# Patient Record
Sex: MALE | Race: WHITE | Employment: OTHER | URBAN - METROPOLITAN AREA
[De-identification: names, ages, dates, MRNs, and addresses within clinical notes are randomized per-mention and may not be internally consistent; named-entity substitution may affect disease eponyms.]

---

## 2017-04-19 ENCOUNTER — HOSPITAL ENCOUNTER (INPATIENT)
Age: 27
LOS: 8 days | Discharge: HOME | DRG: 750 | End: 2017-04-28
Attending: EMERGENCY MEDICINE | Admitting: PSYCHIATRY & NEUROLOGY
Payer: MEDICAID

## 2017-04-19 DIAGNOSIS — F23 ACUTE PSYCHOSIS (HCC): Primary | ICD-10-CM

## 2017-04-19 LAB
ALBUMIN SERPL-MCNC: 4.9 G/DL (ref 3.9–4.9)
ALP BLD-CCNC: 73 U/L (ref 35–104)
ALT SERPL-CCNC: 19 U/L (ref 0–41)
ANION GAP SERPL CALCULATED.3IONS-SCNC: 12 MEQ/L (ref 7–13)
AST SERPL-CCNC: 22 U/L (ref 0–40)
BASOPHILS ABSOLUTE: 0.1 K/UL (ref 0–0.2)
BASOPHILS RELATIVE PERCENT: 0.6 %
BILIRUB SERPL-MCNC: 0.4 MG/DL (ref 0–1.2)
BUN BLDV-MCNC: 21 MG/DL (ref 6–20)
CALCIUM SERPL-MCNC: 9.6 MG/DL (ref 8.6–10.2)
CHLORIDE BLD-SCNC: 98 MEQ/L (ref 98–107)
CO2: 27 MEQ/L (ref 22–29)
CREAT SERPL-MCNC: 0.95 MG/DL (ref 0.7–1.2)
EOSINOPHILS ABSOLUTE: 0.1 K/UL (ref 0–0.7)
EOSINOPHILS RELATIVE PERCENT: 1 %
ETHANOL PERCENT: NORMAL G/DL
ETHANOL: <10 MG/DL (ref 0–0.08)
GFR AFRICAN AMERICAN: >60
GFR NON-AFRICAN AMERICAN: >60
GLOBULIN: 2.3 G/DL (ref 2.3–3.5)
GLUCOSE BLD-MCNC: 151 MG/DL (ref 74–109)
HCT VFR BLD CALC: 45.2 % (ref 42–52)
HEMOGLOBIN: 15.3 G/DL (ref 14–18)
LYMPHOCYTES ABSOLUTE: 3.1 K/UL (ref 1–4.8)
LYMPHOCYTES RELATIVE PERCENT: 27.9 %
MCH RBC QN AUTO: 32.3 PG (ref 27–31.3)
MCHC RBC AUTO-ENTMCNC: 33.9 % (ref 33–37)
MCV RBC AUTO: 95.2 FL (ref 80–100)
MONOCYTES ABSOLUTE: 1 K/UL (ref 0.2–0.8)
MONOCYTES RELATIVE PERCENT: 8.8 %
NEUTROPHILS ABSOLUTE: 6.8 K/UL (ref 1.4–6.5)
NEUTROPHILS RELATIVE PERCENT: 61.7 %
PDW BLD-RTO: 12.1 % (ref 11.5–14.5)
PLATELET # BLD: 184 K/UL (ref 130–400)
POTASSIUM SERPL-SCNC: 3.7 MEQ/L (ref 3.5–5.1)
RBC # BLD: 4.74 M/UL (ref 4.7–6.1)
SODIUM BLD-SCNC: 137 MEQ/L (ref 132–144)
TOTAL CK: 169 U/L (ref 0–190)
TOTAL PROTEIN: 7.2 G/DL (ref 6.4–8.1)
TSH SERPL DL<=0.05 MIU/L-ACNC: 0.88 UIU/ML (ref 0.27–4.2)
VALPROIC ACID LEVEL: 9.4 UG/ML (ref 50–100)
WBC # BLD: 11 K/UL (ref 4.8–10.8)

## 2017-04-19 PROCEDURE — 6370000000 HC RX 637 (ALT 250 FOR IP): Performed by: PERSONAL EMERGENCY RESPONSE ATTENDANT

## 2017-04-19 PROCEDURE — 85025 COMPLETE CBC W/AUTO DIFF WBC: CPT

## 2017-04-19 PROCEDURE — 80053 COMPREHEN METABOLIC PANEL: CPT

## 2017-04-19 PROCEDURE — 81003 URINALYSIS AUTO W/O SCOPE: CPT

## 2017-04-19 PROCEDURE — 80307 DRUG TEST PRSMV CHEM ANLYZR: CPT

## 2017-04-19 PROCEDURE — 82550 ASSAY OF CK (CPK): CPT

## 2017-04-19 PROCEDURE — G0480 DRUG TEST DEF 1-7 CLASSES: HCPCS

## 2017-04-19 PROCEDURE — 99285 EMERGENCY DEPT VISIT HI MDM: CPT

## 2017-04-19 PROCEDURE — 36415 COLL VENOUS BLD VENIPUNCTURE: CPT

## 2017-04-19 PROCEDURE — 96372 THER/PROPH/DIAG INJ SC/IM: CPT

## 2017-04-19 PROCEDURE — 80164 ASSAY DIPROPYLACETIC ACD TOT: CPT

## 2017-04-19 PROCEDURE — 6360000002 HC RX W HCPCS: Performed by: EMERGENCY MEDICINE

## 2017-04-19 PROCEDURE — 84443 ASSAY THYROID STIM HORMONE: CPT

## 2017-04-19 RX ORDER — LORAZEPAM 1 MG/1
2 TABLET ORAL ONCE
Status: COMPLETED | OUTPATIENT
Start: 2017-04-19 | End: 2017-04-19

## 2017-04-19 RX ORDER — HALOPERIDOL 5 MG/ML
4 INJECTION INTRAMUSCULAR ONCE
Status: COMPLETED | OUTPATIENT
Start: 2017-04-19 | End: 2017-04-19

## 2017-04-19 RX ORDER — NICOTINE 21 MG/24HR
1 PATCH, TRANSDERMAL 24 HOURS TRANSDERMAL ONCE
Status: DISCONTINUED | OUTPATIENT
Start: 2017-04-19 | End: 2017-04-20

## 2017-04-19 RX ADMIN — HALOPERIDOL LACTATE 4 MG: 5 INJECTION, SOLUTION INTRAMUSCULAR at 20:38

## 2017-04-19 RX ADMIN — LORAZEPAM 2 MG: 1 TABLET ORAL at 20:30

## 2017-04-20 PROBLEM — M54.9 CHRONIC BACK PAIN: Status: ACTIVE | Noted: 2017-04-20

## 2017-04-20 PROBLEM — G89.29 CHRONIC BACK PAIN: Status: ACTIVE | Noted: 2017-04-20

## 2017-04-20 LAB
AMPHETAMINE SCREEN, URINE: ABNORMAL
BARBITURATE SCREEN URINE: ABNORMAL
BENZODIAZEPINE SCREEN, URINE: ABNORMAL
BILIRUBIN URINE: NEGATIVE
BLOOD, URINE: NEGATIVE
CANNABINOID SCREEN URINE: POSITIVE
CLARITY: CLEAR
COCAINE METABOLITE SCREEN URINE: ABNORMAL
COLOR: YELLOW
GLUCOSE URINE: NEGATIVE MG/DL
KETONES, URINE: NEGATIVE MG/DL
LEUKOCYTE ESTERASE, URINE: NEGATIVE
Lab: ABNORMAL
NITRITE, URINE: NEGATIVE
OPIATE SCREEN URINE: ABNORMAL
PH UA: 6 (ref 5–9)
PHENCYCLIDINE SCREEN URINE: ABNORMAL
PROTEIN UA: NEGATIVE MG/DL
SPECIFIC GRAVITY UA: 1.02 (ref 1–1.03)
URINE REFLEX TO CULTURE: NORMAL
UROBILINOGEN, URINE: 0.2 E.U./DL

## 2017-04-20 PROCEDURE — 99223 1ST HOSP IP/OBS HIGH 75: CPT | Performed by: PSYCHIATRY & NEUROLOGY

## 2017-04-20 PROCEDURE — 6370000000 HC RX 637 (ALT 250 FOR IP): Performed by: PHYSICIAN ASSISTANT

## 2017-04-20 PROCEDURE — 1240000000 HC EMOTIONAL WELLNESS R&B

## 2017-04-20 PROCEDURE — 6360000002 HC RX W HCPCS

## 2017-04-20 PROCEDURE — 6370000000 HC RX 637 (ALT 250 FOR IP): Performed by: PSYCHIATRY & NEUROLOGY

## 2017-04-20 PROCEDURE — 6360000002 HC RX W HCPCS: Performed by: PHYSICIAN ASSISTANT

## 2017-04-20 RX ORDER — DIVALPROEX SODIUM 500 MG/1
500 TABLET, EXTENDED RELEASE ORAL 2 TIMES DAILY
Status: DISCONTINUED | OUTPATIENT
Start: 2017-04-20 | End: 2017-04-23

## 2017-04-20 RX ORDER — HALOPERIDOL 5 MG/ML
5 INJECTION INTRAMUSCULAR ONCE
Status: COMPLETED | OUTPATIENT
Start: 2017-04-20 | End: 2017-04-20

## 2017-04-20 RX ORDER — HYDROXYZINE PAMOATE 50 MG/1
50 CAPSULE ORAL EVERY 6 HOURS PRN
Status: DISCONTINUED | OUTPATIENT
Start: 2017-04-20 | End: 2017-04-28 | Stop reason: HOSPADM

## 2017-04-20 RX ORDER — ACETAMINOPHEN 325 MG/1
650 TABLET ORAL EVERY 4 HOURS PRN
Status: DISCONTINUED | OUTPATIENT
Start: 2017-04-20 | End: 2017-04-28 | Stop reason: HOSPADM

## 2017-04-20 RX ORDER — LORAZEPAM 2 MG/ML
INJECTION INTRAMUSCULAR
Status: COMPLETED
Start: 2017-04-20 | End: 2017-04-20

## 2017-04-20 RX ORDER — LORAZEPAM 2 MG/ML
2 INJECTION INTRAMUSCULAR ONCE
Status: COMPLETED | OUTPATIENT
Start: 2017-04-20 | End: 2017-04-20

## 2017-04-20 RX ORDER — LIDOCAINE 50 MG/G
1 PATCH TOPICAL DAILY
Status: DISCONTINUED | OUTPATIENT
Start: 2017-04-20 | End: 2017-04-25

## 2017-04-20 RX ORDER — PALIPERIDONE 6 MG/1
6 TABLET, EXTENDED RELEASE ORAL DAILY
Status: DISCONTINUED | OUTPATIENT
Start: 2017-04-20 | End: 2017-04-23

## 2017-04-20 RX ORDER — MAGNESIUM HYDROXIDE/ALUMINUM HYDROXICE/SIMETHICONE 120; 1200; 1200 MG/30ML; MG/30ML; MG/30ML
30 SUSPENSION ORAL PRN
Status: DISCONTINUED | OUTPATIENT
Start: 2017-04-20 | End: 2017-04-28 | Stop reason: HOSPADM

## 2017-04-20 RX ORDER — HALOPERIDOL 5 MG/ML
INJECTION INTRAMUSCULAR
Status: DISPENSED
Start: 2017-04-20 | End: 2017-04-20

## 2017-04-20 RX ORDER — GABAPENTIN 100 MG/1
100 CAPSULE ORAL 3 TIMES DAILY
Status: DISCONTINUED | OUTPATIENT
Start: 2017-04-20 | End: 2017-04-25

## 2017-04-20 RX ORDER — BENZTROPINE MESYLATE 1 MG/ML
2 INJECTION INTRAMUSCULAR; INTRAVENOUS 2 TIMES DAILY PRN
Status: DISCONTINUED | OUTPATIENT
Start: 2017-04-20 | End: 2017-04-28 | Stop reason: HOSPADM

## 2017-04-20 RX ORDER — TRAZODONE HYDROCHLORIDE 50 MG/1
50 TABLET ORAL NIGHTLY PRN
Status: DISCONTINUED | OUTPATIENT
Start: 2017-04-20 | End: 2017-04-28 | Stop reason: HOSPADM

## 2017-04-20 RX ORDER — NICOTINE 21 MG/24HR
1 PATCH, TRANSDERMAL 24 HOURS TRANSDERMAL ONCE
Status: DISCONTINUED | OUTPATIENT
Start: 2017-04-20 | End: 2017-04-20

## 2017-04-20 RX ORDER — HALOPERIDOL 5 MG/ML
5 INJECTION INTRAMUSCULAR EVERY 4 HOURS PRN
Status: DISCONTINUED | OUTPATIENT
Start: 2017-04-20 | End: 2017-04-21

## 2017-04-20 RX ORDER — NICOTINE 21 MG/24HR
1 PATCH, TRANSDERMAL 24 HOURS TRANSDERMAL DAILY
Status: DISCONTINUED | OUTPATIENT
Start: 2017-04-20 | End: 2017-04-21

## 2017-04-20 RX ADMIN — ACETAMINOPHEN 650 MG: 325 TABLET ORAL at 13:33

## 2017-04-20 RX ADMIN — DIVALPROEX SODIUM 500 MG: 500 TABLET, EXTENDED RELEASE ORAL at 13:32

## 2017-04-20 RX ADMIN — LORAZEPAM 2 MG: 2 INJECTION INTRAMUSCULAR at 06:39

## 2017-04-20 RX ADMIN — DIVALPROEX SODIUM 500 MG: 500 TABLET, EXTENDED RELEASE ORAL at 20:48

## 2017-04-20 RX ADMIN — GABAPENTIN 100 MG: 100 CAPSULE ORAL at 13:32

## 2017-04-20 RX ADMIN — HALOPERIDOL LACTATE 5 MG: 5 INJECTION, SOLUTION INTRAMUSCULAR at 06:39

## 2017-04-20 RX ADMIN — GABAPENTIN 100 MG: 100 CAPSULE ORAL at 20:48

## 2017-04-20 RX ADMIN — PALIPERIDONE 6 MG: 6 TABLET, EXTENDED RELEASE ORAL at 13:32

## 2017-04-20 ASSESSMENT — SLEEP AND FATIGUE QUESTIONNAIRES
SLEEP PATTERN: DIFFICULTY FALLING ASLEEP;DISTURBED/INTERRUPTED SLEEP;INSOMNIA
DIFFICULTY ARISING: NO
DO YOU USE A SLEEP AID: YES
AVERAGE NUMBER OF SLEEP HOURS: 4
DIFFICULTY STAYING ASLEEP: YES
DIFFICULTY FALLING ASLEEP: YES
RESTFUL SLEEP: NO
DO YOU HAVE DIFFICULTY SLEEPING: YES

## 2017-04-20 ASSESSMENT — ENCOUNTER SYMPTOMS: BACK PAIN: 1

## 2017-04-20 ASSESSMENT — PATIENT HEALTH QUESTIONNAIRE - PHQ9: SUM OF ALL RESPONSES TO PHQ QUESTIONS 1-9: 25

## 2017-04-20 ASSESSMENT — PAIN SCALES - GENERAL: PAINLEVEL_OUTOF10: 4

## 2017-04-21 PROCEDURE — 6370000000 HC RX 637 (ALT 250 FOR IP): Performed by: PHYSICIAN ASSISTANT

## 2017-04-21 PROCEDURE — 6370000000 HC RX 637 (ALT 250 FOR IP): Performed by: PSYCHIATRY & NEUROLOGY

## 2017-04-21 PROCEDURE — 99232 SBSQ HOSP IP/OBS MODERATE 35: CPT | Performed by: PSYCHIATRY & NEUROLOGY

## 2017-04-21 PROCEDURE — 1240000000 HC EMOTIONAL WELLNESS R&B

## 2017-04-21 RX ORDER — ZIPRASIDONE MESYLATE 20 MG/ML
20 INJECTION, POWDER, LYOPHILIZED, FOR SOLUTION INTRAMUSCULAR EVERY 12 HOURS PRN
Status: DISCONTINUED | OUTPATIENT
Start: 2017-04-21 | End: 2017-04-23

## 2017-04-21 RX ORDER — LORAZEPAM 1 MG/1
1 TABLET ORAL EVERY 6 HOURS PRN
Status: DISCONTINUED | OUTPATIENT
Start: 2017-04-21 | End: 2017-04-26

## 2017-04-21 RX ORDER — HALOPERIDOL 5 MG/ML
5 INJECTION INTRAMUSCULAR EVERY 4 HOURS PRN
Status: DISCONTINUED | OUTPATIENT
Start: 2017-04-21 | End: 2017-04-28 | Stop reason: HOSPADM

## 2017-04-21 RX ORDER — HALOPERIDOL 5 MG
5 TABLET ORAL EVERY 4 HOURS PRN
Status: DISCONTINUED | OUTPATIENT
Start: 2017-04-21 | End: 2017-04-28 | Stop reason: HOSPADM

## 2017-04-21 RX ORDER — LORAZEPAM 2 MG/ML
1 INJECTION INTRAMUSCULAR EVERY 6 HOURS PRN
Status: DISCONTINUED | OUTPATIENT
Start: 2017-04-21 | End: 2017-04-26

## 2017-04-21 RX ADMIN — GABAPENTIN 100 MG: 100 CAPSULE ORAL at 21:14

## 2017-04-21 RX ADMIN — MAGNESIUM HYDROXIDE 30 ML: 400 SUSPENSION ORAL at 01:53

## 2017-04-21 RX ADMIN — GABAPENTIN 100 MG: 100 CAPSULE ORAL at 08:16

## 2017-04-21 RX ADMIN — PALIPERIDONE 6 MG: 6 TABLET, EXTENDED RELEASE ORAL at 08:20

## 2017-04-21 RX ADMIN — HALOPERIDOL 5 MG: 5 TABLET ORAL at 01:53

## 2017-04-21 RX ADMIN — TRAZODONE HYDROCHLORIDE 50 MG: 50 TABLET ORAL at 21:14

## 2017-04-21 RX ADMIN — HYDROXYZINE PAMOATE 50 MG: 50 CAPSULE ORAL at 14:31

## 2017-04-21 RX ADMIN — HYDROXYZINE PAMOATE 50 MG: 50 CAPSULE ORAL at 19:31

## 2017-04-21 RX ADMIN — NICOTINE POLACRILEX 2 MG: 2 GUM, CHEWING BUCCAL at 19:27

## 2017-04-21 RX ADMIN — HALOPERIDOL 5 MG: 5 TABLET ORAL at 14:31

## 2017-04-21 RX ADMIN — DIVALPROEX SODIUM 500 MG: 500 TABLET, EXTENDED RELEASE ORAL at 21:14

## 2017-04-21 RX ADMIN — GABAPENTIN 100 MG: 100 CAPSULE ORAL at 13:11

## 2017-04-21 RX ADMIN — NICOTINE POLACRILEX 2 MG: 2 GUM, CHEWING BUCCAL at 13:11

## 2017-04-21 RX ADMIN — HYDROXYZINE PAMOATE 50 MG: 50 CAPSULE ORAL at 01:53

## 2017-04-21 RX ADMIN — DIVALPROEX SODIUM 500 MG: 500 TABLET, EXTENDED RELEASE ORAL at 08:15

## 2017-04-21 RX ADMIN — HALOPERIDOL 5 MG: 5 TABLET ORAL at 08:15

## 2017-04-21 RX ADMIN — LORAZEPAM 1 MG: 1 TABLET ORAL at 15:00

## 2017-04-22 PROCEDURE — 6370000000 HC RX 637 (ALT 250 FOR IP): Performed by: PSYCHIATRY & NEUROLOGY

## 2017-04-22 PROCEDURE — 1240000000 HC EMOTIONAL WELLNESS R&B

## 2017-04-22 RX ORDER — DIVALPROEX SODIUM 500 MG/1
500 TABLET, DELAYED RELEASE ORAL ONCE
Status: DISCONTINUED | OUTPATIENT
Start: 2017-04-22 | End: 2017-04-22

## 2017-04-22 RX ORDER — DIVALPROEX SODIUM 500 MG/1
1000 TABLET, DELAYED RELEASE ORAL ONCE
Status: COMPLETED | OUTPATIENT
Start: 2017-04-22 | End: 2017-04-22

## 2017-04-22 RX ADMIN — TRAZODONE HYDROCHLORIDE 50 MG: 50 TABLET ORAL at 20:39

## 2017-04-22 RX ADMIN — DIVALPROEX SODIUM 500 MG: 500 TABLET, EXTENDED RELEASE ORAL at 20:39

## 2017-04-22 RX ADMIN — HALOPERIDOL 5 MG: 5 TABLET ORAL at 12:45

## 2017-04-22 RX ADMIN — NICOTINE POLACRILEX 2 MG: 2 GUM, CHEWING BUCCAL at 12:36

## 2017-04-22 RX ADMIN — DIVALPROEX SODIUM 1000 MG: 500 TABLET, DELAYED RELEASE ORAL at 13:47

## 2017-04-22 RX ADMIN — LORAZEPAM 1 MG: 1 TABLET ORAL at 12:34

## 2017-04-22 RX ADMIN — GABAPENTIN 100 MG: 100 CAPSULE ORAL at 20:39

## 2017-04-22 RX ADMIN — NICOTINE POLACRILEX 2 MG: 2 GUM, CHEWING BUCCAL at 06:38

## 2017-04-22 RX ADMIN — PALIPERIDONE 6 MG: 6 TABLET, EXTENDED RELEASE ORAL at 09:02

## 2017-04-22 RX ADMIN — LORAZEPAM 1 MG: 1 TABLET ORAL at 20:58

## 2017-04-22 RX ADMIN — NICOTINE POLACRILEX 2 MG: 2 GUM, CHEWING BUCCAL at 19:57

## 2017-04-22 RX ADMIN — HYDROXYZINE PAMOATE 50 MG: 50 CAPSULE ORAL at 12:45

## 2017-04-22 RX ADMIN — DIVALPROEX SODIUM 500 MG: 500 TABLET, EXTENDED RELEASE ORAL at 09:02

## 2017-04-22 RX ADMIN — HALOPERIDOL 5 MG: 5 TABLET ORAL at 05:44

## 2017-04-22 RX ADMIN — HYDROXYZINE PAMOATE 50 MG: 50 CAPSULE ORAL at 05:44

## 2017-04-22 RX ADMIN — GABAPENTIN 100 MG: 100 CAPSULE ORAL at 09:02

## 2017-04-22 RX ADMIN — LORAZEPAM 1 MG: 1 TABLET ORAL at 06:38

## 2017-04-23 PROCEDURE — 1240000000 HC EMOTIONAL WELLNESS R&B

## 2017-04-23 PROCEDURE — 6370000000 HC RX 637 (ALT 250 FOR IP): Performed by: PSYCHIATRY & NEUROLOGY

## 2017-04-23 PROCEDURE — 36415 COLL VENOUS BLD VENIPUNCTURE: CPT

## 2017-04-23 PROCEDURE — 80164 ASSAY DIPROPYLACETIC ACD TOT: CPT

## 2017-04-23 RX ORDER — DIVALPROEX SODIUM 500 MG/1
500 TABLET, EXTENDED RELEASE ORAL 3 TIMES DAILY
Status: DISCONTINUED | OUTPATIENT
Start: 2017-04-23 | End: 2017-04-26

## 2017-04-23 RX ORDER — PALIPERIDONE 9 MG/1
9 TABLET, EXTENDED RELEASE ORAL DAILY
Status: DISCONTINUED | OUTPATIENT
Start: 2017-04-24 | End: 2017-04-28 | Stop reason: HOSPADM

## 2017-04-23 RX ADMIN — TRAZODONE HYDROCHLORIDE 50 MG: 50 TABLET ORAL at 20:57

## 2017-04-23 RX ADMIN — DIVALPROEX SODIUM 500 MG: 500 TABLET, EXTENDED RELEASE ORAL at 20:52

## 2017-04-23 RX ADMIN — PALIPERIDONE 6 MG: 6 TABLET, EXTENDED RELEASE ORAL at 08:10

## 2017-04-23 RX ADMIN — HALOPERIDOL 5 MG: 5 TABLET ORAL at 11:12

## 2017-04-23 RX ADMIN — HYDROXYZINE PAMOATE 50 MG: 50 CAPSULE ORAL at 14:10

## 2017-04-23 RX ADMIN — LORAZEPAM 1 MG: 1 TABLET ORAL at 11:12

## 2017-04-23 RX ADMIN — DIVALPROEX SODIUM 500 MG: 500 TABLET, EXTENDED RELEASE ORAL at 08:10

## 2017-04-23 RX ADMIN — GABAPENTIN 100 MG: 100 CAPSULE ORAL at 08:10

## 2017-04-23 RX ADMIN — GABAPENTIN 100 MG: 100 CAPSULE ORAL at 20:52

## 2017-04-23 RX ADMIN — DIVALPROEX SODIUM 500 MG: 500 TABLET, EXTENDED RELEASE ORAL at 14:01

## 2017-04-23 RX ADMIN — LORAZEPAM 1 MG: 1 TABLET ORAL at 17:58

## 2017-04-23 RX ADMIN — NICOTINE POLACRILEX 2 MG: 2 GUM, CHEWING BUCCAL at 06:46

## 2017-04-23 RX ADMIN — GABAPENTIN 100 MG: 100 CAPSULE ORAL at 14:01

## 2017-04-24 PROCEDURE — 6370000000 HC RX 637 (ALT 250 FOR IP): Performed by: PSYCHIATRY & NEUROLOGY

## 2017-04-24 PROCEDURE — 99232 SBSQ HOSP IP/OBS MODERATE 35: CPT | Performed by: PSYCHIATRY & NEUROLOGY

## 2017-04-24 PROCEDURE — 6370000000 HC RX 637 (ALT 250 FOR IP): Performed by: PHYSICIAN ASSISTANT

## 2017-04-24 PROCEDURE — 1240000000 HC EMOTIONAL WELLNESS R&B

## 2017-04-24 RX ADMIN — LORAZEPAM 1 MG: 1 TABLET ORAL at 12:49

## 2017-04-24 RX ADMIN — DIVALPROEX SODIUM 500 MG: 500 TABLET, EXTENDED RELEASE ORAL at 09:26

## 2017-04-24 RX ADMIN — GABAPENTIN 100 MG: 100 CAPSULE ORAL at 20:42

## 2017-04-24 RX ADMIN — HYDROXYZINE PAMOATE 50 MG: 50 CAPSULE ORAL at 15:59

## 2017-04-24 RX ADMIN — GABAPENTIN 100 MG: 100 CAPSULE ORAL at 09:25

## 2017-04-24 RX ADMIN — HYDROXYZINE PAMOATE 50 MG: 50 CAPSULE ORAL at 10:11

## 2017-04-24 RX ADMIN — LORAZEPAM 1 MG: 1 TABLET ORAL at 04:57

## 2017-04-24 RX ADMIN — PALIPERIDONE 9 MG: 9 TABLET, EXTENDED RELEASE ORAL at 09:26

## 2017-04-24 RX ADMIN — NICOTINE POLACRILEX 2 MG: 2 GUM, CHEWING BUCCAL at 09:41

## 2017-04-24 RX ADMIN — HALOPERIDOL 5 MG: 5 TABLET ORAL at 16:41

## 2017-04-24 RX ADMIN — TRAZODONE HYDROCHLORIDE 50 MG: 50 TABLET ORAL at 20:42

## 2017-04-24 RX ADMIN — DIVALPROEX SODIUM 500 MG: 500 TABLET, EXTENDED RELEASE ORAL at 13:47

## 2017-04-24 RX ADMIN — GABAPENTIN 100 MG: 100 CAPSULE ORAL at 13:47

## 2017-04-24 RX ADMIN — DIVALPROEX SODIUM 500 MG: 500 TABLET, EXTENDED RELEASE ORAL at 20:42

## 2017-04-25 ENCOUNTER — APPOINTMENT (OUTPATIENT)
Dept: GENERAL RADIOLOGY | Age: 27
DRG: 750 | End: 2017-04-25
Payer: MEDICAID

## 2017-04-25 PROBLEM — M25.561 KNEE PAIN, BILATERAL: Status: ACTIVE | Noted: 2017-04-25

## 2017-04-25 PROBLEM — M25.562 KNEE PAIN, BILATERAL: Status: ACTIVE | Noted: 2017-04-25

## 2017-04-25 LAB
VALPROIC ACID % FREE: 11 % (ref 5–18)
VALPROIC ACID TOTAL: 75 UG/ML (ref 50–125)
VALPROIC ACID, FREE: 8 UG/ML (ref 7–23)

## 2017-04-25 PROCEDURE — 1240000000 HC EMOTIONAL WELLNESS R&B

## 2017-04-25 PROCEDURE — 99232 SBSQ HOSP IP/OBS MODERATE 35: CPT | Performed by: PSYCHIATRY & NEUROLOGY

## 2017-04-25 PROCEDURE — 72110 X-RAY EXAM L-2 SPINE 4/>VWS: CPT

## 2017-04-25 PROCEDURE — 73562 X-RAY EXAM OF KNEE 3: CPT

## 2017-04-25 PROCEDURE — 6370000000 HC RX 637 (ALT 250 FOR IP): Performed by: PSYCHIATRY & NEUROLOGY

## 2017-04-25 PROCEDURE — 6370000000 HC RX 637 (ALT 250 FOR IP): Performed by: ANESTHESIOLOGY

## 2017-04-25 RX ORDER — METHOCARBAMOL 500 MG/1
500 TABLET, FILM COATED ORAL 3 TIMES DAILY PRN
Status: DISCONTINUED | OUTPATIENT
Start: 2017-04-25 | End: 2017-04-28 | Stop reason: HOSPADM

## 2017-04-25 RX ORDER — MELOXICAM 7.5 MG/1
7.5 TABLET ORAL 2 TIMES DAILY
Status: DISCONTINUED | OUTPATIENT
Start: 2017-04-25 | End: 2017-04-28 | Stop reason: HOSPADM

## 2017-04-25 RX ORDER — LIDOCAINE 50 MG/G
3 PATCH TOPICAL DAILY
Status: DISCONTINUED | OUTPATIENT
Start: 2017-04-26 | End: 2017-04-28 | Stop reason: HOSPADM

## 2017-04-25 RX ORDER — GABAPENTIN 300 MG/1
300 CAPSULE ORAL NIGHTLY
Status: DISCONTINUED | OUTPATIENT
Start: 2017-04-26 | End: 2017-04-26

## 2017-04-25 RX ADMIN — GABAPENTIN 100 MG: 100 CAPSULE ORAL at 08:45

## 2017-04-25 RX ADMIN — GABAPENTIN 100 MG: 100 CAPSULE ORAL at 20:15

## 2017-04-25 RX ADMIN — DIVALPROEX SODIUM 500 MG: 500 TABLET, EXTENDED RELEASE ORAL at 21:35

## 2017-04-25 RX ADMIN — LORAZEPAM 1 MG: 1 TABLET ORAL at 18:51

## 2017-04-25 RX ADMIN — HYDROXYZINE PAMOATE 50 MG: 50 CAPSULE ORAL at 06:02

## 2017-04-25 RX ADMIN — MELOXICAM 7.5 MG: 7.5 TABLET ORAL at 21:34

## 2017-04-25 RX ADMIN — NICOTINE POLACRILEX 2 MG: 2 GUM, CHEWING BUCCAL at 15:02

## 2017-04-25 RX ADMIN — HALOPERIDOL 5 MG: 5 TABLET ORAL at 15:24

## 2017-04-25 RX ADMIN — NICOTINE POLACRILEX 2 MG: 2 GUM, CHEWING BUCCAL at 20:13

## 2017-04-25 RX ADMIN — HALOPERIDOL 5 MG: 5 TABLET ORAL at 20:34

## 2017-04-25 RX ADMIN — HYDROXYZINE PAMOATE 50 MG: 50 CAPSULE ORAL at 15:24

## 2017-04-25 RX ADMIN — TRAZODONE HYDROCHLORIDE 50 MG: 50 TABLET ORAL at 21:34

## 2017-04-25 RX ADMIN — PALIPERIDONE 9 MG: 9 TABLET, EXTENDED RELEASE ORAL at 08:45

## 2017-04-25 RX ADMIN — DIVALPROEX SODIUM 500 MG: 500 TABLET, EXTENDED RELEASE ORAL at 08:45

## 2017-04-25 RX ADMIN — GABAPENTIN 100 MG: 100 CAPSULE ORAL at 14:01

## 2017-04-25 RX ADMIN — HALOPERIDOL 5 MG: 5 TABLET ORAL at 06:04

## 2017-04-25 RX ADMIN — METHOCARBAMOL 500 MG: 500 TABLET ORAL at 21:35

## 2017-04-25 RX ADMIN — HYDROXYZINE PAMOATE 50 MG: 50 CAPSULE ORAL at 21:35

## 2017-04-25 RX ADMIN — DIVALPROEX SODIUM 500 MG: 500 TABLET, EXTENDED RELEASE ORAL at 14:01

## 2017-04-25 RX ADMIN — LORAZEPAM 1 MG: 1 TABLET ORAL at 10:24

## 2017-04-26 LAB — VALPROIC ACID LEVEL: 62.1 UG/ML (ref 50–100)

## 2017-04-26 PROCEDURE — 1240000000 HC EMOTIONAL WELLNESS R&B

## 2017-04-26 PROCEDURE — 6370000000 HC RX 637 (ALT 250 FOR IP): Performed by: PSYCHIATRY & NEUROLOGY

## 2017-04-26 PROCEDURE — 6370000000 HC RX 637 (ALT 250 FOR IP): Performed by: ANESTHESIOLOGY

## 2017-04-26 PROCEDURE — 80164 ASSAY DIPROPYLACETIC ACD TOT: CPT

## 2017-04-26 PROCEDURE — 36415 COLL VENOUS BLD VENIPUNCTURE: CPT

## 2017-04-26 PROCEDURE — 99233 SBSQ HOSP IP/OBS HIGH 50: CPT | Performed by: PSYCHIATRY & NEUROLOGY

## 2017-04-26 RX ORDER — DIVALPROEX SODIUM 500 MG/1
500 TABLET, DELAYED RELEASE ORAL ONCE
Status: COMPLETED | OUTPATIENT
Start: 2017-04-26 | End: 2017-04-26

## 2017-04-26 RX ORDER — GABAPENTIN 300 MG/1
300 CAPSULE ORAL 3 TIMES DAILY
Status: DISCONTINUED | OUTPATIENT
Start: 2017-04-26 | End: 2017-04-28 | Stop reason: HOSPADM

## 2017-04-26 RX ORDER — DIVALPROEX SODIUM 500 MG/1
1000 TABLET, DELAYED RELEASE ORAL EVERY 12 HOURS SCHEDULED
Status: DISCONTINUED | OUTPATIENT
Start: 2017-04-26 | End: 2017-04-28 | Stop reason: HOSPADM

## 2017-04-26 RX ORDER — BENZTROPINE MESYLATE 1 MG/1
1 TABLET ORAL 2 TIMES DAILY
Status: DISCONTINUED | OUTPATIENT
Start: 2017-04-26 | End: 2017-04-28 | Stop reason: HOSPADM

## 2017-04-26 RX ADMIN — PALIPERIDONE 9 MG: 9 TABLET, EXTENDED RELEASE ORAL at 08:52

## 2017-04-26 RX ADMIN — HYDROXYZINE PAMOATE 50 MG: 50 CAPSULE ORAL at 08:52

## 2017-04-26 RX ADMIN — DIVALPROEX SODIUM 500 MG: 500 TABLET, DELAYED RELEASE ORAL at 12:13

## 2017-04-26 RX ADMIN — NICOTINE POLACRILEX 2 MG: 2 GUM, CHEWING BUCCAL at 17:12

## 2017-04-26 RX ADMIN — HALOPERIDOL 5 MG: 5 TABLET ORAL at 08:52

## 2017-04-26 RX ADMIN — GABAPENTIN 300 MG: 300 CAPSULE ORAL at 14:09

## 2017-04-26 RX ADMIN — MELOXICAM 7.5 MG: 7.5 TABLET ORAL at 08:52

## 2017-04-26 RX ADMIN — HALOPERIDOL 5 MG: 5 TABLET ORAL at 18:10

## 2017-04-26 RX ADMIN — GABAPENTIN 300 MG: 300 CAPSULE ORAL at 20:56

## 2017-04-26 RX ADMIN — MELOXICAM 7.5 MG: 7.5 TABLET ORAL at 20:57

## 2017-04-26 RX ADMIN — BENZTROPINE MESYLATE 1 MG: 1 TABLET ORAL at 12:12

## 2017-04-26 RX ADMIN — DIVALPROEX SODIUM 500 MG: 500 TABLET, EXTENDED RELEASE ORAL at 08:52

## 2017-04-26 RX ADMIN — HYDROXYZINE PAMOATE 50 MG: 50 CAPSULE ORAL at 18:10

## 2017-04-26 RX ADMIN — NICOTINE POLACRILEX 2 MG: 2 GUM, CHEWING BUCCAL at 19:02

## 2017-04-26 RX ADMIN — BENZTROPINE MESYLATE 1 MG: 1 TABLET ORAL at 20:56

## 2017-04-26 RX ADMIN — DIVALPROEX SODIUM 1000 MG: 500 TABLET, DELAYED RELEASE ORAL at 20:56

## 2017-04-26 RX ADMIN — NICOTINE POLACRILEX 2 MG: 2 GUM, CHEWING BUCCAL at 21:31

## 2017-04-26 ASSESSMENT — PAIN SCALES - GENERAL
PAINLEVEL_OUTOF10: 6
PAINLEVEL_OUTOF10: 5

## 2017-04-27 PROCEDURE — 90833 PSYTX W PT W E/M 30 MIN: CPT | Performed by: PSYCHIATRY & NEUROLOGY

## 2017-04-27 PROCEDURE — 6370000000 HC RX 637 (ALT 250 FOR IP): Performed by: PSYCHIATRY & NEUROLOGY

## 2017-04-27 PROCEDURE — 99232 SBSQ HOSP IP/OBS MODERATE 35: CPT | Performed by: PSYCHIATRY & NEUROLOGY

## 2017-04-27 PROCEDURE — 1240000000 HC EMOTIONAL WELLNESS R&B

## 2017-04-27 PROCEDURE — 6370000000 HC RX 637 (ALT 250 FOR IP): Performed by: ANESTHESIOLOGY

## 2017-04-27 RX ADMIN — HALOPERIDOL 5 MG: 5 TABLET ORAL at 21:26

## 2017-04-27 RX ADMIN — BENZTROPINE MESYLATE 1 MG: 1 TABLET ORAL at 08:53

## 2017-04-27 RX ADMIN — NICOTINE POLACRILEX 2 MG: 2 GUM, CHEWING BUCCAL at 13:38

## 2017-04-27 RX ADMIN — GABAPENTIN 300 MG: 300 CAPSULE ORAL at 20:31

## 2017-04-27 RX ADMIN — HYDROXYZINE PAMOATE 50 MG: 50 CAPSULE ORAL at 21:26

## 2017-04-27 RX ADMIN — MELOXICAM 7.5 MG: 7.5 TABLET ORAL at 20:31

## 2017-04-27 RX ADMIN — TRAZODONE HYDROCHLORIDE 50 MG: 50 TABLET ORAL at 21:59

## 2017-04-27 RX ADMIN — GABAPENTIN 300 MG: 300 CAPSULE ORAL at 13:35

## 2017-04-27 RX ADMIN — PALIPERIDONE 9 MG: 9 TABLET, EXTENDED RELEASE ORAL at 08:53

## 2017-04-27 RX ADMIN — DIVALPROEX SODIUM 1000 MG: 500 TABLET, DELAYED RELEASE ORAL at 20:31

## 2017-04-27 RX ADMIN — GABAPENTIN 300 MG: 300 CAPSULE ORAL at 08:53

## 2017-04-27 RX ADMIN — NICOTINE POLACRILEX 2 MG: 2 GUM, CHEWING BUCCAL at 17:11

## 2017-04-27 RX ADMIN — MELOXICAM 7.5 MG: 7.5 TABLET ORAL at 08:53

## 2017-04-27 RX ADMIN — DIVALPROEX SODIUM 1000 MG: 500 TABLET, DELAYED RELEASE ORAL at 08:53

## 2017-04-27 RX ADMIN — BENZTROPINE MESYLATE 1 MG: 1 TABLET ORAL at 20:31

## 2017-04-27 ASSESSMENT — PAIN SCALES - GENERAL
PAINLEVEL_OUTOF10: 0
PAINLEVEL_OUTOF10: 0

## 2017-04-28 VITALS
HEIGHT: 72 IN | BODY MASS INDEX: 18.42 KG/M2 | RESPIRATION RATE: 16 BRPM | TEMPERATURE: 97 F | DIASTOLIC BLOOD PRESSURE: 63 MMHG | WEIGHT: 136 LBS | SYSTOLIC BLOOD PRESSURE: 108 MMHG | HEART RATE: 130 BPM | OXYGEN SATURATION: 97 %

## 2017-04-28 PROCEDURE — 99239 HOSP IP/OBS DSCHRG MGMT >30: CPT | Performed by: PSYCHIATRY & NEUROLOGY

## 2017-04-28 PROCEDURE — 6370000000 HC RX 637 (ALT 250 FOR IP): Performed by: PSYCHIATRY & NEUROLOGY

## 2017-04-28 PROCEDURE — 6370000000 HC RX 637 (ALT 250 FOR IP): Performed by: ANESTHESIOLOGY

## 2017-04-28 RX ORDER — DIVALPROEX SODIUM 500 MG/1
1000 TABLET, DELAYED RELEASE ORAL EVERY 12 HOURS SCHEDULED
Qty: 60 TABLET | Refills: 2 | Status: ON HOLD | OUTPATIENT
Start: 2017-04-28 | End: 2017-06-19 | Stop reason: HOSPADM

## 2017-04-28 RX ORDER — PALIPERIDONE 9 MG/1
9 TABLET, EXTENDED RELEASE ORAL DAILY
Qty: 30 TABLET | Refills: 0 | Status: ON HOLD | OUTPATIENT
Start: 2017-04-28 | End: 2017-06-19 | Stop reason: HOSPADM

## 2017-04-28 RX ORDER — GABAPENTIN 300 MG/1
300 CAPSULE ORAL 3 TIMES DAILY
Qty: 45 CAPSULE | Refills: 2 | Status: ON HOLD | OUTPATIENT
Start: 2017-04-28 | End: 2017-06-19 | Stop reason: HOSPADM

## 2017-04-28 RX ORDER — TRAZODONE HYDROCHLORIDE 50 MG/1
50 TABLET ORAL NIGHTLY PRN
Qty: 15 TABLET | Refills: 2 | Status: ON HOLD | OUTPATIENT
Start: 2017-04-28 | End: 2017-06-19 | Stop reason: HOSPADM

## 2017-04-28 RX ORDER — HYDROXYZINE PAMOATE 50 MG/1
50 CAPSULE ORAL 3 TIMES DAILY PRN
Qty: 30 CAPSULE | Refills: 2 | Status: SHIPPED | OUTPATIENT
Start: 2017-04-28 | End: 2017-05-12

## 2017-04-28 RX ORDER — BENZTROPINE MESYLATE 1 MG/1
1 TABLET ORAL 2 TIMES DAILY
Qty: 30 TABLET | Refills: 1 | Status: ON HOLD | OUTPATIENT
Start: 2017-04-28 | End: 2017-06-19 | Stop reason: HOSPADM

## 2017-04-28 RX ADMIN — BENZTROPINE MESYLATE 1 MG: 1 TABLET ORAL at 08:47

## 2017-04-28 RX ADMIN — MELOXICAM 7.5 MG: 7.5 TABLET ORAL at 08:47

## 2017-04-28 RX ADMIN — GABAPENTIN 300 MG: 300 CAPSULE ORAL at 08:47

## 2017-04-28 RX ADMIN — DIVALPROEX SODIUM 1000 MG: 500 TABLET, DELAYED RELEASE ORAL at 08:47

## 2017-04-28 RX ADMIN — PALIPERIDONE 9 MG: 9 TABLET, EXTENDED RELEASE ORAL at 08:47

## 2017-05-01 ENCOUNTER — HOSPITAL ENCOUNTER (EMERGENCY)
Age: 27
Discharge: HOME | End: 2017-05-01
Attending: EMERGENCY MEDICINE
Payer: MEDICAID

## 2017-05-01 ENCOUNTER — TELEPHONE (OUTPATIENT)
Dept: INTERNAL MEDICINE | Age: 27
End: 2017-05-01

## 2017-05-01 VITALS
SYSTOLIC BLOOD PRESSURE: 139 MMHG | OXYGEN SATURATION: 98 % | HEART RATE: 88 BPM | DIASTOLIC BLOOD PRESSURE: 91 MMHG | RESPIRATION RATE: 16 BRPM | TEMPERATURE: 98.3 F

## 2017-05-01 DIAGNOSIS — F41.1 ANXIETY STATE: Primary | ICD-10-CM

## 2017-05-01 PROCEDURE — 99282 EMERGENCY DEPT VISIT SF MDM: CPT

## 2017-05-01 PROCEDURE — 6370000000 HC RX 637 (ALT 250 FOR IP): Performed by: EMERGENCY MEDICINE

## 2017-05-01 RX ORDER — LORAZEPAM 1 MG/1
TABLET ORAL
Qty: 6 TABLET | Refills: 0 | Status: SHIPPED | OUTPATIENT
Start: 2017-05-01 | End: 2017-05-29

## 2017-05-01 RX ORDER — LORAZEPAM 1 MG/1
1 TABLET ORAL ONCE
Status: DISCONTINUED | OUTPATIENT
Start: 2017-05-01 | End: 2017-05-02 | Stop reason: HOSPADM

## 2017-05-01 RX ORDER — LORAZEPAM 1 MG/1
1 TABLET ORAL ONCE
Status: COMPLETED | OUTPATIENT
Start: 2017-05-01 | End: 2017-05-01

## 2017-05-01 RX ADMIN — LORAZEPAM 1 MG: 1 TABLET ORAL at 22:43

## 2017-05-01 ASSESSMENT — ENCOUNTER SYMPTOMS
TROUBLE SWALLOWING: 0
STRIDOR: 0
WHEEZING: 0
FACIAL SWELLING: 0
DIARRHEA: 0
BLOOD IN STOOL: 0
VOICE CHANGE: 0
RECTAL PAIN: 0
CHOKING: 0
EYE PAIN: 0
EYE REDNESS: 0
CHEST TIGHTNESS: 0
APNEA: 0
NAUSEA: 0
SORE THROAT: 0
PHOTOPHOBIA: 0
SHORTNESS OF BREATH: 0
ANAL BLEEDING: 0
ABDOMINAL DISTENTION: 0
EYE ITCHING: 0
CONSTIPATION: 0
RHINORRHEA: 0
COUGH: 0
EYE DISCHARGE: 0
BACK PAIN: 0
ABDOMINAL PAIN: 0
COLOR CHANGE: 0
SINUS PRESSURE: 0
VOMITING: 0

## 2017-05-01 ASSESSMENT — PAIN DESCRIPTION - PAIN TYPE: TYPE: ACUTE PAIN

## 2017-05-01 ASSESSMENT — PAIN DESCRIPTION - LOCATION: LOCATION: GENERALIZED

## 2017-05-01 ASSESSMENT — PAIN DESCRIPTION - DESCRIPTORS: DESCRIPTORS: ACHING

## 2017-05-01 ASSESSMENT — PAIN DESCRIPTION - FREQUENCY: FREQUENCY: CONTINUOUS

## 2017-05-02 ENCOUNTER — TELEPHONE (OUTPATIENT)
Dept: INTERNAL MEDICINE | Age: 27
End: 2017-05-02

## 2017-05-05 ENCOUNTER — OFFICE VISIT (OUTPATIENT)
Dept: FAMILY MEDICINE CLINIC | Age: 27
End: 2017-05-05

## 2017-05-05 VITALS
SYSTOLIC BLOOD PRESSURE: 124 MMHG | TEMPERATURE: 97.8 F | BODY MASS INDEX: 23.77 KG/M2 | HEART RATE: 102 BPM | DIASTOLIC BLOOD PRESSURE: 80 MMHG | OXYGEN SATURATION: 98 % | RESPIRATION RATE: 20 BRPM | HEIGHT: 74 IN | WEIGHT: 185.2 LBS

## 2017-05-05 DIAGNOSIS — F25.0 SCHIZOAFFECTIVE DISORDER, BIPOLAR TYPE (HCC): Primary | ICD-10-CM

## 2017-05-05 PROCEDURE — 99213 OFFICE O/P EST LOW 20 MIN: CPT | Performed by: NURSE PRACTITIONER

## 2017-05-05 ASSESSMENT — ENCOUNTER SYMPTOMS
WHEEZING: 0
SHORTNESS OF BREATH: 0
COUGH: 0

## 2017-05-29 ENCOUNTER — APPOINTMENT (OUTPATIENT)
Dept: GENERAL RADIOLOGY | Age: 27
End: 2017-05-29
Payer: MEDICAID

## 2017-05-29 ENCOUNTER — HOSPITAL ENCOUNTER (EMERGENCY)
Age: 27
Discharge: HOME | End: 2017-05-29
Attending: EMERGENCY MEDICINE
Payer: MEDICAID

## 2017-05-29 ENCOUNTER — APPOINTMENT (OUTPATIENT)
Dept: CT IMAGING | Age: 27
End: 2017-05-29
Payer: MEDICAID

## 2017-05-29 VITALS
HEIGHT: 72 IN | HEART RATE: 83 BPM | BODY MASS INDEX: 22.35 KG/M2 | RESPIRATION RATE: 18 BRPM | DIASTOLIC BLOOD PRESSURE: 61 MMHG | OXYGEN SATURATION: 97 % | TEMPERATURE: 98.6 F | SYSTOLIC BLOOD PRESSURE: 125 MMHG | WEIGHT: 165 LBS

## 2017-05-29 DIAGNOSIS — F19.90 ILLICIT DRUG USE: ICD-10-CM

## 2017-05-29 DIAGNOSIS — S20.212A CONTUSION OF CHEST WALL, LEFT, INITIAL ENCOUNTER: ICD-10-CM

## 2017-05-29 DIAGNOSIS — R56.9 SEIZURE (HCC): Primary | ICD-10-CM

## 2017-05-29 LAB
AMPHETAMINE SCREEN, URINE: ABNORMAL
ANION GAP SERPL CALCULATED.3IONS-SCNC: 38 MEQ/L (ref 7–13)
BACTERIA: NORMAL /HPF
BARBITURATE SCREEN URINE: ABNORMAL
BASOPHILS ABSOLUTE: 0.1 K/UL (ref 0–0.2)
BASOPHILS RELATIVE PERCENT: 0.5 %
BENZODIAZEPINE SCREEN, URINE: ABNORMAL
BILIRUBIN URINE: NEGATIVE
BLOOD, URINE: ABNORMAL
BUN BLDV-MCNC: 17 MG/DL (ref 6–20)
CALCIUM SERPL-MCNC: 10.7 MG/DL (ref 8.6–10.2)
CANNABINOID SCREEN URINE: POSITIVE
CHLORIDE BLD-SCNC: 92 MEQ/L (ref 98–107)
CLARITY: CLEAR
CO2: 15 MEQ/L (ref 22–29)
COCAINE METABOLITE SCREEN URINE: ABNORMAL
COLOR: YELLOW
CREAT SERPL-MCNC: 1.21 MG/DL (ref 0.7–1.2)
EOSINOPHILS ABSOLUTE: 0.1 K/UL (ref 0–0.7)
EOSINOPHILS RELATIVE PERCENT: 0.7 %
EPITHELIAL CELLS, UA: NORMAL /HPF
GFR AFRICAN AMERICAN: >60
GFR NON-AFRICAN AMERICAN: >60
GLUCOSE BLD-MCNC: 140 MG/DL (ref 74–109)
GLUCOSE URINE: NEGATIVE MG/DL
HCT VFR BLD CALC: 48.7 % (ref 42–52)
HEMOGLOBIN: 16.6 G/DL (ref 14–18)
KETONES, URINE: NEGATIVE MG/DL
LACTIC ACID: 3.6 MMOL/L (ref 0.5–2.2)
LEUKOCYTE ESTERASE, URINE: NEGATIVE
LYMPHOCYTES ABSOLUTE: 2.9 K/UL (ref 1–4.8)
LYMPHOCYTES RELATIVE PERCENT: 24.2 %
Lab: ABNORMAL
MAGNESIUM: 2.7 MG/DL (ref 1.7–2.3)
MCH RBC QN AUTO: 32.9 PG (ref 27–31.3)
MCHC RBC AUTO-ENTMCNC: 34.1 % (ref 33–37)
MCV RBC AUTO: 96.4 FL (ref 80–100)
MONOCYTES ABSOLUTE: 0.8 K/UL (ref 0.2–0.8)
MONOCYTES RELATIVE PERCENT: 6.9 %
NEUTROPHILS ABSOLUTE: 8.2 K/UL (ref 1.4–6.5)
NEUTROPHILS RELATIVE PERCENT: 67.7 %
NITRITE, URINE: NEGATIVE
OPIATE SCREEN URINE: ABNORMAL
PDW BLD-RTO: 12.8 % (ref 11.5–14.5)
PH UA: 6 (ref 5–9)
PHENCYCLIDINE SCREEN URINE: ABNORMAL
PLATELET # BLD: 214 K/UL (ref 130–400)
POTASSIUM SERPL-SCNC: 3.6 MEQ/L (ref 3.5–5.1)
PROTEIN UA: 30 MG/DL
RBC # BLD: 5.05 M/UL (ref 4.7–6.1)
RBC UA: NORMAL /HPF (ref 0–2)
SODIUM BLD-SCNC: 145 MEQ/L (ref 132–144)
SPECIFIC GRAVITY UA: 1.02 (ref 1–1.03)
TRICYCLIC, URINE: POSITIVE
URINE REFLEX TO CULTURE: YES
UROBILINOGEN, URINE: 0.2 E.U./DL
WBC # BLD: 12.2 K/UL (ref 4.8–10.8)
WBC UA: NORMAL /HPF (ref 0–5)

## 2017-05-29 PROCEDURE — 85025 COMPLETE CBC W/AUTO DIFF WBC: CPT

## 2017-05-29 PROCEDURE — 83605 ASSAY OF LACTIC ACID: CPT

## 2017-05-29 PROCEDURE — 87086 URINE CULTURE/COLONY COUNT: CPT

## 2017-05-29 PROCEDURE — 93005 ELECTROCARDIOGRAM TRACING: CPT

## 2017-05-29 PROCEDURE — 99285 EMERGENCY DEPT VISIT HI MDM: CPT

## 2017-05-29 PROCEDURE — 83735 ASSAY OF MAGNESIUM: CPT

## 2017-05-29 PROCEDURE — 80048 BASIC METABOLIC PNL TOTAL CA: CPT

## 2017-05-29 PROCEDURE — 70450 CT HEAD/BRAIN W/O DYE: CPT

## 2017-05-29 PROCEDURE — 2580000003 HC RX 258: Performed by: EMERGENCY MEDICINE

## 2017-05-29 PROCEDURE — 80307 DRUG TEST PRSMV CHEM ANLYZR: CPT

## 2017-05-29 PROCEDURE — 71020 XR CHEST STANDARD TWO VW: CPT

## 2017-05-29 PROCEDURE — 81001 URINALYSIS AUTO W/SCOPE: CPT

## 2017-05-29 RX ORDER — HYDROXYZINE PAMOATE 50 MG/1
50 CAPSULE ORAL 3 TIMES DAILY PRN
Status: ON HOLD | COMMUNITY
End: 2017-06-19 | Stop reason: HOSPADM

## 2017-05-29 RX ORDER — 0.9 % SODIUM CHLORIDE 0.9 %
1000 INTRAVENOUS SOLUTION INTRAVENOUS ONCE
Status: COMPLETED | OUTPATIENT
Start: 2017-05-29 | End: 2017-05-29

## 2017-05-29 RX ADMIN — SODIUM CHLORIDE 1000 ML: 9 INJECTION, SOLUTION INTRAVENOUS at 19:40

## 2017-05-29 ASSESSMENT — ENCOUNTER SYMPTOMS
RHINORRHEA: 0
SHORTNESS OF BREATH: 0
COUGH: 0
ABDOMINAL DISTENTION: 0
ABDOMINAL PAIN: 0
COLOR CHANGE: 0
BACK PAIN: 0
EYE PAIN: 0
SINUS PRESSURE: 0
CONSTIPATION: 0
NAUSEA: 0
VOMITING: 0
PHOTOPHOBIA: 0
APNEA: 0
DIARRHEA: 0
WHEEZING: 0
SORE THROAT: 0

## 2017-05-29 ASSESSMENT — PAIN DESCRIPTION - PROGRESSION: CLINICAL_PROGRESSION: NOT CHANGED

## 2017-05-29 ASSESSMENT — PAIN DESCRIPTION - DESCRIPTORS: DESCRIPTORS: HEADACHE

## 2017-05-29 ASSESSMENT — PAIN DESCRIPTION - ONSET: ONSET: SUDDEN

## 2017-05-29 ASSESSMENT — PAIN DESCRIPTION - LOCATION: LOCATION: HEAD

## 2017-05-29 ASSESSMENT — PAIN DESCRIPTION - ORIENTATION: ORIENTATION: LEFT;RIGHT

## 2017-06-01 LAB — URINE CULTURE, ROUTINE: NORMAL

## 2017-06-08 LAB
EKG ATRIAL RATE: 83 BPM
EKG P AXIS: 83 DEGREES
EKG P-R INTERVAL: 154 MS
EKG Q-T INTERVAL: 370 MS
EKG QRS DURATION: 90 MS
EKG QTC CALCULATION (BAZETT): 434 MS
EKG R AXIS: 88 DEGREES
EKG T AXIS: 44 DEGREES
EKG VENTRICULAR RATE: 83 BPM

## 2017-06-12 ENCOUNTER — APPOINTMENT (OUTPATIENT)
Dept: GENERAL RADIOLOGY | Age: 27
End: 2017-06-12
Payer: MEDICAID

## 2017-06-12 ENCOUNTER — HOSPITAL ENCOUNTER (EMERGENCY)
Age: 27
Discharge: HOME | End: 2017-06-12
Attending: EMERGENCY MEDICINE
Payer: MEDICAID

## 2017-06-12 VITALS
OXYGEN SATURATION: 98 % | DIASTOLIC BLOOD PRESSURE: 70 MMHG | BODY MASS INDEX: 24.38 KG/M2 | TEMPERATURE: 98.7 F | HEART RATE: 94 BPM | RESPIRATION RATE: 16 BRPM | HEIGHT: 72 IN | SYSTOLIC BLOOD PRESSURE: 123 MMHG | WEIGHT: 180 LBS

## 2017-06-12 DIAGNOSIS — S63.502A SPRAIN OF WRIST, LEFT, INITIAL ENCOUNTER: Primary | ICD-10-CM

## 2017-06-12 PROCEDURE — 99283 EMERGENCY DEPT VISIT LOW MDM: CPT

## 2017-06-12 PROCEDURE — 73110 X-RAY EXAM OF WRIST: CPT

## 2017-06-12 PROCEDURE — 6370000000 HC RX 637 (ALT 250 FOR IP): Performed by: EMERGENCY MEDICINE

## 2017-06-12 PROCEDURE — 73130 X-RAY EXAM OF HAND: CPT

## 2017-06-12 RX ORDER — OXYCODONE HYDROCHLORIDE AND ACETAMINOPHEN 5; 325 MG/1; MG/1
1 TABLET ORAL ONCE
Status: COMPLETED | OUTPATIENT
Start: 2017-06-12 | End: 2017-06-12

## 2017-06-12 RX ORDER — NAPROXEN 500 MG/1
500 TABLET ORAL 2 TIMES DAILY WITH MEALS
Qty: 30 TABLET | Refills: 0 | Status: ON HOLD | OUTPATIENT
Start: 2017-06-12 | End: 2017-06-19 | Stop reason: HOSPADM

## 2017-06-12 RX ADMIN — OXYCODONE HYDROCHLORIDE AND ACETAMINOPHEN 1 TABLET: 5; 325 TABLET ORAL at 21:58

## 2017-06-12 ASSESSMENT — ENCOUNTER SYMPTOMS
EYE PAIN: 0
STRIDOR: 0
EYE DISCHARGE: 0
CONSTIPATION: 0
APNEA: 0
WHEEZING: 0
SORE THROAT: 0
VOICE CHANGE: 0
PHOTOPHOBIA: 0
EYE ITCHING: 0
FACIAL SWELLING: 0
BLOOD IN STOOL: 0
ABDOMINAL PAIN: 0
SINUS PRESSURE: 0
COLOR CHANGE: 0
BACK PAIN: 0
CHOKING: 0
SHORTNESS OF BREATH: 0
ANAL BLEEDING: 0
TROUBLE SWALLOWING: 0
RECTAL PAIN: 0
EYE REDNESS: 0
CHEST TIGHTNESS: 0
DIARRHEA: 0
RHINORRHEA: 0
VOMITING: 0
ABDOMINAL DISTENTION: 0
COUGH: 0
NAUSEA: 0

## 2017-06-12 ASSESSMENT — PAIN DESCRIPTION - PROGRESSION
CLINICAL_PROGRESSION: NOT CHANGED
CLINICAL_PROGRESSION: NOT CHANGED

## 2017-06-12 ASSESSMENT — PAIN DESCRIPTION - PAIN TYPE
TYPE: ACUTE PAIN
TYPE: ACUTE PAIN

## 2017-06-12 ASSESSMENT — PAIN SCALES - GENERAL
PAINLEVEL_OUTOF10: 6
PAINLEVEL_OUTOF10: 3

## 2017-06-12 ASSESSMENT — PAIN DESCRIPTION - FREQUENCY
FREQUENCY: CONTINUOUS
FREQUENCY: CONTINUOUS

## 2017-06-12 ASSESSMENT — PAIN DESCRIPTION - LOCATION
LOCATION: WRIST
LOCATION: WRIST

## 2017-06-12 ASSESSMENT — PAIN DESCRIPTION - DESCRIPTORS
DESCRIPTORS: ACHING
DESCRIPTORS: ACHING

## 2017-06-12 ASSESSMENT — PAIN DESCRIPTION - ONSET
ONSET: SUDDEN
ONSET: SUDDEN

## 2017-06-12 ASSESSMENT — PAIN DESCRIPTION - ORIENTATION
ORIENTATION: LEFT
ORIENTATION: LEFT

## 2017-06-13 ENCOUNTER — HOSPITAL ENCOUNTER (INPATIENT)
Age: 27
LOS: 6 days | Discharge: HOME | DRG: 750 | End: 2017-06-19
Attending: EMERGENCY MEDICINE | Admitting: PSYCHIATRY & NEUROLOGY
Payer: MEDICAID

## 2017-06-13 DIAGNOSIS — F25.0 SCHIZOAFFECTIVE DISORDER, BIPOLAR TYPE (HCC): Primary | ICD-10-CM

## 2017-06-13 LAB
ACETAMINOPHEN LEVEL: <15 UG/ML (ref 10–30)
ALBUMIN SERPL-MCNC: 5.2 G/DL (ref 3.9–4.9)
ALP BLD-CCNC: 105 U/L (ref 35–104)
ALT SERPL-CCNC: 20 U/L (ref 0–41)
AMPHETAMINE SCREEN, URINE: POSITIVE
ANION GAP SERPL CALCULATED.3IONS-SCNC: 17 MEQ/L (ref 7–13)
AST SERPL-CCNC: 28 U/L (ref 0–40)
BARBITURATE SCREEN URINE: ABNORMAL
BASOPHILS ABSOLUTE: 0 K/UL (ref 0–0.2)
BASOPHILS RELATIVE PERCENT: 0.3 %
BENZODIAZEPINE SCREEN, URINE: ABNORMAL
BILIRUB SERPL-MCNC: 0.7 MG/DL (ref 0–1.2)
BILIRUBIN URINE: NEGATIVE
BLOOD, URINE: NEGATIVE
BUN BLDV-MCNC: 24 MG/DL (ref 6–20)
CALCIUM SERPL-MCNC: 10 MG/DL (ref 8.6–10.2)
CANNABINOID SCREEN URINE: POSITIVE
CHLORIDE BLD-SCNC: 99 MEQ/L (ref 98–107)
CK MB: 7.5 NG/ML (ref 0–6.7)
CLARITY: CLEAR
CO2: 23 MEQ/L (ref 22–29)
COCAINE METABOLITE SCREEN URINE: ABNORMAL
COLOR: YELLOW
CREAT SERPL-MCNC: 0.77 MG/DL (ref 0.7–1.2)
CREATINE KINASE-MB INDEX: 1.5 % (ref 0–3.5)
EOSINOPHILS ABSOLUTE: 0.1 K/UL (ref 0–0.7)
EOSINOPHILS RELATIVE PERCENT: 0.4 %
ETHANOL PERCENT: NORMAL G/DL
ETHANOL: <10 MG/DL (ref 0–0.08)
GFR AFRICAN AMERICAN: >60
GFR NON-AFRICAN AMERICAN: >60
GLOBULIN: 2.6 G/DL (ref 2.3–3.5)
GLUCOSE BLD-MCNC: 107 MG/DL (ref 74–109)
GLUCOSE URINE: NEGATIVE MG/DL
HCT VFR BLD CALC: 46.6 % (ref 42–52)
HEMOGLOBIN: 15.9 G/DL (ref 14–18)
KETONES, URINE: NEGATIVE MG/DL
LEUKOCYTE ESTERASE, URINE: NEGATIVE
LYMPHOCYTES ABSOLUTE: 1.6 K/UL (ref 1–4.8)
LYMPHOCYTES RELATIVE PERCENT: 12.9 %
Lab: ABNORMAL
MCH RBC QN AUTO: 31.6 PG (ref 27–31.3)
MCHC RBC AUTO-ENTMCNC: 34 % (ref 33–37)
MCV RBC AUTO: 92.9 FL (ref 80–100)
MONOCYTES ABSOLUTE: 1.5 K/UL (ref 0.2–0.8)
MONOCYTES RELATIVE PERCENT: 12.7 %
NEUTROPHILS ABSOLUTE: 8.8 K/UL (ref 1.4–6.5)
NEUTROPHILS RELATIVE PERCENT: 73.7 %
NITRITE, URINE: NEGATIVE
OPIATE SCREEN URINE: ABNORMAL
PDW BLD-RTO: 12.8 % (ref 11.5–14.5)
PH UA: 6 (ref 5–9)
PHENCYCLIDINE SCREEN URINE: ABNORMAL
PLATELET # BLD: 196 K/UL (ref 130–400)
POTASSIUM SERPL-SCNC: 3.6 MEQ/L (ref 3.5–5.1)
PROTEIN UA: NEGATIVE MG/DL
RBC # BLD: 5.01 M/UL (ref 4.7–6.1)
SALICYLATE, SERUM: <0.3 MG/DL (ref 15–30)
SODIUM BLD-SCNC: 139 MEQ/L (ref 132–144)
SPECIFIC GRAVITY UA: 1.02 (ref 1–1.03)
TOTAL CK: 498 U/L (ref 0–190)
TOTAL PROTEIN: 7.8 G/DL (ref 6.4–8.1)
TSH SERPL DL<=0.05 MIU/L-ACNC: 1.15 UIU/ML (ref 0.27–4.2)
UROBILINOGEN, URINE: 0.2 E.U./DL
WBC # BLD: 12 K/UL (ref 4.8–10.8)

## 2017-06-13 PROCEDURE — 80053 COMPREHEN METABOLIC PANEL: CPT

## 2017-06-13 PROCEDURE — 80307 DRUG TEST PRSMV CHEM ANLYZR: CPT

## 2017-06-13 PROCEDURE — 84443 ASSAY THYROID STIM HORMONE: CPT

## 2017-06-13 PROCEDURE — G0480 DRUG TEST DEF 1-7 CLASSES: HCPCS

## 2017-06-13 PROCEDURE — 6370000000 HC RX 637 (ALT 250 FOR IP): Performed by: EMERGENCY MEDICINE

## 2017-06-13 PROCEDURE — 82550 ASSAY OF CK (CPK): CPT

## 2017-06-13 PROCEDURE — 1240000000 HC EMOTIONAL WELLNESS R&B

## 2017-06-13 PROCEDURE — 36415 COLL VENOUS BLD VENIPUNCTURE: CPT

## 2017-06-13 PROCEDURE — 81003 URINALYSIS AUTO W/O SCOPE: CPT

## 2017-06-13 PROCEDURE — 6370000000 HC RX 637 (ALT 250 FOR IP): Performed by: PSYCHIATRY & NEUROLOGY

## 2017-06-13 PROCEDURE — 82553 CREATINE MB FRACTION: CPT

## 2017-06-13 PROCEDURE — 99285 EMERGENCY DEPT VISIT HI MDM: CPT

## 2017-06-13 PROCEDURE — 6370000000 HC RX 637 (ALT 250 FOR IP): Performed by: PHYSICIAN ASSISTANT

## 2017-06-13 PROCEDURE — 85025 COMPLETE CBC W/AUTO DIFF WBC: CPT

## 2017-06-13 RX ORDER — PALIPERIDONE 9 MG/1
9 TABLET, EXTENDED RELEASE ORAL DAILY
Status: DISCONTINUED | OUTPATIENT
Start: 2017-06-13 | End: 2017-06-19

## 2017-06-13 RX ORDER — LORAZEPAM 1 MG/1
2 TABLET ORAL ONCE
Status: COMPLETED | OUTPATIENT
Start: 2017-06-13 | End: 2017-06-13

## 2017-06-13 RX ORDER — TRAZODONE HYDROCHLORIDE 50 MG/1
50 TABLET ORAL NIGHTLY PRN
Status: DISCONTINUED | OUTPATIENT
Start: 2017-06-13 | End: 2017-06-19 | Stop reason: HOSPADM

## 2017-06-13 RX ORDER — BENZTROPINE MESYLATE 1 MG/1
1 TABLET ORAL 2 TIMES DAILY
Status: DISCONTINUED | OUTPATIENT
Start: 2017-06-13 | End: 2017-06-19 | Stop reason: HOSPADM

## 2017-06-13 RX ORDER — MAGNESIUM HYDROXIDE/ALUMINUM HYDROXICE/SIMETHICONE 120; 1200; 1200 MG/30ML; MG/30ML; MG/30ML
30 SUSPENSION ORAL PRN
Status: DISCONTINUED | OUTPATIENT
Start: 2017-06-13 | End: 2017-06-19 | Stop reason: HOSPADM

## 2017-06-13 RX ORDER — HALOPERIDOL 5 MG/ML
5 INJECTION INTRAMUSCULAR EVERY 4 HOURS PRN
Status: DISCONTINUED | OUTPATIENT
Start: 2017-06-13 | End: 2017-06-14

## 2017-06-13 RX ORDER — DIVALPROEX SODIUM 500 MG/1
1000 TABLET, DELAYED RELEASE ORAL EVERY 12 HOURS SCHEDULED
Status: DISCONTINUED | OUTPATIENT
Start: 2017-06-13 | End: 2017-06-19 | Stop reason: HOSPADM

## 2017-06-13 RX ORDER — ACETAMINOPHEN 325 MG/1
650 TABLET ORAL EVERY 4 HOURS PRN
Status: DISCONTINUED | OUTPATIENT
Start: 2017-06-13 | End: 2017-06-19 | Stop reason: HOSPADM

## 2017-06-13 RX ORDER — OLANZAPINE 5 MG/1
10 TABLET ORAL ONCE
Status: COMPLETED | OUTPATIENT
Start: 2017-06-13 | End: 2017-06-13

## 2017-06-13 RX ORDER — HYDROXYZINE PAMOATE 50 MG/1
50 CAPSULE ORAL EVERY 6 HOURS PRN
Status: DISCONTINUED | OUTPATIENT
Start: 2017-06-13 | End: 2017-06-19 | Stop reason: HOSPADM

## 2017-06-13 RX ORDER — ACETAMINOPHEN 500 MG
1000 TABLET ORAL ONCE
Status: COMPLETED | OUTPATIENT
Start: 2017-06-13 | End: 2017-06-13

## 2017-06-13 RX ORDER — BENZTROPINE MESYLATE 1 MG/ML
2 INJECTION INTRAMUSCULAR; INTRAVENOUS 2 TIMES DAILY PRN
Status: DISCONTINUED | OUTPATIENT
Start: 2017-06-13 | End: 2017-06-19 | Stop reason: HOSPADM

## 2017-06-13 RX ADMIN — ACETAMINOPHEN 1000 MG: 500 TABLET ORAL at 15:19

## 2017-06-13 RX ADMIN — TRAZODONE HYDROCHLORIDE 50 MG: 50 TABLET ORAL at 21:03

## 2017-06-13 RX ADMIN — ACETAMINOPHEN 650 MG: 325 TABLET ORAL at 21:35

## 2017-06-13 RX ADMIN — NICOTINE POLACRILEX 4 MG: 4 GUM, CHEWING ORAL at 21:04

## 2017-06-13 RX ADMIN — PALIPERIDONE 9 MG: 9 TABLET, EXTENDED RELEASE ORAL at 20:12

## 2017-06-13 RX ADMIN — HYDROXYZINE PAMOATE 50 MG: 50 CAPSULE ORAL at 20:12

## 2017-06-13 RX ADMIN — NICOTINE POLACRILEX 2 MG: 2 GUM, CHEWING BUCCAL at 13:55

## 2017-06-13 RX ADMIN — BENZTROPINE MESYLATE 1 MG: 1 TABLET ORAL at 20:12

## 2017-06-13 RX ADMIN — OLANZAPINE 10 MG: 5 TABLET, FILM COATED ORAL at 12:56

## 2017-06-13 RX ADMIN — DIVALPROEX SODIUM 1000 MG: 500 TABLET, DELAYED RELEASE ORAL at 20:12

## 2017-06-13 RX ADMIN — LORAZEPAM 2 MG: 1 TABLET ORAL at 13:55

## 2017-06-13 ASSESSMENT — ENCOUNTER SYMPTOMS
SHORTNESS OF BREATH: 0
NAUSEA: 0
CHEST TIGHTNESS: 0
EYE PAIN: 0
VOMITING: 0
ABDOMINAL PAIN: 0
SORE THROAT: 0

## 2017-06-13 ASSESSMENT — PAIN SCALES - GENERAL
PAINLEVEL_OUTOF10: 6
PAINLEVEL_OUTOF10: 5

## 2017-06-13 ASSESSMENT — SLEEP AND FATIGUE QUESTIONNAIRES
DO YOU USE A SLEEP AID: NO
DIFFICULTY FALLING ASLEEP: YES
DIFFICULTY ARISING: NO
DIFFICULTY STAYING ASLEEP: YES
SLEEP PATTERN: DIFFICULTY FALLING ASLEEP
AVERAGE NUMBER OF SLEEP HOURS: 3
DO YOU HAVE DIFFICULTY SLEEPING: YES
RESTFUL SLEEP: YES

## 2017-06-13 ASSESSMENT — PATIENT HEALTH QUESTIONNAIRE - PHQ9: SUM OF ALL RESPONSES TO PHQ QUESTIONS 1-9: 24

## 2017-06-14 PROBLEM — F19.10 POLYDRUG ABUSE (HCC): Status: ACTIVE | Noted: 2017-06-14

## 2017-06-14 PROBLEM — M25.539 WRIST PAIN: Status: ACTIVE | Noted: 2017-06-14

## 2017-06-14 PROCEDURE — 2500000003 HC RX 250 WO HCPCS: Performed by: PSYCHIATRY & NEUROLOGY

## 2017-06-14 PROCEDURE — 1240000000 HC EMOTIONAL WELLNESS R&B

## 2017-06-14 PROCEDURE — 6370000000 HC RX 637 (ALT 250 FOR IP): Performed by: PSYCHIATRY & NEUROLOGY

## 2017-06-14 PROCEDURE — 99223 1ST HOSP IP/OBS HIGH 75: CPT | Performed by: PSYCHIATRY & NEUROLOGY

## 2017-06-14 RX ORDER — HALOPERIDOL 5 MG/ML
5 INJECTION INTRAMUSCULAR EVERY 6 HOURS PRN
Status: DISCONTINUED | OUTPATIENT
Start: 2017-06-14 | End: 2017-06-14

## 2017-06-14 RX ORDER — OLANZAPINE 10 MG/1
10 INJECTION, POWDER, LYOPHILIZED, FOR SOLUTION INTRAMUSCULAR 2 TIMES DAILY PRN
Status: DISCONTINUED | OUTPATIENT
Start: 2017-06-14 | End: 2017-06-17

## 2017-06-14 RX ORDER — NICOTINE 21 MG/24HR
1 PATCH, TRANSDERMAL 24 HOURS TRANSDERMAL DAILY
Status: DISCONTINUED | OUTPATIENT
Start: 2017-06-14 | End: 2017-06-19 | Stop reason: HOSPADM

## 2017-06-14 RX ORDER — HALOPERIDOL 5 MG
5 TABLET ORAL EVERY 6 HOURS PRN
Status: DISCONTINUED | OUTPATIENT
Start: 2017-06-14 | End: 2017-06-14

## 2017-06-14 RX ORDER — OLANZAPINE 10 MG/1
10 TABLET ORAL 2 TIMES DAILY PRN
Status: DISCONTINUED | OUTPATIENT
Start: 2017-06-14 | End: 2017-06-17

## 2017-06-14 RX ADMIN — HYDROXYZINE PAMOATE 50 MG: 50 CAPSULE ORAL at 09:34

## 2017-06-14 RX ADMIN — PALIPERIDONE 9 MG: 9 TABLET, EXTENDED RELEASE ORAL at 09:33

## 2017-06-14 RX ADMIN — OLANZAPINE 10 MG: 10 TABLET, FILM COATED ORAL at 21:05

## 2017-06-14 RX ADMIN — OLANZAPINE 10 MG: 10 INJECTION, POWDER, LYOPHILIZED, FOR SOLUTION INTRAMUSCULAR at 12:19

## 2017-06-14 RX ADMIN — BENZTROPINE MESYLATE 1 MG: 1 TABLET ORAL at 21:10

## 2017-06-14 RX ADMIN — NICOTINE POLACRILEX 4 MG: 4 GUM, CHEWING ORAL at 09:37

## 2017-06-14 RX ADMIN — BENZTROPINE MESYLATE 1 MG: 1 TABLET ORAL at 09:35

## 2017-06-14 RX ADMIN — NICOTINE POLACRILEX 4 MG: 4 GUM, CHEWING ORAL at 11:33

## 2017-06-14 RX ADMIN — DIVALPROEX SODIUM 1000 MG: 500 TABLET, DELAYED RELEASE ORAL at 09:57

## 2017-06-14 RX ADMIN — DIVALPROEX SODIUM 1000 MG: 500 TABLET, DELAYED RELEASE ORAL at 21:10

## 2017-06-14 RX ADMIN — TRAZODONE HYDROCHLORIDE 50 MG: 50 TABLET ORAL at 21:10

## 2017-06-14 RX ADMIN — HYDROXYZINE PAMOATE 50 MG: 50 CAPSULE ORAL at 18:33

## 2017-06-14 ASSESSMENT — LIFESTYLE VARIABLES: HISTORY_ALCOHOL_USE: YES

## 2017-06-15 PROCEDURE — 1240000000 HC EMOTIONAL WELLNESS R&B

## 2017-06-15 PROCEDURE — 6370000000 HC RX 637 (ALT 250 FOR IP): Performed by: PSYCHIATRY & NEUROLOGY

## 2017-06-15 PROCEDURE — 99232 SBSQ HOSP IP/OBS MODERATE 35: CPT | Performed by: PSYCHIATRY & NEUROLOGY

## 2017-06-15 RX ADMIN — DIVALPROEX SODIUM 1000 MG: 500 TABLET, DELAYED RELEASE ORAL at 10:33

## 2017-06-15 RX ADMIN — HYDROXYZINE PAMOATE 50 MG: 50 CAPSULE ORAL at 21:10

## 2017-06-15 RX ADMIN — TRAZODONE HYDROCHLORIDE 50 MG: 50 TABLET ORAL at 21:00

## 2017-06-15 RX ADMIN — PALIPERIDONE 9 MG: 9 TABLET, EXTENDED RELEASE ORAL at 10:33

## 2017-06-15 RX ADMIN — DIVALPROEX SODIUM 1000 MG: 500 TABLET, DELAYED RELEASE ORAL at 21:00

## 2017-06-15 RX ADMIN — BENZTROPINE MESYLATE 1 MG: 1 TABLET ORAL at 10:33

## 2017-06-15 RX ADMIN — HYDROXYZINE PAMOATE 50 MG: 50 CAPSULE ORAL at 12:20

## 2017-06-15 RX ADMIN — BENZTROPINE MESYLATE 1 MG: 1 TABLET ORAL at 21:00

## 2017-06-16 PROCEDURE — 6370000000 HC RX 637 (ALT 250 FOR IP): Performed by: PSYCHIATRY & NEUROLOGY

## 2017-06-16 PROCEDURE — 1240000000 HC EMOTIONAL WELLNESS R&B

## 2017-06-16 PROCEDURE — 99232 SBSQ HOSP IP/OBS MODERATE 35: CPT | Performed by: PSYCHIATRY & NEUROLOGY

## 2017-06-16 RX ADMIN — BENZTROPINE MESYLATE 1 MG: 1 TABLET ORAL at 20:49

## 2017-06-16 RX ADMIN — DIVALPROEX SODIUM 1000 MG: 500 TABLET, DELAYED RELEASE ORAL at 20:49

## 2017-06-16 RX ADMIN — HYDROXYZINE PAMOATE 50 MG: 50 CAPSULE ORAL at 17:03

## 2017-06-16 RX ADMIN — BENZTROPINE MESYLATE 1 MG: 1 TABLET ORAL at 10:30

## 2017-06-16 RX ADMIN — TRAZODONE HYDROCHLORIDE 50 MG: 50 TABLET ORAL at 20:49

## 2017-06-16 RX ADMIN — OLANZAPINE 10 MG: 10 TABLET, FILM COATED ORAL at 18:09

## 2017-06-16 RX ADMIN — DIVALPROEX SODIUM 1000 MG: 500 TABLET, DELAYED RELEASE ORAL at 10:31

## 2017-06-16 RX ADMIN — PALIPERIDONE 9 MG: 9 TABLET, EXTENDED RELEASE ORAL at 10:31

## 2017-06-17 PROCEDURE — 6370000000 HC RX 637 (ALT 250 FOR IP): Performed by: PSYCHIATRY & NEUROLOGY

## 2017-06-17 PROCEDURE — 1240000000 HC EMOTIONAL WELLNESS R&B

## 2017-06-17 RX ORDER — OLANZAPINE 10 MG/1
10 TABLET ORAL EVERY 8 HOURS PRN
Status: DISCONTINUED | OUTPATIENT
Start: 2017-06-17 | End: 2017-06-19 | Stop reason: HOSPADM

## 2017-06-17 RX ORDER — OLANZAPINE 10 MG/1
10 INJECTION, POWDER, LYOPHILIZED, FOR SOLUTION INTRAMUSCULAR 2 TIMES DAILY PRN
Status: DISCONTINUED | OUTPATIENT
Start: 2017-06-17 | End: 2017-06-19 | Stop reason: HOSPADM

## 2017-06-17 RX ADMIN — HYDROXYZINE PAMOATE 50 MG: 50 CAPSULE ORAL at 00:10

## 2017-06-17 RX ADMIN — DIVALPROEX SODIUM 1000 MG: 500 TABLET, DELAYED RELEASE ORAL at 20:54

## 2017-06-17 RX ADMIN — HYDROXYZINE PAMOATE 50 MG: 50 CAPSULE ORAL at 15:40

## 2017-06-17 RX ADMIN — BENZTROPINE MESYLATE 1 MG: 1 TABLET ORAL at 20:54

## 2017-06-17 RX ADMIN — OLANZAPINE 10 MG: 10 TABLET, FILM COATED ORAL at 19:29

## 2017-06-17 RX ADMIN — PALIPERIDONE 9 MG: 9 TABLET, EXTENDED RELEASE ORAL at 09:19

## 2017-06-17 RX ADMIN — DIVALPROEX SODIUM 1000 MG: 500 TABLET, DELAYED RELEASE ORAL at 09:19

## 2017-06-17 RX ADMIN — OLANZAPINE 10 MG: 10 TABLET, FILM COATED ORAL at 11:10

## 2017-06-17 RX ADMIN — TRAZODONE HYDROCHLORIDE 50 MG: 50 TABLET ORAL at 20:54

## 2017-06-17 RX ADMIN — BENZTROPINE MESYLATE 1 MG: 1 TABLET ORAL at 09:19

## 2017-06-18 PROCEDURE — 6370000000 HC RX 637 (ALT 250 FOR IP): Performed by: PSYCHIATRY & NEUROLOGY

## 2017-06-18 PROCEDURE — 1240000000 HC EMOTIONAL WELLNESS R&B

## 2017-06-18 RX ADMIN — PALIPERIDONE 9 MG: 9 TABLET, EXTENDED RELEASE ORAL at 08:52

## 2017-06-18 RX ADMIN — OLANZAPINE 10 MG: 10 TABLET, FILM COATED ORAL at 08:52

## 2017-06-18 RX ADMIN — DIVALPROEX SODIUM 1000 MG: 500 TABLET, DELAYED RELEASE ORAL at 20:17

## 2017-06-18 RX ADMIN — TRAZODONE HYDROCHLORIDE 50 MG: 50 TABLET ORAL at 20:17

## 2017-06-18 RX ADMIN — DIVALPROEX SODIUM 1000 MG: 500 TABLET, DELAYED RELEASE ORAL at 08:52

## 2017-06-18 RX ADMIN — BENZTROPINE MESYLATE 1 MG: 1 TABLET ORAL at 08:52

## 2017-06-18 RX ADMIN — OLANZAPINE 10 MG: 10 TABLET, FILM COATED ORAL at 20:17

## 2017-06-18 RX ADMIN — BENZTROPINE MESYLATE 1 MG: 1 TABLET ORAL at 20:17

## 2017-06-19 VITALS
DIASTOLIC BLOOD PRESSURE: 72 MMHG | BODY MASS INDEX: 24.38 KG/M2 | WEIGHT: 180 LBS | TEMPERATURE: 97 F | HEART RATE: 125 BPM | OXYGEN SATURATION: 98 % | RESPIRATION RATE: 18 BRPM | SYSTOLIC BLOOD PRESSURE: 114 MMHG | HEIGHT: 72 IN

## 2017-06-19 LAB — VALPROIC ACID LEVEL: 109.5 UG/ML (ref 50–100)

## 2017-06-19 PROCEDURE — 6370000000 HC RX 637 (ALT 250 FOR IP): Performed by: PSYCHIATRY & NEUROLOGY

## 2017-06-19 PROCEDURE — 36415 COLL VENOUS BLD VENIPUNCTURE: CPT

## 2017-06-19 PROCEDURE — 99238 HOSP IP/OBS DSCHRG MGMT 30/<: CPT | Performed by: PSYCHIATRY & NEUROLOGY

## 2017-06-19 PROCEDURE — 80164 ASSAY DIPROPYLACETIC ACD TOT: CPT

## 2017-06-19 RX ORDER — BENZTROPINE MESYLATE 1 MG/1
1 TABLET ORAL 2 TIMES DAILY
Qty: 30 TABLET | Refills: 1 | Status: SHIPPED | OUTPATIENT
Start: 2017-06-19 | End: 2018-01-11

## 2017-06-19 RX ORDER — DIVALPROEX SODIUM 500 MG/1
1000 TABLET, DELAYED RELEASE ORAL EVERY 12 HOURS SCHEDULED
Qty: 90 TABLET | Refills: 3 | Status: SHIPPED | OUTPATIENT
Start: 2017-06-19 | End: 2018-06-13

## 2017-06-19 RX ORDER — PALIPERIDONE 6 MG/1
6 TABLET, EXTENDED RELEASE ORAL DAILY
Qty: 15 TABLET | Refills: 0 | Status: SHIPPED | OUTPATIENT
Start: 2017-06-20 | End: 2018-01-11

## 2017-06-19 RX ORDER — PALIPERIDONE 6 MG/1
6 TABLET, EXTENDED RELEASE ORAL DAILY
Status: DISCONTINUED | OUTPATIENT
Start: 2017-06-20 | End: 2017-06-19 | Stop reason: HOSPADM

## 2017-06-19 RX ORDER — TRAZODONE HYDROCHLORIDE 50 MG/1
50 TABLET ORAL NIGHTLY PRN
Qty: 15 TABLET | Refills: 1 | Status: SHIPPED | OUTPATIENT
Start: 2017-06-19 | End: 2018-01-11

## 2017-06-19 RX ADMIN — BENZTROPINE MESYLATE 1 MG: 1 TABLET ORAL at 08:15

## 2017-06-19 RX ADMIN — DIVALPROEX SODIUM 1000 MG: 500 TABLET, DELAYED RELEASE ORAL at 08:15

## 2017-06-19 RX ADMIN — PALIPERIDONE 9 MG: 9 TABLET, EXTENDED RELEASE ORAL at 08:14

## 2017-06-20 ENCOUNTER — TELEPHONE (OUTPATIENT)
Dept: FAMILY MEDICINE CLINIC | Age: 27
End: 2017-06-20

## 2017-06-21 ENCOUNTER — TELEPHONE (OUTPATIENT)
Dept: FAMILY MEDICINE CLINIC | Age: 27
End: 2017-06-21

## 2017-07-05 ENCOUNTER — TELEPHONE (OUTPATIENT)
Dept: FAMILY MEDICINE CLINIC | Age: 27
End: 2017-07-05

## 2017-07-06 ENCOUNTER — CARE COORDINATION (OUTPATIENT)
Dept: FAMILY MEDICINE CLINIC | Age: 27
End: 2017-07-06

## 2017-07-14 ENCOUNTER — CARE COORDINATION (OUTPATIENT)
Dept: FAMILY MEDICINE CLINIC | Age: 27
End: 2017-07-14

## 2017-07-14 ENCOUNTER — TELEPHONE (OUTPATIENT)
Dept: FAMILY MEDICINE CLINIC | Age: 27
End: 2017-07-14

## 2018-01-10 ENCOUNTER — HOSPITAL ENCOUNTER (EMERGENCY)
Age: 28
Discharge: OTHER INSTITUTION | End: 2018-01-11
Attending: EMERGENCY MEDICINE
Payer: COMMERCIAL

## 2018-01-10 VITALS
HEIGHT: 72 IN | BODY MASS INDEX: 23.7 KG/M2 | DIASTOLIC BLOOD PRESSURE: 86 MMHG | HEART RATE: 80 BPM | SYSTOLIC BLOOD PRESSURE: 150 MMHG | WEIGHT: 175 LBS | RESPIRATION RATE: 20 BRPM | OXYGEN SATURATION: 97 %

## 2018-01-10 DIAGNOSIS — F39 MOOD DISORDER (HCC): ICD-10-CM

## 2018-01-10 DIAGNOSIS — R45.851 SUICIDAL IDEATION: Primary | ICD-10-CM

## 2018-01-10 DIAGNOSIS — F25.9 SCHIZO AFFECTIVE SCHIZOPHRENIA (HCC): ICD-10-CM

## 2018-01-10 LAB
ALBUMIN SERPL-MCNC: 4.7 G/DL (ref 3.9–4.9)
ALP BLD-CCNC: 99 U/L (ref 35–104)
ALT SERPL-CCNC: 34 U/L (ref 0–41)
AMPHETAMINE SCREEN, URINE: ABNORMAL
ANION GAP SERPL CALCULATED.3IONS-SCNC: 15 MEQ/L (ref 7–13)
AST SERPL-CCNC: 23 U/L (ref 0–40)
BARBITURATE SCREEN URINE: ABNORMAL
BASOPHILS ABSOLUTE: 0.1 K/UL (ref 0–0.2)
BASOPHILS RELATIVE PERCENT: 0.9 %
BENZODIAZEPINE SCREEN, URINE: ABNORMAL
BILIRUB SERPL-MCNC: 0.4 MG/DL (ref 0–1.2)
BUN BLDV-MCNC: 22 MG/DL (ref 6–20)
CALCIUM SERPL-MCNC: 9.3 MG/DL (ref 8.6–10.2)
CANNABINOID SCREEN URINE: POSITIVE
CHLORIDE BLD-SCNC: 101 MEQ/L (ref 98–107)
CO2: 26 MEQ/L (ref 22–29)
COCAINE METABOLITE SCREEN URINE: ABNORMAL
CREAT SERPL-MCNC: 0.94 MG/DL (ref 0.7–1.2)
EOSINOPHILS ABSOLUTE: 0.4 K/UL (ref 0–0.7)
EOSINOPHILS RELATIVE PERCENT: 4.4 %
ETHANOL PERCENT: NORMAL G/DL
ETHANOL: <10 MG/DL (ref 0–0.08)
GFR AFRICAN AMERICAN: >60
GFR NON-AFRICAN AMERICAN: >60
GLOBULIN: 2.6 G/DL (ref 2.3–3.5)
GLUCOSE BLD-MCNC: 111 MG/DL (ref 74–109)
HCT VFR BLD CALC: 45.9 % (ref 42–52)
HEMOGLOBIN: 15.8 G/DL (ref 14–18)
LYMPHOCYTES ABSOLUTE: 2.6 K/UL (ref 1–4.8)
LYMPHOCYTES RELATIVE PERCENT: 28.8 %
Lab: ABNORMAL
MCH RBC QN AUTO: 31.4 PG (ref 27–31.3)
MCHC RBC AUTO-ENTMCNC: 34.4 % (ref 33–37)
MCV RBC AUTO: 91.3 FL (ref 80–100)
MONOCYTES ABSOLUTE: 0.9 K/UL (ref 0.2–0.8)
MONOCYTES RELATIVE PERCENT: 9.8 %
NEUTROPHILS ABSOLUTE: 5.1 K/UL (ref 1.4–6.5)
NEUTROPHILS RELATIVE PERCENT: 56.1 %
OPIATE SCREEN URINE: ABNORMAL
PDW BLD-RTO: 12.9 % (ref 11.5–14.5)
PHENCYCLIDINE SCREEN URINE: ABNORMAL
PLATELET # BLD: 276 K/UL (ref 130–400)
POTASSIUM SERPL-SCNC: 3.7 MEQ/L (ref 3.5–5.1)
RBC # BLD: 5.03 M/UL (ref 4.7–6.1)
SODIUM BLD-SCNC: 142 MEQ/L (ref 132–144)
TOTAL PROTEIN: 7.3 G/DL (ref 6.4–8.1)
TRICYCLIC, URINE: ABNORMAL
WBC # BLD: 9 K/UL (ref 4.8–10.8)

## 2018-01-10 PROCEDURE — 85025 COMPLETE CBC W/AUTO DIFF WBC: CPT

## 2018-01-10 PROCEDURE — 99285 EMERGENCY DEPT VISIT HI MDM: CPT

## 2018-01-10 PROCEDURE — 80053 COMPREHEN METABOLIC PANEL: CPT

## 2018-01-10 PROCEDURE — 80307 DRUG TEST PRSMV CHEM ANLYZR: CPT

## 2018-01-10 PROCEDURE — G0480 DRUG TEST DEF 1-7 CLASSES: HCPCS

## 2018-01-10 PROCEDURE — 84443 ASSAY THYROID STIM HORMONE: CPT

## 2018-01-10 PROCEDURE — 80164 ASSAY DIPROPYLACETIC ACD TOT: CPT

## 2018-01-10 ASSESSMENT — ENCOUNTER SYMPTOMS
EYE DISCHARGE: 0
DIARRHEA: 0
BACK PAIN: 0
STRIDOR: 0
SHORTNESS OF BREATH: 0
FACIAL SWELLING: 0
VOICE CHANGE: 0
CHOKING: 0
SORE THROAT: 0
BLOOD IN STOOL: 0
WHEEZING: 0
ABDOMINAL PAIN: 0
SINUS PRESSURE: 0
VOMITING: 0
TROUBLE SWALLOWING: 0
CHEST TIGHTNESS: 0
EYE PAIN: 0
CONSTIPATION: 0
COUGH: 0
EYE REDNESS: 0

## 2018-01-11 ENCOUNTER — HOSPITAL ENCOUNTER (INPATIENT)
Age: 28
LOS: 1 days | Discharge: HOME | DRG: 776 | End: 2018-01-12
Attending: PSYCHIATRY & NEUROLOGY | Admitting: PSYCHIATRY & NEUROLOGY
Payer: COMMERCIAL

## 2018-01-11 DIAGNOSIS — F19.10 POLYSUBSTANCE ABUSE (HCC): ICD-10-CM

## 2018-01-11 DIAGNOSIS — F19.94 SUBSTANCE INDUCED MOOD DISORDER (HCC): ICD-10-CM

## 2018-01-11 DIAGNOSIS — F25.0 SCHIZOAFFECTIVE DISORDER, BIPOLAR TYPE (HCC): Primary | ICD-10-CM

## 2018-01-11 PROBLEM — F19.951 DRUG-INDUCED HALLUCINOSIS (HCC): Status: ACTIVE | Noted: 2018-01-11

## 2018-01-11 LAB
BILIRUBIN URINE: NEGATIVE
BLOOD, URINE: NEGATIVE
CK MB: 2.2 NG/ML (ref 0–6.7)
CLARITY: CLEAR
COLOR: YELLOW
CREATINE KINASE-MB INDEX: 0.9 % (ref 0–3.5)
EKG ATRIAL RATE: 77 BPM
EKG P AXIS: 73 DEGREES
EKG P-R INTERVAL: 142 MS
EKG Q-T INTERVAL: 398 MS
EKG QRS DURATION: 88 MS
EKG QTC CALCULATION (BAZETT): 450 MS
EKG R AXIS: 92 DEGREES
EKG T AXIS: 64 DEGREES
EKG VENTRICULAR RATE: 77 BPM
GLUCOSE URINE: NEGATIVE MG/DL
KETONES, URINE: NEGATIVE MG/DL
LEUKOCYTE ESTERASE, URINE: NEGATIVE
NITRITE, URINE: NEGATIVE
PH UA: 7.5 (ref 5–9)
PROTEIN UA: NEGATIVE MG/DL
SPECIFIC GRAVITY UA: 1.02 (ref 1–1.03)
TOTAL CK: 246 U/L (ref 0–190)
TSH SERPL DL<=0.05 MIU/L-ACNC: 1.37 UIU/ML (ref 0.27–4.2)
UROBILINOGEN, URINE: 0.2 E.U./DL
VALPROIC ACID LEVEL: <2.8 UG/ML (ref 50–100)

## 2018-01-11 PROCEDURE — 82550 ASSAY OF CK (CPK): CPT

## 2018-01-11 PROCEDURE — 36415 COLL VENOUS BLD VENIPUNCTURE: CPT

## 2018-01-11 PROCEDURE — 82553 CREATINE MB FRACTION: CPT

## 2018-01-11 PROCEDURE — 1240000000 HC EMOTIONAL WELLNESS R&B

## 2018-01-11 PROCEDURE — 99223 1ST HOSP IP/OBS HIGH 75: CPT | Performed by: PSYCHIATRY & NEUROLOGY

## 2018-01-11 PROCEDURE — 99285 EMERGENCY DEPT VISIT HI MDM: CPT

## 2018-01-11 PROCEDURE — 93010 ELECTROCARDIOGRAM REPORT: CPT | Performed by: INTERNAL MEDICINE

## 2018-01-11 PROCEDURE — 6370000000 HC RX 637 (ALT 250 FOR IP): Performed by: PSYCHIATRY & NEUROLOGY

## 2018-01-11 PROCEDURE — 81003 URINALYSIS AUTO W/O SCOPE: CPT

## 2018-01-11 PROCEDURE — 93005 ELECTROCARDIOGRAM TRACING: CPT

## 2018-01-11 RX ORDER — NICOTINE 21 MG/24HR
1 PATCH, TRANSDERMAL 24 HOURS TRANSDERMAL DAILY
Status: DISCONTINUED | OUTPATIENT
Start: 2018-01-11 | End: 2018-01-12 | Stop reason: HOSPADM

## 2018-01-11 RX ORDER — DIVALPROEX SODIUM 500 MG/1
1000 TABLET, DELAYED RELEASE ORAL EVERY 12 HOURS SCHEDULED
Status: DISCONTINUED | OUTPATIENT
Start: 2018-01-11 | End: 2018-01-12 | Stop reason: HOSPADM

## 2018-01-11 RX ORDER — TRAZODONE HYDROCHLORIDE 50 MG/1
50 TABLET ORAL NIGHTLY PRN
Status: DISCONTINUED | OUTPATIENT
Start: 2018-01-11 | End: 2018-01-12 | Stop reason: HOSPADM

## 2018-01-11 RX ORDER — HALOPERIDOL 5 MG/ML
5 INJECTION INTRAMUSCULAR EVERY 4 HOURS PRN
Status: DISCONTINUED | OUTPATIENT
Start: 2018-01-11 | End: 2018-01-12 | Stop reason: HOSPADM

## 2018-01-11 RX ORDER — HYDROXYZINE PAMOATE 50 MG/1
50 CAPSULE ORAL 3 TIMES DAILY PRN
Status: DISCONTINUED | OUTPATIENT
Start: 2018-01-11 | End: 2018-01-12 | Stop reason: HOSPADM

## 2018-01-11 RX ORDER — ACETAMINOPHEN 325 MG/1
650 TABLET ORAL EVERY 4 HOURS PRN
Status: DISCONTINUED | OUTPATIENT
Start: 2018-01-11 | End: 2018-01-12 | Stop reason: HOSPADM

## 2018-01-11 RX ADMIN — DIVALPROEX SODIUM 1000 MG: 500 TABLET, DELAYED RELEASE ORAL at 09:31

## 2018-01-11 RX ADMIN — DIVALPROEX SODIUM 1000 MG: 500 TABLET, DELAYED RELEASE ORAL at 20:55

## 2018-01-11 RX ADMIN — HYDROXYZINE PAMOATE 50 MG: 50 CAPSULE ORAL at 17:13

## 2018-01-11 ASSESSMENT — ENCOUNTER SYMPTOMS
COLOR CHANGE: 0
DIARRHEA: 0
BLOOD IN STOOL: 0
RHINORRHEA: 0
ABDOMINAL PAIN: 0
SHORTNESS OF BREATH: 0
COUGH: 0
NAUSEA: 0
VOMITING: 0

## 2018-01-11 ASSESSMENT — SLEEP AND FATIGUE QUESTIONNAIRES
AVERAGE NUMBER OF SLEEP HOURS: 3
DIFFICULTY STAYING ASLEEP: YES
DO YOU HAVE DIFFICULTY SLEEPING: YES
DIFFICULTY ARISING: NO
SLEEP PATTERN: DISTURBED/INTERRUPTED SLEEP;RESTLESSNESS
DO YOU USE A SLEEP AID: NO
DIFFICULTY FALLING ASLEEP: NO
RESTFUL SLEEP: NO

## 2018-01-11 ASSESSMENT — PATIENT HEALTH QUESTIONNAIRE - PHQ9: SUM OF ALL RESPONSES TO PHQ QUESTIONS 1-9: 19

## 2018-01-11 NOTE — ED TRIAGE NOTES
Patient states that he needs help getting off of drugs. Smoked spiced tonight and has drug addiction problems. He wants help getting off of the drugs it is making him and his family \"crazy\". He denies wanting to harm himself or others. He states he really wants to find an inpatient setting. He states he was at [facility] three years ago and the program helped him.
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
## 2018-01-11 NOTE — ED NOTES
Patient resting in bed at this time. Respirations even, unlabored and within normal limits. No signs or symptoms of distress or discomfort. Continue to monitor the patient.      Nnamdi Thomason RN  01/11/18 1209

## 2018-01-11 NOTE — ED NOTES
Nursing Adult Assessment    General Appearance  [x] Facial Expressions, extremities, & body posture are relaxed. [] Exceptions:    Cognitive  [x] Alert, make eye contact when prompted. [x] Oriented to person, place, & situation. [] Exceptions:    Respiratory  [x] Unlabored breathing   [x] Speaks in clear and complete sentences   [x] Chest expansion is symmetrical with breaths   [x] Breath sounds are clear bilaterally. [] Exceptions:    Cardiovascular  [x] Regular apical heart sounds   [x] Peripheral pulses are palpable   [x] Capillary refill < 3 seconds in all extremities. [] Exceptions:    Abdomen  [x] Non-tender   [x] Non-distended   [x] Bowel sounds present. [] Exceptions:    Skin  [x] Color appropriate for ethnicity   [x] No rash or discoloration present at the area(s) of complaint   [x] Warm and dry to touch. [] Exceptions:    Extremities  [x] Non-tender   [x] Normal range of motion   [x] Normal sensation   [x] Normal Appearance, No Edema.   [] Exceptions:      Konrad Franco RN  01/10/18 4919

## 2018-01-11 NOTE — ED PROVIDER NOTES
[facility] ED  eMERGENCY dEPARTMENT eNCOUnter      Pt Name: Jillian Patel  MRN: [id number]  [place] 1990  Date of evaluation: 1/10/2018  Provider: Rasheeda Oliva MD    CHIEF COMPLAINT       Chief Complaint   Patient presents with    Addiction Problem     Patient smokes spice and wants to get off of it    Suicidal         HISTORY OF PRESENT ILLNESS   (Location/Symptom, Timing/Onset, Context/Setting, Quality, Duration, Modifying Factors, Severity)  Note limiting factors. Jillian Patel is a 32 y.o. male who presents to the emergency department Patient is here with his mom , as the patient he wanted to get it of his habits going spice spent $20 per day as a little bit of marijuana patient smoker stopped taking his seizure medications history of seizure affective disorder denies any homicidal or suicidal ideation eating and drinking fine as the patient is unemployed at this time and still home 24 hours, As per grandmother who is here with the patient patient wanted to hurt himself and he looking for money to buy drugs, and she is worried that he might hurt himself, initially patient denies for any suicidal ideation or any plan, denies any homicidal ideation  HPI    Nursing Notes were reviewed. REVIEW OF SYSTEMS    (2-9 systems for level 4, 10 or more for level 5)     Review of Systems   Constitutional: Negative. Negative for activity change and fever. HENT: Negative for congestion, drooling, facial swelling, mouth sores, nosebleeds, sinus pressure, sore throat, trouble swallowing and voice change. Eyes: Negative for pain, discharge, redness and visual disturbance. Respiratory: Negative for cough, choking, chest tightness, shortness of breath, wheezing and stridor. Cardiovascular: Negative for chest pain, palpitations and leg swelling. Gastrointestinal: Negative for abdominal pain, blood in stool, constipation, diarrhea and vomiting.    Endocrine: Negative for cold intolerance, allergies indicates no known allergies. FAMILY HISTORY       Family History   Problem Relation Age of Onset    Family history unknown: Yes          SOCIAL HISTORY       Social History     Social History    Marital status: Single     Spouse name: N/A    Number of children: N/A    Years of education: N/A     Social History Main Topics    Smoking status: Current Every Day Smoker     Packs/day: 1.00     Years: 1.00     Types: Cigarettes     Last attempt to quit: 11/28/2011    Smokeless tobacco: Never Used    Alcohol use Yes      Comment:  drinks a 12 pack daily for two weeks previously was occasional    Drug use: Yes     Types: Other-see comments      Comment: Spice and Marijuana per pt, crack, mushrooms, acid    Sexual activity: No     Other Topics Concern    None     Social History Narrative    Lives w family           SCREENINGS             PHYSICAL EXAM    (up to 7 for level 4, 8 or more for level 5)     ED Triage Vitals   BP Temp Temp src Pulse Resp SpO2 Height Weight   -- -- -- -- -- -- -- --       Physical Exam   Constitutional: He is oriented to person, place, and time. He appears well-developed and well-nourished. Active alert cooperative patient) that missing on the questions appropriately mother is at the bedside   HENT:   Head: Normocephalic and atraumatic. Mouth/Throat: No oropharyngeal exudate. Eyes: Conjunctivae are normal. Right eye exhibits no discharge. Left eye exhibits no discharge. No scleral icterus. Neck: Neck supple. No JVD present. No tracheal deviation present. No thyromegaly present. Cardiovascular: Normal rate, regular rhythm and normal heart sounds. Exam reveals no gallop and no friction rub. No murmur heard. Pulmonary/Chest: Breath sounds normal. No respiratory distress. He has no wheezes. He has no rales. He exhibits no tenderness. Abdominal: Soft. Bowel sounds are normal. He exhibits no distension and no mass. There is no tenderness. There is no rebound. lb (79.4 kg)    Height: 6' (1.829 m)            MDM  Number of Diagnoses or Management Options  Diagnosis management comments: Patient with psych history he recently was in the [facility] release from [facility] was in rehab Taking all his medication no doctor to follow-up doing drugs as per patient he does  spent $20every day and he is desperate for the drugs he claimed to do spice, she was referred to disorders as her grandmother is desperate for money and he wanted to buy a gun he wanted to shoot himself he cannot get the drugs, patient is medically cleared I discussed with the psych unit at the [facility] patient is accepted ,DR Gudelia Rebolledo  On call / working in ed        Amount and/or Complexity of Data Reviewed  Clinical lab tests: ordered and reviewed        CRITICAL CARE TIME   Total Critical Care time was  minutes, excluding separately reportable procedures. There was a high probability of clinically significant/life threatening deterioration in the patient's condition which required my urgent intervention. LTS:  None    PROCEDURES:  Unless otherwise noted below, none     Procedures    FINAL IMPRESSION      1. Suicidal ideation    2. Mood disorder ([facility] [address] [facility]    3. Schizo affective schizophrenia [facility])          DISPOSITION/PLAN   DISPOSITION Decision To Transfer 01/10/2018 11:32:43 PM      PATIENT REFERRED TO:  No follow-up provider specified.     DISCHARGE MEDICATIONS:  New Prescriptions    No medications on file          (Please note that portions of this note were completed with a voice recognition program.  Efforts were made to edit the dictations but occasionally words are mis-transcribed.)    Lata Alves MD (electronically signed)  Attending Emergency Physician       Lata Alves MD  01/10/18 7985

## 2018-01-11 NOTE — ED PROVIDER NOTES
well-nourished. HENT:   Head: Normocephalic and atraumatic. Mouth/Throat: Oropharynx is clear and moist.   Eyes: Conjunctivae and EOM are normal. Pupils are equal, round, and reactive to light. Neck: Normal range of motion. Neck supple. No tracheal deviation present. Cardiovascular: Normal heart sounds and intact distal pulses. Pulmonary/Chest: Effort normal and breath sounds normal. No stridor. No respiratory distress. Abdominal: Soft. Bowel sounds are normal. He exhibits no distension and no mass. There is no tenderness. There is no rebound and no guarding. Musculoskeletal: Normal range of motion. Neurological: He is alert and oriented to person, place, and time. He has normal reflexes. Skin: Skin is warm and dry. No rash noted. Psychiatric: He has a normal mood and affect. His behavior is normal. Judgment and thought content normal.       DIAGNOSTIC RESULTS     EKG: All EKG's are interpreted by the Emergency Department Physician who either signs or Co-signs this chart in the absence of a cardiologist.    EKG shows normal sinus rhythm, heart rate 77, normal intervals, normal axis, no ST segment changes    RADIOLOGY:   Non-plain film images such as CT, Ultrasound and MRI are read by the radiologist. Plain radiographic images are visualized and preliminarily interpreted by the emergency physician with the below findings:    Interpretation per the Radiologist below, if available at the time of this note:    No orders to display           LABS:  Labs Reviewed   CK-MB INDEX - Abnormal; Notable for the following:        Result Value    Total  (*)     All other components within normal limits   URINALYSIS       All other labs were within normal range or not returned as of this dictation.     EMERGENCY DEPARTMENT COURSE and DIFFERENTIAL DIAGNOSIS/MDM:   Vitals:    Vitals:    01/11/18 0056 01/11/18 0423   BP: 119/80 136/88   Pulse: 86 86   Resp: 20    Temp: 98.3 °F (36.8 °C) 98.1 °F (36.7 °C)
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
## 2018-01-11 NOTE — ED NOTES
[facility] was called and they will send someone out to evaluate the patient when the urine tox screen comes back     Siria Tom  01/10/18 3279

## 2018-01-11 NOTE — ED NOTES
This nurse spoke with Dr. Nathalia Johnson who gave orders to admit patient to [address] [place] [address] with dx of schizoaffective disorder bipolar type and substance induced mood disorder.       [address], RN  01/11/18 9799

## 2018-01-12 VITALS
DIASTOLIC BLOOD PRESSURE: 71 MMHG | RESPIRATION RATE: 18 BRPM | TEMPERATURE: 98.4 F | WEIGHT: 175 LBS | HEIGHT: 72 IN | BODY MASS INDEX: 23.7 KG/M2 | OXYGEN SATURATION: 97 % | HEART RATE: 73 BPM | SYSTOLIC BLOOD PRESSURE: 145 MMHG

## 2018-01-12 LAB
VALPROIC ACID % FREE: ABNORMAL % (ref 5–18)
VALPROIC ACID TOTAL: <7 UG/ML (ref 50–125)
VALPROIC ACID, FREE: <7 UG/ML (ref 7–23)

## 2018-01-12 PROCEDURE — 6370000000 HC RX 637 (ALT 250 FOR IP): Performed by: PSYCHIATRY & NEUROLOGY

## 2018-01-12 PROCEDURE — 99239 HOSP IP/OBS DSCHRG MGMT >30: CPT | Performed by: PSYCHIATRY & NEUROLOGY

## 2018-01-12 RX ORDER — HALOPERIDOL 5 MG
5 TABLET ORAL EVERY 6 HOURS PRN
Status: DISCONTINUED | OUTPATIENT
Start: 2018-01-12 | End: 2018-01-12 | Stop reason: HOSPADM

## 2018-01-12 RX ADMIN — HYDROXYZINE PAMOATE 50 MG: 50 CAPSULE ORAL at 15:40

## 2018-01-12 RX ADMIN — HALOPERIDOL 5 MG: 5 TABLET ORAL at 15:40

## 2018-01-12 RX ADMIN — DIVALPROEX SODIUM 1000 MG: 500 TABLET, DELAYED RELEASE ORAL at 10:25

## 2018-01-12 RX ADMIN — DIVALPROEX SODIUM 1000 MG: 500 TABLET, DELAYED RELEASE ORAL at 20:25

## 2018-01-12 ASSESSMENT — LIFESTYLE VARIABLES: HISTORY_ALCOHOL_USE: NO

## 2018-01-12 NOTE — CARE COORDINATION
Pt did not attend group despite staff encouragement.    Electronically signed by Monica Hastings on 1/12/2018 at 12:20 PM

## 2018-01-12 NOTE — PLAN OF CARE
Problem: Altered Mood, Depressive Behavior  Goal: LTG-Able to verbalize and/or display a decrease in depressive symptoms  Outcome: Ongoing    Goal: STG-Knowledge of positive coping patterns  Outcome: Ongoing      Problem: Altered Mood, Psychotic Behavior  Goal: LTG-Able to verbalize decrease in frequency and intensity of hallucinations  Outcome: Ongoing    Goal: STG-Medication therapy compliance  Outcome: Ongoing      Problem: Substance Abuse  Goal: STG-Knowledge of community resources  Outcome: Ongoing
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
## 2018-01-12 NOTE — CARE COORDINATION
FAMILY COLLATERAL NOTE    Family/Support Name: Bishop Goff  Contact #:[phone]  Relationship to Pt[de-identified] grandma        Family/Support contact aware of hospitalization: yes  Presenting Symptoms/Current Concerns: patient seems to be agitated as well as has been abusing spice and marijuana prior to admittance. Top 3 Life Stressors: patient has not positive support system/ patient abuses marijuana on a daily basis/patient has no income and has been stealing money to support habit of marijuana. Background History Relevant to Current Hospitalization: Grandma reports that patient was upsetting her and begging her for money and was also not taking his medicine. Grandma reports that patient stated that if he had a gun he would have shot himself. Grandma reports that patient is on his own if he gets out today. Grandma reports that he is not welcome at her home but that he has been staying at a friend home and grandma is aware of this. Grandma reports that she is unsure of if friend is a good support. Grandma reports that patient was not taking his medications prior to admittance and was unsure if patient sees his substance abuse as an issue. Family Mental Health/Substance Use History:   Grandma reports that she is aware of his substance use in terms of marijuana and that he uses that daily. Grandma reports that it has gotten out of control and that he was stealing money  To get his marijuana. Grandma reports that patients father has some issues of drugs and alcohol as well as some unknown mental illness as well as he biological fathers family. Support Network's Goal for Hospitalization:     Discharge Plan: grandma stated that patient is not welcome at her home but would have to go back to current residency.        Support Network Supportive of Discharge Plan: yes      Support can confirm Safety of Location and Security of Weapons: grandma reports that she is unaware of any weapons in the home where patient resides. Support agreeable to Safeguard and Monitor Medications (including Prescription and OTC): grandmother does not live with patient there is no one to monitor patients meds. Identified Barriers to Compliance with Discharge Plan: patient was not compliant with medications. Recommendations for Support Network: call [facility] or [facility] after discharge.          Claudell Spire, MSW, LSW

## 2018-01-13 NOTE — DISCHARGE SUMMARY
Patient discharged by Dr. Gilford Mesi. Reviewed appointment times, medication time and purpose and safety plan. Pt returning to home where he was staying, friend Shonda Calvin, [address], [place]. Pt leaving by cab after all belongings returned. Pt denied suicidal thoughts. He was extremely happy to be discharged. He was able to state a safety plan to call 911 in case of emergency.  All belongings returned and patient leaving unit at

## 2018-01-16 ENCOUNTER — HOSPITAL ENCOUNTER (EMERGENCY)
Age: 28
Discharge: OTHER INSTITUTION | End: 2018-01-16
Attending: EMERGENCY MEDICINE
Payer: COMMERCIAL

## 2018-01-16 VITALS
DIASTOLIC BLOOD PRESSURE: 75 MMHG | OXYGEN SATURATION: 99 % | HEART RATE: 98 BPM | WEIGHT: 175 LBS | HEIGHT: 72 IN | TEMPERATURE: 98.4 F | RESPIRATION RATE: 18 BRPM | BODY MASS INDEX: 23.7 KG/M2 | SYSTOLIC BLOOD PRESSURE: 148 MMHG

## 2018-01-16 DIAGNOSIS — R45.851 SUICIDAL IDEATION: Primary | ICD-10-CM

## 2018-01-16 LAB
ALBUMIN SERPL-MCNC: 4.6 G/DL (ref 3.9–4.9)
ALP BLD-CCNC: 102 U/L (ref 35–104)
ALT SERPL-CCNC: 18 U/L (ref 0–41)
AMPHETAMINE SCREEN, URINE: ABNORMAL
ANION GAP SERPL CALCULATED.3IONS-SCNC: 15 MEQ/L (ref 7–13)
AST SERPL-CCNC: 19 U/L (ref 0–40)
BARBITURATE SCREEN URINE: ABNORMAL
BENZODIAZEPINE SCREEN, URINE: ABNORMAL
BILIRUB SERPL-MCNC: 0.7 MG/DL (ref 0–1.2)
BUN BLDV-MCNC: 19 MG/DL (ref 6–20)
CALCIUM SERPL-MCNC: 9.1 MG/DL (ref 8.6–10.2)
CANNABINOID SCREEN URINE: POSITIVE
CHLORIDE BLD-SCNC: 104 MEQ/L (ref 98–107)
CO2: 25 MEQ/L (ref 22–29)
COCAINE METABOLITE SCREEN URINE: ABNORMAL
CREAT SERPL-MCNC: 0.99 MG/DL (ref 0.7–1.2)
EKG ATRIAL RATE: 101 BPM
EKG P AXIS: 75 DEGREES
EKG P-R INTERVAL: 140 MS
EKG Q-T INTERVAL: 348 MS
EKG QRS DURATION: 86 MS
EKG QTC CALCULATION (BAZETT): 451 MS
EKG R AXIS: 74 DEGREES
EKG T AXIS: 41 DEGREES
EKG VENTRICULAR RATE: 101 BPM
ETHANOL PERCENT: NORMAL G/DL
ETHANOL: <10 MG/DL (ref 0–0.08)
GFR AFRICAN AMERICAN: >60
GFR NON-AFRICAN AMERICAN: >60
GLOBULIN: 2.5 G/DL (ref 2.3–3.5)
GLUCOSE BLD-MCNC: 109 MG/DL (ref 74–109)
HCT VFR BLD CALC: 44.5 % (ref 42–52)
HEMOGLOBIN: 15.2 G/DL (ref 14–18)
Lab: ABNORMAL
MCH RBC QN AUTO: 31.6 PG (ref 27–31.3)
MCHC RBC AUTO-ENTMCNC: 34.2 % (ref 33–37)
MCV RBC AUTO: 92.4 FL (ref 80–100)
OPIATE SCREEN URINE: ABNORMAL
PDW BLD-RTO: 12.9 % (ref 11.5–14.5)
PHENCYCLIDINE SCREEN URINE: ABNORMAL
PLATELET # BLD: 229 K/UL (ref 130–400)
POTASSIUM SERPL-SCNC: 3.6 MEQ/L (ref 3.5–5.1)
RBC # BLD: 4.81 M/UL (ref 4.7–6.1)
SODIUM BLD-SCNC: 144 MEQ/L (ref 132–144)
TOTAL PROTEIN: 7.1 G/DL (ref 6.4–8.1)
TRICYCLIC, URINE: ABNORMAL
WBC # BLD: 9.2 K/UL (ref 4.8–10.8)

## 2018-01-16 PROCEDURE — 93005 ELECTROCARDIOGRAM TRACING: CPT

## 2018-01-16 PROCEDURE — 99285 EMERGENCY DEPT VISIT HI MDM: CPT

## 2018-01-16 PROCEDURE — G0480 DRUG TEST DEF 1-7 CLASSES: HCPCS

## 2018-01-16 PROCEDURE — 80053 COMPREHEN METABOLIC PANEL: CPT

## 2018-01-16 PROCEDURE — 80307 DRUG TEST PRSMV CHEM ANLYZR: CPT

## 2018-01-16 PROCEDURE — 85027 COMPLETE CBC AUTOMATED: CPT

## 2018-01-16 ASSESSMENT — ENCOUNTER SYMPTOMS
DIARRHEA: 0
NAUSEA: 0
WHEEZING: 0
RESPIRATORY NEGATIVE: 1
SINUS PAIN: 0
BLOOD IN STOOL: 0
SORE THROAT: 0
EYES NEGATIVE: 1
EYE DISCHARGE: 0
GASTROINTESTINAL NEGATIVE: 1
ABDOMINAL DISTENTION: 0
BACK PAIN: 0
ABDOMINAL PAIN: 0
SHORTNESS OF BREATH: 0
EYE PAIN: 0
VOMITING: 0
COUGH: 0
CHEST TIGHTNESS: 0

## 2018-01-17 NOTE — ED NOTES
Spoke with Wilfrido Taylor at [facility], paperwork faxed     Neno Trevino RN  01/16/18 9861

## 2018-01-17 NOTE — ED PROVIDER NOTES
syncope, weakness and headaches. Hematological: Negative. Negative for adenopathy. Does not bruise/bleed easily. Psychiatric/Behavioral: Positive for agitation, behavioral problems, hallucinations and suicidal ideas. Negative for confusion and self-injury. The patient is nervous/anxious and is hyperactive. All other systems reviewed and are negative. Except as noted above the remainder of the review of systems was reviewed and negative. PAST MEDICAL HISTORY     Past Medical History:   Diagnosis Date    Anxiety 1/4/2012    Bipolar affective disorder ([facility] 11/25/2015    Chronic back pain 4/20/2017    Drug abuse and dependence ([address] 1/4/2012    Fracture of navicular bone of foot, closed 09/03/2012    lt    Fracture of temporal bone ([facility] 2015    History of pulmonary embolism     Schizophrenia ([facility]     Seizures ([address]     Talus fracture 9/2012    lt    TBI (traumatic brain injury) ([facility] 2015    Wound of left leg 9/14/2012         SURGICAL HISTORY       Past Surgical History:   Procedure Laterality Date    WRIST SURGERY  2009    pin in wrist left         CURRENT MEDICATIONS       Previous Medications    DIVALPROEX (DEPAKOTE) 500 MG DR TABLET    Take 2 tablets by mouth every 12 hours       ALLERGIES     Shellfish-derived products and Shrimp flavor    FAMILY HISTORY       Family History   Problem Relation Age of Onset    Family history unknown: Yes          SOCIAL HISTORY       Social History     Social History    Marital status: Single     Spouse name: N/A    Number of children: 0    Years of education: 13     Social History Main Topics    Smoking status: Current Every Day Smoker     Packs/day: 1.00     Years: 1.00     Types: Cigarettes     Last attempt to quit: 11/28/2011    Smokeless tobacco: Current User     Types: Chew    Alcohol use No      Comment: pt denies    Drug use: Yes     Types:  Other-see comments, Cocaine, Marijuana, Methamphetamines, Opiates       Comment: Spice and Marijuana per pt, crack, mushrooms, acid    Sexual activity: Not Currently     Partners: Female     Other Topics Concern    None     Social History Narrative    Lives w family           SCREENINGS    [place] Coma Scale  Eye Opening: Spontaneous  Best Verbal Response: Oriented  Best Motor Response: Obeys commands  [place] Coma Scale Score: 15        PHYSICAL EXAM    (up to 7 for level 4, 8 or more for level 5)     ED Triage Vitals [01/16/18 1905]   BP Temp Temp Source Pulse Resp SpO2 Height Weight   (!) 165/90 97.6 °F (36.4 °C) Oral 111 20 97 % 6' (1.829 m) 175 lb (79.4 kg)       Physical Exam   Constitutional: He is oriented to person, place, and time. He appears well-developed and well-nourished. HENT:   Head: Normocephalic and atraumatic. Eyes: Conjunctivae and EOM are normal. Pupils are equal, round, and reactive to light. Neck: Normal range of motion. Neck supple. No JVD present. No tracheal deviation present. Cardiovascular: Normal rate, regular rhythm, normal heart sounds and intact distal pulses. Exam reveals no friction rub. No murmur heard. Pulmonary/Chest: Effort normal and breath sounds normal. No stridor. No respiratory distress. Abdominal: Soft. Bowel sounds are normal.   Musculoskeletal: Normal range of motion. He exhibits no edema, tenderness or deformity. Lymphadenopathy:     He has no cervical adenopathy. Neurological: He is alert and oriented to person, place, and time. Skin: Skin is warm and dry. No rash noted. No erythema. No pallor. Nursing note and vitals reviewed. Mood is depressed affect is flat, Patient is hallucinating thought content not normal and patient's showing poor judgment    DIAGNOSTIC RESULTS     EKG: All EKG's are interpreted by the Emergency Department Physician who either signs or Co-signs this chart in the absence of a cardiologist.    Normal sinus rhythm with a ventricular rate of 101. A 140 QRS duration 86 and QT corrected 451.   There is no ST-T wave change    RADIOLOGY:   Non-plain film images such as CT, Ultrasound and MRI are read by the radiologist. Plain radiographic images are visualized and preliminarily interpreted by the emergency physician with the below findings:    None    Interpretation per the Radiologist below, if available at the time of this note:    No orders to display         ED BEDSIDE ULTRASOUND:   Performed by ED Physician - none    LABS:  Labs Reviewed   URINE DRUG SCREEN, COMPREHENSIVE - Abnormal; Notable for the following:        Result Value    Cannabinoid Scrn, Ur POSITIVE (*)     All other components within normal limits   ETHANOL       All other labs were within normal range or not returned as of this dictation. EMERGENCY DEPARTMENT COURSE and DIFFERENTIAL DIAGNOSIS/MDM:   Vitals:    Vitals:    01/16/18 1905   BP: (!) 165/90   Pulse: 111   Resp: 20   Temp: 97.6 °F (36.4 °C)   TempSrc: Oral   SpO2: 97%   Weight: 175 lb (79.4 kg)   Height: 6' (1.829 m)       Patient has depression with suicidal ideation has hallucinations visual and is threatening that he'll hurt himself. He is on These urinating and defecating and jars in his house he is running outside in the cold in the rain he was just released he is improved in no way since he's been released from his previous suicidal ideation. The patient has only gotten worse and requires admission and the longer term facility were trying [address] time was 0 minutes, excluding separately reportable procedures. There was a high probability of clinically significant/life threatening deterioration in the patient's condition which required my urgent intervention. CONSULTS:  None    PROCEDURES:  Unless otherwise noted below, none     Procedures    FINAL IMPRESSION      1.  Suicidal ideation          DISPOSITION/PLAN   DISPOSITION Decision To Transfer 01/16/2018 08:22:06 PM      PATIENT REFERRED TO:  No follow-up provider

## 2018-02-02 ENCOUNTER — APPOINTMENT (OUTPATIENT)
Dept: GENERAL RADIOLOGY | Age: 28
End: 2018-02-02
Payer: COMMERCIAL

## 2018-02-02 ENCOUNTER — HOSPITAL ENCOUNTER (EMERGENCY)
Age: 28
Discharge: HOME | End: 2018-02-02
Attending: EMERGENCY MEDICINE
Payer: COMMERCIAL

## 2018-02-02 VITALS
RESPIRATION RATE: 20 BRPM | BODY MASS INDEX: 24.38 KG/M2 | OXYGEN SATURATION: 98 % | HEIGHT: 72 IN | WEIGHT: 180 LBS | TEMPERATURE: 97.8 F | SYSTOLIC BLOOD PRESSURE: 170 MMHG | HEART RATE: 111 BPM | DIASTOLIC BLOOD PRESSURE: 93 MMHG

## 2018-02-02 DIAGNOSIS — R45.851 SUICIDAL IDEATION: Primary | ICD-10-CM

## 2018-02-02 LAB
ALBUMIN SERPL-MCNC: 4.5 G/DL (ref 3.9–4.9)
ALP BLD-CCNC: 107 U/L (ref 35–104)
ALT SERPL-CCNC: 69 U/L (ref 0–41)
AMPHETAMINE SCREEN, URINE: ABNORMAL
ANION GAP SERPL CALCULATED.3IONS-SCNC: 15 MEQ/L (ref 7–13)
AST SERPL-CCNC: 40 U/L (ref 0–40)
BARBITURATE SCREEN URINE: ABNORMAL
BENZODIAZEPINE SCREEN, URINE: ABNORMAL
BILIRUB SERPL-MCNC: 0.3 MG/DL (ref 0–1.2)
BILIRUBIN URINE: NEGATIVE
BLOOD, URINE: NEGATIVE
BUN BLDV-MCNC: 23 MG/DL (ref 6–20)
CALCIUM SERPL-MCNC: 9.5 MG/DL (ref 8.6–10.2)
CANNABINOID SCREEN URINE: POSITIVE
CHLORIDE BLD-SCNC: 101 MEQ/L (ref 98–107)
CLARITY: CLEAR
CO2: 27 MEQ/L (ref 22–29)
COCAINE METABOLITE SCREEN URINE: ABNORMAL
COLOR: YELLOW
CREAT SERPL-MCNC: 0.85 MG/DL (ref 0.7–1.2)
EKG ATRIAL RATE: 114 BPM
EKG P AXIS: 67 DEGREES
EKG P-R INTERVAL: 148 MS
EKG Q-T INTERVAL: 338 MS
EKG QRS DURATION: 88 MS
EKG QTC CALCULATION (BAZETT): 465 MS
EKG R AXIS: 75 DEGREES
EKG T AXIS: 29 DEGREES
EKG VENTRICULAR RATE: 114 BPM
ETHANOL PERCENT: NORMAL G/DL
ETHANOL: <10 MG/DL (ref 0–0.08)
GFR AFRICAN AMERICAN: >60
GFR NON-AFRICAN AMERICAN: >60
GLOBULIN: 2.5 G/DL (ref 2.3–3.5)
GLUCOSE BLD-MCNC: 127 MG/DL (ref 74–109)
GLUCOSE URINE: NEGATIVE MG/DL
HCT VFR BLD CALC: 43.3 % (ref 42–52)
HEMOGLOBIN: 14.8 G/DL (ref 14–18)
KETONES, URINE: NEGATIVE MG/DL
LACTIC ACID: 2.1 MMOL/L (ref 0.5–2.2)
LEUKOCYTE ESTERASE, URINE: NEGATIVE
Lab: ABNORMAL
MAGNESIUM: 2.5 MG/DL (ref 1.7–2.3)
MCH RBC QN AUTO: 31.3 PG (ref 27–31.3)
MCHC RBC AUTO-ENTMCNC: 34.2 % (ref 33–37)
MCV RBC AUTO: 91.5 FL (ref 80–100)
NITRITE, URINE: NEGATIVE
OPIATE SCREEN URINE: ABNORMAL
PDW BLD-RTO: 13.1 % (ref 11.5–14.5)
PH UA: 7.5 (ref 5–9)
PHENCYCLIDINE SCREEN URINE: ABNORMAL
PLATELET # BLD: 265 K/UL (ref 130–400)
POTASSIUM SERPL-SCNC: 3.6 MEQ/L (ref 3.5–5.1)
PROTEIN UA: NEGATIVE MG/DL
RBC # BLD: 4.74 M/UL (ref 4.7–6.1)
SODIUM BLD-SCNC: 143 MEQ/L (ref 132–144)
SPECIFIC GRAVITY UA: 1.01 (ref 1–1.03)
TOTAL PROTEIN: 7 G/DL (ref 6.4–8.1)
TRICYCLIC, URINE: ABNORMAL
URINE REFLEX TO CULTURE: NORMAL
UROBILINOGEN, URINE: 0.2 E.U./DL
WBC # BLD: 9.7 K/UL (ref 4.8–10.8)

## 2018-02-02 PROCEDURE — 99285 EMERGENCY DEPT VISIT HI MDM: CPT

## 2018-02-02 PROCEDURE — 85025 COMPLETE CBC W/AUTO DIFF WBC: CPT

## 2018-02-02 PROCEDURE — 83605 ASSAY OF LACTIC ACID: CPT

## 2018-02-02 PROCEDURE — G0480 DRUG TEST DEF 1-7 CLASSES: HCPCS

## 2018-02-02 PROCEDURE — 71045 X-RAY EXAM CHEST 1 VIEW: CPT

## 2018-02-02 PROCEDURE — 93005 ELECTROCARDIOGRAM TRACING: CPT

## 2018-02-02 PROCEDURE — 36415 COLL VENOUS BLD VENIPUNCTURE: CPT

## 2018-02-02 PROCEDURE — 81003 URINALYSIS AUTO W/O SCOPE: CPT

## 2018-02-02 PROCEDURE — 80053 COMPREHEN METABOLIC PANEL: CPT

## 2018-02-02 PROCEDURE — 80307 DRUG TEST PRSMV CHEM ANLYZR: CPT

## 2018-02-02 PROCEDURE — 83735 ASSAY OF MAGNESIUM: CPT

## 2018-02-02 ASSESSMENT — ENCOUNTER SYMPTOMS
CONSTIPATION: 0
APNEA: 0
EYE PAIN: 0
COLOR CHANGE: 0
NAUSEA: 0
BACK PAIN: 0
SORE THROAT: 0
WHEEZING: 0
SHORTNESS OF BREATH: 0
RHINORRHEA: 0
COUGH: 0
DIARRHEA: 0
VOMITING: 0
ABDOMINAL DISTENTION: 0
ABDOMINAL PAIN: 0
PHOTOPHOBIA: 0
SINUS PRESSURE: 0

## 2018-02-03 NOTE — ED NOTES
Officer Mal from [employer] that he will be transporting him there once he has been medically cleared. Patient is medically cleared per verbal order from Dr. Emeka Turner. Spoke with Paul Garg at [facility] who states [facility] will evaluate him at the [facility].       Vinnie Pan RN  02/02/18 2040

## 2018-02-03 NOTE — ED NOTES
Mercedez at [facility] informed that PT will be evaluated by [facility] personal at the [facility]. Medical records sent with Officer Mal.      Judith Vasquez RN  02/02/18 2042
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
## 2018-06-13 ENCOUNTER — OFFICE VISIT (OUTPATIENT)
Dept: FAMILY MEDICINE CLINIC | Age: 28
End: 2018-06-13
Payer: COMMERCIAL

## 2018-06-13 VITALS
HEART RATE: 75 BPM | DIASTOLIC BLOOD PRESSURE: 78 MMHG | OXYGEN SATURATION: 98 % | RESPIRATION RATE: 20 BRPM | TEMPERATURE: 96.8 F | BODY MASS INDEX: 26.95 KG/M2 | WEIGHT: 199 LBS | HEIGHT: 72 IN | SYSTOLIC BLOOD PRESSURE: 126 MMHG

## 2018-06-13 DIAGNOSIS — M25.561 ACUTE PAIN OF BOTH KNEES: Primary | ICD-10-CM

## 2018-06-13 DIAGNOSIS — M25.562 ACUTE PAIN OF BOTH KNEES: Primary | ICD-10-CM

## 2018-06-13 PROCEDURE — G8427 DOCREV CUR MEDS BY ELIG CLIN: HCPCS | Performed by: NURSE PRACTITIONER

## 2018-06-13 PROCEDURE — 99213 OFFICE O/P EST LOW 20 MIN: CPT | Performed by: NURSE PRACTITIONER

## 2018-06-13 PROCEDURE — G8419 CALC BMI OUT NRM PARAM NOF/U: HCPCS | Performed by: NURSE PRACTITIONER

## 2018-06-13 PROCEDURE — 4004F PT TOBACCO SCREEN RCVD TLK: CPT | Performed by: NURSE PRACTITIONER

## 2018-06-17 ENCOUNTER — APPOINTMENT (OUTPATIENT)
Dept: CT IMAGING | Age: 28
End: 2018-06-17
Payer: COMMERCIAL

## 2018-06-17 ENCOUNTER — HOSPITAL ENCOUNTER (EMERGENCY)
Age: 28
Discharge: HOME | End: 2018-06-17
Attending: EMERGENCY MEDICINE
Payer: COMMERCIAL

## 2018-06-17 VITALS
OXYGEN SATURATION: 98 % | HEIGHT: 72 IN | TEMPERATURE: 98.4 F | DIASTOLIC BLOOD PRESSURE: 86 MMHG | BODY MASS INDEX: 25.73 KG/M2 | HEART RATE: 99 BPM | RESPIRATION RATE: 18 BRPM | SYSTOLIC BLOOD PRESSURE: 140 MMHG | WEIGHT: 190 LBS

## 2018-06-17 DIAGNOSIS — S09.90XA CLOSED HEAD INJURY, INITIAL ENCOUNTER: Primary | ICD-10-CM

## 2018-06-17 DIAGNOSIS — T07.XXXA MULTIPLE CONTUSIONS: ICD-10-CM

## 2018-06-17 DIAGNOSIS — T14.8XXA ABRASION: ICD-10-CM

## 2018-06-17 DIAGNOSIS — S01.81XA CHIN LACERATION, INITIAL ENCOUNTER: ICD-10-CM
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
## 2018-06-17 DIAGNOSIS — S02.609A JAW FRACTURE, CLOSED, INITIAL ENCOUNTER (HCC): ICD-10-CM

## 2018-06-17 PROCEDURE — 2580000003 HC RX 258: Performed by: EMERGENCY MEDICINE

## 2018-06-17 PROCEDURE — 12013 RPR F/E/E/N/L/M 2.6-5.0 CM: CPT

## 2018-06-17 PROCEDURE — 70486 CT MAXILLOFACIAL W/O DYE: CPT

## 2018-06-17 PROCEDURE — 6370000000 HC RX 637 (ALT 250 FOR IP): Performed by: EMERGENCY MEDICINE

## 2018-06-17 PROCEDURE — 2500000003 HC RX 250 WO HCPCS: Performed by: EMERGENCY MEDICINE

## 2018-06-17 PROCEDURE — 70450 CT HEAD/BRAIN W/O DYE: CPT

## 2018-06-17 PROCEDURE — 99284 EMERGENCY DEPT VISIT MOD MDM: CPT

## 2018-06-17 RX ORDER — CEPHALEXIN 500 MG/1
500 CAPSULE ORAL ONCE
Status: DISCONTINUED | OUTPATIENT
Start: 2018-06-17 | End: 2018-06-17 | Stop reason: HOSPADM

## 2018-06-17 RX ORDER — HYDROCODONE BITARTRATE AND ACETAMINOPHEN 5; 325 MG/1; MG/1
1 TABLET ORAL EVERY 6 HOURS PRN
Qty: 10 TABLET | Refills: 0 | Status: ON HOLD | OUTPATIENT
Start: 2018-06-17 | End: 2018-06-22 | Stop reason: HOSPADM

## 2018-06-17 RX ORDER — CEPHALEXIN 500 MG/1
500 CAPSULE ORAL 4 TIMES DAILY
Qty: 28 CAPSULE | Refills: 0 | Status: SHIPPED | OUTPATIENT
Start: 2018-06-17 | End: 2018-06-24

## 2018-06-17 RX ORDER — GINSENG 100 MG
CAPSULE ORAL ONCE
Status: COMPLETED | OUTPATIENT
Start: 2018-06-17 | End: 2018-06-17

## 2018-06-17 RX ORDER — IBUPROFEN 600 MG/1
600 TABLET ORAL ONCE
Status: DISCONTINUED | OUTPATIENT
Start: 2018-06-17 | End: 2018-06-17 | Stop reason: HOSPADM

## 2018-06-17 RX ORDER — LIDOCAINE HYDROCHLORIDE AND EPINEPHRINE 10; 10 MG/ML; UG/ML
20 INJECTION, SOLUTION INFILTRATION; PERINEURAL ONCE
Status: COMPLETED | OUTPATIENT
Start: 2018-06-17 | End: 2018-06-17

## 2018-06-17 RX ORDER — MAGNESIUM HYDROXIDE 1200 MG/15ML
1000 LIQUID ORAL CONTINUOUS
Status: DISCONTINUED | OUTPATIENT
Start: 2018-06-17 | End: 2018-06-17 | Stop reason: HOSPADM

## 2018-06-17 RX ORDER — IBUPROFEN 600 MG/1
600 TABLET ORAL EVERY 6 HOURS PRN
Qty: 30 TABLET | Refills: 0 | Status: ON HOLD | OUTPATIENT
Start: 2018-06-17 | End: 2018-08-21 | Stop reason: HOSPADM

## 2018-06-17 RX ADMIN — LIDOCAINE HYDROCHLORIDE,EPINEPHRINE BITARTRATE 20 ML: 10; .01 INJECTION, SOLUTION INFILTRATION; PERINEURAL at 14:16

## 2018-06-17 RX ADMIN — BACITRACIN: 500 OINTMENT TOPICAL at 14:16

## 2018-06-17 RX ADMIN — SODIUM CHLORIDE 500 ML: 900 IRRIGANT IRRIGATION at 14:16

## 2018-06-17 ASSESSMENT — PAIN SCALES - GENERAL
PAINLEVEL_OUTOF10: 5
PAINLEVEL_OUTOF10: 10

## 2018-06-17 ASSESSMENT — ENCOUNTER SYMPTOMS
SINUS PRESSURE: 0
EYE PAIN: 0
BLOOD IN STOOL: 0
WHEEZING: 0
CHOKING: 0
VOMITING: 0
COUGH: 0
EYE DISCHARGE: 0
VOICE CHANGE: 0
CHEST TIGHTNESS: 0
TROUBLE SWALLOWING: 0
CONSTIPATION: 0
FACIAL SWELLING: 1
SORE THROAT: 0
STRIDOR: 0
BACK PAIN: 0
EYE REDNESS: 0
DIARRHEA: 0
SHORTNESS OF BREATH: 0
ABDOMINAL PAIN: 0

## 2018-06-17 ASSESSMENT — PAIN DESCRIPTION - LOCATION
LOCATION: JAW
LOCATION: JAW

## 2018-06-17 ASSESSMENT — PAIN DESCRIPTION - ONSET: ONSET: SUDDEN

## 2018-06-17 ASSESSMENT — PAIN DESCRIPTION - ORIENTATION
ORIENTATION: LEFT
ORIENTATION: LEFT

## 2018-06-17 ASSESSMENT — PAIN DESCRIPTION - DESCRIPTORS
DESCRIPTORS: ACHING
DESCRIPTORS: ACHING;TENDER

## 2018-06-17 ASSESSMENT — PAIN DESCRIPTION - FREQUENCY: FREQUENCY: CONTINUOUS

## 2018-06-17 ASSESSMENT — PAIN DESCRIPTION - PROGRESSION: CLINICAL_PROGRESSION: NOT CHANGED

## 2018-06-19 ENCOUNTER — HOSPITAL ENCOUNTER (INPATIENT)
Age: 28
LOS: 3 days | Discharge: HOME | DRG: 750 | End: 2018-06-22
Attending: PSYCHIATRY & NEUROLOGY | Admitting: PSYCHIATRY & NEUROLOGY
Payer: COMMERCIAL

## 2018-06-19 DIAGNOSIS — F20.9 SCHIZOPHRENIA, UNSPECIFIED TYPE (HCC): Primary | ICD-10-CM

## 2018-06-19 LAB
ACETAMINOPHEN LEVEL: <5 UG/ML (ref 10–30)
ALBUMIN SERPL-MCNC: 4.6 G/DL (ref 3.9–4.9)
ALP BLD-CCNC: 119 U/L (ref 35–104)
ALT SERPL-CCNC: 20 U/L (ref 0–41)
AMPHETAMINE SCREEN, URINE: NORMAL
ANION GAP SERPL CALCULATED.3IONS-SCNC: 15 MEQ/L (ref 7–13)
AST SERPL-CCNC: 27 U/L (ref 0–40)
BARBITURATE SCREEN URINE: NORMAL
BASOPHILS ABSOLUTE: 0.1 K/UL (ref 0–0.2)
BASOPHILS RELATIVE PERCENT: 0.6 %
BENZODIAZEPINE SCREEN, URINE: NORMAL
BILIRUB SERPL-MCNC: 0.8 MG/DL (ref 0–1.2)
BILIRUBIN URINE: NEGATIVE
BLOOD, URINE: NEGATIVE
BUN BLDV-MCNC: 25 MG/DL (ref 6–20)
CALCIUM SERPL-MCNC: 9.4 MG/DL (ref 8.6–10.2)
CANNABINOID SCREEN URINE: NORMAL
CHLORIDE BLD-SCNC: 97 MEQ/L (ref 98–107)
CK MB: 3.1 NG/ML (ref 0–6.7)
CLARITY: CLEAR
CO2: 26 MEQ/L (ref 22–29)
COCAINE METABOLITE SCREEN URINE: NORMAL
COLOR: YELLOW
CREAT SERPL-MCNC: 0.78 MG/DL (ref 0.7–1.2)
CREATINE KINASE-MB INDEX: 0.9 % (ref 0–3.5)
EOSINOPHILS ABSOLUTE: 0.2 K/UL (ref 0–0.7)
EOSINOPHILS RELATIVE PERCENT: 2.5 %
ETHANOL PERCENT: NORMAL G/DL
ETHANOL: <10 MG/DL (ref 0–0.08)
GFR AFRICAN AMERICAN: >60
GFR NON-AFRICAN AMERICAN: >60
GLOBULIN: 2.8 G/DL (ref 2.3–3.5)
GLUCOSE BLD-MCNC: 116 MG/DL (ref 74–109)
GLUCOSE URINE: NEGATIVE MG/DL
HCT VFR BLD CALC: 44.3 % (ref 42–52)
HEMOGLOBIN: 15.3 G/DL (ref 14–18)
KETONES, URINE: NEGATIVE MG/DL
LEUKOCYTE ESTERASE, URINE: NEGATIVE
LYMPHOCYTES ABSOLUTE: 1.5 K/UL (ref 1–4.8)
LYMPHOCYTES RELATIVE PERCENT: 18.1 %
Lab: NORMAL
MCH RBC QN AUTO: 31.8 PG (ref 27–31.3)
MCHC RBC AUTO-ENTMCNC: 34.5 % (ref 33–37)
MCV RBC AUTO: 92 FL (ref 80–100)
MONOCYTES ABSOLUTE: 0.7 K/UL (ref 0.2–0.8)
MONOCYTES RELATIVE PERCENT: 8.3 %
NEUTROPHILS ABSOLUTE: 6 K/UL (ref 1.4–6.5)
NEUTROPHILS RELATIVE PERCENT: 70.5 %
NITRITE, URINE: NEGATIVE
OPIATE SCREEN URINE: NORMAL
PDW BLD-RTO: 12.2 % (ref 11.5–14.5)
PH UA: 5 (ref 5–9)
PHENCYCLIDINE SCREEN URINE: NORMAL
PLATELET # BLD: 206 K/UL (ref 130–400)
POTASSIUM SERPL-SCNC: 3.9 MEQ/L (ref 3.5–5.1)
PROTEIN UA: NEGATIVE MG/DL
RBC # BLD: 4.81 M/UL (ref 4.7–6.1)
SALICYLATE, SERUM: <0.3 MG/DL (ref 15–30)
SODIUM BLD-SCNC: 138 MEQ/L (ref 132–144)
SPECIFIC GRAVITY UA: 1.01 (ref 1–1.03)
TOTAL CK: 354 U/L (ref 0–190)
TOTAL PROTEIN: 7.4 G/DL (ref 6.4–8.1)
TSH SERPL DL<=0.05 MIU/L-ACNC: 1.47 UIU/ML (ref 0.27–4.2)
URINE REFLEX TO CULTURE: NORMAL
UROBILINOGEN, URINE: 0.2 E.U./DL
WBC # BLD: 8.5 K/UL (ref 4.8–10.8)

## 2018-06-19 PROCEDURE — G0480 DRUG TEST DEF 1-7 CLASSES: HCPCS

## 2018-06-19 PROCEDURE — 85025 COMPLETE CBC W/AUTO DIFF WBC: CPT

## 2018-06-19 PROCEDURE — 82553 CREATINE MB FRACTION: CPT

## 2018-06-19 PROCEDURE — 80307 DRUG TEST PRSMV CHEM ANLYZR: CPT

## 2018-06-19 PROCEDURE — 36415 COLL VENOUS BLD VENIPUNCTURE: CPT

## 2018-06-19 PROCEDURE — 81003 URINALYSIS AUTO W/O SCOPE: CPT

## 2018-06-19 PROCEDURE — 99285 EMERGENCY DEPT VISIT HI MDM: CPT

## 2018-06-19 PROCEDURE — 84443 ASSAY THYROID STIM HORMONE: CPT

## 2018-06-19 PROCEDURE — 82550 ASSAY OF CK (CPK): CPT

## 2018-06-19 PROCEDURE — 80053 COMPREHEN METABOLIC PANEL: CPT

## 2018-06-19 PROCEDURE — 1240000000 HC EMOTIONAL WELLNESS R&B

## 2018-06-19 RX ORDER — HALOPERIDOL 5 MG
5 TABLET ORAL EVERY 6 HOURS PRN
Status: DISCONTINUED | OUTPATIENT
Start: 2018-06-19 | End: 2018-06-22 | Stop reason: HOSPADM

## 2018-06-19 RX ORDER — TRAZODONE HYDROCHLORIDE 50 MG/1
50 TABLET ORAL NIGHTLY
Status: DISCONTINUED | OUTPATIENT
Start: 2018-06-19 | End: 2018-06-22 | Stop reason: HOSPADM

## 2018-06-19 RX ORDER — BENZTROPINE MESYLATE 1 MG/ML
2 INJECTION INTRAMUSCULAR; INTRAVENOUS 2 TIMES DAILY PRN
Status: DISCONTINUED | OUTPATIENT
Start: 2018-06-19 | End: 2018-06-22 | Stop reason: HOSPADM

## 2018-06-19 RX ORDER — MAGNESIUM HYDROXIDE/ALUMINUM HYDROXICE/SIMETHICONE 120; 1200; 1200 MG/30ML; MG/30ML; MG/30ML
30 SUSPENSION ORAL PRN
Status: DISCONTINUED | OUTPATIENT
Start: 2018-06-19 | End: 2018-06-22 | Stop reason: HOSPADM

## 2018-06-19 RX ORDER — HALOPERIDOL 5 MG/ML
5 INJECTION INTRAMUSCULAR EVERY 6 HOURS PRN
Status: DISCONTINUED | OUTPATIENT
Start: 2018-06-19 | End: 2018-06-22 | Stop reason: HOSPADM

## 2018-06-19 RX ORDER — RISPERIDONE 1 MG/1
1 TABLET, FILM COATED ORAL 2 TIMES DAILY
Status: DISCONTINUED | OUTPATIENT
Start: 2018-06-19 | End: 2018-06-20

## 2018-06-19 RX ORDER — HYDROXYZINE PAMOATE 50 MG/1
50 CAPSULE ORAL EVERY 6 HOURS PRN
Status: DISCONTINUED | OUTPATIENT
Start: 2018-06-19 | End: 2018-06-22 | Stop reason: HOSPADM

## 2018-06-19 RX ORDER — HYDROXYZINE HYDROCHLORIDE 50 MG/ML
50 INJECTION, SOLUTION INTRAMUSCULAR EVERY 6 HOURS PRN
Status: DISCONTINUED | OUTPATIENT
Start: 2018-06-19 | End: 2018-06-22 | Stop reason: HOSPADM

## 2018-06-19 RX ORDER — ACETAMINOPHEN 325 MG/1
650 TABLET ORAL EVERY 4 HOURS PRN
Status: DISCONTINUED | OUTPATIENT
Start: 2018-06-19 | End: 2018-06-22 | Stop reason: HOSPADM

## 2018-06-19 ASSESSMENT — PAIN DESCRIPTION - PROGRESSION: CLINICAL_PROGRESSION: NOT CHANGED

## 2018-06-19 ASSESSMENT — PAIN DESCRIPTION - DESCRIPTORS: DESCRIPTORS: ACHING

## 2018-06-19 ASSESSMENT — ENCOUNTER SYMPTOMS
ALLERGIC/IMMUNOLOGIC NEGATIVE: 1
COLOR CHANGE: 0
APNEA: 0
EYE PAIN: 0
ABDOMINAL PAIN: 0
TROUBLE SWALLOWING: 0
SHORTNESS OF BREATH: 0

## 2018-06-19 ASSESSMENT — PAIN DESCRIPTION - FREQUENCY: FREQUENCY: INTERMITTENT

## 2018-06-19 ASSESSMENT — PAIN DESCRIPTION - PAIN TYPE: TYPE: ACUTE PAIN

## 2018-06-19 ASSESSMENT — PAIN DESCRIPTION - LOCATION: LOCATION: JAW

## 2018-06-20 PROCEDURE — 6370000000 HC RX 637 (ALT 250 FOR IP): Performed by: NURSE PRACTITIONER

## 2018-06-20 PROCEDURE — 1240000000 HC EMOTIONAL WELLNESS R&B

## 2018-06-20 PROCEDURE — 99222 1ST HOSP IP/OBS MODERATE 55: CPT | Performed by: PSYCHIATRY & NEUROLOGY

## 2018-06-20 RX ORDER — CEPHALEXIN 500 MG/1
500 CAPSULE ORAL 4 TIMES DAILY
Status: DISCONTINUED | OUTPATIENT
Start: 2018-06-20 | End: 2018-06-22 | Stop reason: HOSPADM

## 2018-06-20 RX ORDER — PALIPERIDONE 3 MG/1
3 TABLET, EXTENDED RELEASE ORAL NIGHTLY
Status: DISCONTINUED | OUTPATIENT
Start: 2018-06-20 | End: 2018-06-22 | Stop reason: HOSPADM

## 2018-06-20 RX ORDER — IBUPROFEN 600 MG/1
600 TABLET ORAL EVERY 6 HOURS PRN
Status: DISCONTINUED | OUTPATIENT
Start: 2018-06-20 | End: 2018-06-22 | Stop reason: HOSPADM

## 2018-06-20 RX ADMIN — CEPHALEXIN 500 MG: 500 CAPSULE ORAL at 21:50

## 2018-06-20 RX ADMIN — CEPHALEXIN 500 MG: 500 CAPSULE ORAL at 17:10

## 2018-06-20 ASSESSMENT — SLEEP AND FATIGUE QUESTIONNAIRES
DIFFICULTY STAYING ASLEEP: YES
RESTFUL SLEEP: NO
DIFFICULTY FALLING ASLEEP: YES
DIFFICULTY ARISING: NO
DO YOU HAVE DIFFICULTY SLEEPING: YES
DO YOU USE A SLEEP AID: NO
SLEEP PATTERN: RESTLESSNESS;DISTURBED/INTERRUPTED SLEEP

## 2018-06-21 PROCEDURE — 6370000000 HC RX 637 (ALT 250 FOR IP): Performed by: NURSE PRACTITIONER

## 2018-06-21 PROCEDURE — 99232 SBSQ HOSP IP/OBS MODERATE 35: CPT | Performed by: PSYCHIATRY & NEUROLOGY

## 2018-06-21 PROCEDURE — 1240000000 HC EMOTIONAL WELLNESS R&B

## 2018-06-21 RX ADMIN — CEPHALEXIN 500 MG: 500 CAPSULE ORAL at 21:13

## 2018-06-21 RX ADMIN — CEPHALEXIN 500 MG: 500 CAPSULE ORAL at 16:34

## 2018-06-21 RX ADMIN — CEPHALEXIN 500 MG: 500 CAPSULE ORAL at 13:46

## 2018-06-22 VITALS
HEART RATE: 72 BPM | SYSTOLIC BLOOD PRESSURE: 135 MMHG | OXYGEN SATURATION: 100 % | DIASTOLIC BLOOD PRESSURE: 86 MMHG | TEMPERATURE: 97 F | BODY MASS INDEX: 25.73 KG/M2 | HEIGHT: 72 IN | WEIGHT: 190 LBS | RESPIRATION RATE: 16 BRPM

## 2018-06-22 PROCEDURE — 99239 HOSP IP/OBS DSCHRG MGMT >30: CPT | Performed by: PSYCHIATRY & NEUROLOGY

## 2018-06-22 RX ORDER — PALIPERIDONE 3 MG/1
3 TABLET, EXTENDED RELEASE ORAL NIGHTLY
Qty: 30 TABLET | Refills: 2 | Status: ON HOLD | OUTPATIENT
Start: 2018-06-22 | End: 2018-08-21 | Stop reason: HOSPADM

## 2018-07-02 ENCOUNTER — OFFICE VISIT (OUTPATIENT)
Dept: FAMILY MEDICINE CLINIC | Age: 28
End: 2018-07-02
Payer: MEDICARE

## 2018-07-02 ENCOUNTER — APPOINTMENT (OUTPATIENT)
Dept: GENERAL RADIOLOGY | Age: 28
End: 2018-07-02
Payer: MEDICARE

## 2018-07-02 ENCOUNTER — HOSPITAL ENCOUNTER (EMERGENCY)
Age: 28
Discharge: HOME | End: 2018-07-02
Attending: EMERGENCY MEDICINE
Payer: MEDICARE

## 2018-07-02 VITALS
BODY MASS INDEX: 25.73 KG/M2 | HEIGHT: 72 IN | HEART RATE: 84 BPM | DIASTOLIC BLOOD PRESSURE: 88 MMHG | SYSTOLIC BLOOD PRESSURE: 143 MMHG | WEIGHT: 190 LBS | TEMPERATURE: 98.6 F | RESPIRATION RATE: 17 BRPM | OXYGEN SATURATION: 99 %

## 2018-07-02 VITALS
WEIGHT: 191.5 LBS | OXYGEN SATURATION: 97 % | DIASTOLIC BLOOD PRESSURE: 70 MMHG | BODY MASS INDEX: 25.94 KG/M2 | RESPIRATION RATE: 14 BRPM | SYSTOLIC BLOOD PRESSURE: 112 MMHG | TEMPERATURE: 97.1 F | HEIGHT: 72 IN | HEART RATE: 80 BPM

## 2018-07-02 DIAGNOSIS — M25.531 RIGHT WRIST PAIN: ICD-10-CM

## 2018-07-02 DIAGNOSIS — S02.652D CLOSED FRACTURE OF LEFT MANDIBULAR ANGLE WITH ROUTINE HEALING, SUBSEQUENT ENCOUNTER: Primary | ICD-10-CM

## 2018-07-02 DIAGNOSIS — S63.501D RIGHT WRIST SPRAIN, SUBSEQUENT ENCOUNTER: ICD-10-CM

## 2018-07-02 DIAGNOSIS — S02.600D CLOSED FRACTURE OF BODY OF MANDIBLE WITH ROUTINE HEALING, UNSPECIFIED LATERALITY, SUBSEQUENT ENCOUNTER: Primary | ICD-10-CM

## 2018-07-02 DIAGNOSIS — M26.4 MALOCCLUSION: ICD-10-CM

## 2018-07-02 PROCEDURE — 99214 OFFICE O/P EST MOD 30 MIN: CPT | Performed by: FAMILY MEDICINE

## 2018-07-02 PROCEDURE — 4004F PT TOBACCO SCREEN RCVD TLK: CPT | Performed by: FAMILY MEDICINE

## 2018-07-02 PROCEDURE — 99283 EMERGENCY DEPT VISIT LOW MDM: CPT

## 2018-07-02 PROCEDURE — 1111F DSCHRG MED/CURRENT MED MERGE: CPT | Performed by: FAMILY MEDICINE

## 2018-07-02 PROCEDURE — G8427 DOCREV CUR MEDS BY ELIG CLIN: HCPCS | Performed by: FAMILY MEDICINE

## 2018-07-02 PROCEDURE — G8419 CALC BMI OUT NRM PARAM NOF/U: HCPCS | Performed by: FAMILY MEDICINE

## 2018-07-02 PROCEDURE — 73110 X-RAY EXAM OF WRIST: CPT

## 2018-07-02 PROCEDURE — 70110 X-RAY EXAM OF JAW 4/> VIEWS: CPT

## 2018-07-02 ASSESSMENT — ENCOUNTER SYMPTOMS
BLOOD IN STOOL: 0
COUGH: 0
NAUSEA: 0
ABDOMINAL PAIN: 0
APNEA: 0
CHEST TIGHTNESS: 0
SHORTNESS OF BREATH: 0
CONSTIPATION: 0
VOMITING: 0
DIARRHEA: 0

## 2018-07-02 ASSESSMENT — PAIN DESCRIPTION - DESCRIPTORS: DESCRIPTORS: SHARP

## 2018-07-02 ASSESSMENT — PAIN DESCRIPTION - PAIN TYPE: TYPE: ACUTE PAIN

## 2018-07-02 ASSESSMENT — PAIN DESCRIPTION - ONSET: ONSET: ON-GOING

## 2018-07-02 ASSESSMENT — PAIN DESCRIPTION - ORIENTATION: ORIENTATION: LEFT;RIGHT

## 2018-07-02 ASSESSMENT — PAIN DESCRIPTION - FREQUENCY: FREQUENCY: CONTINUOUS

## 2018-07-02 ASSESSMENT — PAIN SCALES - GENERAL: PAINLEVEL_OUTOF10: 2
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
## 2018-07-03 NOTE — ED TRIAGE NOTES
Patient arrives with jaw pain from previous fracture. Also has c/o right wrist pain. A/0 x3,a mbulatory, resps even and unlabored on room air. Call bell in reach. Patient anxious in bed.

## 2018-07-30 ENCOUNTER — HOSPITAL ENCOUNTER (EMERGENCY)
Age: 28
Discharge: ANOTHER HOSPITAL | End: 2018-07-31
Payer: MEDICARE

## 2018-07-30 DIAGNOSIS — F20.3 UNDIFFERENTIATED SCHIZOPHRENIA (HCC): Primary | ICD-10-CM

## 2018-07-30 LAB
ACETAMINOPHEN LEVEL: <5 UG/ML (ref 10–30)
ALBUMIN SERPL-MCNC: 4.6 G/DL (ref 3.9–4.9)
ALP BLD-CCNC: 106 U/L (ref 35–104)
ALT SERPL-CCNC: 33 U/L (ref 0–41)
AMPHETAMINE SCREEN, URINE: ABNORMAL
ANION GAP SERPL CALCULATED.3IONS-SCNC: 14 MEQ/L (ref 7–13)
AST SERPL-CCNC: 27 U/L (ref 0–40)
BARBITURATE SCREEN URINE: ABNORMAL
BASOPHILS ABSOLUTE: 0.1 K/UL (ref 0–0.2)
BASOPHILS RELATIVE PERCENT: 0.6 %
BENZODIAZEPINE SCREEN, URINE: ABNORMAL
BILIRUB SERPL-MCNC: 0.4 MG/DL (ref 0–1.2)
BILIRUBIN URINE: NEGATIVE
BLOOD, URINE: NEGATIVE
BUN BLDV-MCNC: 14 MG/DL (ref 6–20)
CALCIUM SERPL-MCNC: 9.3 MG/DL (ref 8.6–10.2)
CANNABINOID SCREEN URINE: POSITIVE
CHLORIDE BLD-SCNC: 101 MEQ/L (ref 98–107)
CK MB: 3.4 NG/ML (ref 0–6.7)
CLARITY: CLEAR
CO2: 28 MEQ/L (ref 22–29)
COCAINE METABOLITE SCREEN URINE: ABNORMAL
COLOR: YELLOW
CREAT SERPL-MCNC: 0.9 MG/DL (ref 0.7–1.2)
CREATINE KINASE-MB INDEX: 1 % (ref 0–3.5)
EOSINOPHILS ABSOLUTE: 0 K/UL (ref 0–0.7)
EOSINOPHILS RELATIVE PERCENT: 0.2 %
ETHANOL PERCENT: NORMAL G/DL
ETHANOL: <10 MG/DL (ref 0–0.08)
GFR AFRICAN AMERICAN: >60
GFR NON-AFRICAN AMERICAN: >60
GLOBULIN: 2.4 G/DL (ref 2.3–3.5)
GLUCOSE BLD-MCNC: 97 MG/DL (ref 74–109)
GLUCOSE URINE: NEGATIVE MG/DL
HCT VFR BLD CALC: 45 % (ref 42–52)
HEMOGLOBIN: 15.8 G/DL (ref 14–18)
KETONES, URINE: NEGATIVE MG/DL
LEUKOCYTE ESTERASE, URINE: NEGATIVE
LYMPHOCYTES ABSOLUTE: 1.7 K/UL (ref 1–4.8)
LYMPHOCYTES RELATIVE PERCENT: 16.8 %
Lab: ABNORMAL
MCH RBC QN AUTO: 32 PG (ref 27–31.3)
MCHC RBC AUTO-ENTMCNC: 35.1 % (ref 33–37)
MCV RBC AUTO: 91.2 FL (ref 80–100)
MONOCYTES ABSOLUTE: 0.7 K/UL (ref 0.2–0.8)
MONOCYTES RELATIVE PERCENT: 6.8 %
NEUTROPHILS ABSOLUTE: 7.5 K/UL (ref 1.4–6.5)
NEUTROPHILS RELATIVE PERCENT: 75.6 %
NITRITE, URINE: NEGATIVE
OPIATE SCREEN URINE: ABNORMAL
PDW BLD-RTO: 12.7 % (ref 11.5–14.5)
PH UA: 6.5 (ref 5–9)
PHENCYCLIDINE SCREEN URINE: ABNORMAL
PLATELET # BLD: 204 K/UL (ref 130–400)
POTASSIUM SERPL-SCNC: 3.7 MEQ/L (ref 3.5–5.1)
PROTEIN UA: NEGATIVE MG/DL
RBC # BLD: 4.94 M/UL (ref 4.7–6.1)
SALICYLATE, SERUM: <0.3 MG/DL (ref 15–30)
SODIUM BLD-SCNC: 143 MEQ/L (ref 132–144)
SPECIFIC GRAVITY UA: 1.01 (ref 1–1.03)
TOTAL CK: 342 U/L (ref 0–190)
TOTAL PROTEIN: 7 G/DL (ref 6.4–8.1)
TSH SERPL DL<=0.05 MIU/L-ACNC: 1.2 UIU/ML (ref 0.27–4.2)
UROBILINOGEN, URINE: 0.2 E.U./DL
WBC # BLD: 9.9 K/UL (ref 4.8–10.8)

## 2018-07-30 PROCEDURE — G0480 DRUG TEST DEF 1-7 CLASSES: HCPCS

## 2018-07-30 PROCEDURE — 2580000003 HC RX 258

## 2018-07-30 PROCEDURE — 99285 EMERGENCY DEPT VISIT HI MDM: CPT

## 2018-07-30 PROCEDURE — 82553 CREATINE MB FRACTION: CPT

## 2018-07-30 PROCEDURE — 6360000002 HC RX W HCPCS

## 2018-07-30 PROCEDURE — 80053 COMPREHEN METABOLIC PANEL: CPT

## 2018-07-30 PROCEDURE — 82550 ASSAY OF CK (CPK): CPT

## 2018-07-30 PROCEDURE — 84443 ASSAY THYROID STIM HORMONE: CPT

## 2018-07-30 PROCEDURE — 80307 DRUG TEST PRSMV CHEM ANLYZR: CPT

## 2018-07-30 PROCEDURE — 85025 COMPLETE CBC W/AUTO DIFF WBC: CPT

## 2018-07-30 PROCEDURE — 6370000000 HC RX 637 (ALT 250 FOR IP): Performed by: PHYSICIAN ASSISTANT

## 2018-07-30 PROCEDURE — 96372 THER/PROPH/DIAG INJ SC/IM: CPT

## 2018-07-30 PROCEDURE — 6360000002 HC RX W HCPCS: Performed by: PHYSICIAN ASSISTANT

## 2018-07-30 PROCEDURE — 36415 COLL VENOUS BLD VENIPUNCTURE: CPT

## 2018-07-30 PROCEDURE — 81003 URINALYSIS AUTO W/O SCOPE: CPT

## 2018-07-30 RX ORDER — NICOTINE 21 MG/24HR
1 PATCH, TRANSDERMAL 24 HOURS TRANSDERMAL ONCE
Status: DISCONTINUED | OUTPATIENT
Start: 2018-07-30 | End: 2018-07-31 | Stop reason: HOSPADM

## 2018-07-30 RX ORDER — LORAZEPAM 1 MG/1
2 TABLET ORAL ONCE
Status: DISCONTINUED | OUTPATIENT
Start: 2018-07-30 | End: 2018-07-30

## 2018-07-30 RX ORDER — ARIPIPRAZOLE 20 MG/1
20 TABLET ORAL DAILY
Status: ON HOLD | COMMUNITY
End: 2018-08-21 | Stop reason: HOSPADM

## 2018-07-30 RX ORDER — ZIPRASIDONE MESYLATE 20 MG/ML
20 INJECTION, POWDER, LYOPHILIZED, FOR SOLUTION INTRAMUSCULAR ONCE
Status: COMPLETED | OUTPATIENT
Start: 2018-07-31 | End: 2018-07-31

## 2018-07-30 RX ORDER — LORAZEPAM 2 MG/ML
INJECTION INTRAMUSCULAR
Status: COMPLETED
Start: 2018-07-30 | End: 2018-07-30

## 2018-07-30 RX ORDER — LORAZEPAM 2 MG/ML
2 INJECTION INTRAMUSCULAR ONCE
Status: COMPLETED | OUTPATIENT
Start: 2018-07-30 | End: 2018-07-30

## 2018-07-30 RX ORDER — LORAZEPAM 1 MG/1
2 TABLET ORAL ONCE
Status: COMPLETED | OUTPATIENT
Start: 2018-07-30 | End: 2018-07-30

## 2018-07-30 RX ORDER — ZIPRASIDONE MESYLATE 20 MG/ML
20 INJECTION, POWDER, LYOPHILIZED, FOR SOLUTION INTRAMUSCULAR ONCE
Status: COMPLETED | OUTPATIENT
Start: 2018-07-30 | End: 2018-07-30

## 2018-07-30 RX ORDER — LORAZEPAM 2 MG/ML
2 INJECTION INTRAMUSCULAR ONCE
Status: COMPLETED | OUTPATIENT
Start: 2018-07-31 | End: 2018-07-31

## 2018-07-30 RX ORDER — OXCARBAZEPINE 300 MG/1
300 TABLET, FILM COATED ORAL 2 TIMES DAILY
Status: ON HOLD | COMMUNITY
End: 2018-08-21 | Stop reason: HOSPADM

## 2018-07-30 RX ADMIN — LORAZEPAM 2 MG: 2 INJECTION INTRAMUSCULAR at 11:01

## 2018-07-30 RX ADMIN — LORAZEPAM 2 MG: 2 INJECTION INTRAMUSCULAR; INTRAVENOUS at 11:01

## 2018-07-30 RX ADMIN — LORAZEPAM 2 MG: 1 TABLET ORAL at 18:11

## 2018-07-30 RX ADMIN — ZIPRASIDONE MESYLATE 20 MG: 20 INJECTION, POWDER, LYOPHILIZED, FOR SOLUTION INTRAMUSCULAR at 11:33

## 2018-07-30 RX ADMIN — WATER 10 ML: 1 INJECTION INTRAMUSCULAR; INTRAVENOUS; SUBCUTANEOUS at 11:33

## 2018-07-30 ASSESSMENT — ENCOUNTER SYMPTOMS
EYE DISCHARGE: 0
SORE THROAT: 0
ABDOMINAL PAIN: 0
ABDOMINAL DISTENTION: 0
CONSTIPATION: 0
RHINORRHEA: 0
VOMITING: 0
NAUSEA: 0
DIARRHEA: 0
SHORTNESS OF BREATH: 0
COLOR CHANGE: 0
WHEEZING: 0
STRIDOR: 0
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
## 2018-07-30 NOTE — ED NOTES
Sandhya Carter CNP attempted to assess for disposition however pt is to groggy to participate. se will reattempt. Theresa Heads  Radha Florian  07/30/18 0893

## 2018-07-30 NOTE — ED NOTES
Restring in [employer]. More calm . Lab notified of need for blood draw. Ken James Appl  07/30/18 1226

## 2018-07-30 NOTE — ED PROVIDER NOTES
Negative for difficulty urinating, dysuria and flank pain. Musculoskeletal: Negative for arthralgias, gait problem, joint swelling, neck pain and neck stiffness. Skin: Negative for color change, pallor, rash and wound. Neurological: Negative for dizziness, syncope, numbness and headaches. Psychiatric/Behavioral: Positive for agitation and suicidal ideas. Negative for confusion, hallucinations and self-injury. The patient is not hyperactive. Except as noted above the remainder of the review of systems was reviewed and negative.        PAST MEDICAL HISTORY     Past Medical History:   Diagnosis Date    Anxiety 1/4/2012    Bipolar affective disorder ([address] 11/25/2015    Chronic back pain 4/20/2017    Drug abuse and dependence ([facility] 1/4/2012    Fracture of left side of mandibular body ([facility] [address] 06/17/2018    ACUTE LEFT CONDYLAR NECK FRACTURE    Fracture of navicular bone of foot, closed 09/03/2012    lt    Fracture of temporal bone ([facility] [address] 2015    History of pulmonary embolism     Schizophrenia ([facility] [address] [facility]     Seizures ([facility]     Talus fracture 9/2012    lt    TBI (traumatic brain injury) ([facility] [address] 2015    Wound of left leg 9/14/2012         SURGICAL HISTORY       Past Surgical History:   Procedure Laterality Date    WRIST SURGERY  2009    pin in wrist left         CURRENT MEDICATIONS       Previous Medications    ARIPIPRAZOLE (ABILIFY) 20 MG TABLET    Take 20 mg by mouth daily    IBUPROFEN (ADVIL;MOTRIN) 600 MG TABLET    Take 1 tablet by mouth every 6 hours as needed for Pain    OXCARBAZEPINE (TRILEPTAL) 300 MG TABLET    Take 300 mg by mouth 2 times daily    PALIPERIDONE (INVEGA) 3 MG EXTENDED RELEASE TABLET    Take 1 tablet by mouth nightly       ALLERGIES     Shellfish-derived products and Shrimp flavor    FAMILY HISTORY       Family History   Problem Relation Age of Onset    Family history unknown: Yes          SOCIAL HISTORY       Social History     Social History    Marital status: Single     Spouse name: N/A    Number of children: 0    Years of education: 15     Social History Main Topics    Smoking status: Former Smoker     Packs/day: 0.00     Years: 1.00     Quit date: 11/28/2011    Smokeless tobacco: Current User     Types: Chew    Alcohol use No      Comment: pt denies    Drug use: Yes     Types: Marijuana      Comment: pt currently denies    Sexual activity: Not Currently     Partners: Female     Other Topics Concern    Not on file     Social History Narrative    Lives w family           SCREENINGS             PHYSICAL EXAM    (up to 7 for level 4, 8 or more for level 5)     ED Triage Vitals   BP Temp Temp Source Pulse Resp SpO2 Height Weight   07/30/18 1029 07/30/18 1029 07/30/18 1029 07/30/18 1029 07/30/18 1029 07/30/18 1029 07/30/18 1034 07/30/18 1034   138/82 98.6 °F (37 °C) Oral 93 20 100 % 6' (1.829 m) 180 lb (81.6 kg)       Physical Exam   Constitutional: He is oriented to person, place, and time. He appears well-developed and well-nourished. HENT:   Head: Normocephalic. Eyes: Pupils are equal, round, and reactive to light. Neck: Normal range of motion. Neck supple. No JVD present. No tracheal deviation present. Cardiovascular: Normal rate. Pulmonary/Chest: Effort normal. No respiratory distress. He has no wheezes. He has no rales. He exhibits no tenderness. Abdominal: Soft. Bowel sounds are normal. He exhibits no distension and no mass. There is no tenderness. There is no guarding. Musculoskeletal: He exhibits no edema or deformity. Neurological: He is alert and oriented to person, place, and time. Coordination normal.   Skin: Skin is warm and dry. Psychiatric:   FIFI edge is angry and agitated refusing to answer any questions he states he does not know why he is here, he states that he can manage his old care and self medicate for his issues.        DIAGNOSTIC RESULTS     EKG: All EKG's are interpreted by the Emergency Department Physician who either signs or Co-signs this management comments: Medically Clear          CONSULTS:  None    PROCEDURES:  Unless otherwise noted below, none     Procedures    FINAL IMPRESSION      1. 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     DISCHARGE MEDICATIONS:  New Prescriptions    No medications on file          (Please note that portions of this note were completed with a voice recognition program.  Efforts were made to edit the dictations but occasionally words are mis-transcribed.)    Angelita Wilson MD (electronically signed)  Attending Emergency Physician         Angelita Wilson MD  07/31/18 9411

## 2018-07-30 NOTE — ED NOTES
[facility] has pt's packet, currently being reviewed. Intake states they have beds.      Agustin Morales RN  07/30/18 2058
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
## 2018-07-31 VITALS
DIASTOLIC BLOOD PRESSURE: 71 MMHG | BODY MASS INDEX: 24.38 KG/M2 | HEIGHT: 72 IN | HEART RATE: 82 BPM | TEMPERATURE: 98.6 F | RESPIRATION RATE: 16 BRPM | SYSTOLIC BLOOD PRESSURE: 131 MMHG | WEIGHT: 180 LBS | OXYGEN SATURATION: 97 %

## 2018-07-31 PROCEDURE — 6360000002 HC RX W HCPCS: Performed by: EMERGENCY MEDICINE

## 2018-07-31 PROCEDURE — 96372 THER/PROPH/DIAG INJ SC/IM: CPT

## 2018-07-31 RX ADMIN — LORAZEPAM 2 MG: 2 INJECTION INTRAMUSCULAR; INTRAVENOUS at 00:22

## 2018-07-31 RX ADMIN — ZIPRASIDONE MESYLATE 20 MG: 20 INJECTION, POWDER, LYOPHILIZED, FOR SOLUTION INTRAMUSCULAR at 00:23

## 2018-07-31 NOTE — ED NOTES
Per Vincenzo Tello RN at [facility], pt has been accepted by Dr Gaston Lemus, for [facility].      Amee Baker RN  07/31/18 3801

## 2018-07-31 NOTE — ED NOTES
Nurse report given to supervisor, Tamiko Harmon, as instructed by access center. She is unsure if pt can be accepted tonight and will have intake return call.      Rayray Leal RN  07/31/18 9637

## 2018-07-31 NOTE — ED NOTES
Faxed to [facility] [place]. AT [place] and pt ED synopsis     Helga Weaver RN  07/30/18 2038       Helga Weaver RN  07/30/18 2039

## 2018-07-31 NOTE — ED NOTES
Pt has positive medication effect. He had asked for medication stating \"I don't think I can stay in control here--I'm too cooped up and antsy\". He is polite and less hyperactive.      Alli Travis RN  07/31/18 3051

## 2018-08-14 ENCOUNTER — HOSPITAL ENCOUNTER (INPATIENT)
Age: 28
LOS: 2 days | Discharge: PSYCHIATRIC OR SUBSTANCE USE FACILITY | DRG: 558 | End: 2018-08-17
Attending: INTERNAL MEDICINE | Admitting: INTERNAL MEDICINE
Payer: MEDICARE

## 2018-08-14 DIAGNOSIS — M62.82 NON-TRAUMATIC RHABDOMYOLYSIS: Primary | ICD-10-CM

## 2018-08-14 DIAGNOSIS — F20.9 SCHIZOPHRENIA, UNSPECIFIED TYPE (HCC): ICD-10-CM

## 2018-08-14 PROCEDURE — 84443 ASSAY THYROID STIM HORMONE: CPT

## 2018-08-14 PROCEDURE — 82550 ASSAY OF CK (CPK): CPT

## 2018-08-14 PROCEDURE — G0480 DRUG TEST DEF 1-7 CLASSES: HCPCS

## 2018-08-14 PROCEDURE — 36415 COLL VENOUS BLD VENIPUNCTURE: CPT

## 2018-08-14 PROCEDURE — 85025 COMPLETE CBC W/AUTO DIFF WBC: CPT

## 2018-08-14 PROCEDURE — 82553 CREATINE MB FRACTION: CPT

## 2018-08-14 PROCEDURE — 99285 EMERGENCY DEPT VISIT HI MDM: CPT

## 2018-08-14 PROCEDURE — 80053 COMPREHEN METABOLIC PANEL: CPT

## 2018-08-14 RX ORDER — LORAZEPAM 2 MG/ML
1 INJECTION INTRAMUSCULAR ONCE
Status: COMPLETED | OUTPATIENT
Start: 2018-08-14 | End: 2018-08-15

## 2018-08-14 RX ORDER — DIPHENHYDRAMINE HYDROCHLORIDE 50 MG/ML
50 INJECTION INTRAMUSCULAR; INTRAVENOUS ONCE
Status: COMPLETED | OUTPATIENT
Start: 2018-08-14 | End: 2018-08-15

## 2018-08-14 RX ORDER — ZIPRASIDONE MESYLATE 20 MG/ML
20 INJECTION, POWDER, LYOPHILIZED, FOR SOLUTION INTRAMUSCULAR ONCE
Status: COMPLETED | OUTPATIENT
Start: 2018-08-14 | End: 2018-08-15

## 2018-08-15 PROBLEM — M62.82 RHABDOMYOLYSIS: Status: ACTIVE | Noted: 2018-08-15

## 2018-08-15 LAB
ACETAMINOPHEN LEVEL: <5 UG/ML (ref 10–30)
ALBUMIN SERPL-MCNC: 4.3 G/DL (ref 3.9–4.9)
ALP BLD-CCNC: 89 U/L (ref 35–104)
ALT SERPL-CCNC: 27 U/L (ref 0–41)
AMPHETAMINE SCREEN, URINE: ABNORMAL
ANION GAP SERPL CALCULATED.3IONS-SCNC: 13 MEQ/L (ref 7–13)
AST SERPL-CCNC: 31 U/L (ref 0–40)
BARBITURATE SCREEN URINE: ABNORMAL
BASOPHILS ABSOLUTE: 0.1 K/UL (ref 0–0.2)
BASOPHILS RELATIVE PERCENT: 0.9 %
BENZODIAZEPINE SCREEN, URINE: ABNORMAL
BILIRUB SERPL-MCNC: 0.7 MG/DL (ref 0–1.2)
BILIRUBIN URINE: NEGATIVE
BLOOD, URINE: NEGATIVE
BUN BLDV-MCNC: 14 MG/DL (ref 6–20)
CALCIUM SERPL-MCNC: 8.8 MG/DL (ref 8.6–10.2)
CANNABINOID SCREEN URINE: POSITIVE
CHLORIDE BLD-SCNC: 103 MEQ/L (ref 98–107)
CK MB: 10.8 NG/ML (ref 0–6.7)
CK MB: 12.6 NG/ML (ref 0–6.7)
CK MB: 5.7 NG/ML (ref 0–6.7)
CK MB: 9.3 NG/ML (ref 0–6.7)
CLARITY: CLEAR
CO2: 25 MEQ/L (ref 22–29)
COCAINE METABOLITE SCREEN URINE: ABNORMAL
COLOR: YELLOW
CREAT SERPL-MCNC: 0.77 MG/DL (ref 0.7–1.2)
CREATINE KINASE-MB INDEX: 0.8 % (ref 0–3.5)
CREATINE KINASE-MB INDEX: 0.9 % (ref 0–3.5)
CREATINE KINASE-MB INDEX: 0.9 % (ref 0–3.5)
CREATINE KINASE-MB INDEX: 1 % (ref 0–3.5)
EKG ATRIAL RATE: 75 BPM
EKG P AXIS: 67 DEGREES
EKG P-R INTERVAL: 148 MS
EKG Q-T INTERVAL: 396 MS
EKG QRS DURATION: 92 MS
EKG QTC CALCULATION (BAZETT): 442 MS
EKG R AXIS: 83 DEGREES
EKG T AXIS: 33 DEGREES
EKG VENTRICULAR RATE: 75 BPM
EOSINOPHILS ABSOLUTE: 0.1 K/UL (ref 0–0.7)
EOSINOPHILS RELATIVE PERCENT: 1.2 %
ETHANOL PERCENT: NORMAL G/DL
ETHANOL: <10 MG/DL (ref 0–0.08)
GFR AFRICAN AMERICAN: >60
GFR NON-AFRICAN AMERICAN: >60
GLOBULIN: 2.4 G/DL (ref 2.3–3.5)
GLUCOSE BLD-MCNC: 104 MG/DL (ref 74–109)
GLUCOSE URINE: NEGATIVE MG/DL
HCT VFR BLD CALC: 44.1 % (ref 42–52)
HEMOGLOBIN: 15.6 G/DL (ref 14–18)
KETONES, URINE: NEGATIVE MG/DL
LEUKOCYTE ESTERASE, URINE: NEGATIVE
LYMPHOCYTES ABSOLUTE: 2.6 K/UL (ref 1–4.8)
LYMPHOCYTES RELATIVE PERCENT: 28.3 %
Lab: ABNORMAL
MCH RBC QN AUTO: 32.5 PG (ref 27–31.3)
MCHC RBC AUTO-ENTMCNC: 35.3 % (ref 33–37)
MCV RBC AUTO: 92.1 FL (ref 80–100)
MONOCYTES ABSOLUTE: 1.1 K/UL (ref 0.2–0.8)
MONOCYTES RELATIVE PERCENT: 11.7 %
NEUTROPHILS ABSOLUTE: 5.3 K/UL (ref 1.4–6.5)
NEUTROPHILS RELATIVE PERCENT: 57.9 %
NITRITE, URINE: NEGATIVE
OPIATE SCREEN URINE: ABNORMAL
PDW BLD-RTO: 12.7 % (ref 11.5–14.5)
PH UA: 5 (ref 5–9)
PHENCYCLIDINE SCREEN URINE: ABNORMAL
PLATELET # BLD: 187 K/UL (ref 130–400)
POTASSIUM SERPL-SCNC: 3.6 MEQ/L (ref 3.5–5.1)
PROTEIN UA: NEGATIVE MG/DL
RBC # BLD: 4.79 M/UL (ref 4.7–6.1)
SALICYLATE, SERUM: <0.3 MG/DL (ref 15–30)
SODIUM BLD-SCNC: 141 MEQ/L (ref 132–144)
SPECIFIC GRAVITY UA: 1.01 (ref 1–1.03)
TOTAL CK: 1164 U/L (ref 0–190)
TOTAL CK: 1492 U/L (ref 0–190)
TOTAL CK: 669 U/L (ref 0–190)
TOTAL CK: 965 U/L (ref 0–190)
TOTAL PROTEIN: 6.7 G/DL (ref 6.4–8.1)
TRICYCLIC, URINE: ABNORMAL
TSH SERPL DL<=0.05 MIU/L-ACNC: 2.24 UIU/ML (ref 0.27–4.2)
URINE REFLEX TO CULTURE: NORMAL
UROBILINOGEN, URINE: 0.2 E.U./DL
WBC # BLD: 9.2 K/UL (ref 4.8–10.8)

## 2018-08-15 PROCEDURE — 2580000003 HC RX 258: Performed by: PHYSICIAN ASSISTANT

## 2018-08-15 PROCEDURE — 6370000000 HC RX 637 (ALT 250 FOR IP): Performed by: PHYSICIAN ASSISTANT

## 2018-08-15 PROCEDURE — 6360000002 HC RX W HCPCS: Performed by: NURSE PRACTITIONER

## 2018-08-15 PROCEDURE — 6370000000 HC RX 637 (ALT 250 FOR IP): Performed by: NURSE PRACTITIONER

## 2018-08-15 PROCEDURE — 82550 ASSAY OF CK (CPK): CPT

## 2018-08-15 PROCEDURE — 93005 ELECTROCARDIOGRAM TRACING: CPT

## 2018-08-15 PROCEDURE — 96372 THER/PROPH/DIAG INJ SC/IM: CPT

## 2018-08-15 PROCEDURE — 6360000002 HC RX W HCPCS

## 2018-08-15 PROCEDURE — 80306 DRUG TEST PRSMV INSTRMNT: CPT

## 2018-08-15 PROCEDURE — 2580000003 HC RX 258: Performed by: NURSE PRACTITIONER

## 2018-08-15 PROCEDURE — 96375 TX/PRO/DX INJ NEW DRUG ADDON: CPT

## 2018-08-15 PROCEDURE — 96374 THER/PROPH/DIAG INJ IV PUSH: CPT

## 2018-08-15 PROCEDURE — 1210000000 HC MED SURG R&B

## 2018-08-15 PROCEDURE — 81003 URINALYSIS AUTO W/O SCOPE: CPT

## 2018-08-15 PROCEDURE — 82553 CREATINE MB FRACTION: CPT

## 2018-08-15 PROCEDURE — 2580000003 HC RX 258

## 2018-08-15 PROCEDURE — 2580000003 HC RX 258: Performed by: EMERGENCY MEDICINE

## 2018-08-15 PROCEDURE — 36415 COLL VENOUS BLD VENIPUNCTURE: CPT

## 2018-08-15 RX ORDER — HALOPERIDOL 5 MG/ML
10 INJECTION INTRAMUSCULAR ONCE
Status: COMPLETED | OUTPATIENT
Start: 2018-08-15 | End: 2018-08-15

## 2018-08-15 RX ORDER — 0.9 % SODIUM CHLORIDE 0.9 %
2000 INTRAVENOUS SOLUTION INTRAVENOUS ONCE
Status: COMPLETED | OUTPATIENT
Start: 2018-08-15 | End: 2018-08-15

## 2018-08-15 RX ORDER — POTASSIUM CHLORIDE 7.45 MG/ML
10 INJECTION INTRAVENOUS PRN
Status: DISCONTINUED | OUTPATIENT
Start: 2018-08-15 | End: 2018-08-17 | Stop reason: HOSPADM

## 2018-08-15 RX ORDER — NICOTINE 21 MG/24HR
1 PATCH, TRANSDERMAL 24 HOURS TRANSDERMAL DAILY
Status: DISCONTINUED | OUTPATIENT
Start: 2018-08-15 | End: 2018-08-17 | Stop reason: HOSPADM

## 2018-08-15 RX ORDER — POTASSIUM CHLORIDE 20 MEQ/1
40 TABLET, EXTENDED RELEASE ORAL PRN
Status: DISCONTINUED | OUTPATIENT
Start: 2018-08-15 | End: 2018-08-17 | Stop reason: HOSPADM

## 2018-08-15 RX ORDER — 0.9 % SODIUM CHLORIDE 0.9 %
2000 INTRAVENOUS SOLUTION INTRAVENOUS ONCE
Status: DISCONTINUED | OUTPATIENT
Start: 2018-08-15 | End: 2018-08-15

## 2018-08-15 RX ORDER — SODIUM CHLORIDE 9 MG/ML
INJECTION, SOLUTION INTRAVENOUS
Status: DISCONTINUED
Start: 2018-08-15 | End: 2018-08-16

## 2018-08-15 RX ORDER — MAGNESIUM SULFATE 1 G/100ML
1 INJECTION INTRAVENOUS PRN
Status: DISCONTINUED | OUTPATIENT
Start: 2018-08-15 | End: 2018-08-17 | Stop reason: HOSPADM

## 2018-08-15 RX ORDER — DIPHENHYDRAMINE HYDROCHLORIDE 50 MG/ML
50 INJECTION INTRAMUSCULAR; INTRAVENOUS ONCE
Status: COMPLETED | OUTPATIENT
Start: 2018-08-15 | End: 2018-08-15

## 2018-08-15 RX ORDER — HALOPERIDOL 5 MG/ML
INJECTION INTRAMUSCULAR
Status: COMPLETED
Start: 2018-08-15 | End: 2018-08-15

## 2018-08-15 RX ORDER — DIPHENHYDRAMINE HYDROCHLORIDE 50 MG/ML
INJECTION INTRAMUSCULAR; INTRAVENOUS
Status: COMPLETED
Start: 2018-08-15 | End: 2018-08-15

## 2018-08-15 RX ORDER — SODIUM CHLORIDE 0.9 % (FLUSH) 0.9 %
10 SYRINGE (ML) INJECTION EVERY 12 HOURS SCHEDULED
Status: DISCONTINUED | OUTPATIENT
Start: 2018-08-15 | End: 2018-08-17 | Stop reason: HOSPADM

## 2018-08-15 RX ORDER — ONDANSETRON 2 MG/ML
4 INJECTION INTRAMUSCULAR; INTRAVENOUS EVERY 6 HOURS PRN
Status: DISCONTINUED | OUTPATIENT
Start: 2018-08-15 | End: 2018-08-17 | Stop reason: HOSPADM

## 2018-08-15 RX ORDER — NICOTINE 21 MG/24HR
1 PATCH, TRANSDERMAL 24 HOURS TRANSDERMAL ONCE
Status: DISCONTINUED | OUTPATIENT
Start: 2018-08-15 | End: 2018-08-15

## 2018-08-15 RX ORDER — SODIUM CHLORIDE 0.9 % (FLUSH) 0.9 %
10 SYRINGE (ML) INJECTION PRN
Status: DISCONTINUED | OUTPATIENT
Start: 2018-08-15 | End: 2018-08-17 | Stop reason: HOSPADM

## 2018-08-15 RX ORDER — POTASSIUM CHLORIDE 20MEQ/15ML
40 LIQUID (ML) ORAL PRN
Status: DISCONTINUED | OUTPATIENT
Start: 2018-08-15 | End: 2018-08-17 | Stop reason: HOSPADM

## 2018-08-15 RX ORDER — SODIUM CHLORIDE 9 MG/ML
INJECTION, SOLUTION INTRAVENOUS CONTINUOUS
Status: DISCONTINUED | OUTPATIENT
Start: 2018-08-15 | End: 2018-08-17 | Stop reason: HOSPADM

## 2018-08-15 RX ADMIN — SODIUM CHLORIDE 2000 ML: 9 INJECTION, SOLUTION INTRAVENOUS at 14:10

## 2018-08-15 RX ADMIN — ENOXAPARIN SODIUM 40 MG: 40 INJECTION SUBCUTANEOUS at 20:21

## 2018-08-15 RX ADMIN — DIPHENHYDRAMINE HYDROCHLORIDE 50 MG: 50 INJECTION, SOLUTION INTRAMUSCULAR; INTRAVENOUS at 00:09

## 2018-08-15 RX ADMIN — SODIUM CHLORIDE: 9 INJECTION, SOLUTION INTRAVENOUS at 20:22

## 2018-08-15 RX ADMIN — NICOTINE POLACRILEX 2 MG: 2 GUM, CHEWING BUCCAL at 18:26

## 2018-08-15 RX ADMIN — Medication 10 ML: at 20:27

## 2018-08-15 RX ADMIN — NICOTINE POLACRILEX 4 MG: 2 GUM, CHEWING BUCCAL at 10:42

## 2018-08-15 RX ADMIN — WATER 1.2 ML: 1 INJECTION INTRAMUSCULAR; INTRAVENOUS; SUBCUTANEOUS at 00:09

## 2018-08-15 RX ADMIN — DIPHENHYDRAMINE HYDROCHLORIDE 50 MG: 50 INJECTION, SOLUTION INTRAMUSCULAR; INTRAVENOUS at 07:07

## 2018-08-15 RX ADMIN — SODIUM CHLORIDE 2000 ML: 9 INJECTION, SOLUTION INTRAVENOUS at 10:49

## 2018-08-15 RX ADMIN — DIPHENHYDRAMINE HYDROCHLORIDE 50 MG: 50 INJECTION INTRAMUSCULAR; INTRAVENOUS at 07:07

## 2018-08-15 RX ADMIN — LORAZEPAM 1 MG: 2 INJECTION INTRAMUSCULAR; INTRAVENOUS at 00:09

## 2018-08-15 RX ADMIN — ZIPRASIDONE MESYLATE 20 MG: 20 INJECTION, POWDER, LYOPHILIZED, FOR SOLUTION INTRAMUSCULAR at 00:08

## 2018-08-15 RX ADMIN — HALOPERIDOL LACTATE 10 MG: 5 INJECTION, SOLUTION INTRAMUSCULAR at 07:09

## 2018-08-15 RX ADMIN — HALOPERIDOL 10 MG: 5 INJECTION INTRAMUSCULAR at 07:09

## 2018-08-15 NOTE — ED NOTES
[facility] notified of need for placement secondary to ED [facility] at [facility].      Andrés Russell RN  08/15/18 1459

## 2018-08-15 NOTE — ED NOTES
Lab in for ck redraw, pt cooperative at this time, took pt his lunch osvaldo Whitehead RN  08/15/18 5160

## 2018-08-15 NOTE — ED NOTES
0[facility] nurse updated that fluids are complete and phleb was called for CK level. Patient is currently in bed, eating turkey wrap and drinking fluids.       Mg Canada RN  08/15/18 6394

## 2018-08-15 NOTE — ED NOTES
Pt continues to laugh uncontrollable for no reason talking to self. Pt unable to answer any questions appropriately. Pt responds with incoherent answers.      Joan Flood RN  08/14/18 8251

## 2018-08-15 NOTE — ED NOTES
Report given to me from [facility]). Security and [facility] assisted patient into room 10. Pt calm and cooperative and given remote at this time. Watching tv.       Ori Cochran RN  08/15/18 4975

## 2018-08-15 NOTE — ED NOTES
Resting in bed with eyes closed and  occ. position changes per self.      Phoebe Machado LPN  60/11/32 5656

## 2018-08-15 NOTE — ED NOTES
Patient's grandmother is at [address], [address] [facility]  08/15/18 8843

## 2018-08-15 NOTE — H&P
Admission medications    Medication Sig Start Date End Date Taking? Authorizing Provider   ARIPiprazole (ABILIFY) 20 MG tablet Take 20 mg by mouth daily    Historical Provider, MD   OXcarbazepine (TRILEPTAL) 300 MG tablet Take 300 mg by mouth 2 times daily    Historical Provider, MD   paliperidone (INVEGA) 3 MG extended release tablet Take 1 tablet by mouth nightly 6/22/18   Madison Jacob MD   ibuprofen (ADVIL;MOTRIN) 600 MG tablet Take 1 tablet by mouth every 6 hours as needed for Pain 6/17/18   Guille Tesfaye MD       Allergies:  Shellfish-derived products and Shrimp flavor    Social History:      The patient currently lives alone    TOBACCO:   reports that he quit smoking about 6 years ago. He smoked 0.00 packs per day for 1.00 year. His smokeless tobacco use includes Chew. ETOH:   reports that he does not drink alcohol. Family History:       Reviewed in detail and negative for DM, CAD, Cancer, CVA. Family History   Problem Relation Age of Onset    Family history unknown: Yes       REVIEW OF SYSTEMS:   Pertinent positives as noted in the HPI. All other systems reviewed and negative. PHYSICAL EXAM:    /71   Pulse 70   Temp 97.3 °F (36.3 °C) (Oral)   Resp 18   SpO2 96%     General appearance:  No apparent distress, appears stated age and cooperative. HEENT:  Normal cephalic, atraumatic without obvious deformity. Pupils equal, round, and reactive to light. Extra ocular muscles intact. Conjunctivae/corneas clear. Neck: Supple, with full range of motion. No jugular venous distention. Trachea midline. Respiratory:  Normal respiratory effort. Clear to auscultation, bilaterally   Cardiovascular:  Regular rate and rhythm with normal S1/S2   Abdomen: Soft, non-tender, non-distended with normal bowel sounds. Musculoskeletal:  No clubbing, cyanosis or edema bilaterally. Full range of motion without deformity. Skin: Skin color, texture, turgor normal.  No rashes or lesions.   Neurologic:

## 2018-08-15 NOTE — ED NOTES
Patient plan of care was reviewed with [facility] nurses. Staff is aware that once NS bolus is completed, another CK level will be drawn. Patient is currently resting in bed with no s/s of distress. [facility] nurses state they will give report to [address].       Elizabeth Ford RN  08/15/18 7514

## 2018-08-15 NOTE — ED PROVIDER NOTES
[facility] ED  eMERGENCY dEPARTMENT eNCOUnter      Pt Name: Gela Cabezas  MRN: [id number]  Pilogfbishnu 1990  Date of evaluation: 8/14/2018  Provider: Darleen Kawasaki, [address]       Chief Complaint   Patient presents with    Mental Health Problem       HISTORY OF PRESENT ILLNESS   (Location/Symptom, Timing/Onset, Context/Setting, Quality, Duration, Modifying Factors, Severity) Note limiting factors. HPI     Conception Courser is a 80-year-old male patient per chart review with a past medical history of anxiety, meningitis, pulmonary embolism, subdural hematoma, schizoaffective disorder bipolar type, but induce hallucinations, schizophrenia. Patient presents to the behavioral health emergency room department psychotic state. He will not answer any questions and is just staring at me at this time. The patient became very agitated requiring medication at this time he will have to be further evaluated after the Geodon, Ativan and Benadryl have worn off    Nursing Notes were reviewed. REVIEW OF SYSTEMS    (2+ for level 4; 10+ for level 5)     Review of Systems   Unable to perform ROS: Psychiatric disorder       Except as noted above the remainder of the review of systems was reviewed and negative.      PAST MEDICAL HISTORY     Past Medical History:   Diagnosis Date    Anxiety 1/4/2012    Bipolar affective disorder ([address] 11/25/2015    Chronic back pain 4/20/2017    Drug abuse and dependence ([address] 1/4/2012    Fracture of left side of mandibular body ([address] 06/17/2018    ACUTE LEFT CONDYLAR NECK FRACTURE    Fracture of navicular bone of foot, closed 09/03/2012    lt    Fracture of temporal bone ([address] 2015    History of pulmonary embolism     Schizophrenia ([address]     Seizures ([address]     Talus fracture 9/2012    lt    TBI (traumatic brain injury) ([address] 2015    Wound of left leg 9/14/2012       SURGICAL HISTORY       Past Surgical History:   Procedure Laterality Date    WRIST SURGERY psychosis. Will need to be evaluated for medical clearance after the medication has worn off and the patient is cooperative at that time. Pt is medically cleared. CRITICAL CARE TIME     Total Critical Care time (not applicable if blank)      Total minutes, excluding separately reportable procedures. There was a high probability of clinically significant/life threatening deterioration in the patient's condition which required my urgent intervention. This includes discussing the case with consultants, reviewing laboratory studies and images independently, arranging disposition, and speaking with patient/family    CONSULTS:  None    PROCEDURES:  Unless otherwise noted below, none     Procedures    FINAL IMPRESSION    No diagnosis found. DISPOSITION/PLAN   DISPOSITION        PATIENT REFERRED TO:  No follow-up provider specified. DISCHARGE MEDICATIONS:  New Prescriptions    No medications on file          (Please note that portions of this note were completed with a voice recognition program.  Efforts were made to edit the dictations but occasionally words and phrases are mis-transcribed. )    VELMA Quijano  (electronically signed)                 VELMA Bryant  08/15/18 4065

## 2018-08-15 NOTE — ED NOTES
Provisional Diagnosis:    Schizophrenia    Psychosocial and Contextual Factors:    Recently discharged from [facility].     C-SSRS Summary:     Patient: C-SSRS Suicide Screening  1) Within the past month, have you wished you were dead or wished you could go to sleep and not wake up? : NO  2) Within the past month, have you actually had any thoughts of killing yourself? : NO  6)  Have you ever done anything, started to do anything, or prepared to do anything to end your life?: NO    Family: No family interaction    Agency: Assessed and pink slipped by [facility] Assessment  Suicidal and Self-Injurious Behavior : Actual suicide attempt - Lifetime  Treatment History: Noncompliant with treatment  Clinical Status (Recent): Highly impulsive behavior, Substance abuse or dependence, Agitation or severe anxiety  Protective Factors (Recent):  (Patient unable to answer question)  Other Risk Factors:  (chronic mental illness with psychosis)     Abuse Assessment  Physical Abuse: Unable to assess  Verbal Abuse: Unable to assess  Emotional Abuse: Unable to assess   Financial Abuse: Unable to assess   Sexual Abuse: Unable to assess   Elder abuse: No    Clinical Summary:    Patient presents as a 32year old male with significant past psychiatric history of schizophrenia and non-compliance who was assessed and pink slipped by [facility]. Patient presents to the ED disorganized, internally stimulated, laughing inappropriately, and alternately hostile and threatening staff. Patient denied suicidal ideation and requested to be discharged, then proceeded to begin speaking in word salad and was incoherent. Patient displayed increased psychomotor agitation, pacing, and attempting to leave the unit. Patient required emergency medication for the protection of self and others. Level of Care Disposition:      Per Dr Marta Suarez, refer to [address] as [facility] is at capacity.      Les Hanna CINDA  08/15/18 0144

## 2018-08-15 NOTE — ED NOTES
Tentatively accepted to [facility], information requested by Ramon lAlen at their [employer]. Faxed as requsted and [address] notified     Son Donohue.  [facility]  08/15/18 9032

## 2018-08-15 NOTE — ED NOTES
Spoke with Thom CARREON Patient regarding elevated CK and need to show it is trneding now to faciltate transfer. Pt can be moved to medical for IV fluid administrations temporarily as soon as be avaialble. Notified [facility] of delay. [facility]  08/15/18 0997

## 2018-08-15 NOTE — ED PROVIDER NOTES
Addendum:    Behavior health nursing noted that patient Ck is climbing after 4 liters of fluid and we cannot transfer patient at this point and we should admit medically at this time. Will advise medical backup  Physician of this and begin admission process. Discussed with Dr Jarret Lujan regarding rising ck levels and she accepts patient.     Dx rhabdomyolysis  Dx schizophrenia,nos       Jazlyn Lawrence PA-C  08/15/18 5072

## 2018-08-16 LAB
ANION GAP SERPL CALCULATED.3IONS-SCNC: 11 MEQ/L (ref 7–13)
BUN BLDV-MCNC: 13 MG/DL (ref 6–20)
CALCIUM SERPL-MCNC: 8.8 MG/DL (ref 8.6–10.2)
CHLORIDE BLD-SCNC: 103 MEQ/L (ref 98–107)
CK MB: 11.6 NG/ML (ref 0–6.7)
CK MB: 13.6 NG/ML (ref 0–6.7)
CO2: 28 MEQ/L (ref 22–29)
CREAT SERPL-MCNC: 0.61 MG/DL (ref 0.7–1.2)
CREATINE KINASE-MB INDEX: 0.6 % (ref 0–3.5)
CREATINE KINASE-MB INDEX: 0.6 % (ref 0–3.5)
GFR AFRICAN AMERICAN: >60
GFR NON-AFRICAN AMERICAN: >60
GLUCOSE BLD-MCNC: 96 MG/DL (ref 74–109)
HCT VFR BLD CALC: 43.5 % (ref 42–52)
HEMOGLOBIN: 14.8 G/DL (ref 14–18)
MCH RBC QN AUTO: 31.5 PG (ref 27–31.3)
MCHC RBC AUTO-ENTMCNC: 34.2 % (ref 33–37)
MCV RBC AUTO: 92.2 FL (ref 80–100)
PDW BLD-RTO: 12.7 % (ref 11.5–14.5)
PLATELET # BLD: 173 K/UL (ref 130–400)
POTASSIUM REFLEX MAGNESIUM: 4 MEQ/L (ref 3.5–5.1)
RBC # BLD: 4.71 M/UL (ref 4.7–6.1)
SODIUM BLD-SCNC: 142 MEQ/L (ref 132–144)
TOTAL CK: 1843 U/L (ref 0–190)
TOTAL CK: 2273 U/L (ref 0–190)
WBC # BLD: 7.1 K/UL (ref 4.8–10.8)

## 2018-08-16 PROCEDURE — 2580000003 HC RX 258: Performed by: NURSE PRACTITIONER

## 2018-08-16 PROCEDURE — 6370000000 HC RX 637 (ALT 250 FOR IP): Performed by: INTERNAL MEDICINE

## 2018-08-16 PROCEDURE — 99222 1ST HOSP IP/OBS MODERATE 55: CPT | Performed by: CLINICAL NURSE SPECIALIST

## 2018-08-16 PROCEDURE — 85027 COMPLETE CBC AUTOMATED: CPT

## 2018-08-16 PROCEDURE — 6360000002 HC RX W HCPCS: Performed by: INTERNAL MEDICINE

## 2018-08-16 PROCEDURE — 82553 CREATINE MB FRACTION: CPT

## 2018-08-16 PROCEDURE — 36415 COLL VENOUS BLD VENIPUNCTURE: CPT

## 2018-08-16 PROCEDURE — 6360000002 HC RX W HCPCS: Performed by: NURSE PRACTITIONER

## 2018-08-16 PROCEDURE — 1210000000 HC MED SURG R&B

## 2018-08-16 PROCEDURE — 6370000000 HC RX 637 (ALT 250 FOR IP): Performed by: CLINICAL NURSE SPECIALIST

## 2018-08-16 PROCEDURE — 80048 BASIC METABOLIC PNL TOTAL CA: CPT

## 2018-08-16 PROCEDURE — 6370000000 HC RX 637 (ALT 250 FOR IP): Performed by: NURSE PRACTITIONER

## 2018-08-16 PROCEDURE — 82550 ASSAY OF CK (CPK): CPT

## 2018-08-16 PROCEDURE — 2580000003 HC RX 258: Performed by: INTERNAL MEDICINE

## 2018-08-16 RX ORDER — DIPHENHYDRAMINE HCL 25 MG
50 TABLET ORAL EVERY 6 HOURS PRN
Status: DISCONTINUED | OUTPATIENT
Start: 2018-08-16 | End: 2018-08-17 | Stop reason: HOSPADM

## 2018-08-16 RX ORDER — LANOLIN ALCOHOL/MO/W.PET/CERES
3 CREAM (GRAM) TOPICAL NIGHTLY PRN
Status: DISCONTINUED | OUTPATIENT
Start: 2018-08-16 | End: 2018-08-17 | Stop reason: HOSPADM

## 2018-08-16 RX ORDER — ACETAMINOPHEN 325 MG/1
650 TABLET ORAL EVERY 8 HOURS SCHEDULED
Status: DISCONTINUED | OUTPATIENT
Start: 2018-08-16 | End: 2018-08-17 | Stop reason: HOSPADM

## 2018-08-16 RX ORDER — HALOPERIDOL 5 MG/ML
5 INJECTION INTRAMUSCULAR EVERY 6 HOURS PRN
Status: COMPLETED | OUTPATIENT
Start: 2018-08-16 | End: 2018-08-16

## 2018-08-16 RX ORDER — 0.9 % SODIUM CHLORIDE 0.9 %
2000 INTRAVENOUS SOLUTION INTRAVENOUS ONCE
Status: COMPLETED | OUTPATIENT
Start: 2018-08-16 | End: 2018-08-16

## 2018-08-16 RX ORDER — LORAZEPAM 1 MG/1
1 TABLET ORAL EVERY 4 HOURS PRN
Status: DISCONTINUED | OUTPATIENT
Start: 2018-08-16 | End: 2018-08-17 | Stop reason: HOSPADM

## 2018-08-16 RX ORDER — LANOLIN ALCOHOL/MO/W.PET/CERES
3 CREAM (GRAM) TOPICAL NIGHTLY PRN
Status: DISCONTINUED | OUTPATIENT
Start: 2018-08-16 | End: 2018-08-16

## 2018-08-16 RX ORDER — TRAZODONE HYDROCHLORIDE 100 MG/1
100 TABLET ORAL NIGHTLY PRN
Status: DISCONTINUED | OUTPATIENT
Start: 2018-08-16 | End: 2018-08-17 | Stop reason: HOSPADM

## 2018-08-16 RX ADMIN — HALOPERIDOL LACTATE 5 MG: 5 INJECTION INTRAMUSCULAR at 03:03

## 2018-08-16 RX ADMIN — ACETAMINOPHEN 650 MG: 325 TABLET ORAL at 22:05

## 2018-08-16 RX ADMIN — ENOXAPARIN SODIUM 40 MG: 40 INJECTION SUBCUTANEOUS at 21:14

## 2018-08-16 RX ADMIN — DIPHENHYDRAMINE HCL 50 MG: 25 TABLET ORAL at 21:14

## 2018-08-16 RX ADMIN — ACETAMINOPHEN 650 MG: 325 TABLET ORAL at 15:04

## 2018-08-16 RX ADMIN — LORAZEPAM 1 MG: 1 TABLET ORAL at 15:04

## 2018-08-16 RX ADMIN — SODIUM CHLORIDE: 9 INJECTION, SOLUTION INTRAVENOUS at 03:04

## 2018-08-16 RX ADMIN — SODIUM CHLORIDE: 9 INJECTION, SOLUTION INTRAVENOUS at 10:47

## 2018-08-16 RX ADMIN — SODIUM CHLORIDE 2000 ML: 9 INJECTION, SOLUTION INTRAVENOUS at 17:37

## 2018-08-16 RX ADMIN — TRAZODONE HYDROCHLORIDE 100 MG: 100 TABLET ORAL at 22:05

## 2018-08-16 RX ADMIN — MELATONIN TAB 3 MG 3 MG: 3 TAB at 03:04

## 2018-08-16 RX ADMIN — DIPHENHYDRAMINE HCL 50 MG: 25 TABLET ORAL at 15:40

## 2018-08-16 RX ADMIN — LORAZEPAM 1 MG: 1 TABLET ORAL at 20:10

## 2018-08-16 RX ADMIN — SODIUM CHLORIDE: 9 INJECTION, SOLUTION INTRAVENOUS at 22:37

## 2018-08-16 ASSESSMENT — PAIN SCALES - GENERAL
PAINLEVEL_OUTOF10: 3
PAINLEVEL_OUTOF10: 7
PAINLEVEL_OUTOF10: 5

## 2018-08-16 NOTE — PROGRESS NOTES
Nutrition Assessment    Type and Reason for Visit: Positive Nutrition Screen      Nutrition Assessment:  · Subjective Assessment: Nursing screen identified pt has having one TF or TPN which is incorrect. Also noted chewing/swallowing problems which is incorrect. Pt does report hx of fx jaw ( 6/18) but is currently tolerating Regualr diet. Awaiting tx to I unit        Patient assessed for nutrition risk. Deemed to be at no risk at this time.   Rescreen 5/36/59 per policy    Electronically signed by Alessandro Lowery RD, LD on 8/16/18 at 1:17 PM

## 2018-08-16 NOTE — PROGRESS NOTES
Difficult to obtain assessment information form patient, he has trouble focusing on the questions and changes subject frequently. He has no complaints of pain or nausea. Has steady gait, walks in room frequently. Dano Parra is the 1 on 1 in room, Pt. Is pink slipped, paper is in the chart.  Electronically signed by Maribel Brito RN on 8/15/2018 at 10:33 PM

## 2018-08-16 NOTE — PROGRESS NOTES
Patient has become increasingly more agitated the last hour. He is wanting to go home and is complaining that he can't sleep here. Andres Granados he ordered melatonin 3mg for tonight and haldol 5mg IM for agitation. Patient allowed me to give him the injection into his left deltoid, tolerated well.  Electronically signed by Mic Wall RN on 8/16/2018 at 3:11 AM

## 2018-08-17 ENCOUNTER — HOSPITAL ENCOUNTER (INPATIENT)
Age: 28
LOS: 4 days | Discharge: HOME | DRG: 885 | End: 2018-08-21
Attending: PSYCHIATRY & NEUROLOGY | Admitting: PSYCHIATRY & NEUROLOGY
Payer: MEDICARE

## 2018-08-17 VITALS
HEART RATE: 96 BPM | RESPIRATION RATE: 18 BRPM | DIASTOLIC BLOOD PRESSURE: 83 MMHG | SYSTOLIC BLOOD PRESSURE: 155 MMHG | OXYGEN SATURATION: 97 % | BODY MASS INDEX: 27.38 KG/M2 | WEIGHT: 202.16 LBS | HEIGHT: 72 IN | TEMPERATURE: 98.8 F

## 2018-08-17 LAB
ANION GAP SERPL CALCULATED.3IONS-SCNC: 13 MEQ/L (ref 7–13)
BUN BLDV-MCNC: 14 MG/DL (ref 6–20)
CALCIUM SERPL-MCNC: 8.3 MG/DL (ref 8.6–10.2)
CHLORIDE BLD-SCNC: 102 MEQ/L (ref 98–107)
CK MB: 4.6 NG/ML (ref 0–6.7)
CO2: 27 MEQ/L (ref 22–29)
CREAT SERPL-MCNC: 0.7 MG/DL (ref 0.7–1.2)
CREATINE KINASE-MB INDEX: 0.5 % (ref 0–3.5)
GFR AFRICAN AMERICAN: >60
GFR NON-AFRICAN AMERICAN: >60
GLUCOSE BLD-MCNC: 91 MG/DL (ref 74–109)
POTASSIUM REFLEX MAGNESIUM: 3.9 MEQ/L (ref 3.5–5.1)
SODIUM BLD-SCNC: 142 MEQ/L (ref 132–144)
TOTAL CK: 943 U/L (ref 0–190)

## 2018-08-17 PROCEDURE — 2580000003 HC RX 258: Performed by: NURSE PRACTITIONER

## 2018-08-17 PROCEDURE — 6370000000 HC RX 637 (ALT 250 FOR IP): Performed by: INTERNAL MEDICINE

## 2018-08-17 PROCEDURE — 82553 CREATINE MB FRACTION: CPT

## 2018-08-17 PROCEDURE — 82550 ASSAY OF CK (CPK): CPT

## 2018-08-17 PROCEDURE — 1240000000 HC EMOTIONAL WELLNESS R&B

## 2018-08-17 PROCEDURE — 36415 COLL VENOUS BLD VENIPUNCTURE: CPT

## 2018-08-17 PROCEDURE — 80048 BASIC METABOLIC PNL TOTAL CA: CPT

## 2018-08-17 PROCEDURE — 6370000000 HC RX 637 (ALT 250 FOR IP): Performed by: NURSE PRACTITIONER

## 2018-08-17 PROCEDURE — 6370000000 HC RX 637 (ALT 250 FOR IP): Performed by: PSYCHIATRY & NEUROLOGY

## 2018-08-17 PROCEDURE — 6370000000 HC RX 637 (ALT 250 FOR IP): Performed by: CLINICAL NURSE SPECIALIST

## 2018-08-17 RX ORDER — HALOPERIDOL 5 MG/ML
5 INJECTION INTRAMUSCULAR EVERY 6 HOURS PRN
Status: CANCELLED | OUTPATIENT
Start: 2018-08-17

## 2018-08-17 RX ORDER — HYDROXYZINE PAMOATE 50 MG/1
50 CAPSULE ORAL EVERY 6 HOURS PRN
Status: CANCELLED | OUTPATIENT
Start: 2018-08-17

## 2018-08-17 RX ORDER — HALOPERIDOL 5 MG/ML
5 INJECTION INTRAMUSCULAR EVERY 6 HOURS PRN
Status: DISCONTINUED | OUTPATIENT
Start: 2018-08-17 | End: 2018-08-21 | Stop reason: HOSPADM

## 2018-08-17 RX ORDER — MAGNESIUM HYDROXIDE/ALUMINUM HYDROXICE/SIMETHICONE 120; 1200; 1200 MG/30ML; MG/30ML; MG/30ML
30 SUSPENSION ORAL PRN
Status: DISCONTINUED | OUTPATIENT
Start: 2018-08-17 | End: 2018-08-21 | Stop reason: HOSPADM

## 2018-08-17 RX ORDER — LANOLIN ALCOHOL/MO/W.PET/CERES
3 CREAM (GRAM) TOPICAL NIGHTLY PRN
Status: DISCONTINUED | OUTPATIENT
Start: 2018-08-17 | End: 2018-08-21 | Stop reason: HOSPADM

## 2018-08-17 RX ORDER — BENZTROPINE MESYLATE 1 MG/ML
2 INJECTION INTRAMUSCULAR; INTRAVENOUS 2 TIMES DAILY PRN
Status: DISCONTINUED | OUTPATIENT
Start: 2018-08-17 | End: 2018-08-21 | Stop reason: HOSPADM

## 2018-08-17 RX ORDER — HALOPERIDOL 5 MG
5 TABLET ORAL EVERY 6 HOURS PRN
Status: DISCONTINUED | OUTPATIENT
Start: 2018-08-17 | End: 2018-08-21 | Stop reason: HOSPADM

## 2018-08-17 RX ORDER — TRAZODONE HYDROCHLORIDE 100 MG/1
100 TABLET ORAL NIGHTLY PRN
Status: DISCONTINUED | OUTPATIENT
Start: 2018-08-17 | End: 2018-08-21 | Stop reason: HOSPADM

## 2018-08-17 RX ORDER — SODIUM CHLORIDE 0.9 % (FLUSH) 0.9 %
10 SYRINGE (ML) INJECTION EVERY 12 HOURS SCHEDULED
Status: CANCELLED | OUTPATIENT
Start: 2018-08-17

## 2018-08-17 RX ORDER — NICOTINE 21 MG/24HR
1 PATCH, TRANSDERMAL 24 HOURS TRANSDERMAL DAILY
Status: DISCONTINUED | OUTPATIENT
Start: 2018-08-18 | End: 2018-08-19 | Stop reason: ALTCHOICE

## 2018-08-17 RX ORDER — SODIUM CHLORIDE 0.9 % (FLUSH) 0.9 %
10 SYRINGE (ML) INJECTION EVERY 12 HOURS SCHEDULED
Status: DISCONTINUED | OUTPATIENT
Start: 2018-08-17 | End: 2018-08-17

## 2018-08-17 RX ORDER — ACETAMINOPHEN 325 MG/1
650 TABLET ORAL EVERY 8 HOURS SCHEDULED
Status: CANCELLED | OUTPATIENT
Start: 2018-08-17

## 2018-08-17 RX ORDER — MAGNESIUM HYDROXIDE/ALUMINUM HYDROXICE/SIMETHICONE 120; 1200; 1200 MG/30ML; MG/30ML; MG/30ML
30 SUSPENSION ORAL PRN
Status: CANCELLED | OUTPATIENT
Start: 2018-08-17

## 2018-08-17 RX ORDER — SODIUM CHLORIDE 0.9 % (FLUSH) 0.9 %
10 SYRINGE (ML) INJECTION PRN
Status: CANCELLED | OUTPATIENT
Start: 2018-08-17

## 2018-08-17 RX ORDER — HYDROXYZINE HYDROCHLORIDE 50 MG/ML
50 INJECTION, SOLUTION INTRAMUSCULAR EVERY 6 HOURS PRN
Status: DISCONTINUED | OUTPATIENT
Start: 2018-08-17 | End: 2018-08-21 | Stop reason: HOSPADM

## 2018-08-17 RX ORDER — ACETAMINOPHEN 325 MG/1
650 TABLET ORAL EVERY 4 HOURS PRN
Status: CANCELLED | OUTPATIENT
Start: 2018-08-17

## 2018-08-17 RX ORDER — ACETAMINOPHEN 325 MG/1
650 TABLET ORAL EVERY 4 HOURS PRN
Status: DISCONTINUED | OUTPATIENT
Start: 2018-08-17 | End: 2018-08-19

## 2018-08-17 RX ORDER — TRAZODONE HYDROCHLORIDE 100 MG/1
100 TABLET ORAL NIGHTLY PRN
Status: CANCELLED | OUTPATIENT
Start: 2018-08-17

## 2018-08-17 RX ORDER — DIPHENHYDRAMINE HCL 25 MG
50 TABLET ORAL EVERY 6 HOURS PRN
Status: DISCONTINUED | OUTPATIENT
Start: 2018-08-17 | End: 2018-08-21 | Stop reason: HOSPADM

## 2018-08-17 RX ORDER — LANOLIN ALCOHOL/MO/W.PET/CERES
3 CREAM (GRAM) TOPICAL NIGHTLY PRN
Status: CANCELLED | OUTPATIENT
Start: 2018-08-17

## 2018-08-17 RX ORDER — NICOTINE 21 MG/24HR
1 PATCH, TRANSDERMAL 24 HOURS TRANSDERMAL DAILY
Status: CANCELLED | OUTPATIENT
Start: 2018-08-18

## 2018-08-17 RX ORDER — LORAZEPAM 1 MG/1
1 TABLET ORAL EVERY 4 HOURS PRN
Status: DISCONTINUED | OUTPATIENT
Start: 2018-08-17 | End: 2018-08-21 | Stop reason: HOSPADM

## 2018-08-17 RX ORDER — LORAZEPAM 1 MG/1
1 TABLET ORAL EVERY 4 HOURS PRN
Status: CANCELLED | OUTPATIENT
Start: 2018-08-17

## 2018-08-17 RX ORDER — BENZTROPINE MESYLATE 1 MG/ML
2 INJECTION INTRAMUSCULAR; INTRAVENOUS 2 TIMES DAILY PRN
Status: CANCELLED | OUTPATIENT
Start: 2018-08-17

## 2018-08-17 RX ORDER — HYDROXYZINE HYDROCHLORIDE 50 MG/ML
50 INJECTION, SOLUTION INTRAMUSCULAR EVERY 6 HOURS PRN
Status: CANCELLED | OUTPATIENT
Start: 2018-08-17

## 2018-08-17 RX ORDER — DIPHENHYDRAMINE HCL 25 MG
50 TABLET ORAL EVERY 6 HOURS PRN
Status: CANCELLED | OUTPATIENT
Start: 2018-08-17

## 2018-08-17 RX ORDER — ACETAMINOPHEN 325 MG/1
650 TABLET ORAL EVERY 8 HOURS SCHEDULED
Status: DISCONTINUED | OUTPATIENT
Start: 2018-08-17 | End: 2018-08-21

## 2018-08-17 RX ORDER — SODIUM CHLORIDE 0.9 % (FLUSH) 0.9 %
10 SYRINGE (ML) INJECTION PRN
Status: DISCONTINUED | OUTPATIENT
Start: 2018-08-17 | End: 2018-08-18

## 2018-08-17 RX ORDER — HYDROXYZINE PAMOATE 50 MG/1
50 CAPSULE ORAL EVERY 6 HOURS PRN
Status: DISCONTINUED | OUTPATIENT
Start: 2018-08-17 | End: 2018-08-21 | Stop reason: HOSPADM

## 2018-08-17 RX ADMIN — LORAZEPAM 1 MG: 1 TABLET ORAL at 19:09

## 2018-08-17 RX ADMIN — TRAZODONE HYDROCHLORIDE 100 MG: 100 TABLET ORAL at 20:47

## 2018-08-17 RX ADMIN — HALOPERIDOL 5 MG: 5 TABLET ORAL at 18:19

## 2018-08-17 RX ADMIN — DIPHENHYDRAMINE HCL 50 MG: 25 TABLET ORAL at 18:19

## 2018-08-17 RX ADMIN — LORAZEPAM 1 MG: 1 TABLET ORAL at 06:05

## 2018-08-17 RX ADMIN — DIPHENHYDRAMINE HCL 50 MG: 25 TABLET ORAL at 12:14

## 2018-08-17 RX ADMIN — MELATONIN TAB 3 MG 3 MG: 3 TAB at 20:47

## 2018-08-17 RX ADMIN — ACETAMINOPHEN 650 MG: 325 TABLET ORAL at 20:47

## 2018-08-17 RX ADMIN — LORAZEPAM 1 MG: 1 TABLET ORAL at 11:04

## 2018-08-17 RX ADMIN — ACETAMINOPHEN 650 MG: 325 TABLET ORAL at 05:46

## 2018-08-17 RX ADMIN — LORAZEPAM 1 MG: 1 TABLET ORAL at 15:12

## 2018-08-17 RX ADMIN — DIPHENHYDRAMINE HCL 50 MG: 25 TABLET ORAL at 06:05

## 2018-08-17 RX ADMIN — ACETAMINOPHEN 650 MG: 325 TABLET ORAL at 14:34

## 2018-08-17 RX ADMIN — HALOPERIDOL 5 MG: 1 TABLET ORAL at 10:21

## 2018-08-17 RX ADMIN — SODIUM CHLORIDE: 9 INJECTION, SOLUTION INTRAVENOUS at 05:47

## 2018-08-17 ASSESSMENT — PAIN SCALES - GENERAL
PAINLEVEL_OUTOF10: 3
PAINLEVEL_OUTOF10: 4
PAINLEVEL_OUTOF10: 8
PAINLEVEL_OUTOF10: 2

## 2018-08-17 ASSESSMENT — PAIN DESCRIPTION - PAIN TYPE: TYPE: CHRONIC PAIN

## 2018-08-17 ASSESSMENT — PAIN DESCRIPTION - DESCRIPTORS: DESCRIPTORS: SORE

## 2018-08-17 ASSESSMENT — PAIN DESCRIPTION - LOCATION: LOCATION: KNEE;FOOT

## 2018-08-17 ASSESSMENT — PAIN DESCRIPTION - ORIENTATION: ORIENTATION: RIGHT;LEFT

## 2018-08-17 NOTE — PROGRESS NOTES
Pt has been sleeping all morning. He became agitated on night shift. CK levels this morning 943. Planning to discharge to [address] [place] [address] today when bed is available.  Electronically signed by Ross Aguirre RN on 8/17/2018 at 10:15 AM

## 2018-08-17 NOTE — PROGRESS NOTES
Security called up to the floor. Pt being very inapropriate with staff members swearing at them demmanding to be released from his pink slip to psych.  multipul attempts were made to calm him down. At one point he ripped out his IV. 1:1 sitter continues at this time.

## 2018-08-17 NOTE — DISCHARGE SUMMARY
Hospital Medicine Discharge Summary    Antoinette Chavez  :  1990  MRN:  [id number]    Admit date:  2018  Discharge date:  2018    Admitting Physician:  Yarelis Turpin MD  Primary Care Physician:  No primary care provider on file. Discharge Diagnoses:    Principal Problem:    Rhabdomyolysis  Active Problems:    Schizoaffective disorder, bipolar type ([address]  Resolved Problems:    * No resolved hospital problems. *    Chief Complaint   Patient presents with   [address] Course:   Antoinette Chavez is a 32 y.o. male that was admitted and treated at [facility] for the following medical issues:     Principal Problem:    Rhabdomyolysis  Active Problems:    Schizoaffective disorder, bipolar type ([facility]  Resolved Problems:    * No resolved hospital problems. *    Patient presented with a psychotic state and was found to have non traumatic rhabdomyolysis. Patients CK improved and the patient was medically clear for discharge to the psych unit for further management of his psychiatric illnesses. Pt was discharge in a stable condition. Exam on discharge:   /70   Pulse 73   Temp 97.3 °F (36.3 °C) (Oral)   Resp 18   Ht 6' (1.829 m)   Wt 202 lb 2.6 oz (91.7 kg)   SpO2 98%   BMI 27.42 kg/m²   General appearance: No apparent distress, appears stated age and cooperative. HEENT: Pupils equal, round, and reactive to light. Conjunctivae/corneas clear. Neck: Supple, with full range of motion. No jugular venous distention. Trachea midline. Respiratory:  Normal respiratory effort. Clear to auscultation, bilaterally without Rales/Wheezes/Rhonchi. Cardiovascular: Regular rate and rhythm with normal S1/S2 without murmurs, rubs or gallops. Abdomen: Soft, non-tender, non-distended with normal bowel sounds. Musculoskeletal: No clubbing, cyanosis or edema bilaterally.     Neuro: Non Focal.   Capillary Refill: Brisk,< 3 seconds   Peripheral Pulses: +2 palpable, equal bilaterally     Patient was seen by the following consultants while admitted to [facility]:   Consults:  [address] Messer    Significant Diagnostic Studies:    Component      Latest Ref Rng & Units 8/29/2018 8/28/2018 8/17/2018 8/15/2018           7:09 AM  1:30 PM  6:18 AM  8:20 AM   Total CK      0 - 190 U/L 435 (H) 423 (H) 943 (H) 965 (H)       Discharge Medications:       [facility]   Home Medication Instructions FJS:[id number]    Printed on:08/17/18 0920   Medication Information                      ARIPiprazole (ABILIFY) 20 MG tablet  Take 20 mg by mouth daily             ibuprofen (ADVIL;MOTRIN) 600 MG tablet  Take 1 tablet by mouth every 6 hours as needed for Pain             OXcarbazepine (TRILEPTAL) 300 MG tablet  Take 300 mg by mouth 2 times daily             paliperidone (INVEGA) 3 MG extended release tablet  Take 1 tablet by mouth nightly                 Disposition:   If discharged to Home, Any [facility] needs that were indicated and/or required as been addressed and set up by Social Work. Condition at discharge: Pt was medically stable at the time of discharge. Activity: activity as tolerated    Total time taken for discharging this patient: 40 minutes. Greater than 70% of time was spent focused exclusively on this patient. Time was taken to review chart, discuss plans with consultants, reconciling medications, discussing plan answering questions with patient.      [facility] Room  8/17/2018, 9:20 AM  ----------------------------------------------------------------------------------------------------------------------    Jessica Goldberg

## 2018-08-17 NOTE — PROGRESS NOTES
Hospitalist Progress Note    Assessment and Plan:   1. Rhabdomyolysis: Monitor CK levels, IVF, monitor renal function and potassium levels. Will give IVF bolus today  2. Schizoaffective disorder with psychosis: to 3w once CK less than 1000  3. Functional Status: Fall precautions. 4. Bowel Regimen: stool softners PRN ordered   5. Diet: DIET GENERAL;  6. Advance Directive: Full Code   7. DVT prophylaxis: Lovenox   8. Discharge planning: SW on board. Will discharge once medically stable and cleared by all consultants. 9. High Risk Readmission Screening Tool Score Noted. Additionally, the following hospital problems were addressed:  Principal Problem:    Rhabdomyolysis  Active Problems:    Schizoaffective disorder, bipolar type ([address]  Resolved Problems:    * No resolved hospital problems. *      ** Total time spent reviewing medical records, evaluating patient, speaking with RN's and consultants where I was focused exclusively on this patient: 35 minutes. Subjective:   Admit Date: 8/14/2018  PCP: No primary care provider on file. No acute events overnight. Telemetry reviewed. No new complaints. Pt denies chest pain, SOB, N/V, fevers or chills. Objective:     Vitals:    08/16/18 1909 08/16/18 2308 08/17/18 0259 08/17/18 0710   BP: (!) 145/92 (!) 117/58 123/66 127/70   Pulse: 82 64 67 73   Resp: 18 16 16 18   Temp: 97.3 °F (36.3 °C) 97.5 °F (36.4 °C) 97.8 °F (36.6 °C) 97.3 °F (36.3 °C)   TempSrc: Oral Oral Axillary Oral   SpO2: 100% 99% 98% 98%   Weight:       Height:         General appearance: Mild acute distress sleeping  Lungs: CTA no exp wheezes, No rales , No rhonchi. No retractions; No use of accessory muscles  Heart:  S1, S2 normal, RRR, no MRG appreciated  Abdomen: (+) BS, soft, non-tender; non distended no guarding or rigidity. Extremities:  no cyanosis, no edema bilat lower exts, no calf tenderness bilaterally.        Medications:      sodium chloride 125 mL/hr at 08/17/18 0547     

## 2018-08-17 NOTE — PROGRESS NOTES
Patient up and ambulated full [place] in hallway. Patient continues to ask about when he is going to be discharged home  and when he is going to go to [address] [place] [place] [address]. Patient became slightly agitated but calm after going for a walk.

## 2018-08-18 PROCEDURE — 1240000000 HC EMOTIONAL WELLNESS R&B

## 2018-08-18 PROCEDURE — 6370000000 HC RX 637 (ALT 250 FOR IP): Performed by: PSYCHIATRY & NEUROLOGY

## 2018-08-18 PROCEDURE — 6370000000 HC RX 637 (ALT 250 FOR IP): Performed by: INTERNAL MEDICINE

## 2018-08-18 RX ORDER — DIVALPROEX SODIUM 250 MG/1
500 TABLET, DELAYED RELEASE ORAL DAILY
Status: ON HOLD | COMMUNITY
End: 2018-08-21 | Stop reason: HOSPADM

## 2018-08-18 RX ORDER — OLANZAPINE 5 MG/1
5 TABLET ORAL 2 TIMES DAILY
Status: DISCONTINUED | OUTPATIENT
Start: 2018-08-18 | End: 2018-08-21

## 2018-08-18 RX ORDER — DIVALPROEX SODIUM 500 MG/1
500 TABLET, EXTENDED RELEASE ORAL 2 TIMES DAILY WITH MEALS
Status: DISCONTINUED | OUTPATIENT
Start: 2018-08-18 | End: 2018-08-21 | Stop reason: HOSPADM

## 2018-08-18 RX ORDER — GABAPENTIN 300 MG/1
300 CAPSULE ORAL 2 TIMES DAILY
Status: ON HOLD | COMMUNITY
End: 2018-08-21 | Stop reason: HOSPADM

## 2018-08-18 RX ADMIN — HALOPERIDOL 5 MG: 5 TABLET ORAL at 05:18

## 2018-08-18 RX ADMIN — ACETAMINOPHEN 650 MG: 325 TABLET ORAL at 05:18

## 2018-08-18 RX ADMIN — OLANZAPINE 5 MG: 5 TABLET, FILM COATED ORAL at 19:57

## 2018-08-18 RX ADMIN — OLANZAPINE 5 MG: 5 TABLET, FILM COATED ORAL at 10:40

## 2018-08-18 RX ADMIN — LORAZEPAM 1 MG: 1 TABLET ORAL at 05:18

## 2018-08-18 RX ADMIN — LORAZEPAM 1 MG: 1 TABLET ORAL at 19:42

## 2018-08-18 RX ADMIN — HYDROXYZINE PAMOATE 50 MG: 50 CAPSULE ORAL at 15:43

## 2018-08-18 RX ADMIN — TRAZODONE HYDROCHLORIDE 100 MG: 100 TABLET ORAL at 19:57

## 2018-08-18 RX ADMIN — ACETAMINOPHEN 650 MG: 325 TABLET ORAL at 19:59

## 2018-08-18 RX ADMIN — DIPHENHYDRAMINE HCL 50 MG: 25 TABLET ORAL at 10:13

## 2018-08-18 ASSESSMENT — SLEEP AND FATIGUE QUESTIONNAIRES
DO YOU HAVE DIFFICULTY SLEEPING: YES
DIFFICULTY FALLING ASLEEP: YES
AVERAGE NUMBER OF SLEEP HOURS: 3
DIFFICULTY ARISING: NO
DIFFICULTY STAYING ASLEEP: YES
DO YOU USE A SLEEP AID: NO
SLEEP PATTERN: DIFFICULTY FALLING ASLEEP;INSOMNIA
RESTFUL SLEEP: NO

## 2018-08-18 ASSESSMENT — PAIN SCALES - GENERAL
PAINLEVEL_OUTOF10: 3
PAINLEVEL_OUTOF10: 5

## 2018-08-18 NOTE — PLAN OF CARE
Problem: Altered Mood, Psychotic Behavior:  Goal: Able to demonstrate trust by eating, participating in treatment and following staff's direction  Able to demonstrate trust by eating, participating in treatment and following staff's direction   Outcome: Ongoing    Goal: Able to verbalize decrease in frequency and intensity of hallucinations  Able to verbalize decrease in frequency and intensity of hallucinations   Outcome: Ongoing    Goal: Able to verbalize reality based thinking  Able to verbalize reality based thinking   Outcome: Ongoing    Goal: Absence of self-harm  Absence of self-harm   Outcome: Met This Shift    Goal: Ability to achieve adequate nutritional intake will improve  Ability to achieve adequate nutritional intake will improve   Outcome: Ongoing    Goal: Ability to interact with others will improve  Ability to interact with others will improve   Outcome: Ongoing    Goal: Compliance with prescribed medication regimen will improve  Compliance with prescribed medication regimen will improve   Outcome: Ongoing    Goal: Patient specific goal  Patient specific goal   Outcome: Ongoing

## 2018-08-19 PROCEDURE — 6370000000 HC RX 637 (ALT 250 FOR IP): Performed by: PSYCHIATRY & NEUROLOGY

## 2018-08-19 PROCEDURE — 1240000000 HC EMOTIONAL WELLNESS R&B

## 2018-08-19 PROCEDURE — 6370000000 HC RX 637 (ALT 250 FOR IP): Performed by: INTERNAL MEDICINE

## 2018-08-19 RX ADMIN — TRAZODONE HYDROCHLORIDE 100 MG: 100 TABLET ORAL at 20:04

## 2018-08-19 RX ADMIN — OLANZAPINE 5 MG: 5 TABLET, FILM COATED ORAL at 20:04

## 2018-08-19 RX ADMIN — ACETAMINOPHEN 650 MG: 325 TABLET ORAL at 20:04

## 2018-08-19 RX ADMIN — LORAZEPAM 1 MG: 1 TABLET ORAL at 09:58

## 2018-08-19 RX ADMIN — OLANZAPINE 5 MG: 5 TABLET, FILM COATED ORAL at 09:01

## 2018-08-19 RX ADMIN — HYDROXYZINE PAMOATE 50 MG: 50 CAPSULE ORAL at 09:01

## 2018-08-19 RX ADMIN — ACETAMINOPHEN 650 MG: 325 TABLET ORAL at 16:28

## 2018-08-19 RX ADMIN — DIPHENHYDRAMINE HCL 50 MG: 25 TABLET ORAL at 12:27

## 2018-08-19 RX ADMIN — DIVALPROEX SODIUM 500 MG: 500 TABLET, EXTENDED RELEASE ORAL at 16:29

## 2018-08-19 RX ADMIN — DIVALPROEX SODIUM 500 MG: 500 TABLET, EXTENDED RELEASE ORAL at 09:01

## 2018-08-19 RX ADMIN — NICOTINE POLACRILEX 4 MG: 4 GUM, CHEWING BUCCAL at 12:10

## 2018-08-19 RX ADMIN — HALOPERIDOL 5 MG: 5 TABLET ORAL at 12:27

## 2018-08-19 RX ADMIN — NICOTINE POLACRILEX 4 MG: 4 GUM, CHEWING BUCCAL at 16:31

## 2018-08-19 RX ADMIN — NICOTINE POLACRILEX 4 MG: 4 GUM, CHEWING BUCCAL at 19:48

## 2018-08-19 RX ADMIN — NICOTINE POLACRILEX 4 MG: 4 GUM, CHEWING BUCCAL at 18:25

## 2018-08-19 RX ADMIN — NICOTINE POLACRILEX 4 MG: 4 GUM, CHEWING BUCCAL at 09:40

## 2018-08-19 RX ADMIN — LORAZEPAM 1 MG: 1 TABLET ORAL at 16:29

## 2018-08-19 RX ADMIN — LORAZEPAM 1 MG: 1 TABLET ORAL at 20:30

## 2018-08-19 ASSESSMENT — PAIN SCALES - GENERAL
PAINLEVEL_OUTOF10: 5
PAINLEVEL_OUTOF10: 0
PAINLEVEL_OUTOF10: 5

## 2018-08-19 ASSESSMENT — LIFESTYLE VARIABLES: HISTORY_ALCOHOL_USE: NO

## 2018-08-19 NOTE — H&P
symptoms     Substance Abuse History:  ETOH: denies  Marijuana: CBD use on and off  Opiates: no  Other Drugs:  no     Past Psychiatric History:  Prior Diagnosis: Schizophrenia, Substance disorders:  Polysubstance abuse  Psychiatrist: [facility]  Therapist:no  Hospitalization: yes  Hx of Suicidal Attempts: no  Hx of violence:  no  ECT: no  Previous discontinued Psychiatric Med Trials: depakote     Past Medical History:    Past Medical History             Diagnosis Date    Anxiety 1/4/2012    Bipolar affective disorder ([facility] 11/25/2015    Chronic back pain 4/20/2017    Drug abuse and dependence ([facility] 1/4/2012    Fracture of left side of mandibular body ([facility] 06/17/2018     ACUTE LEFT CONDYLAR NECK FRACTURE    Fracture of navicular bone of foot, closed 09/03/2012     lt    Fracture of temporal bone ([facility] 2015    History of pulmonary embolism      Schizophrenia ([facility]      Seizures ([facility]      Talus fracture 9/2012     lt    TBI (traumatic brain injury) ([facility] 2015    Wound of left leg 9/14/2012            Past Surgical History:    Past Surgical History             Procedure Laterality Date    WRIST SURGERY   2009     pin in wrist left            Allergies:   Shellfish-derived products and Shrimp flavor     Family History  Family History         Family History   Problem Relation Age of Onset    Family history unknown:  Yes            Recent Labs      08/16/18   0641  08/14/18   2245  07/30/18   1232   WBC  7.1  9.2  9.9   HGB  14.8  15.6  15.8   HCT  43.5  44.1  45.0   MCV  92.2  92.1  91.2   PLT  173  187  204     Lab Results   Component Value Date     08/17/2018    K 3.9 08/17/2018     08/17/2018    CO2 27 08/17/2018    BUN 14 08/17/2018    CREATININE 0.70 08/17/2018    GLUCOSE 91 08/17/2018    CALCIUM 8.3 (L) 08/17/2018    PROT 6.7 08/14/2018    LABALBU 4.3 08/14/2018    BILITOT 0.7 08/14/2018    ALKPHOS 89 08/14/2018    AST 31 08/14/2018    ALT 27 08/14/2018    LABGLOM >60.0 08/17/2018 GFRAA >60.0 08/17/2018    AGRATIO 2.0 07/18/2018    GLOB 2.4 08/14/2018       Lab Results   Component Value Date    TSH 2.240 08/14/2018          Social History:     Born and Raised: [place]  Describes Childhood:   supportive  Education: some [employer], seeking work  Relationships: single  Children: no children  Current Support: grandparents    Legal Hx: on probation  Access to weapons?:  No     REVIEW OF SYSTEMS:     ROS:  [x] All negative/unchanged except if checked. Explain positive(checked items) below:  [] Constitutional  [] Eyes  [] Ear/Nose/Mouth/Throat  [] Respiratory  [] CV  [] GI  []   [] Musculoskeletal  [] Skin/Breast  [] Neurological  [] Endocrine  [] Heme/Lymph  [] Allergic/Immunologic     Explanation:         PHYSICAL EXAM:  Vitals:     Vitals:    08/18/18 1901   BP: 139/84   Pulse: 86   Resp: 16   Temp: 98 °F (36.7 °C)   TempSrc: Oral   SpO2: 100%   Neurologic Exam:   Muscle Strength & Tone: full ROM, normal  Gait: normal gait   Involuntary Movements: No     Mental Status Examination:    Level of consciousness:  within normal limits   Appearance:  ill-appearing  Behavior/Motor:  psychomotor agitation  Attitude toward examiner:  argumentative  Speech:  hyperverbal   Mood: irritable and labile  Affect: elated, inappropriate  Thought processes:  tangential   Thought content:  Suicidal Ideation:  denies   Delusions:  paranoid  Frequently threatens staff  Perceptual Disturbance:  denies any perceptual disturbance  Cognition:  oriented to person, place, and time   Concentration poor  Memory intact  Mini Mental Status 30/30  Insight poor   Judgement poor   Fund of Knowledge adequate          Discharge focused and minimizing. Dx: schizophrenia continue offering medciation. Per grandma, she is concerned about him and does not think he can come back to her    Dx: schizophrenia paranoid  Start olanzapine 5 mg po bid  . Nicola Weber    . Nicola Weber

## 2018-08-19 NOTE — PLAN OF CARE
Problem: Altered Mood, Psychotic Behavior:  Goal: Able to demonstrate trust by eating, participating in treatment and following staff's direction  Able to demonstrate trust by eating, participating in treatment and following staff's direction   Outcome: Met This Shift    Goal: Able to verbalize decrease in frequency and intensity of hallucinations  Able to verbalize decrease in frequency and intensity of hallucinations   Outcome: Ongoing    Goal: Able to verbalize reality based thinking  Able to verbalize reality based thinking   Outcome: Ongoing    Goal: Absence of self-harm  Absence of self-harm   Outcome: Met This Shift    Goal: Ability to achieve adequate nutritional intake will improve  Ability to achieve adequate nutritional intake will improve   Outcome: Met This Shift    Goal: Ability to interact with others will improve  Ability to interact with others will improve   Outcome: Met This Shift    Goal: Compliance with prescribed medication regimen will improve  Compliance with prescribed medication regimen will improve   Outcome: Ongoing

## 2018-08-20 PROCEDURE — 6370000000 HC RX 637 (ALT 250 FOR IP): Performed by: INTERNAL MEDICINE

## 2018-08-20 PROCEDURE — 90833 PSYTX W PT W E/M 30 MIN: CPT | Performed by: PSYCHIATRY & NEUROLOGY

## 2018-08-20 PROCEDURE — 1240000000 HC EMOTIONAL WELLNESS R&B

## 2018-08-20 PROCEDURE — 6370000000 HC RX 637 (ALT 250 FOR IP): Performed by: PSYCHIATRY & NEUROLOGY

## 2018-08-20 PROCEDURE — 99232 SBSQ HOSP IP/OBS MODERATE 35: CPT | Performed by: PSYCHIATRY & NEUROLOGY

## 2018-08-20 RX ADMIN — LORAZEPAM 1 MG: 1 TABLET ORAL at 21:41

## 2018-08-20 RX ADMIN — ACETAMINOPHEN 650 MG: 325 TABLET ORAL at 13:54

## 2018-08-20 RX ADMIN — HALOPERIDOL 5 MG: 5 TABLET ORAL at 10:44

## 2018-08-20 RX ADMIN — DIVALPROEX SODIUM 500 MG: 500 TABLET, EXTENDED RELEASE ORAL at 16:10

## 2018-08-20 RX ADMIN — ACETAMINOPHEN 650 MG: 325 TABLET ORAL at 20:05

## 2018-08-20 RX ADMIN — DIPHENHYDRAMINE HCL 50 MG: 25 TABLET ORAL at 20:01

## 2018-08-20 RX ADMIN — DIVALPROEX SODIUM 500 MG: 500 TABLET, EXTENDED RELEASE ORAL at 09:11

## 2018-08-20 RX ADMIN — NICOTINE POLACRILEX 4 MG: 4 GUM, CHEWING BUCCAL at 06:56

## 2018-08-20 RX ADMIN — DIPHENHYDRAMINE HCL 50 MG: 25 TABLET ORAL at 10:55

## 2018-08-20 RX ADMIN — NICOTINE POLACRILEX 4 MG: 4 GUM, CHEWING BUCCAL at 09:11

## 2018-08-20 RX ADMIN — HALOPERIDOL 5 MG: 5 TABLET ORAL at 20:02

## 2018-08-20 RX ADMIN — TRAZODONE HYDROCHLORIDE 100 MG: 100 TABLET ORAL at 20:02

## 2018-08-20 RX ADMIN — NICOTINE POLACRILEX 4 MG: 4 GUM, CHEWING BUCCAL at 10:19

## 2018-08-20 RX ADMIN — LORAZEPAM 1 MG: 1 TABLET ORAL at 16:10

## 2018-08-20 RX ADMIN — OLANZAPINE 5 MG: 5 TABLET, FILM COATED ORAL at 09:11

## 2018-08-20 RX ADMIN — NICOTINE POLACRILEX 4 MG: 4 GUM, CHEWING BUCCAL at 20:01

## 2018-08-20 RX ADMIN — LORAZEPAM 1 MG: 1 TABLET ORAL at 09:39

## 2018-08-20 RX ADMIN — OLANZAPINE 5 MG: 5 TABLET, FILM COATED ORAL at 20:02

## 2018-08-20 ASSESSMENT — PAIN SCALES - GENERAL: PAINLEVEL_OUTOF10: 5

## 2018-08-21 VITALS
HEART RATE: 84 BPM | SYSTOLIC BLOOD PRESSURE: 127 MMHG | OXYGEN SATURATION: 97 % | TEMPERATURE: 97 F | RESPIRATION RATE: 20 BRPM | DIASTOLIC BLOOD PRESSURE: 81 MMHG

## 2018-08-21 PROCEDURE — 6370000000 HC RX 637 (ALT 250 FOR IP): Performed by: INTERNAL MEDICINE

## 2018-08-21 PROCEDURE — 99239 HOSP IP/OBS DSCHRG MGMT >30: CPT | Performed by: PSYCHIATRY & NEUROLOGY

## 2018-08-21 PROCEDURE — 6370000000 HC RX 637 (ALT 250 FOR IP): Performed by: PSYCHIATRY & NEUROLOGY

## 2018-08-21 RX ORDER — ARIPIPRAZOLE 10 MG/1
10 TABLET ORAL DAILY
Qty: 30 TABLET | Refills: 0 | Status: SHIPPED | OUTPATIENT
Start: 2018-08-21 | End: 2018-10-02

## 2018-08-21 RX ORDER — DIVALPROEX SODIUM 500 MG/1
500 TABLET, EXTENDED RELEASE ORAL 2 TIMES DAILY WITH MEALS
Qty: 30 TABLET | Refills: 2 | Status: SHIPPED | OUTPATIENT
Start: 2018-08-21 | End: 2018-10-02

## 2018-08-21 RX ORDER — ARIPIPRAZOLE 10 MG/1
10 TABLET ORAL DAILY
Status: DISCONTINUED | OUTPATIENT
Start: 2018-08-21 | End: 2018-08-21 | Stop reason: HOSPADM

## 2018-08-21 RX ADMIN — NICOTINE POLACRILEX 4 MG: 4 GUM, CHEWING BUCCAL at 09:42

## 2018-08-21 RX ADMIN — DIVALPROEX SODIUM 500 MG: 500 TABLET, EXTENDED RELEASE ORAL at 08:39

## 2018-08-21 RX ADMIN — ARIPIPRAZOLE 10 MG: 10 TABLET ORAL at 09:42

## 2018-08-21 RX ADMIN — NICOTINE POLACRILEX 4 MG: 4 GUM, CHEWING BUCCAL at 11:09

## 2018-08-21 RX ADMIN — OLANZAPINE 5 MG: 5 TABLET, FILM COATED ORAL at 08:39

## 2018-08-21 RX ADMIN — HYDROXYZINE PAMOATE 50 MG: 50 CAPSULE ORAL at 08:40

## 2018-08-21 RX ADMIN — ACETAMINOPHEN 650 MG: 325 TABLET ORAL at 06:05

## 2018-08-21 RX ADMIN — NICOTINE POLACRILEX 4 MG: 4 GUM, CHEWING BUCCAL at 08:39

## 2018-08-21 ASSESSMENT — PAIN SCALES - GENERAL: PAINLEVEL_OUTOF10: 5

## 2018-08-21 NOTE — CARE COORDINATION
BHI Biopsychosocial Assessment    Current Level of Psychosocial Functioning     Independent x   Dependent    Minimal Assist     Comments:  Patient stated he lives alone and receive government benefit e.g. social security, medicaid, and food stamps. Psychosocial High Risk Factors (check all that apply)    Unable to obtain meds   Chronic illness/pain    Substance abuse   Lack of Family Support   Financial stress   Isolation   Inadequate Community Resources  Suicide attempt(s)  Not taking medications   Victim of crime   Developmental Delay  Unable to manage personal needs    Age 72 or older   Homeless  No transportation   Readmission within 30 days  Unemployment   Traumatic Event    Comments: Patient did not indicate any high risk factors associated with this admission. Psychiatric Advanced Directives: None Reported. Family to Involve in Treatment: Patient provided a family member's contact information to complete collateral.    Sexual Orientation:  Patient is in neither a hetero or homosexual relationship. Patient Strengths: None Identified. Patient Barriers: None Identified. Opiate Education Provided:  N/A    CMHC/mental health history: Patient stated he has a counselor at the [facility]. Plan of Care   medication management, group/individual therapies, family meetings, psycho -education, treatment team meetings to assist with stabilization    Initial Discharge Plan:  Patient will return to his home and follow the recommendations of the treatment team.      Clinical Summary:    Patient is a 32year old male who was admitted to the [facility] due to mental health decompensation/psychosis. Patient was agitated during the interview. He was demanding to go to the [facility] so he could smoke. Patient answered some questions appropriately while others he responded superficially or not at all. Patient presented as paranoid and confused. Not sure how to proceed.  At the end of the interview, patient was
Pt approached team station and demands to know why they lied on the pink sheet. Pt states he should not be here. Pt states he needs ativan or he \"won't be able to control getting mad. \"    Pt offered PRN Haldol and Benadryl and pt agreed to PO administration.
Pt. Calm and cooperative this afternoon. Napped through lunch d/t Zyprexa, woke up at 3:07 and ate lunch. Pt. Remains preoccupied with discharge, but is maintaining control. Good insight and said, \"I need help slowing down and focusing on things. \"  Denies depression and anxiety. Easily distracted. Was jogging around the unit in the morning. Poor sleep, but napped.
Received a call from caller identifying herself as Cande Martinez a good friend of the pt. Caller states that she knows that we cannot give any information but she wanted us to know a few things. Caller states that the pt is heavily involved in drug use, will use within hours of getting discharged from any hospital and states the pt often gets admitted for 10 days or so at various hospitals. Caller states that pt is known to abuse gabapentin whenever he can get it. Caller states pt gave her his password to his facebook account and the caller discovered that the pt has contacted 20 or more [facility] women trying at first to \"sext\" with them and then sending inappropriate photos of himself to these women and then they \"block\" him. Caller states the pt told her he knows what to say when he wants out of a hospital.    Diego Shepherd states she is a friend, but is getting very concerned. States pt seems to have gotten worse since getting an apt and she feels the pt is not in a good way, seeming out of control.
Support agreeable to Safeguard and Monitor Medications (including Prescription and OTC):   Patient will not allow grandmother to help him with medication management. Identified Barriers to Compliance with Discharge Plan:   Patient refuses to allow grandmother to support him while he is in the community. Recommendations for Support Network:    encouraged grandmother to ask patient for his HIPPA Code.        CHRIS Flynn

## 2018-08-21 NOTE — DISCHARGE SUMMARY
hours. Lab Results   Component Value Date    LABAMPH Neg 08/15/2018    BARBSCNU Neg 08/15/2018    LABBENZ Neg 08/15/2018    LABBENZ NotDTCD 03/10/2013    OPIATESCREENURINE Neg 08/15/2018    PHENCYCLIDINESCREENURINE Neg 08/15/2018    ETOH <10 08/14/2018     Lab Results   Component Value Date    TSH 2.240 08/14/2018     No results found for: LITHIUM  Lab Results   Component Value Date    VALPROATE <10 (L) 07/18/2018       RISK ASSESSMENT AT DISCHARGE: Low risk for suicide and homicide. Treatment Plan:  Reviewed current Medications with the patient. Education provided on the complaince with treatment. Risks, benefits, side effects, drug-to-drug interactions and alternatives to treatment were discussed. Encourage patient to attend outpatient follow up appointment and therapy. Patient was advised to call the outpatient provider, visit the nearest ED or call 911 if symptoms are not manageable.      Patient's family member was contacted prior to the discharge.         Medication List      START taking these medications    ARIPiprazole lauroxil 662 MG/2.4ML Prsy injection  Commonly known as:  ARISTADA  Inject 3.2 mLs into the muscle once for 1 dose  Start taking on:  10/2/2018     divalproex 500 MG extended release tablet  Commonly known as:  DEPAKOTE ER  Take 1 tablet by mouth 2 times daily (with meals)  Replaces:  divalproex 250 MG DR tablet        CHANGE how you take these medications    ARIPiprazole 10 MG tablet  Commonly known as:  ABILIFY  Take 1 tablet by mouth daily  What changed:  · medication strength  · how much to take        STOP taking these medications    divalproex 250 MG DR tablet  Commonly known as:  DEPAKOTE  Replaced by:  divalproex 500 MG extended release tablet     gabapentin 300 MG capsule  Commonly known as:  NEURONTIN     ibuprofen 600 MG tablet  Commonly known as:  ADVIL;MOTRIN     lurasidone 40 MG Tabs tablet  Commonly known as:  LATUDA     OXcarbazepine 300 MG tablet  Commonly known as:   TRILEPTAL     paliperidone 3 MG extended release tablet  Commonly known as:  INVEGA           Where to Get Your Medications      These medications were sent to [address] [place] [address], [facility] Syd Schwartz  [address]    Phone:  [phone]   · ARIPiprazole 10 MG tablet  · divalproex 500 MG extended release tablet     You can get these medications from any pharmacy    Bring a paper prescription for each of these medications  · ARIPiprazole lauroxil 662 MG/2.4ML Prsy injection           TIME SPEND - 35 MINUTES TO COMPLETE THE EVALUATION, DISCHARGE SUMMARY, MEDICATION RECONCILIATION AND FOLLOW UP CARE     Ori Clay  8/21/2018  9:23 AM

## 2018-08-21 NOTE — PROGRESS NOTES
Affect and mood stable  Denied suicidal/homicidal ideation  Reviewed and verbalized understanding of all instructions  Belongings returned to patient  D/c with grandmother for transport home
Group Therapy Note    Date: 8/20/18  Start Time: 7970  End Time: 9272    Number of Participants:8    Type of Group: Psychoeducation    Patient's Goal:  To participate in mood management group. Notes: Patient declined to attend psychoeducation group at [phone] despite encouragement by staff.      Discipline Responsible: /Counselor    Inez Severin, LISW
Group Therapy Note    Date: 8/21/2018  Start Time: 1000  End Time:  1100  Number of Participants: 13    Type of Group: Psychoeducation    Wellness Binder Information  Module Name:    Session Number:      Patient's Goal:  \"To go home\"    Notes:  Patient was calmer and more focus on his task in group. Status After Intervention:  Improved    Participation Level:  Active Listener    Participation Quality: Appropriate      Speech:  quieter      Thought Process/Content: Linear      Affective Functioning: Congruent      Mood: calmer      Level of consciousness:  Alert      Response to Learning: Progressing to goal      Endings: None Reported    Modes of Intervention: Education, Socialization and Activity      Discipline Responsible: Psychoeducational Specialist      Signature:  Kaylene Marie
Medicated per lpn with trazadone 997UA po sleep at 2002,ativan 1mg po 2141 for pacing,anxiety
Patient attended 21:00 Wrap-Up Group and participated well with others.
Patient visible on unit. Irritable. Elevated at times. Preoccupied with discharge. Patient states he does not feel he needs to be here. Patient denies SI, HI, and Hallucinations. Pt accepted PRN ativan 1 mg PO at 2030 for complaints of anxiety. Ativan effective.  Electronically signed by Esperanza Oseguera RN on 8/20/18 at 1:23 AM
Pt is irritable, c/o feeling agitated, remains fixated on discharge, states he is wasting his time being here, medicated with 1 mg po Ativan, 5 mg po Haldol.  Electronically signed by Esther Gaspar RN on 8/18/18 at 5:21 AM
Pt refused recheck of vitals at 15:33pm.
Pt. attended the 0900 community meeting.  Electronically signed by Sumeet Garduno on 8/19/2018 at 9:39 AM
Pt. presents pleasant. Laughs and makes jokes inappropriately at times. Familiar with unit program and staff from past admissions. Denies the need to be here. Reports poor sleep. Has legal issues that include fines and being on probation. Poor concentration and is easily distracted. Drinks ETOH and does smoke marijuana but is vague with details of frequency and amount. Denies AH/VH and denies si/hi. Reports going to [facility] \"just to talk to someone and ended up here. \"  Stressors include  1.ex gf Orvel Rinks  *outdoor activities, riding bikes, fishing  Electronically signed by Josh Quevedo on 8/18/2018 at 6:41 AM
Unable to complete admission assessment/consents at this time. Pt is asleep after being medicated for increased irritability, agitations, and demands to leave.  Electronically signed by Ramu Swain RN on 8/18/18 at 4:04 AM
at 08/17/18 2047    traZODone (DESYREL) tablet 100 mg, 100 mg, Oral, Nightly PRN, Carlie Nelson MD, 100 mg at 08/19/18 2004      Examination:  /81   Pulse 97   Temp 98 °F (36.7 °C) (Oral)   Resp 20   SpO2 99%   Gait - steady  Medication side effects(SE): no    Mental Status Examination:    Level of consciousness:  within normal limits   Appearance:  poor grooming and fair hygiene  Behavior/Motor:  Less psychomotor agitation  Attitude toward examiner:  cooperative  Speech:  slow   Mood: dysthymic  Affect:  mood incongruent  Thought processes:  slow   Thought content:  Delusions:  paranoid  Perceptual Disturbance:  denies any perceptual disturbance  Cognition:  oriented to person, place, and time   Concentration poor  Insight poor   Judgement poor     ASSESSMENT:   Patient symptoms are:  [] Well controlled  [x] Improving  [] Worsening  [] No change      Diagnosis:   Active Problems:    [facility]  Resolved Problems:    * No resolved hospital problems. *      LABS:    No results for input(s): WBC, HGB, PLT in the last 72 hours. No results for input(s): NA, K, CL, CO2, BUN, CREATININE, GLUCOSE in the last 72 hours. No results for input(s): BILITOT, ALKPHOS, AST, ALT in the last 72 hours. Lab Results   Component Value Date    LABAMPH Neg 08/15/2018    BARBSCNU Neg 08/15/2018    LABBENZ Neg 08/15/2018    LABBENZ NotDTCD 03/10/2013    OPIATESCREENURINE Neg 08/15/2018    PHENCYCLIDINESCREENURINE Neg 08/15/2018    ETOH <10 08/14/2018     Lab Results   Component Value Date    TSH 2.240 08/14/2018     No results found for: LITHIUM  Lab Results   Component Value Date    VALPROATE <10 (L) 07/18/2018         Treatment Plan:  Reviewed current Medications with the patient. Medication as ordered  Risks, benefits, side effects, drug-to-drug interactions and alternatives to treatment were discussed.   Collateral information: pending  CD evaluation  Encourage patient to attend group and other milieu

## 2018-08-28 ENCOUNTER — HOSPITAL ENCOUNTER (EMERGENCY)
Age: 28
Discharge: PSYCHIATRIC OR SUBSTANCE USE FACILITY | End: 2018-08-29
Attending: EMERGENCY MEDICINE
Payer: MEDICARE

## 2018-08-28 DIAGNOSIS — F20.9 SCHIZOPHRENIA, UNSPECIFIED TYPE (HCC): ICD-10-CM

## 2018-08-28 DIAGNOSIS — F25.0 SCHIZOAFFECTIVE DISORDER, BIPOLAR TYPE (HCC): Primary | ICD-10-CM

## 2018-08-28 LAB
ACETAMINOPHEN LEVEL: <5 UG/ML (ref 10–30)
ALBUMIN SERPL-MCNC: 5.2 G/DL (ref 3.9–4.9)
ALP BLD-CCNC: 129 U/L (ref 35–104)
ALT SERPL-CCNC: 41 U/L (ref 0–41)
AMPHETAMINE SCREEN, URINE: ABNORMAL
ANION GAP SERPL CALCULATED.3IONS-SCNC: 13 MEQ/L (ref 7–13)
AST SERPL-CCNC: 30 U/L (ref 0–40)
BARBITURATE SCREEN URINE: ABNORMAL
BASOPHILS ABSOLUTE: 0.1 K/UL (ref 0–0.2)
BASOPHILS RELATIVE PERCENT: 0.8 %
BENZODIAZEPINE SCREEN, URINE: ABNORMAL
BILIRUB SERPL-MCNC: 0.6 MG/DL (ref 0–1.2)
BILIRUBIN URINE: NEGATIVE
BLOOD, URINE: NEGATIVE
BUN BLDV-MCNC: 20 MG/DL (ref 6–20)
CALCIUM SERPL-MCNC: 10 MG/DL (ref 8.6–10.2)
CANNABINOID SCREEN URINE: POSITIVE
CHLORIDE BLD-SCNC: 100 MEQ/L (ref 98–107)
CLARITY: CLEAR
CO2: 25 MEQ/L (ref 22–29)
COCAINE METABOLITE SCREEN URINE: ABNORMAL
COLOR: YELLOW
CREAT SERPL-MCNC: 0.82 MG/DL (ref 0.7–1.2)
EOSINOPHILS ABSOLUTE: 0.2 K/UL (ref 0–0.7)
EOSINOPHILS RELATIVE PERCENT: 1.9 %
ETHANOL PERCENT: NORMAL G/DL
ETHANOL: <10 MG/DL (ref 0–0.08)
GFR AFRICAN AMERICAN: >60
GFR NON-AFRICAN AMERICAN: >60
GLOBULIN: 3 G/DL (ref 2.3–3.5)
GLUCOSE BLD-MCNC: 123 MG/DL (ref 74–109)
GLUCOSE URINE: NEGATIVE MG/DL
HCT VFR BLD CALC: 47.6 % (ref 42–52)
HEMOGLOBIN: 16.8 G/DL (ref 14–18)
KETONES, URINE: NEGATIVE MG/DL
LEUKOCYTE ESTERASE, URINE: NEGATIVE
LYMPHOCYTES ABSOLUTE: 1.3 K/UL (ref 1–4.8)
LYMPHOCYTES RELATIVE PERCENT: 12.2 %
Lab: ABNORMAL
MCH RBC QN AUTO: 32 PG (ref 27–31.3)
MCHC RBC AUTO-ENTMCNC: 35.2 % (ref 33–37)
MCV RBC AUTO: 90.9 FL (ref 80–100)
MONOCYTES ABSOLUTE: 0.7 K/UL (ref 0.2–0.8)
MONOCYTES RELATIVE PERCENT: 6.4 %
NEUTROPHILS ABSOLUTE: 8.6 K/UL (ref 1.4–6.5)
NEUTROPHILS RELATIVE PERCENT: 78.7 %
NITRITE, URINE: NEGATIVE
OPIATE SCREEN URINE: ABNORMAL
PDW BLD-RTO: 13.3 % (ref 11.5–14.5)
PH UA: 7 (ref 5–9)
PHENCYCLIDINE SCREEN URINE: ABNORMAL
PLATELET # BLD: 305 K/UL (ref 130–400)
POTASSIUM SERPL-SCNC: 4.2 MEQ/L (ref 3.5–5.1)
PROTEIN UA: NEGATIVE MG/DL
RBC # BLD: 5.24 M/UL (ref 4.7–6.1)
SALICYLATE, SERUM: <0.3 MG/DL (ref 15–30)
SODIUM BLD-SCNC: 138 MEQ/L (ref 132–144)
SPECIFIC GRAVITY UA: 1.01 (ref 1–1.03)
TOTAL CK: 423 U/L (ref 0–190)
TOTAL PROTEIN: 8.2 G/DL (ref 6.4–8.1)
TSH SERPL DL<=0.05 MIU/L-ACNC: 1.09 UIU/ML (ref 0.27–4.2)
UROBILINOGEN, URINE: 1 E.U./DL
WBC # BLD: 10.9 K/UL (ref 4.8–10.8)

## 2018-08-28 PROCEDURE — 85025 COMPLETE CBC W/AUTO DIFF WBC: CPT

## 2018-08-28 PROCEDURE — 6370000000 HC RX 637 (ALT 250 FOR IP): Performed by: EMERGENCY MEDICINE

## 2018-08-28 PROCEDURE — G0480 DRUG TEST DEF 1-7 CLASSES: HCPCS

## 2018-08-28 PROCEDURE — 6360000002 HC RX W HCPCS: Performed by: EMERGENCY MEDICINE

## 2018-08-28 PROCEDURE — 96372 THER/PROPH/DIAG INJ SC/IM: CPT

## 2018-08-28 PROCEDURE — 82550 ASSAY OF CK (CPK): CPT

## 2018-08-28 PROCEDURE — 82553 CREATINE MB FRACTION: CPT

## 2018-08-28 PROCEDURE — 80053 COMPREHEN METABOLIC PANEL: CPT

## 2018-08-28 PROCEDURE — 84443 ASSAY THYROID STIM HORMONE: CPT

## 2018-08-28 PROCEDURE — 36415 COLL VENOUS BLD VENIPUNCTURE: CPT

## 2018-08-28 PROCEDURE — 81003 URINALYSIS AUTO W/O SCOPE: CPT

## 2018-08-28 PROCEDURE — 80307 DRUG TEST PRSMV CHEM ANLYZR: CPT

## 2018-08-28 PROCEDURE — 99285 EMERGENCY DEPT VISIT HI MDM: CPT

## 2018-08-28 RX ORDER — LORAZEPAM 1 MG/1
2 TABLET ORAL ONCE
Status: COMPLETED | OUTPATIENT
Start: 2018-08-28 | End: 2018-08-28

## 2018-08-28 RX ORDER — NICOTINE 21 MG/24HR
1 PATCH, TRANSDERMAL 24 HOURS TRANSDERMAL ONCE
Status: COMPLETED | OUTPATIENT
Start: 2018-08-28 | End: 2018-08-29

## 2018-08-28 RX ORDER — LORAZEPAM 2 MG/ML
3 INJECTION INTRAMUSCULAR ONCE
Status: COMPLETED | OUTPATIENT
Start: 2018-08-28 | End: 2018-08-28

## 2018-08-28 RX ADMIN — LORAZEPAM 3 MG: 2 INJECTION INTRAMUSCULAR; INTRAVENOUS at 13:51

## 2018-08-28 RX ADMIN — LORAZEPAM 2 MG: 1 TABLET ORAL at 21:28

## 2018-08-28 NOTE — ED TRIAGE NOTES
Pt is not cooperative and refusing to allow any blood draws or urine sample at this time. Pt is very agitated at this time and does not want to do anything. chp officers changed pt on arrival to ed under supervision of opd. Pt body language showing the ability to have an increased temperament soon. Pt was medicated and pt states \"that is not going to knock me down, I just wants the medicine and I want to get out of here\" pt also advised that he is \"not staying here today\" pt speaking softly and using hand gestures  Pt states he is not allowing another needle right now and wants his dip. Advised pt he is not going to able to have his dip at this time.

## 2018-08-28 NOTE — ED NOTES
Bed: 10  Expected date: 8/28/18  Expected time: 1:08 PM  Means of arrival: [facility]  Comments:  32 M - homicidal. Not taking meds.  Pink gaurang Wright RN  08/28/18 5966

## 2018-08-28 NOTE — ED NOTES
Report received from [facility], voices no complaints.       Héctor Kidd RN  08/28/18 7887

## 2018-08-29 VITALS
BODY MASS INDEX: 27.36 KG/M2 | HEIGHT: 72 IN | WEIGHT: 202 LBS | HEART RATE: 87 BPM | RESPIRATION RATE: 18 BRPM | OXYGEN SATURATION: 99 % | DIASTOLIC BLOOD PRESSURE: 74 MMHG | TEMPERATURE: 97.3 F | SYSTOLIC BLOOD PRESSURE: 130 MMHG

## 2018-08-29 LAB
CK MB: 4.6 NG/ML (ref 0–6.7)
CK MB: 4.9 NG/ML (ref 0–6.7)
CREATINE KINASE-MB INDEX: 1.1 % (ref 0–3.5)
CREATINE KINASE-MB INDEX: 1.2 % (ref 0–3.5)
EKG ATRIAL RATE: 87 BPM
EKG P AXIS: 90 DEGREES
EKG P-R INTERVAL: 138 MS
EKG Q-T INTERVAL: 366 MS
EKG QRS DURATION: 92 MS
EKG QTC CALCULATION (BAZETT): 440 MS
EKG R AXIS: 94 DEGREES
EKG T AXIS: 50 DEGREES
EKG VENTRICULAR RATE: 87 BPM
TOTAL CK: 435 U/L (ref 0–190)

## 2018-08-29 PROCEDURE — 6360000002 HC RX W HCPCS

## 2018-08-29 PROCEDURE — 96372 THER/PROPH/DIAG INJ SC/IM: CPT

## 2018-08-29 PROCEDURE — 6360000002 HC RX W HCPCS: Performed by: PHYSICIAN ASSISTANT

## 2018-08-29 PROCEDURE — 82550 ASSAY OF CK (CPK): CPT

## 2018-08-29 PROCEDURE — 6370000000 HC RX 637 (ALT 250 FOR IP): Performed by: PHYSICIAN ASSISTANT

## 2018-08-29 PROCEDURE — 82553 CREATINE MB FRACTION: CPT

## 2018-08-29 PROCEDURE — 36415 COLL VENOUS BLD VENIPUNCTURE: CPT

## 2018-08-29 PROCEDURE — 93005 ELECTROCARDIOGRAM TRACING: CPT

## 2018-08-29 RX ORDER — HALOPERIDOL 5 MG/ML
10 INJECTION INTRAMUSCULAR ONCE
Status: COMPLETED | OUTPATIENT
Start: 2018-08-29 | End: 2018-08-29

## 2018-08-29 RX ORDER — LORAZEPAM 2 MG/ML
2 INJECTION INTRAMUSCULAR ONCE
Status: COMPLETED | OUTPATIENT
Start: 2018-08-29 | End: 2018-08-29

## 2018-08-29 RX ORDER — ZIPRASIDONE MESYLATE 20 MG/ML
INJECTION, POWDER, LYOPHILIZED, FOR SOLUTION INTRAMUSCULAR
Status: COMPLETED
Start: 2018-08-29 | End: 2018-08-29

## 2018-08-29 RX ORDER — ZIPRASIDONE MESYLATE 20 MG/ML
20 INJECTION, POWDER, LYOPHILIZED, FOR SOLUTION INTRAMUSCULAR ONCE
Status: COMPLETED | OUTPATIENT
Start: 2018-08-29 | End: 2018-08-29

## 2018-08-29 RX ADMIN — LORAZEPAM 2 MG: 2 INJECTION INTRAMUSCULAR; INTRAVENOUS at 13:12

## 2018-08-29 RX ADMIN — HALOPERIDOL LACTATE 10 MG: 5 INJECTION, SOLUTION INTRAMUSCULAR at 13:12

## 2018-08-29 RX ADMIN — ZIPRASIDONE MESYLATE 20 MG: 20 INJECTION, POWDER, LYOPHILIZED, FOR SOLUTION INTRAMUSCULAR at 05:31

## 2018-08-29 RX ADMIN — NICOTINE POLACRILEX 2 MG: 2 GUM, CHEWING BUCCAL at 12:10

## 2018-08-29 NOTE — ED NOTES
Received call from [facility] requesting repeat CK level. Order entered and lab notified.       Rakesh Watts RN  08/29/18 2350

## 2018-09-09 ENCOUNTER — HOSPITAL ENCOUNTER (EMERGENCY)
Age: 28
Discharge: PSYCHIATRIC OR SUBSTANCE USE FACILITY | End: 2018-09-10
Attending: EMERGENCY MEDICINE
Payer: MEDICARE

## 2018-09-09 DIAGNOSIS — F25.0 SCHIZOAFFECTIVE DISORDER, BIPOLAR TYPE (HCC): Primary | ICD-10-CM

## 2018-09-09 LAB
ACETAMINOPHEN LEVEL: <5 UG/ML (ref 10–30)
ALBUMIN SERPL-MCNC: 4.9 G/DL (ref 3.9–4.9)
ALP BLD-CCNC: 103 U/L (ref 35–104)
ALT SERPL-CCNC: 35 U/L (ref 0–41)
AMPHETAMINE SCREEN, URINE: ABNORMAL
ANION GAP SERPL CALCULATED.3IONS-SCNC: 14 MEQ/L (ref 7–13)
AST SERPL-CCNC: 42 U/L (ref 0–40)
BARBITURATE SCREEN URINE: ABNORMAL
BASOPHILS ABSOLUTE: 0.1 K/UL (ref 0–0.2)
BASOPHILS RELATIVE PERCENT: 0.8 %
BENZODIAZEPINE SCREEN, URINE: ABNORMAL
BILIRUB SERPL-MCNC: 0.8 MG/DL (ref 0–1.2)
BILIRUBIN URINE: NEGATIVE
BLOOD, URINE: NEGATIVE
BUN BLDV-MCNC: 19 MG/DL (ref 6–20)
CALCIUM SERPL-MCNC: 9.6 MG/DL (ref 8.6–10.2)
CANNABINOID SCREEN URINE: POSITIVE
CHLORIDE BLD-SCNC: 100 MEQ/L (ref 98–107)
CK MB: 5.5 NG/ML (ref 0–6.7)
CLARITY: CLEAR
CO2: 25 MEQ/L (ref 22–29)
COCAINE METABOLITE SCREEN URINE: ABNORMAL
COLOR: YELLOW
CREAT SERPL-MCNC: 1.03 MG/DL (ref 0.7–1.2)
CREATINE KINASE-MB INDEX: 0.8 % (ref 0–3.5)
EOSINOPHILS ABSOLUTE: 0.1 K/UL (ref 0–0.7)
EOSINOPHILS RELATIVE PERCENT: 0.9 %
ETHANOL PERCENT: NORMAL G/DL
ETHANOL: <10 MG/DL (ref 0–0.08)
GFR AFRICAN AMERICAN: >60
GFR NON-AFRICAN AMERICAN: >60
GLOBULIN: 2.6 G/DL (ref 2.3–3.5)
GLUCOSE BLD-MCNC: 109 MG/DL (ref 74–109)
GLUCOSE URINE: NEGATIVE MG/DL
HCT VFR BLD CALC: 45.4 % (ref 42–52)
HEMOGLOBIN: 15.9 G/DL (ref 14–18)
KETONES, URINE: NEGATIVE MG/DL
LEUKOCYTE ESTERASE, URINE: NEGATIVE
LYMPHOCYTES ABSOLUTE: 1.1 K/UL (ref 1–4.8)
LYMPHOCYTES RELATIVE PERCENT: 11.9 %
Lab: ABNORMAL
MCH RBC QN AUTO: 32 PG (ref 27–31.3)
MCHC RBC AUTO-ENTMCNC: 34.9 % (ref 33–37)
MCV RBC AUTO: 91.6 FL (ref 80–100)
MONOCYTES ABSOLUTE: 1.1 K/UL (ref 0.2–0.8)
MONOCYTES RELATIVE PERCENT: 12 %
NEUTROPHILS ABSOLUTE: 7.1 K/UL (ref 1.4–6.5)
NEUTROPHILS RELATIVE PERCENT: 74.4 %
NITRITE, URINE: NEGATIVE
OPIATE SCREEN URINE: ABNORMAL
PDW BLD-RTO: 12.9 % (ref 11.5–14.5)
PH UA: 6 (ref 5–9)
PHENCYCLIDINE SCREEN URINE: ABNORMAL
PLATELET # BLD: 221 K/UL (ref 130–400)
POTASSIUM SERPL-SCNC: 3.6 MEQ/L (ref 3.5–5.1)
PROTEIN UA: NEGATIVE MG/DL
RBC # BLD: 4.96 M/UL (ref 4.7–6.1)
SALICYLATE, SERUM: <0.3 MG/DL (ref 15–30)
SODIUM BLD-SCNC: 139 MEQ/L (ref 132–144)
SPECIFIC GRAVITY UA: 1.02 (ref 1–1.03)
TOTAL CK: 687 U/L (ref 0–190)
TOTAL PROTEIN: 7.5 G/DL (ref 6.4–8.1)
TSH SERPL DL<=0.05 MIU/L-ACNC: 1.4 UIU/ML (ref 0.27–4.2)
URINE REFLEX TO CULTURE: NORMAL
UROBILINOGEN, URINE: 1 E.U./DL
VALPROIC ACID LEVEL: <2.8 UG/ML (ref 50–100)
WBC # BLD: 9.5 K/UL (ref 4.8–10.8)

## 2018-09-09 PROCEDURE — 99285 EMERGENCY DEPT VISIT HI MDM: CPT

## 2018-09-09 PROCEDURE — 85025 COMPLETE CBC W/AUTO DIFF WBC: CPT

## 2018-09-09 PROCEDURE — 80307 DRUG TEST PRSMV CHEM ANLYZR: CPT

## 2018-09-09 PROCEDURE — 84443 ASSAY THYROID STIM HORMONE: CPT

## 2018-09-09 PROCEDURE — G0480 DRUG TEST DEF 1-7 CLASSES: HCPCS

## 2018-09-09 PROCEDURE — 81003 URINALYSIS AUTO W/O SCOPE: CPT

## 2018-09-09 PROCEDURE — 82553 CREATINE MB FRACTION: CPT

## 2018-09-09 PROCEDURE — 80053 COMPREHEN METABOLIC PANEL: CPT

## 2018-09-09 PROCEDURE — 80164 ASSAY DIPROPYLACETIC ACD TOT: CPT

## 2018-09-09 PROCEDURE — 36415 COLL VENOUS BLD VENIPUNCTURE: CPT

## 2018-09-09 PROCEDURE — 82550 ASSAY OF CK (CPK): CPT

## 2018-09-09 PROCEDURE — 6370000000 HC RX 637 (ALT 250 FOR IP): Performed by: FAMILY MEDICINE

## 2018-09-09 RX ORDER — LORAZEPAM 1 MG/1
2 TABLET ORAL ONCE
Status: COMPLETED | OUTPATIENT
Start: 2018-09-09 | End: 2018-09-09

## 2018-09-09 RX ADMIN — LORAZEPAM 2 MG: 1 TABLET ORAL at 11:36

## 2018-09-09 RX ADMIN — NICOTINE POLACRILEX 2 MG: 2 GUM, CHEWING BUCCAL at 11:36

## 2018-09-09 ASSESSMENT — PAIN DESCRIPTION - PAIN TYPE: TYPE: ACUTE PAIN

## 2018-09-09 ASSESSMENT — PAIN DESCRIPTION - DESCRIPTORS: DESCRIPTORS: ACHING

## 2018-09-09 ASSESSMENT — PAIN DESCRIPTION - LOCATION: LOCATION: WRIST;SHOULDER

## 2018-09-09 ASSESSMENT — ENCOUNTER SYMPTOMS
FACIAL SWELLING: 0
ABDOMINAL PAIN: 0
RHINORRHEA: 0
COLOR CHANGE: 0
EYE DISCHARGE: 0
VOMITING: 0
SHORTNESS OF BREATH: 0

## 2018-09-09 ASSESSMENT — PAIN DESCRIPTION - FREQUENCY: FREQUENCY: CONTINUOUS

## 2018-09-09 ASSESSMENT — PAIN SCALES - GENERAL: PAINLEVEL_OUTOF10: 3

## 2018-09-09 NOTE — ED NOTES
Pt given Ativan 2 mg oral and nicotine gum 2 mg given to pt, pt was acceptable in taking the medication he is slightly irritable and wanted the police called to make a report on his grandparents. Called security and security came to [facility] office where pt was allowed to vent his frustrations to security as he asked, security listened, pt was advised he could use the phone amd make a report to the police would find the number for him but pt refused to call the police on his grandmother, he talked with security and this  in the office and was able to calm himself down. Will monitor pt's anxiety irritability with the medication given. He is paranoid talking about he doesn't like what his peers are saying but bothers him. He could not express what exactly he was observing that bothered him  \"I just will stay away from them and sit down in my area, I really don't understand what they are saying but I don't like the talk. \"  \"I can stop and sit and not listen, I don't want to call the police now maybe later I really shouldn't report my grandmother right now, it is okay. \"     Mona Mcintyre, CINDA  09/09/18 1145

## 2018-09-09 NOTE — ED NOTES
Pt is resting ate poor for lunch is drinking fluids with no problems noted     Diann Dyer RN  09/09/18 0132

## 2018-09-09 NOTE — ED NOTES
Pt stated he has a protection order where he can not be around his grandmother, he can state if TPO is against him or his grandmother, per pt he stated \"I think it is on both of us not sure but      Yvrose Connelly RN  09/09/18 1000

## 2018-09-09 NOTE — ED NOTES
Pt arrive from [facility] from home, pt believes his neighbor called the police due to he was banging pans in his home. Pt is hard to understand and is not cooperating with changing his clothes, he has not acted out but laughs inappropriately. [facility] ER staff are present with pt at the bathroom door  Per EMS staff he stated he took 15 mg of Naproxen before leaving home.   He is not S/H/I feels depressed and anxious would like to see [facility]  09/09/18 3398

## 2018-09-09 NOTE — ED PROVIDER NOTES
remainder of the review of systems was reviewed and negative.        PAST MEDICAL HISTORY     Past Medical History:   Diagnosis Date    Anxiety 1/4/2012    Bipolar affective disorder ([facility] 11/25/2015    Chronic back pain 4/20/2017    Depression     Drug abuse and dependence ([facility] 1/4/2012    Fracture of left side of mandibular body ([facility] 06/17/2018    ACUTE LEFT CONDYLAR NECK FRACTURE    Fracture of navicular bone of foot, closed 09/03/2012    lt    Fracture of temporal bone ([facility] 2015    History of pulmonary embolism     Schizophrenia ([facility]     Seizures ([facility]     Talus fracture 9/2012    lt    TBI (traumatic brain injury) ([facility] 2015    Wound of left leg 9/14/2012         SURGICAL HISTORY       Past Surgical History:   Procedure Laterality Date    WRIST SURGERY  2009    pin in wrist left         CURRENT MEDICATIONS       Previous Medications    ARIPIPRAZOLE (ABILIFY) 10 MG TABLET    Take 1 tablet by mouth daily    ARIPIPRAZOLE LAUROXIL (ARISTADA) 662 MG/2.4ML PRSY INJECTION    Inject 3.2 mLs into the muscle once for 1 dose    DIVALPROEX (DEPAKOTE ER) 500 MG EXTENDED RELEASE TABLET    Take 1 tablet by mouth 2 times daily (with meals)       ALLERGIES     Shellfish-derived products and Shrimp flavor    FAMILY HISTORY       Family History   Problem Relation Age of Onset    Family history unknown: Yes          SOCIAL HISTORY       Social History     Social History    Marital status: Single     Spouse name: N/A    Number of children: 0    Years of education: 13     Social History Main Topics    Smoking status: Former Smoker     Packs/day: 0.00     Years: 1.00     Quit date: 11/28/2011    Smokeless tobacco: Current User     Types: Chew    Alcohol use No      Comment: pt denies    Drug use: Yes     Frequency: 3.0 times per week     Types: Marijuana      Comment:      Sexual activity: Not Currently     Partners: Female     Other Topics Concern    None     Social History Narrative    Lives w family the time of this note:    No orders to display         ED BEDSIDE ULTRASOUND:   Performed by ED Physician - none    LABS:  Labs Reviewed   CK - Abnormal; Notable for the following:        Result Value    Total  (*)     All other components within normal limits   CBC WITH AUTO DIFFERENTIAL - Abnormal; Notable for the following:     MCH 32.0 (*)     Neutrophils # 7.1 (*)     Monocytes # 1.1 (*)     All other components within normal limits   COMPREHENSIVE METABOLIC PANEL - Abnormal; Notable for the following: Anion Gap 14 (*)     AST 42 (*)     All other components within normal limits   URINE DRUG SCREEN - Abnormal; Notable for the following:     Cannabinoid Scrn, Ur POSITIVE (*)     All other components within normal limits   ACETAMINOPHEN LEVEL - Abnormal; Notable for the following:     Acetaminophen Level <5 (*)     All other components within normal limits   SALICYLATE LEVEL - Abnormal; Notable for the following:     Salicylate, Serum <9.8 (*)     All other components within normal limits   VALPROIC ACID LEVEL, TOTAL - Abnormal; Notable for the following:     Valproic Acid Lvl <2.8 (*)     All other components within normal limits   ETHANOL   TSH WITHOUT REFLEX   URINE RT REFLEX TO CULTURE   CKMB & RELATIVE PERCENT   VALPROIC ACID LEVEL, FREE       All other labs were within normal range or not returned as of this dictation.     EMERGENCY DEPARTMENT COURSE and DIFFERENTIAL DIAGNOSIS/MDM:   Vitals:    Vitals:    09/09/18 0900 09/10/18 0000 09/10/18 0638 09/10/18 1643   BP: (!) 162/104 103/62 124/78 125/74   Pulse: 106 91 93 66   Resp: 20 20 14 14   Temp: 98.9 °F (37.2 °C)  98.1 °F (36.7 °C)    TempSrc: Oral  Oral    SpO2: 95% 98% 97% 97%   Weight: 175 lb (79.4 kg)      Height: 6' (1.829 m)          Patient is medically cleared at 11:30 AM with further disposition pending psychiatric evaluation    MDM    CRITICAL CARE TIME   Total Critical Care time was 0 minutes, excluding separately reportable

## 2018-09-09 NOTE — ED NOTES
Dr. Carmencita Marquez was busy with medical pt's report given to  in zone 1 chart was left for the doctor to see the pt.      Delores Rosa RN  [phone]

## 2018-09-10 VITALS
RESPIRATION RATE: 14 BRPM | OXYGEN SATURATION: 97 % | WEIGHT: 175 LBS | SYSTOLIC BLOOD PRESSURE: 125 MMHG | DIASTOLIC BLOOD PRESSURE: 74 MMHG | HEIGHT: 72 IN | BODY MASS INDEX: 23.7 KG/M2 | HEART RATE: 66 BPM | TEMPERATURE: 98.1 F

## 2018-09-10 LAB
EKG ATRIAL RATE: 90 BPM
EKG P AXIS: 82 DEGREES
EKG P-R INTERVAL: 142 MS
EKG Q-T INTERVAL: 384 MS
EKG QRS DURATION: 92 MS
EKG QTC CALCULATION (BAZETT): 469 MS
EKG R AXIS: 92 DEGREES
EKG T AXIS: 59 DEGREES
EKG VENTRICULAR RATE: 90 BPM

## 2018-09-10 PROCEDURE — 96372 THER/PROPH/DIAG INJ SC/IM: CPT

## 2018-09-10 PROCEDURE — 6370000000 HC RX 637 (ALT 250 FOR IP): Performed by: EMERGENCY MEDICINE

## 2018-09-10 PROCEDURE — 6360000002 HC RX W HCPCS

## 2018-09-10 PROCEDURE — 93005 ELECTROCARDIOGRAM TRACING: CPT

## 2018-09-10 PROCEDURE — 6370000000 HC RX 637 (ALT 250 FOR IP): Performed by: NURSE PRACTITIONER

## 2018-09-10 PROCEDURE — 6370000000 HC RX 637 (ALT 250 FOR IP): Performed by: PHYSICIAN ASSISTANT

## 2018-09-10 RX ORDER — LORAZEPAM 2 MG/ML
2 INJECTION INTRAMUSCULAR ONCE
Status: COMPLETED | OUTPATIENT
Start: 2018-09-10 | End: 2018-09-10

## 2018-09-10 RX ORDER — LORAZEPAM 2 MG/ML
INJECTION INTRAMUSCULAR
Status: COMPLETED
Start: 2018-09-10 | End: 2018-09-10

## 2018-09-10 RX ORDER — ZIPRASIDONE MESYLATE 20 MG/ML
20 INJECTION, POWDER, LYOPHILIZED, FOR SOLUTION INTRAMUSCULAR ONCE
Status: COMPLETED | OUTPATIENT
Start: 2018-09-10 | End: 2018-09-10

## 2018-09-10 RX ORDER — HALOPERIDOL 10 MG/1
10 TABLET ORAL ONCE
Status: COMPLETED | OUTPATIENT
Start: 2018-09-10 | End: 2018-09-10

## 2018-09-10 RX ORDER — LORAZEPAM 1 MG/1
1 TABLET ORAL ONCE
Status: COMPLETED | OUTPATIENT
Start: 2018-09-10 | End: 2018-09-10

## 2018-09-10 RX ORDER — DIPHENHYDRAMINE HCL 25 MG
50 TABLET ORAL
Status: COMPLETED | OUTPATIENT
Start: 2018-09-10 | End: 2018-09-10

## 2018-09-10 RX ORDER — ZIPRASIDONE MESYLATE 20 MG/ML
INJECTION, POWDER, LYOPHILIZED, FOR SOLUTION INTRAMUSCULAR
Status: COMPLETED
Start: 2018-09-10 | End: 2018-09-10

## 2018-09-10 RX ADMIN — NICOTINE POLACRILEX 2 MG: 2 GUM, CHEWING BUCCAL at 19:53

## 2018-09-10 RX ADMIN — LORAZEPAM 2 MG: 2 INJECTION INTRAMUSCULAR; INTRAVENOUS at 01:11

## 2018-09-10 RX ADMIN — LORAZEPAM 1 MG: 1 TABLET ORAL at 00:44

## 2018-09-10 RX ADMIN — DIPHENHYDRAMINE HCL 50 MG: 25 TABLET ORAL at 11:43

## 2018-09-10 RX ADMIN — HALOPERIDOL 10 MG: 10 TABLET ORAL at 11:43

## 2018-09-10 RX ADMIN — ZIPRASIDONE MESYLATE 20 MG: 20 INJECTION, POWDER, LYOPHILIZED, FOR SOLUTION INTRAMUSCULAR at 01:10

## 2018-09-10 RX ADMIN — NICOTINE POLACRILEX 2 MG: 2 GUM, CHEWING BUCCAL at 11:43

## 2018-09-10 RX ADMIN — LORAZEPAM 2 MG: 2 INJECTION INTRAMUSCULAR at 01:11

## 2018-09-10 RX ADMIN — NICOTINE POLACRILEX 2 MG: 2 GUM, CHEWING BUCCAL at 01:10

## 2018-09-10 NOTE — ED NOTES
Pt demands to \"Get the fuck out of here! I have an appointment 9-12 at [place]! \". Due to mood lability with agitation, disorganized thoughts with hallucinations, Dr ferguson pink slip him.      Patito Verdin RN  09/10/18 1304

## 2018-09-10 NOTE — ED NOTES
When pt was informed that he may not leave [facility], he shouted and tried to break heavy glass door between nurse station and unit. Threatened to punch  in the face. Unable to redirect or regain control.      Lolyd Ch RN  09/10/18 1858

## 2018-09-10 NOTE — ED NOTES
Called charge nurse to talked with Anny Hidalgo and advised her off Dr. Ulysses Kub wanting [facility] for admit that [facility] has put the pt on their [facility] to assess but will not assess until all the hospitals have declined him can not send to [address] [facility] with insurance unless all places have declined  [employer] is faxing their form of places declined or no bed.   [facility] will assess when all places have declined and [address] is aware and faxing the form to [facility], [address] [place] [address]  09/10/18 2625

## 2018-09-10 NOTE — ED NOTES
Talked with Bridger Reyna at [facility] gave her a report on the pt, drug alcohol screen and why and how he arrived to the ER and Dr. Jesika Cardona wanted pt admitted to [facility]  Reviewed places where he had been declined and not accepted, per Sindy Baker will need the list/form from [place] [address] of places where he has been declined and having no beds is not a reason need all places to decline, advised I was aaware of this requirement due to he has insurance. Advised will call [facility] to have them fax the form on places declined and places that are full. Advised pt on last admit had been threatening to the doctor on [address] and has been non compliant with treatment or taking medications  Dr. Jesika Cardona stated he had also been disruptive in another place of hospitalization.   Bridger Reyna will put him on [facility] board for assessment for [address] [facility] [address], RN  09/10/18 3575

## 2018-09-10 NOTE — ED NOTES
Per    Staff, pt is  To be transferred to  [facility]. Pt resting at this time     April L Lorelei Greenwood RN  09/10/18 9480

## 2018-09-10 NOTE — ED NOTES
Pt approached Nursing office questioning if he could go home informed that his assessment needed to be  completed and physician consulted      Kevin Hung, [facility]  09/09/18 8749

## 2018-09-10 NOTE — ED NOTES
Code violet with pt still making verbal threats to harm security and to leave ER. Did agree to walk down the barrow to private room, and to accept IM meds. Report given to CINDA Grossman and to charge RN, Rishi Wan. Responsive to decreased stimuli.      Alli Travis RN  09/10/18 1487

## 2018-09-10 NOTE — ED NOTES
Patients best friend called  stating that patient has a no contact order through the [address] [place] [address] Road with his grandmother. The patient has been calling his grandmother for her to bring him things. Phone removed from room. Security informed of situation.      Myrna Fierro RN  09/10/18 2073

## 2018-09-10 NOTE — ED NOTES
Report and paper work given to [employer] whom is here to assess the pt for [address] Henrique Weber, RN  09/10/18 9849

## 2018-09-10 NOTE — ED NOTES
Pt resting in bed.  Respirations unlabored     April L [facility]  09/10/18 0098

## 2018-09-10 NOTE — ED NOTES
Pt file has been faxed to [facility] and they are aware that he requires a bed. Waiting for  Available bed. Pt resting at this time.      April MADAI Blanc RN  09/10/18 1991

## 2018-09-10 NOTE — ED NOTES
In room calm drinking soda pt medicated by Kathryn Covarrubias rn bh precautions maintained  Pt brought to room 12 from psych due to code violet and pt acting out .   Kee Plasencia RN  09/10/18 0126       Kee Plasencia RN  09/10/18 [address], RN  09/10/18 4171

## 2018-09-10 NOTE — ED NOTES
Presented pt to Dr Leonides Curry, who is familiar with pt. Instructed to seek transfer due to capacity. Pt is with access center.      Kasey Schwartz RN  09/10/18 0367

## 2018-09-10 NOTE — ED NOTES
Medical chart and pink slip faxed to [address] [facility] admission to confirm receipt.  Spoke with Anupama Hale in [address], RN  09/10/18 7688

## 2018-09-10 NOTE — ED NOTES
Daphnie  From [facility] called requesting pt info to be faxed to her pt's EKG , Labs , ED Summary  Provider /nursing notes ,# [phone]  Faxed at this time      Kevin Knapp [facility]  09/10/18 1570

## 2018-09-10 NOTE — ED NOTES
Called [facility] to talk Myrtle Yen talked with then talked with [facility] and gave a report to her to fax the form of denials and no beds to [address] [place] [address] will fax the form and [facility] is aware of need for pt to go to [facility] but due to insurance they will need to call hospitals today to get him admitted somewhere before he can go to [address], RN  09/10/18 2904

## 2018-09-10 NOTE — ED NOTES
Dr. Kristine Collins called the [facility] pt needs to go to [facility] due to threats made to the doctor while on [address] and non compliant with medications and treatment.   Will call [facility] and [address] [place] [address] to find out and let the [address] [place] [address] aware of [facility] admit per Dr. Toy Brooks, RN  09/10/18 9196

## 2018-09-14 PROCEDURE — 93010 ELECTROCARDIOGRAM REPORT: CPT | Performed by: INTERNAL MEDICINE

## 2018-10-02 ENCOUNTER — HOSPITAL ENCOUNTER (EMERGENCY)
Age: 28
Discharge: HOME | End: 2018-10-03
Payer: MEDICARE

## 2018-10-02 DIAGNOSIS — F25.9 SCHIZOAFFECTIVE DISORDER, UNSPECIFIED TYPE (HCC): Primary | ICD-10-CM

## 2018-10-02 LAB
ACETAMINOPHEN LEVEL: <5 UG/ML (ref 10–30)
ALBUMIN SERPL-MCNC: 5.4 G/DL (ref 3.9–4.9)
ALP BLD-CCNC: 109 U/L (ref 35–104)
ALT SERPL-CCNC: 35 U/L (ref 0–41)
AMPHETAMINE SCREEN, URINE: ABNORMAL
ANION GAP SERPL CALCULATED.3IONS-SCNC: 17 MEQ/L (ref 7–13)
AST SERPL-CCNC: 30 U/L (ref 0–40)
BARBITURATE SCREEN URINE: ABNORMAL
BASOPHILS ABSOLUTE: 0.1 K/UL (ref 0–0.2)
BASOPHILS RELATIVE PERCENT: 0.6 %
BENZODIAZEPINE SCREEN, URINE: ABNORMAL
BILIRUB SERPL-MCNC: 0.8 MG/DL (ref 0–1.2)
BILIRUBIN URINE: ABNORMAL
BLOOD, URINE: NEGATIVE
BUN BLDV-MCNC: 18 MG/DL (ref 6–20)
CALCIUM SERPL-MCNC: 10 MG/DL (ref 8.6–10.2)
CANNABINOID SCREEN URINE: POSITIVE
CHLORIDE BLD-SCNC: 96 MEQ/L (ref 98–107)
CK MB: 2.1 NG/ML (ref 0–6.7)
CLARITY: ABNORMAL
CO2: 28 MEQ/L (ref 22–29)
COCAINE METABOLITE SCREEN URINE: ABNORMAL
COLOR: ABNORMAL
CREAT SERPL-MCNC: 1.06 MG/DL (ref 0.7–1.2)
CREATINE KINASE-MB INDEX: 0.9 % (ref 0–3.5)
EOSINOPHILS ABSOLUTE: 0 K/UL (ref 0–0.7)
EOSINOPHILS RELATIVE PERCENT: 0.2 %
ETHANOL PERCENT: NORMAL G/DL
ETHANOL: <10 MG/DL (ref 0–0.08)
GFR AFRICAN AMERICAN: >60
GFR NON-AFRICAN AMERICAN: >60
GLOBULIN: 3 G/DL (ref 2.3–3.5)
GLUCOSE BLD-MCNC: 115 MG/DL (ref 74–109)
GLUCOSE URINE: NEGATIVE MG/DL
HCT VFR BLD CALC: 48 % (ref 42–52)
HEMOGLOBIN: 16.9 G/DL (ref 14–18)
KETONES, URINE: ABNORMAL MG/DL
LEUKOCYTE ESTERASE, URINE: ABNORMAL
LYMPHOCYTES ABSOLUTE: 1.7 K/UL (ref 1–4.8)
LYMPHOCYTES RELATIVE PERCENT: 17.2 %
Lab: ABNORMAL
MCH RBC QN AUTO: 32.1 PG (ref 27–31.3)
MCHC RBC AUTO-ENTMCNC: 35.2 % (ref 33–37)
MCV RBC AUTO: 91 FL (ref 80–100)
MONOCYTES ABSOLUTE: 0.8 K/UL (ref 0.2–0.8)
MONOCYTES RELATIVE PERCENT: 7.6 %
MUCUS: PRESENT
NEUTROPHILS ABSOLUTE: 7.5 K/UL (ref 1.4–6.5)
NEUTROPHILS RELATIVE PERCENT: 74.4 %
NITRITE, URINE: NEGATIVE
OPIATE SCREEN URINE: ABNORMAL
PDW BLD-RTO: 12.8 % (ref 11.5–14.5)
PH UA: 6 (ref 5–9)
PHENCYCLIDINE SCREEN URINE: ABNORMAL
PLATELET # BLD: 242 K/UL (ref 130–400)
POTASSIUM SERPL-SCNC: 4 MEQ/L (ref 3.5–5.1)
PROTEIN UA: ABNORMAL MG/DL
RBC # BLD: 5.27 M/UL (ref 4.7–6.1)
RBC UA: NORMAL /HPF (ref 0–2)
SALICYLATE, SERUM: <0.3 MG/DL (ref 15–30)
SODIUM BLD-SCNC: 141 MEQ/L (ref 132–144)
SPECIFIC GRAVITY UA: 1.03 (ref 1–1.03)
TOTAL CK: 240 U/L (ref 0–190)
TOTAL PROTEIN: 8.4 G/DL (ref 6.4–8.1)
TSH SERPL DL<=0.05 MIU/L-ACNC: 1.73 UIU/ML (ref 0.27–4.2)
URINE REFLEX TO CULTURE: YES
UROBILINOGEN, URINE: 1 E.U./DL
VALPROIC ACID LEVEL: <2.8 UG/ML (ref 50–100)
WBC # BLD: 10.1 K/UL (ref 4.8–10.8)
WBC UA: NORMAL /HPF (ref 0–5)

## 2018-10-02 PROCEDURE — G0480 DRUG TEST DEF 1-7 CLASSES: HCPCS

## 2018-10-02 PROCEDURE — 84443 ASSAY THYROID STIM HORMONE: CPT

## 2018-10-02 PROCEDURE — 99284 EMERGENCY DEPT VISIT MOD MDM: CPT

## 2018-10-02 PROCEDURE — 36415 COLL VENOUS BLD VENIPUNCTURE: CPT

## 2018-10-02 PROCEDURE — 81001 URINALYSIS AUTO W/SCOPE: CPT

## 2018-10-02 PROCEDURE — 85025 COMPLETE CBC W/AUTO DIFF WBC: CPT

## 2018-10-02 PROCEDURE — 80307 DRUG TEST PRSMV CHEM ANLYZR: CPT

## 2018-10-02 PROCEDURE — 82553 CREATINE MB FRACTION: CPT

## 2018-10-02 PROCEDURE — 82550 ASSAY OF CK (CPK): CPT

## 2018-10-02 PROCEDURE — 87086 URINE CULTURE/COLONY COUNT: CPT

## 2018-10-02 PROCEDURE — 80053 COMPREHEN METABOLIC PANEL: CPT

## 2018-10-02 PROCEDURE — 80164 ASSAY DIPROPYLACETIC ACD TOT: CPT

## 2018-10-02 RX ORDER — DIVALPROEX SODIUM 250 MG/1
500 TABLET, EXTENDED RELEASE ORAL 2 TIMES DAILY
COMMUNITY
End: 2018-10-30

## 2018-10-02 RX ORDER — BENZTROPINE MESYLATE 1 MG/1
1 TABLET ORAL 2 TIMES DAILY
COMMUNITY
End: 2018-10-30

## 2018-10-02 RX ORDER — NALOXONE HYDROCHLORIDE 1 MG/ML
1 INJECTION INTRAMUSCULAR; INTRAVENOUS; SUBCUTANEOUS PRN
COMMUNITY
End: 2018-10-18

## 2018-10-02 RX ORDER — CETIRIZINE HYDROCHLORIDE 10 MG/1
10 TABLET ORAL NIGHTLY
COMMUNITY
End: 2018-10-30

## 2018-10-02 RX ORDER — NICOTINE 21 MG/24HR
1 PATCH, TRANSDERMAL 24 HOURS TRANSDERMAL ONCE
Status: DISCONTINUED | OUTPATIENT
Start: 2018-10-02 | End: 2018-10-03 | Stop reason: HOSPADM

## 2018-10-02 RX ORDER — HALOPERIDOL 10 MG/1
10 TABLET ORAL 2 TIMES DAILY
COMMUNITY
End: 2018-10-30

## 2018-10-02 ASSESSMENT — ENCOUNTER SYMPTOMS
VOMITING: 0
RHINORRHEA: 0
SORE THROAT: 0
COUGH: 0
DIARRHEA: 0
COLOR CHANGE: 0
SHORTNESS OF BREATH: 0
BLOOD IN STOOL: 0
ABDOMINAL PAIN: 0
NAUSEA: 0

## 2018-10-03 VITALS
OXYGEN SATURATION: 98 % | DIASTOLIC BLOOD PRESSURE: 81 MMHG | RESPIRATION RATE: 20 BRPM | HEART RATE: 89 BPM | SYSTOLIC BLOOD PRESSURE: 134 MMHG | TEMPERATURE: 98.7 F | HEIGHT: 72 IN | WEIGHT: 200 LBS | BODY MASS INDEX: 27.09 KG/M2

## 2018-10-03 PROCEDURE — 6370000000 HC RX 637 (ALT 250 FOR IP): Performed by: PERSONAL EMERGENCY RESPONSE ATTENDANT

## 2018-10-03 RX ORDER — HALOPERIDOL 10 MG/1
10 TABLET ORAL ONCE
Status: COMPLETED | OUTPATIENT
Start: 2018-10-03 | End: 2018-10-03

## 2018-10-03 RX ORDER — BENZTROPINE MESYLATE 1 MG/1
1 TABLET ORAL ONCE
Status: COMPLETED | OUTPATIENT
Start: 2018-10-03 | End: 2018-10-03

## 2018-10-03 RX ADMIN — BENZTROPINE MESYLATE 1 MG: 1 TABLET ORAL at 02:39

## 2018-10-03 RX ADMIN — HALOPERIDOL 10 MG: 10 TABLET ORAL at 02:39

## 2018-10-03 NOTE — ED NOTES
Valproic Acid level added to labs ordered      Kevin Sterling, [facility]  10/02/18 7259
Yisel Casarez is here from [facility] for evaluation. She reports there are no beds on [facility].      Sandrita Reis RN  10/03/18 0000
was with his grandmother today  prior to calling 911/ pt med seeking requesting Xanax     Level of Care Disposition: To be determined by physician      Kevin Granados, [facility]  10/02/18 8769

## 2018-10-04 LAB — URINE CULTURE, ROUTINE: NORMAL

## 2018-10-09 ENCOUNTER — HOSPITAL ENCOUNTER (EMERGENCY)
Age: 28
Discharge: HOME | End: 2018-10-09
Payer: MEDICARE

## 2018-10-09 VITALS
OXYGEN SATURATION: 100 % | HEIGHT: 72 IN | RESPIRATION RATE: 16 BRPM | HEART RATE: 86 BPM | TEMPERATURE: 98 F | DIASTOLIC BLOOD PRESSURE: 90 MMHG | SYSTOLIC BLOOD PRESSURE: 142 MMHG | WEIGHT: 200 LBS | BODY MASS INDEX: 27.09 KG/M2

## 2018-10-09 DIAGNOSIS — F19.10 DRUG ABUSE (HCC): Primary | ICD-10-CM

## 2018-10-09 LAB
ACETAMINOPHEN LEVEL: <5 UG/ML (ref 10–30)
ALBUMIN SERPL-MCNC: 5 G/DL (ref 3.9–4.9)
ALP BLD-CCNC: 92 U/L (ref 35–104)
ALT SERPL-CCNC: 27 U/L (ref 0–41)
AMPHETAMINE SCREEN, URINE: ABNORMAL
ANION GAP SERPL CALCULATED.3IONS-SCNC: 15 MEQ/L (ref 7–13)
AST SERPL-CCNC: 25 U/L (ref 0–40)
BARBITURATE SCREEN URINE: ABNORMAL
BASOPHILS ABSOLUTE: 0.1 K/UL (ref 0–0.2)
BASOPHILS RELATIVE PERCENT: 0.8 %
BENZODIAZEPINE SCREEN, URINE: ABNORMAL
BILIRUB SERPL-MCNC: 0.6 MG/DL (ref 0–1.2)
BILIRUBIN URINE: NEGATIVE
BLOOD, URINE: NEGATIVE
BUN BLDV-MCNC: 20 MG/DL (ref 6–20)
CALCIUM SERPL-MCNC: 9.3 MG/DL (ref 8.6–10.2)
CANNABINOID SCREEN URINE: POSITIVE
CHLORIDE BLD-SCNC: 100 MEQ/L (ref 98–107)
CK MB: 2.2 NG/ML (ref 0–6.7)
CLARITY: CLEAR
CO2: 27 MEQ/L (ref 22–29)
COCAINE METABOLITE SCREEN URINE: ABNORMAL
COLOR: YELLOW
CREAT SERPL-MCNC: 1 MG/DL (ref 0.7–1.2)
CREATINE KINASE-MB INDEX: 1.1 % (ref 0–3.5)
EKG ATRIAL RATE: 113 BPM
EKG ATRIAL RATE: 90 BPM
EKG P AXIS: 106 DEGREES
EKG P AXIS: 67 DEGREES
EKG P-R INTERVAL: 146 MS
EKG P-R INTERVAL: 148 MS
EKG Q-T INTERVAL: 346 MS
EKG Q-T INTERVAL: 380 MS
EKG QRS DURATION: 94 MS
EKG QRS DURATION: 98 MS
EKG QTC CALCULATION (BAZETT): 464 MS
EKG QTC CALCULATION (BAZETT): 474 MS
EKG R AXIS: 83 DEGREES
EKG R AXIS: 96 DEGREES
EKG T AXIS: 131 DEGREES
EKG T AXIS: 36 DEGREES
EKG VENTRICULAR RATE: 113 BPM
EKG VENTRICULAR RATE: 90 BPM
EOSINOPHILS ABSOLUTE: 0.1 K/UL (ref 0–0.7)
EOSINOPHILS RELATIVE PERCENT: 1.2 %
ETHANOL PERCENT: NORMAL G/DL
ETHANOL: <10 MG/DL (ref 0–0.08)
GFR AFRICAN AMERICAN: >60
GFR NON-AFRICAN AMERICAN: >60
GLOBULIN: 2.5 G/DL (ref 2.3–3.5)
GLUCOSE BLD-MCNC: 102 MG/DL (ref 74–109)
GLUCOSE URINE: NEGATIVE MG/DL
HCT VFR BLD CALC: 45.6 % (ref 42–52)
HEMOGLOBIN: 16.1 G/DL (ref 14–18)
KETONES, URINE: NEGATIVE MG/DL
LEUKOCYTE ESTERASE, URINE: NEGATIVE
LYMPHOCYTES ABSOLUTE: 1.6 K/UL (ref 1–4.8)
LYMPHOCYTES RELATIVE PERCENT: 18.7 %
Lab: ABNORMAL
MCH RBC QN AUTO: 32.7 PG (ref 27–31.3)
MCHC RBC AUTO-ENTMCNC: 35.2 % (ref 33–37)
MCV RBC AUTO: 92.9 FL (ref 80–100)
MONOCYTES ABSOLUTE: 0.7 K/UL (ref 0.2–0.8)
MONOCYTES RELATIVE PERCENT: 7.9 %
NEUTROPHILS ABSOLUTE: 6.1 K/UL (ref 1.4–6.5)
NEUTROPHILS RELATIVE PERCENT: 71.4 %
NITRITE, URINE: NEGATIVE
OPIATE SCREEN URINE: ABNORMAL
PDW BLD-RTO: 13.3 % (ref 11.5–14.5)
PH UA: 7 (ref 5–9)
PHENCYCLIDINE SCREEN URINE: ABNORMAL
PLATELET # BLD: 239 K/UL (ref 130–400)
POTASSIUM SERPL-SCNC: 3.9 MEQ/L (ref 3.5–5.1)
PROTEIN UA: NEGATIVE MG/DL
RBC # BLD: 4.91 M/UL (ref 4.7–6.1)
SALICYLATE, SERUM: <0.3 MG/DL (ref 15–30)
SODIUM BLD-SCNC: 142 MEQ/L (ref 132–144)
SPECIFIC GRAVITY UA: 1.02 (ref 1–1.03)
TOTAL CK: 200 U/L (ref 0–190)
TOTAL PROTEIN: 7.5 G/DL (ref 6.4–8.1)
TSH SERPL DL<=0.05 MIU/L-ACNC: 1.27 UIU/ML (ref 0.27–4.2)
UROBILINOGEN, URINE: 0.2 E.U./DL
VALPROIC ACID LEVEL: 7.9 UG/ML (ref 50–100)
WBC # BLD: 8.6 K/UL (ref 4.8–10.8)

## 2018-10-09 PROCEDURE — 93005 ELECTROCARDIOGRAM TRACING: CPT

## 2018-10-09 PROCEDURE — 84443 ASSAY THYROID STIM HORMONE: CPT

## 2018-10-09 PROCEDURE — 82553 CREATINE MB FRACTION: CPT

## 2018-10-09 PROCEDURE — 80164 ASSAY DIPROPYLACETIC ACD TOT: CPT

## 2018-10-09 PROCEDURE — 81003 URINALYSIS AUTO W/O SCOPE: CPT

## 2018-10-09 PROCEDURE — 36415 COLL VENOUS BLD VENIPUNCTURE: CPT

## 2018-10-09 PROCEDURE — G0480 DRUG TEST DEF 1-7 CLASSES: HCPCS

## 2018-10-09 PROCEDURE — 93010 ELECTROCARDIOGRAM REPORT: CPT | Performed by: INTERNAL MEDICINE

## 2018-10-09 PROCEDURE — 99284 EMERGENCY DEPT VISIT MOD MDM: CPT

## 2018-10-09 PROCEDURE — 2580000003 HC RX 258: Performed by: NURSE PRACTITIONER

## 2018-10-09 PROCEDURE — 85025 COMPLETE CBC W/AUTO DIFF WBC: CPT

## 2018-10-09 PROCEDURE — 80053 COMPREHEN METABOLIC PANEL: CPT

## 2018-10-09 PROCEDURE — 82550 ASSAY OF CK (CPK): CPT

## 2018-10-09 PROCEDURE — 80307 DRUG TEST PRSMV CHEM ANLYZR: CPT

## 2018-10-09 RX ORDER — 0.9 % SODIUM CHLORIDE 0.9 %
2000 INTRAVENOUS SOLUTION INTRAVENOUS ONCE
Status: COMPLETED | OUTPATIENT
Start: 2018-10-09 | End: 2018-10-09

## 2018-10-09 RX ADMIN — SODIUM CHLORIDE 2000 ML: 9 INJECTION, SOLUTION INTRAVENOUS at 08:08

## 2018-10-09 ASSESSMENT — ENCOUNTER SYMPTOMS
BACK PAIN: 0
EYE PAIN: 0
VOMITING: 0
RHINORRHEA: 0
ABDOMINAL PAIN: 0
DIARRHEA: 0
SORE THROAT: 0
COUGH: 0
NAUSEA: 0
SHORTNESS OF BREATH: 0
PHOTOPHOBIA: 0

## 2018-10-09 NOTE — ED NOTES
Bed: 11  Expected date:   Expected time:   Means of arrival:   Comments:     Bridger Lemus RN  10/09/18 4896

## 2018-10-09 NOTE — ED NOTES
Telemetry applied. Pt very diaphoretic, agitated, talking to self.       Bridget Tapia RN  10/09/18 5744

## 2018-10-09 NOTE — ED NOTES
PT agitated, wanting to go home. Explained to pt again that he has to be monitored to noon, per poison control. Pt verbalized understanding.      Norm Done, RN  10/09/18 1015

## 2018-10-09 NOTE — ED PROVIDER NOTES
[facility] ED  eMERGENCY dEPARTMENT eNCOUnter      Pt Name: Ana Laura Scott  MRN: [id number]  [place] 1990  Date of evaluation: 10/9/2018  Provider: Arlen Scheuermann, APRN - [place] [address]       Chief Complaint   Patient presents with    Ingestion     Patient took 3 boxes of Coricidin to get high         HISTORY OF PRESENT ILLNESS   (Location/Symptom, Timing/Onset, Context/Setting, Quality, Duration, Modifying Factors, Severity)  Note limiting factors. Ana Laura Scott is a 29 y.o. male who presents to the emergency department for evaluation. Patient was brought by his grandmother after taking 3 boxes of Coricidin to get high. The grandmother was concerned because he typically takes 1-1/2 boxes that had taken 2. While in route to the hospital he found another box of Coricidin in the vehicle with her and took another box prior to arrival.  He denies this to be a suicide attempt. He does not feel depressed. He states he was trying to get high and he feels that way right now. He is denying any complaints. Location/Symptom - Coricidin overdose  Timing/Onset - unknown exact onset but within the last few hours  Context/Setting - attempting to get high  Quality - N/A  Duration - hours  Modifying Factors - none  Severity - moderate    Nursing Notes were reviewed. REVIEW OF SYSTEMS    (2-9 systems for level 4, 10 or more for level 5)     Review of Systems   Constitutional: Negative for chills, diaphoresis, fatigue and fever. HENT: Negative for congestion, rhinorrhea and sore throat. Eyes: Negative for photophobia and pain. Respiratory: Negative for cough and shortness of breath. Cardiovascular: Negative for chest pain and palpitations. Gastrointestinal: Negative for abdominal pain, diarrhea, nausea and vomiting. Genitourinary: Negative for dysuria and flank pain. Musculoskeletal: Negative for back pain. Skin: Negative for rash.    Neurological: Negative for dizziness, light-headedness and headaches. Except as noted above the remainder of the review of systems was reviewed and negative.        PAST MEDICAL HISTORY     Past Medical History:   Diagnosis Date    Anxiety 1/4/2012    Bipolar [facility] disorder [facility] 11/25/2015    Chronic back pain 4/20/2017    Depression     Drug abuse and dependence ([facility] 1/4/2012    Fracture of left side of mandibular body ([facility] 06/17/2018    ACUTE LEFT CONDYLAR NECK FRACTURE    Fracture of navicular bone of foot, closed 09/03/2012    lt    Fracture of temporal bone ([facility] 2015    History of pulmonary embolism     Schizophrenia ([facility]     Seizures ([facility]     Talus fracture 9/2012    lt    [facility]    Wound of left leg 9/14/2012     Past Surgical History:   Procedure Laterality Date    WRIST SURGERY  2009    pin in wrist left     Social History     Social History    Marital status: Single     Spouse name: N/A    Number of children: 0    Years of education: 15     Social History Main Topics    Smoking status: Current Some Day Smoker     Packs/day: 0.50     Years: 1.00     Last attempt to quit: 11/28/2011    Smokeless tobacco: Current User     Types: Chew    Alcohol use No      Comment: pt denies    Drug use: Yes     Frequency: 3.0 times per week     Types: Marijuana      Comment: used today     Sexual activity: Not Currently     Partners: Female     Other Topics Concern    Not on file     Social History Narrative    Lives w family           SCREENINGS    [place] Coma Scale  Eye Opening: Spontaneous  Best Verbal Response: Oriented  Best Motor Response: Obeys commands  [place] Coma Scale Score: 15        PHYSICAL EXAM    (up to 7 for level 4, 8 or more for level 5)     ED Triage Vitals [10/09/18 0445]   BP Temp Temp Source Pulse Resp SpO2 Height Weight   (!) 148/93 98.6 °F (37 °C) Oral 102 22 100 % 6' (1.829 m) 200 lb (90.7 kg)       Physical Exam   Constitutional: He is oriented to person, place, and time. He appears well-developed and well-nourished. He is active. No distress. HENT:   Head: Normocephalic and atraumatic. Mouth/Throat: Mucous membranes are normal.   Neck: Normal range of motion. Neck supple. Cardiovascular: Regular rhythm, normal heart sounds, intact distal pulses and normal pulses. Tachycardia present. Pulmonary/Chest: Effort normal and breath sounds normal.   Abdominal: Soft. Normal appearance and bowel sounds are normal. There is no tenderness. Neurological: He is alert and oriented to person, place, and time. He has normal strength. Skin: Skin is warm and intact. No rash noted. He is diaphoretic. Nursing note and vitals reviewed. DIAGNOSTIC RESULTS     EKG: All EKG's are interpreted by the Emergency Department Physician who either signs or Co-signs this chart in the absence of a cardiologist.    Sinus tach at 113. No acute ST segment changes  Repeat  EKG demonstrates normal sinus rhythm at 90 bpm.  No acute ST segment elevation. QTC is 464. RADIOLOGY:   Non-plain film images such as CT, Ultrasound and MRI are read by the radiologist. Plain radiographic images are visualized and preliminarily interpreted by the emergency physician with the below findings:        Interpretation per the Radiologist below, if available at the time of this note:    No orders to display         ED BEDSIDE ULTRASOUND:   Performed by ED Physician - none    LABS:  Labs Reviewed   CBC WITH AUTO DIFFERENTIAL - Abnormal; Notable for the following:        Result Value    MCH 32.7 (*)     All other components within normal limits   CK - Abnormal; Notable for the following: Total  (*)     All other components within normal limits   COMPREHENSIVE METABOLIC PANEL - Abnormal; Notable for the following:      Anion Gap 15 (*)     Alb 5.0 (*)     All other components within normal limits   URINE DRUG SCREEN - Abnormal; Notable for the following:     Cannabinoid Scrn, Ur POSITIVE

## 2018-10-11 PROCEDURE — 93010 ELECTROCARDIOGRAM REPORT: CPT | Performed by: INTERNAL MEDICINE

## 2018-10-18 ENCOUNTER — HOSPITAL ENCOUNTER (EMERGENCY)
Age: 28
Discharge: HOME | End: 2018-10-19
Payer: MEDICARE

## 2018-10-18 DIAGNOSIS — F19.10 SUBSTANCE ABUSE (HCC): Primary | ICD-10-CM

## 2018-10-18 DIAGNOSIS — R74.8 ELEVATED CK: ICD-10-CM

## 2018-10-18 LAB
ACETAMINOPHEN LEVEL: <5 UG/ML (ref 10–30)
ALBUMIN SERPL-MCNC: 4.7 G/DL (ref 3.9–4.9)
ALP BLD-CCNC: 94 U/L (ref 35–104)
ALT SERPL-CCNC: 45 U/L (ref 0–41)
ANION GAP SERPL CALCULATED.3IONS-SCNC: 12 MEQ/L (ref 7–13)
AST SERPL-CCNC: 59 U/L (ref 0–40)
BASOPHILS ABSOLUTE: 0.1 K/UL (ref 0–0.2)
BASOPHILS RELATIVE PERCENT: 0.7 %
BILIRUB SERPL-MCNC: 0.5 MG/DL (ref 0–1.2)
BUN BLDV-MCNC: 21 MG/DL (ref 6–20)
CALCIUM SERPL-MCNC: 9.5 MG/DL (ref 8.6–10.2)
CHLORIDE BLD-SCNC: 95 MEQ/L (ref 98–107)
CK MB: 11.7 NG/ML (ref 0–6.7)
CO2: 30 MEQ/L (ref 22–29)
CREAT SERPL-MCNC: 1 MG/DL (ref 0.7–1.2)
CREATINE KINASE-MB INDEX: 0.8 % (ref 0–3.5)
EOSINOPHILS ABSOLUTE: 0.2 K/UL (ref 0–0.7)
EOSINOPHILS RELATIVE PERCENT: 1.8 %
ETHANOL PERCENT: NORMAL G/DL
ETHANOL: <10 MG/DL (ref 0–0.08)
GFR AFRICAN AMERICAN: >60
GFR NON-AFRICAN AMERICAN: >60
GLOBULIN: 2.6 G/DL (ref 2.3–3.5)
GLUCOSE BLD-MCNC: 91 MG/DL (ref 74–109)
HCT VFR BLD CALC: 46.7 % (ref 42–52)
HEMOGLOBIN: 16.1 G/DL (ref 14–18)
LYMPHOCYTES ABSOLUTE: 1.9 K/UL (ref 1–4.8)
LYMPHOCYTES RELATIVE PERCENT: 22.3 %
MCH RBC QN AUTO: 32 PG (ref 27–31.3)
MCHC RBC AUTO-ENTMCNC: 34.5 % (ref 33–37)
MCV RBC AUTO: 92.7 FL (ref 80–100)
MONOCYTES ABSOLUTE: 0.6 K/UL (ref 0.2–0.8)
MONOCYTES RELATIVE PERCENT: 7 %
NEUTROPHILS ABSOLUTE: 5.9 K/UL (ref 1.4–6.5)
NEUTROPHILS RELATIVE PERCENT: 68.2 %
PDW BLD-RTO: 12.9 % (ref 11.5–14.5)
PLATELET # BLD: 245 K/UL (ref 130–400)
POTASSIUM SERPL-SCNC: 4 MEQ/L (ref 3.5–5.1)
RBC # BLD: 5.03 M/UL (ref 4.7–6.1)
SALICYLATE, SERUM: <0.3 MG/DL (ref 15–30)
SODIUM BLD-SCNC: 137 MEQ/L (ref 132–144)
TOTAL CK: 1399 U/L (ref 0–190)
TOTAL PROTEIN: 7.3 G/DL (ref 6.4–8.1)
TSH SERPL DL<=0.05 MIU/L-ACNC: 1.68 UIU/ML (ref 0.27–4.2)
VALPROIC ACID LEVEL: 16.2 UG/ML (ref 50–100)
WBC # BLD: 8.7 K/UL (ref 4.8–10.8)

## 2018-10-18 PROCEDURE — 80053 COMPREHEN METABOLIC PANEL: CPT

## 2018-10-18 PROCEDURE — 82553 CREATINE MB FRACTION: CPT

## 2018-10-18 PROCEDURE — 2580000003 HC RX 258: Performed by: PERSONAL EMERGENCY RESPONSE ATTENDANT

## 2018-10-18 PROCEDURE — 99285 EMERGENCY DEPT VISIT HI MDM: CPT

## 2018-10-18 PROCEDURE — 36415 COLL VENOUS BLD VENIPUNCTURE: CPT

## 2018-10-18 PROCEDURE — G0480 DRUG TEST DEF 1-7 CLASSES: HCPCS

## 2018-10-18 PROCEDURE — 84443 ASSAY THYROID STIM HORMONE: CPT

## 2018-10-18 PROCEDURE — 85025 COMPLETE CBC W/AUTO DIFF WBC: CPT

## 2018-10-18 PROCEDURE — 82550 ASSAY OF CK (CPK): CPT

## 2018-10-18 PROCEDURE — 80164 ASSAY DIPROPYLACETIC ACD TOT: CPT

## 2018-10-18 RX ORDER — 0.9 % SODIUM CHLORIDE 0.9 %
3000 INTRAVENOUS SOLUTION INTRAVENOUS ONCE
Status: COMPLETED | OUTPATIENT
Start: 2018-10-18 | End: 2018-10-19

## 2018-10-18 RX ADMIN — SODIUM CHLORIDE 3000 ML: 9 INJECTION, SOLUTION INTRAVENOUS at 23:13

## 2018-10-18 ASSESSMENT — ENCOUNTER SYMPTOMS
SORE THROAT: 0
VOMITING: 0
NAUSEA: 0
BLOOD IN STOOL: 0
COUGH: 0
RHINORRHEA: 0
ABDOMINAL PAIN: 0
COLOR CHANGE: 0
DIARRHEA: 0
SHORTNESS OF BREATH: 0

## 2018-10-18 ASSESSMENT — PATIENT HEALTH QUESTIONNAIRE - PHQ9: SUM OF ALL RESPONSES TO PHQ QUESTIONS 1-9: 9

## 2018-10-19 VITALS
DIASTOLIC BLOOD PRESSURE: 84 MMHG | TEMPERATURE: 98.3 F | HEIGHT: 72 IN | HEART RATE: 78 BPM | RESPIRATION RATE: 20 BRPM | WEIGHT: 185 LBS | SYSTOLIC BLOOD PRESSURE: 132 MMHG | OXYGEN SATURATION: 100 % | BODY MASS INDEX: 25.06 KG/M2

## 2018-10-19 NOTE — ED NOTES
Lab called for blood draw. Patient unable to urinate at this time. Patient given 240ml  Of water.      Jorge Oneal RN  10/18/18 5300

## 2018-10-19 NOTE — ED PROVIDER NOTES
components within normal limits   CKMB & RELATIVE PERCENT - Abnormal; Notable for the following:     CK-MB 11.7 (*)     All other components within normal limits   ETHANOL   TSH WITHOUT REFLEX   URINE DRUG SCREEN   URINE RT REFLEX TO CULTURE       All other labs were within normal range or not returned as of this dictation. EMERGENCY DEPARTMENT COURSE and DIFFERENTIAL DIAGNOSIS/MDM:   Vitals:    Vitals:    10/18/18 2133 10/18/18 2233 10/18/18 2305 10/19/18 0002   BP: (!) 146/98 132/68 (!) 148/66 132/84   Pulse: 99 74 68 71   Resp: 18 20 18 20   Temp: 97.6 °F (36.4 °C) 98 °F (36.7 °C)  98.1 °F (36.7 °C)   TempSrc:  Oral  Oral   SpO2: 99% 100% 100% 99%   Weight: 185 lb (83.9 kg)      Height: 6' (1.829 m)            MDM    Patients CK is 1,399. He was given 3L IVF. Lets get real came to speak to patient and he is to call them in the morning for treatment. He denies suicidal ideation or homicidal ideation. He states he is not supposed to be taking Depakote. Standard anticipatory guidance given to patient upon discharge. Have given them a specific time frame in which to follow-up and who to follow-up with. I have also advised them that they should return to the emergency department if they get worse, or not getting better or develop any new or concerning symptoms. Patient demonstrates understanding. CRITICAL CARE TIME   Total Critical Caretime was 0 minutes, excluding separately reportable procedures. There was a high probability of clinically significant/life threatening deterioration in the patient's condition which required my urgent intervention. Procedures    FINAL IMPRESSION      1.  Substance abuse ([address]    2. Elevated CK          DISPOSITION/PLAN   DISPOSITION        PATIENT REFERRED TO:  [facility] ALCOHOL AND DRUG [facility]BUSE  [facility] [id number] [phone]          DISCHARGE MEDICATIONS:  New Prescriptions    No medications on file          (Please notethat portions of

## 2018-10-26 ENCOUNTER — HOSPITAL ENCOUNTER (EMERGENCY)
Age: 28
Discharge: HOME | End: 2018-10-26
Payer: MEDICARE

## 2018-10-26 ENCOUNTER — HOSPITAL ENCOUNTER (EMERGENCY)
Age: 28
Discharge: OTHER INSTITUTION | End: 2018-10-26
Attending: EMERGENCY MEDICINE
Payer: MEDICARE

## 2018-10-26 VITALS
WEIGHT: 180 LBS | OXYGEN SATURATION: 99 % | BODY MASS INDEX: 24.38 KG/M2 | HEART RATE: 106 BPM | HEIGHT: 72 IN | RESPIRATION RATE: 18 BRPM | DIASTOLIC BLOOD PRESSURE: 53 MMHG | SYSTOLIC BLOOD PRESSURE: 99 MMHG | TEMPERATURE: 98.4 F

## 2018-10-26 VITALS
OXYGEN SATURATION: 99 % | WEIGHT: 185 LBS | RESPIRATION RATE: 20 BRPM | SYSTOLIC BLOOD PRESSURE: 177 MMHG | DIASTOLIC BLOOD PRESSURE: 95 MMHG | HEART RATE: 120 BPM | BODY MASS INDEX: 25.09 KG/M2

## 2018-10-26 DIAGNOSIS — F32.A DEPRESSION WITH SUICIDAL IDEATION: Primary | ICD-10-CM

## 2018-10-26 DIAGNOSIS — R45.851 DEPRESSION WITH SUICIDAL IDEATION: Primary | ICD-10-CM

## 2018-10-26 DIAGNOSIS — F25.9 SCHIZOAFFECTIVE DISORDER, UNSPECIFIED TYPE (HCC): Primary | ICD-10-CM

## 2018-10-26 LAB
ACETAMINOPHEN LEVEL: <15 UG/ML (ref 10–30)
AMPHETAMINE SCREEN, URINE: ABNORMAL
ANION GAP SERPL CALCULATED.3IONS-SCNC: 18 MEQ/L (ref 7–13)
BARBITURATE SCREEN URINE: ABNORMAL
BASOPHILS ABSOLUTE: 0 K/UL (ref 0–0.2)
BASOPHILS RELATIVE PERCENT: 0.2 %
BENZODIAZEPINE SCREEN, URINE: ABNORMAL
BILIRUBIN URINE: NORMAL
BLOOD, URINE: NEGATIVE
BUN BLDV-MCNC: 21 MG/DL (ref 6–20)
CALCIUM SERPL-MCNC: 9.6 MG/DL (ref 8.6–10.2)
CANNABINOID SCREEN URINE: POSITIVE
CHLORIDE BLD-SCNC: 97 MEQ/L (ref 98–107)
CLARITY: CLEAR
CO2: 22 MEQ/L (ref 22–29)
COCAINE METABOLITE SCREEN URINE: ABNORMAL
COLOR: YELLOW
CREAT SERPL-MCNC: 0.94 MG/DL (ref 0.7–1.2)
EKG ATRIAL RATE: 103 BPM
EKG P AXIS: 104 DEGREES
EKG P-R INTERVAL: 136 MS
EKG Q-T INTERVAL: 358 MS
EKG QRS DURATION: 92 MS
EKG QTC CALCULATION (BAZETT): 468 MS
EKG R AXIS: 97 DEGREES
EKG T AXIS: 139 DEGREES
EKG VENTRICULAR RATE: 103 BPM
EOSINOPHILS ABSOLUTE: 0.2 K/UL (ref 0–0.7)
EOSINOPHILS RELATIVE PERCENT: 1.1 %
ETHANOL PERCENT: NORMAL G/DL
ETHANOL: <10 MG/DL (ref 0–0.08)
GFR AFRICAN AMERICAN: >60
GFR NON-AFRICAN AMERICAN: >60
GLUCOSE BLD-MCNC: 142 MG/DL (ref 74–109)
GLUCOSE URINE: NEGATIVE MG/DL
HCT VFR BLD CALC: 44.3 % (ref 42–52)
HEMOGLOBIN: 15.4 G/DL (ref 14–18)
KETONES, URINE: NEGATIVE MG/DL
LEUKOCYTE ESTERASE, URINE: NEGATIVE
LYMPHOCYTES ABSOLUTE: 1.5 K/UL (ref 1–4.8)
LYMPHOCYTES RELATIVE PERCENT: 11.3 %
Lab: ABNORMAL
MAGNESIUM: 2.2 MG/DL (ref 1.7–2.3)
MCH RBC QN AUTO: 32.4 PG (ref 27–31.3)
MCHC RBC AUTO-ENTMCNC: 34.8 % (ref 33–37)
MCV RBC AUTO: 93.1 FL (ref 80–100)
MONOCYTES ABSOLUTE: 0.7 K/UL (ref 0.2–0.8)
MONOCYTES RELATIVE PERCENT: 4.8 %
NEUTROPHILS ABSOLUTE: 11.3 K/UL (ref 1.4–6.5)
NEUTROPHILS RELATIVE PERCENT: 82.6 %
NITRITE, URINE: NEGATIVE
OPIATE SCREEN URINE: ABNORMAL
PDW BLD-RTO: 12.9 % (ref 11.5–14.5)
PH UA: 7.5 (ref 5–9)
PHENCYCLIDINE SCREEN URINE: ABNORMAL
PLATELET # BLD: 170 K/UL (ref 130–400)
POTASSIUM SERPL-SCNC: 3.6 MEQ/L (ref 3.5–5.1)
PROTEIN UA: NEGATIVE MG/DL
RBC # BLD: 4.75 M/UL (ref 4.7–6.1)
SALICYLATE, SERUM: <0.3 MG/DL (ref 15–30)
SODIUM BLD-SCNC: 137 MEQ/L (ref 132–144)
SPECIFIC GRAVITY UA: 1.02 (ref 1–1.03)
TRICYCLIC, URINE: ABNORMAL
TSH SERPL DL<=0.05 MIU/L-ACNC: 0.18 UIU/ML (ref 0.27–4.2)
URINE REFLEX TO CULTURE: NORMAL
UROBILINOGEN, URINE: 1 E.U./DL
VALPROIC ACID LEVEL: <2.8 UG/ML (ref 50–100)
WBC # BLD: 13.6 K/UL (ref 4.8–10.8)

## 2018-10-26 PROCEDURE — 99284 EMERGENCY DEPT VISIT MOD MDM: CPT

## 2018-10-26 PROCEDURE — 80306 DRUG TEST PRSMV INSTRMNT: CPT

## 2018-10-26 PROCEDURE — 36415 COLL VENOUS BLD VENIPUNCTURE: CPT

## 2018-10-26 PROCEDURE — 80048 BASIC METABOLIC PNL TOTAL CA: CPT

## 2018-10-26 PROCEDURE — 81003 URINALYSIS AUTO W/O SCOPE: CPT

## 2018-10-26 PROCEDURE — 85025 COMPLETE CBC W/AUTO DIFF WBC: CPT

## 2018-10-26 PROCEDURE — 99283 EMERGENCY DEPT VISIT LOW MDM: CPT

## 2018-10-26 PROCEDURE — 93005 ELECTROCARDIOGRAM TRACING: CPT

## 2018-10-26 PROCEDURE — G0480 DRUG TEST DEF 1-7 CLASSES: HCPCS

## 2018-10-26 PROCEDURE — 80164 ASSAY DIPROPYLACETIC ACD TOT: CPT

## 2018-10-26 PROCEDURE — 83735 ASSAY OF MAGNESIUM: CPT

## 2018-10-26 PROCEDURE — 96372 THER/PROPH/DIAG INJ SC/IM: CPT

## 2018-10-26 PROCEDURE — 84443 ASSAY THYROID STIM HORMONE: CPT

## 2018-10-26 PROCEDURE — 6360000002 HC RX W HCPCS

## 2018-10-26 RX ORDER — HALOPERIDOL 5 MG/ML
5 INJECTION INTRAMUSCULAR ONCE
Status: COMPLETED | OUTPATIENT
Start: 2018-10-26 | End: 2018-10-26

## 2018-10-26 RX ORDER — HALOPERIDOL 5 MG/ML
INJECTION INTRAMUSCULAR
Status: COMPLETED
Start: 2018-10-26 | End: 2018-10-26

## 2018-10-26 RX ORDER — LORAZEPAM 2 MG/ML
2 INJECTION INTRAMUSCULAR ONCE
Status: COMPLETED | OUTPATIENT
Start: 2018-10-26 | End: 2018-10-26

## 2018-10-26 RX ORDER — LORAZEPAM 2 MG/ML
INJECTION INTRAMUSCULAR
Status: COMPLETED
Start: 2018-10-26 | End: 2018-10-26

## 2018-10-26 RX ADMIN — HALOPERIDOL LACTATE 5 MG: 5 INJECTION, SOLUTION INTRAMUSCULAR at 17:42

## 2018-10-26 RX ADMIN — LORAZEPAM 2 MG: 2 INJECTION INTRAMUSCULAR; INTRAVENOUS at 17:41

## 2018-10-26 RX ADMIN — LORAZEPAM 2 MG: 2 INJECTION INTRAMUSCULAR at 17:41

## 2018-10-26 RX ADMIN — HALOPERIDOL 5 MG: 5 INJECTION INTRAMUSCULAR at 17:42

## 2018-10-26 ASSESSMENT — ENCOUNTER SYMPTOMS
EYE PAIN: 0
SHORTNESS OF BREATH: 0
NAUSEA: 0
RHINORRHEA: 0
COLOR CHANGE: 0
VOMITING: 0
APNEA: 0
CONSTIPATION: 0
PHOTOPHOBIA: 0
ABDOMINAL PAIN: 0
SORE THROAT: 0
WHEEZING: 0
SINUS PRESSURE: 0
DIARRHEA: 0
ABDOMINAL DISTENTION: 0
COUGH: 0
BACK PAIN: 0

## 2018-10-26 NOTE — ED NOTES
Pt arrived via [facility] [facility] on a gurney from [facility] ED pt transferred to [facility] with  Assistance X3 respirations unlabored  pt remained asleep during transfer, v/s taken . Kevin Monique, [facility]  10/26/18 0802

## 2018-10-26 NOTE — ED TRIAGE NOTES
Pt transported from [facility] ED for psych eval Pt was medicated with Haldol and Ativan prior to transfer. Pt arrived sleeping and transferred into bed still asleep.  Behavior calm

## 2018-10-26 NOTE — ED NOTES
Pt complains of anxiety and unable to settle down. He rapidly goes from tearful to uncooperative. He refuses to change into a gown. [employer] called to assist with searching the patient for weapons.      Tianna Lowery RN  10/26/18 9087

## 2018-10-26 NOTE — ED NOTES
PT cooperative and agrressive intermittently. A PD at bedside. Pt allowed medication, Lab draw and EKG after significant de-escalation by PD.      Sim Alvarado RN  10/26/18 6169

## 2018-10-26 NOTE — ED PROVIDER NOTES
concentration, dysphoric mood and suicidal ideas. Negative for confusion and hallucinations. The patient is nervous/anxious. All other systems reviewed and are negative. Except as noted above the remainder of the review of systems was reviewed and negative.        PAST MEDICAL HISTORY     Past Medical History:   Diagnosis Date    Anxiety 1/4/2012    Bipolar affective disorder ([facility] 11/25/2015    Chronic back pain 4/20/2017    Depression     Drug abuse and dependence ([facility] 1/4/2012    Fracture of left side of mandibular body ([facility] 06/17/2018    ACUTE LEFT CONDYLAR NECK FRACTURE    Fracture of navicular bone of foot, closed 09/03/2012    lt    Fracture of temporal bone ([facility] 2015    History of pulmonary embolism     Schizophrenia ([facility]     Seizures ([facility]     Talus fracture 9/2012    lt    TBI (traumatic brain injury) ([facility] 2015    Wound of left leg 9/14/2012         SURGICAL HISTORY       Past Surgical History:   Procedure Laterality Date    WRIST SURGERY  2009    pin in wrist left         CURRENT MEDICATIONS       Discharge Medication List as of 10/26/2018  6:49 PM      CONTINUE these medications which have NOT CHANGED    Details   benztropine (COGENTIN) 1 MG tablet Take 1 mg by mouth 2 times dailyHistorical Med      cetirizine (ZYRTEC) 10 MG tablet Take 10 mg by mouth nightlyHistorical Med      haloperidol (HALDOL) 10 MG tablet Take 10 mg by mouth 2 times dailyHistorical Med      divalproex (DEPAKOTE ER) 250 MG extended release tablet Take 250 mg by mouth 2 times dailyHistorical Med             ALLERGIES     Shellfish-derived products and Shrimp flavor    FAMILY HISTORY       Family History   Problem Relation Age of Onset    Family history unknown: Yes          SOCIAL HISTORY       Social History     Social History    Marital status: Single     Spouse name: N/A    Number of children: 0    Years of education: 13     Social History Main Topics    Smoking status: Current Some Day Smoker tone. Coordination normal.   Skin: Skin is warm and dry. No rash noted. He is not diaphoretic. No erythema. No pallor. Psychiatric: His behavior is normal. Judgment and thought content normal.   Very tearful and anxious. Nursing note and vitals reviewed. DIAGNOSTIC RESULTS     EKG: All EKG's are interpreted by the Emergency Department Physician who either signs or Co-signs this chart in the absence of a cardiologist.    Twelve-lead EKG shows sinus tachycardia, rate 10 3 bpm, right colón axis, normal intervals, no acute ST-T wave changes.     RADIOLOGY:   Non-plain film images such as CT, Ultrasound and MRI are read by the radiologist. [place] radiographicimages are visualized and preliminarily interpreted by the emergency physician with the below findings:        Interpretation per the Radiologist below, if available at the time of this note:    No orders to display         ED BEDSIDE ULTRASOUND:   Performed by ED Physician - none    LABS:  Labs Reviewed   BASIC METABOLIC PANEL - Abnormal; Notable for the following:        Result Value    Chloride 97 (*)     Anion Gap 18 (*)     Glucose 142 (*)     BUN 21 (*)     All other components within normal limits   CBC WITH AUTO DIFFERENTIAL - Abnormal; Notable for the following:     WBC 13.6 (*)     MCH 32.4 (*)     Neutrophils # 11.3 (*)     All other components within normal limits   URINE DRUG SCREEN, COMPREHENSIVE - Abnormal; Notable for the following:     Cannabinoid Scrn, Ur POSITIVE (*)     All other components within normal limits   TSH WITHOUT REFLEX - Abnormal; Notable for the following:     TSH 0.183 (*)     All other components within normal limits   SALICYLATE LEVEL - Abnormal; Notable for the following:     Salicylate, Serum <8.9 (*)     All other components within normal limits   VALPROIC ACID LEVEL, TOTAL - Abnormal; Notable for the following:     Valproic Acid Lvl <2.8 (*)     All other components within normal limits   MAGNESIUM   URINE RT REFLEX TO

## 2018-10-27 NOTE — ED PROVIDER NOTES
Patient is medically cleared for behavioral health evaluation and treatment     Patient evaluated by behavioral health and discussion with psychiatrist, discussion with grandmother reveals that the stomal is not homicidal or suicidal.  Patient still denying homicidal or suicidal ideation. Grandmother states that she will check on him later on tonight. He will return for homicidal or suicidal ideation.   Condition is stable     Esteban Alaniz MD  10/26/18 7028       Esteban Alaniz MD  10/26/18 3709       Esteban Alaniz MD  10/26/18 Winnie Ro MD  11/02/18 9018

## 2018-10-27 NOTE — ED NOTES
Pt requesting Xanax RX . Writer informed pt that ED physician was not going to give pt a RX for xanax pt then stated if hes not going to give me any xanax then send me home Pt denies any SI/HI or a/v hallucinations at his time      Kevin HANNA  [facility], [address] [place] [address]  10/26/18 9598

## 2018-10-28 ENCOUNTER — HOSPITAL ENCOUNTER (EMERGENCY)
Age: 28
Discharge: HOME | End: 2018-10-28
Attending: EMERGENCY MEDICINE
Payer: MEDICARE

## 2018-10-28 VITALS
WEIGHT: 180 LBS | RESPIRATION RATE: 16 BRPM | HEIGHT: 72 IN | HEART RATE: 114 BPM | OXYGEN SATURATION: 98 % | TEMPERATURE: 98.8 F | BODY MASS INDEX: 24.38 KG/M2 | DIASTOLIC BLOOD PRESSURE: 110 MMHG | SYSTOLIC BLOOD PRESSURE: 165 MMHG

## 2018-10-28 DIAGNOSIS — F13.90 BENZODIAZEPINE MISUSE: ICD-10-CM

## 2018-10-28 DIAGNOSIS — F41.1 ANXIETY STATE: Primary | ICD-10-CM

## 2018-10-28 PROCEDURE — 6370000000 HC RX 637 (ALT 250 FOR IP): Performed by: EMERGENCY MEDICINE

## 2018-10-28 PROCEDURE — 99282 EMERGENCY DEPT VISIT SF MDM: CPT

## 2018-10-28 RX ORDER — HYDROXYZINE HYDROCHLORIDE 25 MG/1
25 TABLET, FILM COATED ORAL ONCE
Status: COMPLETED | OUTPATIENT
Start: 2018-10-28 | End: 2018-10-28

## 2018-10-28 RX ORDER — HYDROXYZINE HYDROCHLORIDE 25 MG/1
25 TABLET, FILM COATED ORAL EVERY 6 HOURS PRN
Qty: 20 TABLET | Refills: 0 | Status: SHIPPED | OUTPATIENT
Start: 2018-10-28 | End: 2018-10-30

## 2018-10-28 RX ADMIN — HYDROXYZINE HYDROCHLORIDE 25 MG: 25 TABLET, FILM COATED ORAL at 10:12

## 2018-10-28 ASSESSMENT — ENCOUNTER SYMPTOMS
COLOR CHANGE: 0
ABDOMINAL DISTENTION: 0
RHINORRHEA: 0
WHEEZING: 0
SHORTNESS OF BREATH: 0
ABDOMINAL PAIN: 0
VOMITING: 0
APNEA: 0
BACK PAIN: 0
DIARRHEA: 0
SINUS PRESSURE: 0
NAUSEA: 0
SORE THROAT: 0
EYE PAIN: 0
PHOTOPHOBIA: 0
COUGH: 0
CONSTIPATION: 0

## 2018-10-28 NOTE — ED PROVIDER NOTES
history unknown: Yes          SOCIAL HISTORY       Social History     Social History    Marital status: Single     Spouse name: N/A    Number of children: 0    Years of education: 13     Social History Main Topics    Smoking status: Current Some Day Smoker     Packs/day: 0.50     Years: 1.00     Last attempt to quit: 11/28/2011    Smokeless tobacco: Current User     Types: Chew    Alcohol use No      Comment: pt denies    Drug use: Yes     Frequency: 3.0 times per week     Types: Marijuana      Comment: used today,  10/28/18    Sexual activity: Not Currently     Partners: Female     Other Topics Concern    None     Social History Narrative    Lives w family           SCREENINGS             PHYSICAL EXAM    (up to 7 for level 4, 8 or more for level 5)     ED Triage Vitals [10/28/18 0948]   BP Temp Temp Source Pulse Resp SpO2 Height Weight   (!) 165/110 98.8 °F (37.1 °C) Oral 114 16 98 % 6' (1.829 m) 180 lb (81.6 kg)       Physical Exam   Constitutional: He is oriented to person, place, and time. He appears well-developed and well-nourished. No distress. HENT:   Head: Normocephalic and atraumatic. Nose: Nose normal.   Mouth/Throat: Oropharynx is clear and moist. No oropharyngeal exudate. Eyes: Pupils are equal, round, and reactive to light. Conjunctivae and EOM are normal. Right eye exhibits no discharge. Left eye exhibits no discharge. No scleral icterus. Neck: Normal range of motion. Neck supple. No JVD present. No tracheal deviation present. No thyromegaly present. Cardiovascular: Normal rate, regular rhythm, normal heart sounds and intact distal pulses. Exam reveals no gallop and no friction rub. No murmur heard. Pulmonary/Chest: Effort normal and breath sounds normal. No stridor. No respiratory distress. He has no wheezes. He has no rales. He exhibits no tenderness. Abdominal: Soft. Bowel sounds are normal. He exhibits no distension and no mass. There is no tenderness.  There is no

## 2018-10-28 NOTE — ED TRIAGE NOTES
Pt walked to [facility] [employer] police department. Pt told police that he needed help. Pt reporting to nurse that his friend  and\"I just can't deal with this\". Pt requesting xanax. Pt says he doesn't take any of his home meds because \"they do not help\"  Pt denies thoughts of hurting himself at this time. Denies thoughts of hurting others. Pt denies heroin use, reports he has used marijuana and cold medication today. Pt is impatient saying he will leave if he doesn't get xanax soon.

## 2018-10-30 ENCOUNTER — HOSPITAL ENCOUNTER (EMERGENCY)
Age: 28
Discharge: OTHER INSTITUTION | End: 2018-10-30
Attending: EMERGENCY MEDICINE
Payer: MEDICARE

## 2018-10-30 ENCOUNTER — HOSPITAL ENCOUNTER (INPATIENT)
Age: 28
LOS: 9 days | Discharge: HOME | DRG: 885 | End: 2018-11-08
Attending: EMERGENCY MEDICINE | Admitting: PSYCHIATRY & NEUROLOGY
Payer: MEDICARE

## 2018-10-30 VITALS
WEIGHT: 190 LBS | HEART RATE: 99 BPM | SYSTOLIC BLOOD PRESSURE: 129 MMHG | HEIGHT: 72 IN | DIASTOLIC BLOOD PRESSURE: 91 MMHG | TEMPERATURE: 97.4 F | RESPIRATION RATE: 20 BRPM | OXYGEN SATURATION: 100 % | BODY MASS INDEX: 25.73 KG/M2

## 2018-10-30 DIAGNOSIS — F31.81 BIPOLAR 2 DISORDER (HCC): Primary | ICD-10-CM

## 2018-10-30 DIAGNOSIS — R45.850 HOMICIDAL BEHAVIOR: ICD-10-CM

## 2018-10-30 DIAGNOSIS — R45.89 SUICIDAL BEHAVIOR WITHOUT ATTEMPTED SELF-INJURY: Primary | ICD-10-CM

## 2018-10-30 LAB
ACETAMINOPHEN LEVEL: <5 UG/ML (ref 10–30)
ALBUMIN SERPL-MCNC: 4.5 G/DL (ref 3.9–4.9)
ALP BLD-CCNC: 90 U/L (ref 35–104)
ALT SERPL-CCNC: 29 U/L (ref 0–41)
AMPHETAMINE SCREEN, URINE: ABNORMAL
ANION GAP SERPL CALCULATED.3IONS-SCNC: 13 MEQ/L (ref 7–13)
AST SERPL-CCNC: 28 U/L (ref 0–40)
BARBITURATE SCREEN URINE: ABNORMAL
BASOPHILS ABSOLUTE: 0.1 K/UL (ref 0–0.2)
BASOPHILS RELATIVE PERCENT: 0.6 %
BENZODIAZEPINE SCREEN, URINE: ABNORMAL
BILIRUB SERPL-MCNC: 0.3 MG/DL (ref 0–1.2)
BILIRUBIN URINE: NEGATIVE
BLOOD, URINE: NEGATIVE
BUN BLDV-MCNC: 12 MG/DL (ref 6–20)
CALCIUM SERPL-MCNC: 9.3 MG/DL (ref 8.6–10.2)
CANNABINOID SCREEN URINE: POSITIVE
CHLORIDE BLD-SCNC: 100 MEQ/L (ref 98–107)
CK MB: 2.7 NG/ML (ref 0–6.7)
CLARITY: CLEAR
CO2: 27 MEQ/L (ref 22–29)
COCAINE METABOLITE SCREEN URINE: ABNORMAL
COLOR: YELLOW
CREAT SERPL-MCNC: 0.68 MG/DL (ref 0.7–1.2)
CREATINE KINASE-MB INDEX: 1 % (ref 0–3.5)
EOSINOPHILS ABSOLUTE: 0.3 K/UL (ref 0–0.7)
EOSINOPHILS RELATIVE PERCENT: 2.6 %
ETHANOL PERCENT: NORMAL G/DL
ETHANOL: <10 MG/DL (ref 0–0.08)
GFR AFRICAN AMERICAN: >60
GFR NON-AFRICAN AMERICAN: >60
GLOBULIN: 2.5 G/DL (ref 2.3–3.5)
GLUCOSE BLD-MCNC: 94 MG/DL (ref 74–109)
GLUCOSE URINE: NEGATIVE MG/DL
HCT VFR BLD CALC: 45.1 % (ref 42–52)
HEMOGLOBIN: 15.7 G/DL (ref 14–18)
KETONES, URINE: NEGATIVE MG/DL
LEUKOCYTE ESTERASE, URINE: NEGATIVE
LYMPHOCYTES ABSOLUTE: 1.9 K/UL (ref 1–4.8)
LYMPHOCYTES RELATIVE PERCENT: 17.5 %
Lab: ABNORMAL
MCH RBC QN AUTO: 33 PG (ref 27–31.3)
MCHC RBC AUTO-ENTMCNC: 34.8 % (ref 33–37)
MCV RBC AUTO: 95.1 FL (ref 80–100)
MONOCYTES ABSOLUTE: 0.7 K/UL (ref 0.2–0.8)
MONOCYTES RELATIVE PERCENT: 6.6 %
NEUTROPHILS ABSOLUTE: 7.8 K/UL (ref 1.4–6.5)
NEUTROPHILS RELATIVE PERCENT: 72.7 %
NITRITE, URINE: NEGATIVE
OPIATE SCREEN URINE: ABNORMAL
PDW BLD-RTO: 13 % (ref 11.5–14.5)
PH UA: 7 (ref 5–9)
PHENCYCLIDINE SCREEN URINE: ABNORMAL
PLATELET # BLD: 210 K/UL (ref 130–400)
POTASSIUM SERPL-SCNC: 3.5 MEQ/L (ref 3.5–5.1)
PROTEIN UA: NEGATIVE MG/DL
RBC # BLD: 4.74 M/UL (ref 4.7–6.1)
SALICYLATE, SERUM: <0.3 MG/DL (ref 15–30)
SODIUM BLD-SCNC: 140 MEQ/L (ref 132–144)
SPECIFIC GRAVITY UA: 1 (ref 1–1.03)
TOTAL CK: 262 U/L (ref 0–190)
TOTAL PROTEIN: 7 G/DL (ref 6.4–8.1)
TSH SERPL DL<=0.05 MIU/L-ACNC: 1.52 UIU/ML (ref 0.27–4.2)
URINE REFLEX TO CULTURE: NORMAL
UROBILINOGEN, URINE: 0.2 E.U./DL
WBC # BLD: 10.7 K/UL (ref 4.8–10.8)

## 2018-10-30 PROCEDURE — 99285 EMERGENCY DEPT VISIT HI MDM: CPT

## 2018-10-30 PROCEDURE — G0480 DRUG TEST DEF 1-7 CLASSES: HCPCS

## 2018-10-30 PROCEDURE — 6370000000 HC RX 637 (ALT 250 FOR IP): Performed by: PSYCHIATRY & NEUROLOGY

## 2018-10-30 PROCEDURE — 6370000000 HC RX 637 (ALT 250 FOR IP): Performed by: EMERGENCY MEDICINE

## 2018-10-30 PROCEDURE — 80053 COMPREHEN METABOLIC PANEL: CPT

## 2018-10-30 PROCEDURE — 81003 URINALYSIS AUTO W/O SCOPE: CPT

## 2018-10-30 PROCEDURE — 36415 COLL VENOUS BLD VENIPUNCTURE: CPT

## 2018-10-30 PROCEDURE — 80307 DRUG TEST PRSMV CHEM ANLYZR: CPT

## 2018-10-30 PROCEDURE — 82550 ASSAY OF CK (CPK): CPT

## 2018-10-30 PROCEDURE — 82553 CREATINE MB FRACTION: CPT

## 2018-10-30 PROCEDURE — 85025 COMPLETE CBC W/AUTO DIFF WBC: CPT

## 2018-10-30 PROCEDURE — 1240000000 HC EMOTIONAL WELLNESS R&B

## 2018-10-30 PROCEDURE — 84443 ASSAY THYROID STIM HORMONE: CPT

## 2018-10-30 RX ORDER — HALOPERIDOL 5 MG
5 TABLET ORAL EVERY 4 HOURS PRN
Status: DISCONTINUED | OUTPATIENT
Start: 2018-10-30 | End: 2018-10-31

## 2018-10-30 RX ORDER — BENZTROPINE MESYLATE 2 MG/1
2 TABLET ORAL 2 TIMES DAILY PRN
Status: DISCONTINUED | OUTPATIENT
Start: 2018-10-30 | End: 2018-11-08 | Stop reason: HOSPADM

## 2018-10-30 RX ORDER — TRAZODONE HYDROCHLORIDE 50 MG/1
50 TABLET ORAL NIGHTLY PRN
Status: DISCONTINUED | OUTPATIENT
Start: 2018-10-30 | End: 2018-11-08 | Stop reason: HOSPADM

## 2018-10-30 RX ORDER — HALOPERIDOL 5 MG/ML
5 INJECTION INTRAMUSCULAR EVERY 4 HOURS PRN
Status: DISCONTINUED | OUTPATIENT
Start: 2018-10-30 | End: 2018-10-30 | Stop reason: SDUPTHER

## 2018-10-30 RX ORDER — HYDROXYZINE PAMOATE 50 MG/1
50 CAPSULE ORAL ONCE
Status: COMPLETED | OUTPATIENT
Start: 2018-10-30 | End: 2018-10-30

## 2018-10-30 RX ORDER — ACETAMINOPHEN 325 MG/1
650 TABLET ORAL EVERY 4 HOURS PRN
Status: DISCONTINUED | OUTPATIENT
Start: 2018-10-30 | End: 2018-11-08 | Stop reason: HOSPADM

## 2018-10-30 RX ORDER — HYDROXYZINE PAMOATE 50 MG/1
50 CAPSULE ORAL EVERY 6 HOURS PRN
Status: DISCONTINUED | OUTPATIENT
Start: 2018-10-30 | End: 2018-11-08 | Stop reason: HOSPADM

## 2018-10-30 RX ORDER — BENZTROPINE MESYLATE 1 MG/ML
2 INJECTION INTRAMUSCULAR; INTRAVENOUS 2 TIMES DAILY PRN
Status: DISCONTINUED | OUTPATIENT
Start: 2018-10-30 | End: 2018-10-30 | Stop reason: SDUPTHER

## 2018-10-30 RX ORDER — MAGNESIUM HYDROXIDE/ALUMINUM HYDROXICE/SIMETHICONE 120; 1200; 1200 MG/30ML; MG/30ML; MG/30ML
30 SUSPENSION ORAL PRN
Status: DISCONTINUED | OUTPATIENT
Start: 2018-10-30 | End: 2018-11-08 | Stop reason: HOSPADM

## 2018-10-30 RX ORDER — BENZTROPINE MESYLATE 1 MG/ML
2 INJECTION INTRAMUSCULAR; INTRAVENOUS 2 TIMES DAILY PRN
Status: DISCONTINUED | OUTPATIENT
Start: 2018-10-30 | End: 2018-11-08 | Stop reason: HOSPADM

## 2018-10-30 RX ORDER — ARIPIPRAZOLE 10 MG/1
10 TABLET ORAL DAILY
Status: ON HOLD | COMMUNITY
End: 2018-11-08 | Stop reason: HOSPADM

## 2018-10-30 RX ORDER — HALOPERIDOL 5 MG/ML
5 INJECTION INTRAMUSCULAR EVERY 4 HOURS PRN
Status: DISCONTINUED | OUTPATIENT
Start: 2018-10-30 | End: 2018-10-31

## 2018-10-30 RX ADMIN — HYDROXYZINE PAMOATE 50 MG: 50 CAPSULE ORAL at 19:08

## 2018-10-30 RX ADMIN — BENZTROPINE MESYLATE 2 MG: 2 TABLET ORAL at 21:18

## 2018-10-30 RX ADMIN — HALOPERIDOL 5 MG: 5 TABLET ORAL at 21:17

## 2018-10-30 RX ADMIN — TRAZODONE HYDROCHLORIDE 50 MG: 50 TABLET ORAL at 21:17

## 2018-10-30 ASSESSMENT — ENCOUNTER SYMPTOMS
PHOTOPHOBIA: 0
EYE PAIN: 0
CHEST TIGHTNESS: 0
SINUS PRESSURE: 0
VOMITING: 0
RHINORRHEA: 0
DIARRHEA: 0
CONSTIPATION: 0
NAUSEA: 0
SORE THROAT: 0
APNEA: 0
ABDOMINAL DISTENTION: 0
COUGH: 0
SHORTNESS OF BREATH: 0
COLOR CHANGE: 0
ABDOMINAL PAIN: 0
NAUSEA: 0
ABDOMINAL PAIN: 0
VOMITING: 0
BACK PAIN: 0
WHEEZING: 0
EYE PAIN: 0
SORE THROAT: 0
SHORTNESS OF BREATH: 0

## 2018-10-30 ASSESSMENT — PATIENT HEALTH QUESTIONNAIRE - PHQ9: SUM OF ALL RESPONSES TO PHQ QUESTIONS 1-9: 12

## 2018-10-30 NOTE — ED PROVIDER NOTES
urgentintervention. CONSULTS:  None    PROCEDURES:  Unless otherwise noted below, none     Procedures    FINAL IMPRESSION      1. Suicidal behavior without attempted self-injury    2. Homicidal behavior          DISPOSITION/PLAN   DISPOSITION Decision To Transfer 10/30/2018 01:21:01 PM      PATIENT REFERRED TO:  No follow-up provider specified.     DISCHARGE MEDICATIONS:  New Prescriptions    No medications on file          (Please note that portions of this note were completed with a voice recognitionprogram.  Efforts were made to edit the dictations but occasionally words are mis-transcribed.)    Darcy Donald MD (electronically signed)  Attending Emergency Physician          Darcy Donald MD  10/30/18 6140

## 2018-10-30 NOTE — ED PROVIDER NOTES
Chronic back pain 4/20/2017    Depression     Drug abuse and dependence ([facility] 1/4/2012    Fracture of left side of mandibular body ([facility] 06/17/2018    ACUTE LEFT CONDYLAR NECK FRACTURE    Fracture of navicular bone of foot, closed 09/03/2012    lt    Fracture of temporal bone ([facility] 2015    History of pulmonary embolism     Schizophrenia ([facility]     Seizures ([facility]     Talus fracture 9/2012    lt    [facility]    Wound of left leg 9/14/2012         SURGICALHISTORY       Past Surgical History:   Procedure Laterality Date    WRIST SURGERY  2009    pin in wrist left         CURRENT MEDICATIONS       Previous Medications    BENZTROPINE (COGENTIN) 1 MG TABLET    Take 1 mg by mouth 2 times daily    CETIRIZINE (ZYRTEC) 10 MG TABLET    Take 10 mg by mouth nightly    DIVALPROEX (DEPAKOTE ER) 250 MG EXTENDED RELEASE TABLET    Take 250 mg by mouth 2 times daily    HALOPERIDOL (HALDOL) 10 MG TABLET    Take 10 mg by mouth 2 times daily    HYDROXYZINE (ATARAX) 25 MG TABLET    Take 1 tablet by mouth every 6 hours as needed for Anxiety       ALLERGIES     Shellfish-derived products and Shrimp flavor    FAMILY HISTORY       Family History   Problem Relation Age of Onset    Family history unknown: Yes          SOCIAL HISTORY       Social History     Social History    Marital status: Single     Spouse name: N/A    Number of children: 0    Years of education: 13     Social History Main Topics    Smoking status: Current Some Day Smoker     Packs/day: 0.50     Years: 1.00     Last attempt to quit: 11/28/2011    Smokeless tobacco: Current User     Types: Chew    Alcohol use No      Comment: pt denies    Drug use: Yes     Frequency: 3.0 times per week     Types: Marijuana      Comment: used today,  10/28/18    Sexual activity: Not Currently     Partners: Female     Other Topics Concern    Not on file     Social History Narrative    Lives w family           SCREENINGS CREATININE 0.68 (*)     All other components within normal limits   CBC WITH AUTO DIFFERENTIAL - Abnormal; Notable for the following:     MCH 33.0 (*)     Neutrophils # 7.8 (*)     All other components within normal limits   ACETAMINOPHEN LEVEL - Abnormal; Notable for the following:     Acetaminophen Level <5 (*)     All other components within normal limits   SALICYLATE LEVEL - Abnormal; Notable for the following:     Salicylate, Serum <0.5 (*)     All other components within normal limits   CK - Abnormal; Notable for the following: Total  (*)     All other components within normal limits   ETHANOL   URINE RT REFLEX TO CULTURE   TSH WITHOUT REFLEX   URINE DRUG SCREEN   CKMB & RELATIVE PERCENT       All other labs were within normal range or not returned as of this dictation. EMERGENCY DEPARTMENT COURSE and DIFFERENTIAL DIAGNOSIS/MDM:   Vitals:    Vitals:    10/30/18 1519   BP: 135/83   Pulse: 60   Temp: 98.4 °F (36.9 °C)   TempSrc: Oral   SpO2: 98%       Medically cleared for [facility]    MDM      REASSESSMENT          CRITICAL CARE TIME   Total Critical Care time was 0 minutes, excluding separatelyreportable procedures. There was a high probability ofclinically significant/life threatening deterioration in the patient's condition which required my urgent intervention. CONSULTS:  None    PROCEDURES:  Unless otherwise noted below, none     Procedures    FINAL IMPRESSION      1. Bipolar 2 disorder ([facility]          DISPOSITION/PLAN   DISPOSITION  admit to Psychiatry      PATIENT REFERREDTO:  No follow-up provider specified.     DISCHARGEMEDICATIONS:  New Prescriptions    No medications on file          (Please note that portions of this note were completed with a voice recognition program.  Efforts were made to edit the dictations but occasionally words are mis-transcribed.)    Rodrigo Dsouza DO (electronically signed)  Attending Emergency Physician          Rodrigo Dsouza DO  10/31/18 1044

## 2018-10-30 NOTE — ED NOTES
Patient to transfer to ED [facility] behavior health ED>      Tom Williamson RN  10/30/18 5445

## 2018-10-30 NOTE — ED NOTES
Provisional Diagnosis ; Bipolar type 2    Psychosocial and Contextual Factors:     29 yr old W/M arrived via ambulance /ambulatory from [facility] ED  where pt took himself and c/o anxiety /agitation  Pt's grandmother called [facility] ED expressing concern that pt was treating harm to him self and others  Pt was observed to be \"agitated \" and uncooperative to requests and not responding to questions asked demanding Xanax . Pt was transfered to [facility] on arrival pt was  cooperative answering questions asked stated he was tired of this shit  That he does not get along with him grandmother he goes to the [facility] in [place]  for medication (Xanax) and they send him here . Pt arrived on an invalid Pink  called [facility] ED and informed ED Manager [place] and she stated that she was sending over a new \"Pink Sheet \" Pt has an appointment with the [facility] center scheduled for Nov 1 2018 @ 10:00 am           C-SSRS Summary:     Patient: C-SSRS Suicide Screening  1) Within the past month, have you wished you were dead or wished you could go to sleep and not wake up? : YES  2) Within the past month, have you actually had any thoughts of killing yourself? : YES  3) Within the past month, have you been thinking about how you might kill yourself? : YES  4) Within the past month, have you had these thoughts and had some intention of acting on them? : YES  5) Within the past month, have you started to work out or worked out the details of how to kill yourself? Do you intend to carry out this plan? : YES  6)  Have you ever done anything, started to do anything, or prepared to do anything to end your life?: YES  How long ago did you do any of these? : Over a year ago?     Family: grandmother   Agency : Open with [place]   C-SSRS Assessment  Suicidal and Self-Injurious Behavior :  (denies )  Suicidal Ideation (Most Severe in Past Month): Suicidal thoughts (no plan or intent )  Activating Events (Recent):

## 2018-10-31 PROCEDURE — 6370000000 HC RX 637 (ALT 250 FOR IP): Performed by: PSYCHIATRY & NEUROLOGY

## 2018-10-31 PROCEDURE — 99223 1ST HOSP IP/OBS HIGH 75: CPT | Performed by: PSYCHIATRY & NEUROLOGY

## 2018-10-31 PROCEDURE — 1240000000 HC EMOTIONAL WELLNESS R&B

## 2018-10-31 RX ORDER — HALOPERIDOL 5 MG
5 TABLET ORAL EVERY 4 HOURS PRN
Status: DISCONTINUED | OUTPATIENT
Start: 2018-10-31 | End: 2018-11-01

## 2018-10-31 RX ORDER — HALOPERIDOL 5 MG/ML
10 INJECTION INTRAMUSCULAR EVERY 6 HOURS PRN
Status: DISCONTINUED | OUTPATIENT
Start: 2018-10-31 | End: 2018-11-01

## 2018-10-31 RX ORDER — FLUPHENAZINE HYDROCHLORIDE 5 MG/1
5 TABLET ORAL EVERY 12 HOURS SCHEDULED
Status: DISCONTINUED | OUTPATIENT
Start: 2018-10-31 | End: 2018-11-08 | Stop reason: HOSPADM

## 2018-10-31 RX ADMIN — FLUPHENAZINE HYDROCHLORIDE 5 MG: 5 TABLET, FILM COATED ORAL at 20:31

## 2018-10-31 RX ADMIN — HYDROXYZINE PAMOATE 50 MG: 50 CAPSULE ORAL at 20:31

## 2018-10-31 RX ADMIN — HYDROXYZINE PAMOATE 50 MG: 50 CAPSULE ORAL at 10:44

## 2018-10-31 RX ADMIN — NICOTINE POLACRILEX 4 MG: 4 GUM, CHEWING BUCCAL at 20:31

## 2018-10-31 RX ADMIN — NICOTINE POLACRILEX 4 MG: 4 GUM, CHEWING BUCCAL at 19:11

## 2018-10-31 RX ADMIN — FLUPHENAZINE HYDROCHLORIDE 5 MG: 5 TABLET, FILM COATED ORAL at 10:44

## 2018-10-31 RX ADMIN — TRAZODONE HYDROCHLORIDE 50 MG: 50 TABLET ORAL at 20:31

## 2018-10-31 ASSESSMENT — SLEEP AND FATIGUE QUESTIONNAIRES
SLEEP PATTERN: DIFFICULTY FALLING ASLEEP;DISTURBED/INTERRUPTED SLEEP;RESTLESSNESS
AVERAGE NUMBER OF SLEEP HOURS: 6
DIFFICULTY STAYING ASLEEP: YES
DIFFICULTY FALLING ASLEEP: YES
DO YOU HAVE DIFFICULTY SLEEPING: YES
DO YOU USE A SLEEP AID: NO
RESTFUL SLEEP: NO
DIFFICULTY ARISING: NO

## 2018-10-31 ASSESSMENT — PATIENT HEALTH QUESTIONNAIRE - PHQ9: SUM OF ALL RESPONSES TO PHQ QUESTIONS 1-9: 13

## 2018-10-31 ASSESSMENT — LIFESTYLE VARIABLES: HISTORY_ALCOHOL_USE: NO

## 2018-10-31 NOTE — PROGRESS NOTES
Pt. declined to attend the 0900 community meeting, despite staff encouragement.  Electronically signed by Keysha Cedeno on 10/31/2018 at 9:56 AM

## 2018-10-31 NOTE — PROGRESS NOTES
Pt. refused to attend the 1000 skills group, despite staff encouragement.  Electronically signed by Kena Kerr on 10/31/2018 at 1:55 PM

## 2018-10-31 NOTE — PROGRESS NOTES
Pt. presents laying in bed, head turned away. Refused to engage in any assessment conversation. \"I just want to go home. \"  Irritated and dismissive. Familiar with 3WT unit program and activities staff from past admissions. Unwilling to state stressors or constructive leisure activities. Denies the need to be here and repeated, \"I just want to go home. \"   Pt. will be encouraged to attend all unit programs and groups as condition improves.  Electronically signed by Pat House on 10/31/2018 at 12:24 PM

## 2018-10-31 NOTE — CONSULTS
[address] MEDICINE    HISTORY AND PHYSICAL EXAM    PATIENT NAME:  Greg Cabello    MRN:  [id number]  SERVICE DATE:  10/31/2018   SERVICE TIME:  1:50 PM    Primary Care Physician: No primary care provider on file. SUBJECTIVE  CHIEF COMPLAINT:  Medical clear for inpatient psychiatry admission. Consult for medical H/P encounter. HPI:  This is a 29 y.o. male admitted to behavioral health unit with c/o anxiety and agitation and report by grandma that pt is threatening to kill himself and others. Pt pacing around in room asking when he will be discharged . I asked pt if he has a hx of seizure as listed under his medical hx  and if on any medication, pt replied \" I wish I had one now\" . He however denies any SOB, CP, N/V, fever, chills, constipation or diarrhea     PAST MEDICAL HISTORY:    Past Medical History:   Diagnosis Date    Anxiety 1/4/2012    Bipolar affective disorder ([address] 11/25/2015    Chronic back pain 4/20/2017    Depression     Drug abuse and dependence ([address] 1/4/2012    Fracture of left side of mandibular body ([address] 06/17/2018    ACUTE LEFT CONDYLAR NECK FRACTURE    Fracture of navicular bone of foot, closed 09/03/2012    lt    Fracture of temporal bone ([address] 2015    History of pulmonary embolism     Schizophrenia ([address]     Seizures ([address]     Talus fracture 9/2012    lt    TBI (traumatic brain injury) ([facility] 2015    Wound of left leg 9/14/2012     PAST SURGICAL HISTORY:    Past Surgical History:   Procedure Laterality Date    WRIST SURGERY  2009    pin in wrist left     FAMILY HISTORY:    Family History   Problem Relation Age of Onset    Family history unknown: Yes     SOCIAL HISTORY:    Social History     Social History    Marital status: Single     Spouse name: N/A    Number of children: 0    Years of education: 15     Occupational History    Not on file.      Social History Main Topics    Smoking status: Current Some Day Smoker     Packs/day: 0.50     Years: 1.00 Last attempt to quit: 11/28/2011    Smokeless tobacco: Current User     Types: Chew    Alcohol use No      Comment: pt denies    Drug use: Yes     Frequency: 3.0 times per week     Types: Marijuana      Comment: used today,  10/28/18    Sexual activity: Not Currently     Partners: Female     Other Topics Concern    Not on file     Social History Narrative    Lives w family         MEDICATIONS:    Current Facility-Administered Medications   Medication Dose Route Frequency Provider Last Rate Last Dose    fluPHENAZine HCl (PROLIXIN) tablet 5 mg  5 mg Oral 2 times per day Nini Coffman MD   5 mg at 10/31/18 1044    haloperidol lactate (HALDOL) injection 10 mg  10 mg Intramuscular Q6H PRN Nini Coffman MD        Or    haloperidol (HALDOL) tablet 5 mg  5 mg Oral Q4H PRN Nini Coffman MD        acetaminophen (TYLENOL) tablet 650 mg  650 mg Oral Q4H PRN Rafael Kwon MD        hydrOXYzine (VISTARIL) capsule 50 mg  50 mg Oral Q6H PRN Rafael Kwon MD   50 mg at 10/31/18 1044    traZODone (DESYREL) tablet 50 mg  50 mg Oral Nightly PRN Rafael Kwon MD   50 mg at 10/30/18 2117    magnesium hydroxide (MILK OF MAGNESIA) 400 MG/5ML suspension 30 mL  30 mL Oral Daily PRN Rafael Kwon MD        aluminum & magnesium hydroxide-simethicone (MAALOX) 200-200-20 MG/5ML suspension 30 mL  30 mL Oral PRN Rafael Kwon MD        benztropine (COGENTIN) tablet 2 mg  2 mg Oral BID PRN Rafael Kwon MD   2 mg at 10/30/18 2118    Or    benztropine mesylate (COGENTIN) injection 2 mg  2 mg Intramuscular BID PRN Rafael Kwon MD           ALLERGIES: Shellfish-derived products and Shrimp flavor    REVIEW OF SYSTEM:   ROS as noted in HPI, 12 point ROS reviewed and otherwise negative.     OBJECTIVE  PHYSICAL EXAM: /80   Pulse 69   Temp 98.4 °F (36.9 °C) (Oral)   Resp 18   SpO2 98%   CONSTITUTIONAL:  awake, alert, cooperative, no apparent distress, and appears stated age  EYES:  Lids and lashes

## 2018-10-31 NOTE — CARE COORDINATION
BHI Biopsychosocial Assessment    Current Level of Psychosocial Functioning     Independent   Dependent  x  Minimal Assist     Comments:  Patient refused to provide any details about his level of functioning. He stated he lives on his own with the support of his [address]. Patient stated he has no family support at this time. Psychosocial High Risk Factors (check all that apply)    Unable to obtain meds   Chronic illness/pain    Substance abuse x  Lack of Family Support x  Financial stress x  Isolation x  Inadequate Community Resources  Suicide attempt(s)  Not taking medications   Victim of crime   Developmental Delay  Unable to manage personal needs    Age 72 or older   Homeless  No transportation   Readmission within 30 days  Unemployment x  Traumatic Event    Comments: Patient has at least four high risk factors associated with this admission. Psychiatric Advanced Directives: None Reported. Family to Involve in Treatment: Patient refused to provide any collateral contact information. Sexual Orientation:  Patient refused to answer. Patient Strengths: None Identified. Patient Barriers: Patient is very oppositional and defiant. Opiate Education Provided:  Patient refused to participate in brief education/intervention. CMHC/mental health history: Patient has been on the [facility] unit several times before this admission. Plan of Care   medication management, group/individual therapies, family meetings, psycho -education, treatment team meetings to assist with stabilization    Initial Discharge Plan:  Patient did not indicate what his discharge plan was for himself. He is connected to [facility]. One would assume he would continue with [facility] as a community provider. Clinical Summary:   Patient is a 29year old male who was admitted to the [facility] due to severe anxiety and agitation. Reportedly, patient has been a frequent visitor to the ER and was finally admitted.  Patient presented as

## 2018-11-01 PROCEDURE — 6370000000 HC RX 637 (ALT 250 FOR IP): Performed by: PSYCHIATRY & NEUROLOGY

## 2018-11-01 PROCEDURE — 99232 SBSQ HOSP IP/OBS MODERATE 35: CPT | Performed by: PSYCHIATRY & NEUROLOGY

## 2018-11-01 PROCEDURE — 1240000000 HC EMOTIONAL WELLNESS R&B

## 2018-11-01 RX ORDER — HALOPERIDOL 5 MG
5 TABLET ORAL EVERY 4 HOURS PRN
Status: DISCONTINUED | OUTPATIENT
Start: 2018-11-01 | End: 2018-11-08 | Stop reason: HOSPADM

## 2018-11-01 RX ORDER — HALOPERIDOL 5 MG/ML
10 INJECTION INTRAMUSCULAR EVERY 4 HOURS PRN
Status: DISCONTINUED | OUTPATIENT
Start: 2018-11-01 | End: 2018-11-08 | Stop reason: HOSPADM

## 2018-11-01 RX ADMIN — NICOTINE POLACRILEX 4 MG: 4 GUM, CHEWING BUCCAL at 16:17

## 2018-11-01 RX ADMIN — NICOTINE POLACRILEX 4 MG: 4 GUM, CHEWING BUCCAL at 11:58

## 2018-11-01 RX ADMIN — NICOTINE POLACRILEX 4 MG: 4 GUM, CHEWING BUCCAL at 13:30

## 2018-11-01 RX ADMIN — FLUPHENAZINE HYDROCHLORIDE 5 MG: 5 TABLET, FILM COATED ORAL at 20:18

## 2018-11-01 RX ADMIN — NICOTINE POLACRILEX 4 MG: 4 GUM, CHEWING BUCCAL at 19:32

## 2018-11-01 RX ADMIN — HALOPERIDOL 5 MG: 5 TABLET ORAL at 16:39

## 2018-11-01 RX ADMIN — HYDROXYZINE PAMOATE 50 MG: 50 CAPSULE ORAL at 16:39

## 2018-11-01 RX ADMIN — HALOPERIDOL 5 MG: 5 TABLET ORAL at 21:17

## 2018-11-01 RX ADMIN — FLUPHENAZINE HYDROCHLORIDE 5 MG: 5 TABLET, FILM COATED ORAL at 08:20

## 2018-11-01 RX ADMIN — TRAZODONE HYDROCHLORIDE 50 MG: 50 TABLET ORAL at 20:18

## 2018-11-01 ASSESSMENT — SLEEP AND FATIGUE QUESTIONNAIRES
DIFFICULTY STAYING ASLEEP: YES
RESTFUL SLEEP: NO
DIFFICULTY ARISING: NO
DIFFICULTY FALLING ASLEEP: YES
AVERAGE NUMBER OF SLEEP HOURS: 4
DO YOU HAVE DIFFICULTY SLEEPING: YES
DO YOU USE A SLEEP AID: NO
SLEEP PATTERN: DIFFICULTY FALLING ASLEEP;DISTURBED/INTERRUPTED SLEEP;INSOMNIA

## 2018-11-01 ASSESSMENT — PAIN SCALES - GENERAL: PAINLEVEL_OUTOF10: 0

## 2018-11-01 ASSESSMENT — PATIENT HEALTH QUESTIONNAIRE - PHQ9: SUM OF ALL RESPONSES TO PHQ QUESTIONS 1-9: 14

## 2018-11-01 NOTE — PROGRESS NOTES
effects(SE): no    Mental Status Examination:    Level of consciousness:  within normal limits   Appearance:  poor grooming and poor hygiene  Behavior/Motor:  psychomotor agitation  Attitude toward examiner:  evasive and guarded  Speech:  slow   Mood: dysthymic  Affect:  mood incongruent  Thought processes:  slow   Thought content:  Suicidal Ideation:  passive  Delusions:  paranoid  Perceptual Disturbance:  auditory  Cognition:  oriented to person, place, and time   Concentration poor  Insight poor  Judgement poor     ASSESSMENT:   Patient symptoms are:  [] Well controlled  [] Improving  [] Worsening  [] No change      Diagnosis:   Principal Problem:    Schizoaffective disorder, [facility] ([facility]  Resolved Problems:    * No resolved hospital problems. *      LABS:    Recent Labs      10/30/18   1541   WBC  10.7   HGB  15.7   PLT  210     Recent Labs      10/30/18   1541   NA  140   K  3.5   CL  100   CO2  27   BUN  12   CREATININE  0.68*   GLUCOSE  94     Recent Labs      10/30/18   1541   BILITOT  0.3   ALKPHOS  90   AST  28   ALT  29     Lab Results   Component Value Date    LABAMPH Neg 10/30/2018    BARBSCNU Neg 10/30/2018    LABBENZ Neg 10/30/2018    LABBENZ NotDTCD 03/10/2013    OPIATESCREENURINE Neg 10/30/2018    PHENCYCLIDINESCREENURINE Neg 10/30/2018    ETOH <10 10/30/2018     Lab Results   Component Value Date    TSH 1.520 10/30/2018     No results found for: LITHIUM  Lab Results   Component Value Date    VALPROATE <2.8 (L) 10/26/2018       Treatment Plan:  Reviewed current Medications with the patient. Meds as ordered  Risks, benefits, side effects, drug-to-drug interactions and alternatives to treatment were discussed. Collateral information: pending  CD evaluation  Encourage patient to attend group and other milieu activities.   Discharge planning discussed with the patient and treatment team.    PSYCHOTHERAPY/COUNSELING:  [x] Therapeutic interview  [x] Supportive  [] CBT  [] Ongoing  [] Other    [x]

## 2018-11-01 NOTE — PROGRESS NOTES
Pt states that her cant stop his paranoid thoughts that tell him   that the government and gangs are after him . he denies he has actual auditory hallucinations but feels fearful and he is diaphoretic as a result . He was able to engage in a game with his peers which got his mind off these thoughts .haldol 5 mg po and vistaril  50 mg po were given at this time for his extreme anxiety and fears . ..

## 2018-11-01 NOTE — PROGRESS NOTES
Pt. declined to attend the 0900 community meeting, despite staff encouragement.  Electronically signed by Lex Santos on 11/1/2018 at 10:01 AM

## 2018-11-02 PROCEDURE — 6370000000 HC RX 637 (ALT 250 FOR IP): Performed by: PSYCHIATRY & NEUROLOGY

## 2018-11-02 PROCEDURE — 1240000000 HC EMOTIONAL WELLNESS R&B

## 2018-11-02 PROCEDURE — 99232 SBSQ HOSP IP/OBS MODERATE 35: CPT | Performed by: PSYCHIATRY & NEUROLOGY

## 2018-11-02 RX ORDER — DIVALPROEX SODIUM 250 MG/1
250 TABLET, DELAYED RELEASE ORAL EVERY 8 HOURS SCHEDULED
Status: DISCONTINUED | OUTPATIENT
Start: 2018-11-02 | End: 2018-11-03

## 2018-11-02 RX ADMIN — BENZTROPINE MESYLATE 2 MG: 2 TABLET ORAL at 17:47

## 2018-11-02 RX ADMIN — DIVALPROEX SODIUM 250 MG: 250 TABLET, DELAYED RELEASE ORAL at 13:33

## 2018-11-02 RX ADMIN — FLUPHENAZINE HYDROCHLORIDE 5 MG: 5 TABLET, FILM COATED ORAL at 10:54

## 2018-11-02 RX ADMIN — DIVALPROEX SODIUM 250 MG: 250 TABLET, DELAYED RELEASE ORAL at 20:58

## 2018-11-02 RX ADMIN — NICOTINE POLACRILEX 4 MG: 4 GUM, CHEWING BUCCAL at 21:12

## 2018-11-02 RX ADMIN — HYDROXYZINE PAMOATE 50 MG: 50 CAPSULE ORAL at 13:33

## 2018-11-02 RX ADMIN — NICOTINE POLACRILEX 4 MG: 4 GUM, CHEWING BUCCAL at 19:12

## 2018-11-02 RX ADMIN — FLUPHENAZINE HYDROCHLORIDE 5 MG: 5 TABLET, FILM COATED ORAL at 20:58

## 2018-11-02 RX ADMIN — NICOTINE POLACRILEX 4 MG: 4 GUM, CHEWING BUCCAL at 11:00

## 2018-11-02 RX ADMIN — NICOTINE POLACRILEX 4 MG: 4 GUM, CHEWING BUCCAL at 13:06

## 2018-11-02 RX ADMIN — HYDROXYZINE PAMOATE 50 MG: 50 CAPSULE ORAL at 20:58

## 2018-11-02 RX ADMIN — TRAZODONE HYDROCHLORIDE 50 MG: 50 TABLET ORAL at 20:58

## 2018-11-02 NOTE — PROGRESS NOTES
poor grooming and poor hygiene  Behavior/Motor:  psychomotor agitation  Attitude toward examiner:  evasive and guarded  Speech:  slow   Mood: dysthymic  Affect:  mood incongruent  Thought processes:  slow   Thought content:  Suicidal Ideation:  passive  Delusions:  paranoid  Perceptual Disturbance:  auditory  Cognition:  oriented to person, place, and time   Concentration poor  Insight poor  Judgement poor     ASSESSMENT:   Patient symptoms are:  [] Well controlled  [] Improving  [] Worsening  [] No change      Diagnosis:   Principal Problem:    Schiz[facility]ffective disorder[facility] ([facility]  Resolved Problems:    * No resolved hospital problems. *      LABS:    Recent Labs      10/30/18   1541   WBC  10.7   HGB  15.7   PLT  210     Recent Labs      10/30/18   1541   NA  140   K  3.5   CL  100   CO2  27   BUN  12   CREATININE  0.68*   GLUCOSE  94     Recent Labs      10/30/18   1541   BILITOT  0.3   ALKPHOS  90   AST  28   ALT  29     Lab Results   Component Value Date    LABAMPH Neg 10/30/2018    BARBSCNU Neg 10/30/2018    LABBENZ Neg 10/30/2018    LABBENZ NotDTCD 03/10/2013    OPIATESCREENURINE Neg 10/30/2018    PHENCYCLIDINESCREENURINE Neg 10/30/2018    ETOH <10 10/30/2018     Lab Results   Component Value Date    TSH 1.520 10/30/2018     No results found for: LITHIUM  Lab Results   Component Value Date    VALPROATE <2.8 (L) 10/26/2018       Treatment Plan:  Reviewed current Medications with the patient. Meds as ordered. Depakote started today  Risks, benefits, side effects, drug-to-drug interactions and alternatives to treatment were discussed. Collateral information: pending  CD evaluation  Encourage patient to attend group and other milieu activities.   Discharge planning discussed with the patient and treatment team.    PSYCHOTHERAPY/COUNSELING:  [x] Therapeutic interview  [x] Supportive  [] CBT  [] Ongoing  [] Other    [x] Patient continues to need, on a daily basis, active treatment furnished directly by or

## 2018-11-02 NOTE — CARE COORDINATION
Pt is anxious, pacing, approaching staff asking for something for anxiety. Pt is elevated. Pt states he wants something to control his anxiety, pt also is fixated on being discharged. Pt will verbalize some insight and then immediately state he needs to leave the hospital as the medications and the doctor are not doing anything. Pt verbalizes that he is keeping much inside his head and forcing himself to hold it together, pt wants this to be easier. Pt feels medications should address this and make \"life easier so I can go home and handle it\". Pt repeatedly states he \"can't continue to live like this,  it makes no sense\". Pt also repeatedly states if this help is not available then he wants to go home. RN assured pt that his insight is helpful. Also encouraged pt to approach staff when he is feeling himself getting out of control. Pt is accepting of PRN medications but has difficulty understanding why antipsychotics are all that help him when he feels only anxious. RN also encouraged pt to be forthcoming with provider during their daily discussions, pt relates again that he is dissatisfied with Dr Khang Amaya and does not think the doctor can help him. Pt states \" this is not the first time he has pissed me off\". Pt encouraged to continue to consider all avenues of therapy, including medication compliance as well as tx. Pt was not overly engaging at this point in the conversation, took his prn Haldol and returned to the common area. Pt is selectively social and will alternate between walking laps of the unit and sitting alone or selectively with other male pts.

## 2018-11-03 PROCEDURE — 1240000000 HC EMOTIONAL WELLNESS R&B

## 2018-11-03 PROCEDURE — 6370000000 HC RX 637 (ALT 250 FOR IP): Performed by: PSYCHIATRY & NEUROLOGY

## 2018-11-03 RX ORDER — IBUPROFEN 200 MG
400 TABLET ORAL EVERY 6 HOURS PRN
Status: DISCONTINUED | OUTPATIENT
Start: 2018-11-03 | End: 2018-11-08 | Stop reason: HOSPADM

## 2018-11-03 RX ORDER — BUSPIRONE HYDROCHLORIDE 10 MG/1
10 TABLET ORAL 3 TIMES DAILY
Status: DISCONTINUED | OUTPATIENT
Start: 2018-11-03 | End: 2018-11-08 | Stop reason: HOSPADM

## 2018-11-03 RX ORDER — DIVALPROEX SODIUM 500 MG/1
500 TABLET, DELAYED RELEASE ORAL EVERY 8 HOURS SCHEDULED
Status: DISCONTINUED | OUTPATIENT
Start: 2018-11-03 | End: 2018-11-08 | Stop reason: HOSPADM

## 2018-11-03 RX ADMIN — TRAZODONE HYDROCHLORIDE 50 MG: 50 TABLET ORAL at 21:55

## 2018-11-03 RX ADMIN — NICOTINE POLACRILEX 4 MG: 4 GUM, CHEWING BUCCAL at 12:56

## 2018-11-03 RX ADMIN — DIVALPROEX SODIUM 250 MG: 250 TABLET, DELAYED RELEASE ORAL at 13:36

## 2018-11-03 RX ADMIN — FLUPHENAZINE HYDROCHLORIDE 5 MG: 5 TABLET, FILM COATED ORAL at 08:37

## 2018-11-03 RX ADMIN — DIVALPROEX SODIUM 250 MG: 250 TABLET, DELAYED RELEASE ORAL at 06:38

## 2018-11-03 RX ADMIN — FLUPHENAZINE HYDROCHLORIDE 5 MG: 5 TABLET, FILM COATED ORAL at 21:55

## 2018-11-03 RX ADMIN — DIVALPROEX SODIUM 500 MG: 500 TABLET, DELAYED RELEASE ORAL at 21:55

## 2018-11-03 RX ADMIN — HYDROXYZINE PAMOATE 50 MG: 50 CAPSULE ORAL at 13:36

## 2018-11-03 RX ADMIN — BUSPIRONE HYDROCHLORIDE 10 MG: 10 TABLET ORAL at 21:55

## 2018-11-03 ASSESSMENT — PAIN - FUNCTIONAL ASSESSMENT: PAIN_FUNCTIONAL_ASSESSMENT: 0-10

## 2018-11-03 NOTE — PROGRESS NOTES
Pt. refused to attend the 0900 community meeting due to resting in room, despite staff encouragement. Electronically signed by Dale [address] on 11/3/2018 at 10:07 AM

## 2018-11-03 NOTE — PLAN OF CARE
Problem: Anger Management/Homicidal Ideation:  Goal: Ability to verbalize frustrations and anger appropriately will improve  Ability to verbalize frustrations and anger appropriately will improve   Outcome: Ongoing    Goal: Absence of angry outbursts  Absence of angry outbursts   Outcome: Met This Shift    Goal: Participates in care planning  Participates in care planning   Outcome: Ongoing      Problem: Altered Mood, Depressive Behavior:  Goal: Able to verbalize and/or display a decrease in depressive symptoms  Able to verbalize and/or display a decrease in depressive symptoms   Outcome: Ongoing    Goal: Absence of self-harm  Absence of self-harm   Outcome: Met This Shift      Problem: Falls - Risk of:  Goal: Will remain free from falls  Will remain free from falls   Outcome: Met This Shift    Goal: Absence of physical injury  Absence of physical injury   Outcome: Met This Shift

## 2018-11-03 NOTE — PROGRESS NOTES
Pt. refused to attend the 1000 skills group, despite staff encouragement. Electronically signed by Inder Mendenhall, [place] [facility] on 11/3/2018 at 2:52 PM

## 2018-11-04 PROCEDURE — 1240000000 HC EMOTIONAL WELLNESS R&B

## 2018-11-04 PROCEDURE — 6370000000 HC RX 637 (ALT 250 FOR IP): Performed by: PSYCHIATRY & NEUROLOGY

## 2018-11-04 RX ORDER — QUETIAPINE FUMARATE 25 MG/1
25 TABLET, FILM COATED ORAL 3 TIMES DAILY
Status: DISCONTINUED | OUTPATIENT
Start: 2018-11-04 | End: 2018-11-08 | Stop reason: HOSPADM

## 2018-11-04 RX ADMIN — DIVALPROEX SODIUM 500 MG: 500 TABLET, DELAYED RELEASE ORAL at 20:19

## 2018-11-04 RX ADMIN — DIVALPROEX SODIUM 500 MG: 500 TABLET, DELAYED RELEASE ORAL at 06:22

## 2018-11-04 RX ADMIN — DIVALPROEX SODIUM 500 MG: 500 TABLET, DELAYED RELEASE ORAL at 13:24

## 2018-11-04 RX ADMIN — FLUPHENAZINE HYDROCHLORIDE 5 MG: 5 TABLET, FILM COATED ORAL at 20:20

## 2018-11-04 RX ADMIN — QUETIAPINE FUMARATE 25 MG: 25 TABLET ORAL at 14:43

## 2018-11-04 RX ADMIN — BUSPIRONE HYDROCHLORIDE 10 MG: 10 TABLET ORAL at 08:23

## 2018-11-04 RX ADMIN — FLUPHENAZINE HYDROCHLORIDE 5 MG: 5 TABLET, FILM COATED ORAL at 08:23

## 2018-11-04 RX ADMIN — NICOTINE POLACRILEX 4 MG: 4 GUM, CHEWING BUCCAL at 13:25

## 2018-11-04 RX ADMIN — NICOTINE POLACRILEX 4 MG: 4 GUM, CHEWING BUCCAL at 17:43

## 2018-11-04 RX ADMIN — QUETIAPINE FUMARATE 25 MG: 25 TABLET ORAL at 20:19

## 2018-11-04 RX ADMIN — BUSPIRONE HYDROCHLORIDE 10 MG: 10 TABLET ORAL at 13:24

## 2018-11-04 RX ADMIN — NICOTINE POLACRILEX 4 MG: 4 GUM, CHEWING BUCCAL at 09:44

## 2018-11-04 RX ADMIN — BUSPIRONE HYDROCHLORIDE 10 MG: 10 TABLET ORAL at 20:19

## 2018-11-04 RX ADMIN — TRAZODONE HYDROCHLORIDE 50 MG: 50 TABLET ORAL at 20:20

## 2018-11-04 NOTE — PROGRESS NOTES
Pt. attended the 0900 community meeting. Electronically signed by Derrick Castellanos [address] on 11/4/2018 at 9:55 AM

## 2018-11-04 NOTE — PROGRESS NOTES
denies    Drug use: Yes     Frequency: 3.0 times per week     Types: Marijuana      Comment: used today,  10/28/18    Sexual activity: Not Currently     Partners: Female     Other Topics Concern    Not on file     Social History Narrative    Lives w family               ROS:  [x] All negative/unchanged except if checked.  Explain positive(checked items) below:  [] Constitutional  [] Eyes  [] Ear/Nose/Mouth/Throat  [] Respiratory  [] CV  [] GI  []   [] Musculoskeletal  [] Skin/Breast  [] Neurological  [] Endocrine  [] Heme/Lymph  [] Allergic/Immunologic    Explanation:     MEDICATIONS:    Current Facility-Administered Medications:     QUEtiapine (SEROQUEL) tablet 25 mg, 25 mg, Oral, TID, Katherine Kelly MD, 25 mg at 11/04/18 1443    ibuprofen (ADVIL;MOTRIN) tablet 400 mg, 400 mg, Oral, Q6H PRN, Katherine Kelly MD    divalproex (DEPAKOTE) DR tablet 500 mg, 500 mg, Oral, 3 times per day, Katherine Kelly MD, 500 mg at 11/04/18 1324    busPIRone (BUSPAR) tablet 10 mg, 10 mg, Oral, TID, Katherine Kelly MD, 10 mg at 11/04/18 1324    haloperidol (HALDOL) tablet 5 mg, 5 mg, Oral, Q4H PRN, 5 mg at 11/01/18 2117 **OR** haloperidol lactate (HALDOL) injection 10 mg, 10 mg, Intramuscular, Q4H PRN, Jacky Juarez MD    fluPHENAZine HCl (PROLIXIN) tablet 5 mg, 5 mg, Oral, 2 times per day, Jacky Juarez MD, 5 mg at 11/04/18 6436    nicotine polacrilex (NICORETTE) gum 4 mg, 4 mg, Oral, PRN, Jacky Juarez MD, 4 mg at 11/04/18 1743    acetaminophen (TYLENOL) tablet 650 mg, 650 mg, Oral, Q4H PRN, Katherine Kelly MD    hydrOXYzine (VISTARIL) capsule 50 mg, 50 mg, Oral, Q6H PRN, Katherine Kelly MD, 50 mg at 11/03/18 1336    traZODone (DESYREL) tablet 50 mg, 50 mg, Oral, Nightly PRN, Katherine Kelly MD, 50 mg at 11/03/18 7612    magnesium hydroxide (MILK OF MAGNESIA) 400 MG/5ML suspension 30 mL, 30 mL, Oral, Daily PRN, Katherine Kelly MD    aluminum & magnesium hydroxide-simethicone ([address]) 788-390-31 MG/5ML

## 2018-11-05 PROCEDURE — 6370000000 HC RX 637 (ALT 250 FOR IP): Performed by: PSYCHIATRY & NEUROLOGY

## 2018-11-05 PROCEDURE — 99232 SBSQ HOSP IP/OBS MODERATE 35: CPT | Performed by: PSYCHIATRY & NEUROLOGY

## 2018-11-05 PROCEDURE — 1240000000 HC EMOTIONAL WELLNESS R&B

## 2018-11-05 RX ADMIN — BUSPIRONE HYDROCHLORIDE 10 MG: 10 TABLET ORAL at 21:15

## 2018-11-05 RX ADMIN — TRAZODONE HYDROCHLORIDE 50 MG: 50 TABLET ORAL at 21:15

## 2018-11-05 RX ADMIN — QUETIAPINE FUMARATE 25 MG: 25 TABLET ORAL at 09:11

## 2018-11-05 RX ADMIN — NICOTINE POLACRILEX 4 MG: 4 GUM, CHEWING BUCCAL at 17:49

## 2018-11-05 RX ADMIN — BUSPIRONE HYDROCHLORIDE 10 MG: 10 TABLET ORAL at 09:11

## 2018-11-05 RX ADMIN — DIVALPROEX SODIUM 500 MG: 500 TABLET, DELAYED RELEASE ORAL at 06:13

## 2018-11-05 RX ADMIN — NICOTINE POLACRILEX 4 MG: 4 GUM, CHEWING BUCCAL at 14:32

## 2018-11-05 RX ADMIN — QUETIAPINE FUMARATE 25 MG: 25 TABLET ORAL at 21:15

## 2018-11-05 RX ADMIN — NICOTINE POLACRILEX 4 MG: 4 GUM, CHEWING BUCCAL at 09:18

## 2018-11-05 RX ADMIN — FLUPHENAZINE HYDROCHLORIDE 5 MG: 5 TABLET, FILM COATED ORAL at 09:11

## 2018-11-05 RX ADMIN — NICOTINE POLACRILEX 4 MG: 4 GUM, CHEWING BUCCAL at 20:02

## 2018-11-05 RX ADMIN — DIVALPROEX SODIUM 500 MG: 500 TABLET, DELAYED RELEASE ORAL at 14:32

## 2018-11-05 RX ADMIN — QUETIAPINE FUMARATE 25 MG: 25 TABLET ORAL at 14:32

## 2018-11-05 RX ADMIN — DIVALPROEX SODIUM 500 MG: 500 TABLET, DELAYED RELEASE ORAL at 21:14

## 2018-11-05 RX ADMIN — BUSPIRONE HYDROCHLORIDE 10 MG: 10 TABLET ORAL at 14:32

## 2018-11-05 RX ADMIN — FLUPHENAZINE HYDROCHLORIDE 5 MG: 5 TABLET, FILM COATED ORAL at 21:14

## 2018-11-05 NOTE — PROGRESS NOTES
BEHAVIORAL HEALTH FOLLOW-UP NOTE     11/5/2018     Patient was seen and examined in person, Chart reviewed   Patient's case discussed with staff/team    Chief Complaint: psychosis    Interim History:     No major change in presentation  Has been compliant with medication  Pt has been sleeping better  D/C focused  Still is paranoid and internally stimulated    Appetite:   [x] Normal/Unchanged  [] Increased  [] Decreased      Sleep:       [] Normal/Unchanged  [x] Fair       [] Poor              Energy:    [x] Normal/Unchanged  [] Increased  [] Decreased        SI [x] Present  [] Absent    HI  []Present  [] Absent     Aggression:  [x] yes  [] no    Patient is [] able  [x] unable to CONTRACT FOR SAFETY     PAST MEDICAL/PSYCHIATRIC HISTORY:   Past Medical History:   Diagnosis Date    Anxiety 1/4/2012    Bipolar affective disorder ([facility] [address] 11/25/2015    Chronic back pain 4/20/2017    Depression     Drug abuse and dependence ([facility] [address] 1/4/2012    Fracture of left side of mandibular body ([facility] [address] 06/17/2018    ACUTE LEFT CONDYLAR NECK FRACTURE    Fracture of navicular bone of foot, closed 09/03/2012    lt    Fracture of temporal bone ([facility] [address] 2015    History of pulmonary embolism     Schizophrenia ([facility] [address] [facility]     Seizures ([facility] [address]     Talus fracture 9/2012    lt    TBI (traumatic brain injury) ([facility] [address] 2015    Wound of left leg 9/14/2012       FAMILY/SOCIAL HISTORY:  Family History   Problem Relation Age of Onset    Family history unknown: Yes     Social History     Social History    Marital status: Single     Spouse name: N/A    Number of children: 0    Years of education: 15     Occupational History    Not on file.      Social History Main Topics    Smoking status: Current Some Day Smoker     Packs/day: 0.50     Years: 1.00     Last attempt to quit: 11/28/2011    Smokeless tobacco: Current User     Types: Chew    Alcohol use No      Comment: pt denies    Drug use: Yes     Frequency: 3.0 times per week     Types: Marijuana tablet 2 mg, 2 mg, Oral, BID PRN, 2 mg at 11/02/18 1747 **OR** benztropine mesylate (COGENTIN) injection 2 mg, 2 mg, Intramuscular, BID PRN, Teresa Montez MD      Examination:  /79   Pulse 77   Temp 98 °F (36.7 °C) (Oral)   Resp 18   SpO2 100%   Gait - steady  Medication side effects(SE): no    Mental Status Examination:    Level of consciousness:  within normal limits   Appearance:  poor grooming and poor hygiene  Behavior/Motor:  psychomotor agitation  Attitude toward examiner:  evasive and guarded  Speech:  slow   Mood: dysthymic  Affect:  mood incongruent  Thought processes:  slow   Thought content:  Suicidal Ideation:  passive  Delusions:  paranoid  Perceptual Disturbance:  auditory  Cognition:  oriented to person, place, and time   Concentration poor  Insight poor  Judgement poor     ASSESSMENT:   Patient symptoms are:  [] Well controlled  [] Improving  [] Worsening  [] No change      Diagnosis:   Principal Problem:    Schizoaffective disorder, bipolar type (HCC)  Resolved Problems:    * No resolved hospital problems. *      LABS:    No results for input(s): WBC, HGB, PLT in the last 72 hours. No results for input(s): NA, K, CL, CO2, BUN, CREATININE, GLUCOSE in the last 72 hours. No results for input(s): BILITOT, ALKPHOS, AST, ALT in the last 72 hours. Lab Results   Component Value Date    LABAMPH Neg 10/30/2018    BARBSCNU Neg 10/30/2018    LABBENZ Neg 10/30/2018    LABBENZ NotDTCD 03/10/2013    OPIATESCREENURINE Neg 10/30/2018    PHENCYCLIDINESCREENURINE Neg 10/30/2018    ETOH <10 10/30/2018     Lab Results   Component Value Date    TSH 1.520 10/30/2018     No results found for: LITHIUM  Lab Results   Component Value Date    VALPROATE <2.8 (L) 10/26/2018       Treatment Plan:  Reviewed current Medications with the patient. Meds as ordered. Depakote level  Risks, benefits, side effects, drug-to-drug interactions and alternatives to treatment were discussed.   Collateral information:

## 2018-11-06 PROCEDURE — 6370000000 HC RX 637 (ALT 250 FOR IP): Performed by: PSYCHIATRY & NEUROLOGY

## 2018-11-06 PROCEDURE — 1240000000 HC EMOTIONAL WELLNESS R&B

## 2018-11-06 PROCEDURE — 6360000002 HC RX W HCPCS: Performed by: PSYCHIATRY & NEUROLOGY

## 2018-11-06 PROCEDURE — 99232 SBSQ HOSP IP/OBS MODERATE 35: CPT | Performed by: PSYCHIATRY & NEUROLOGY

## 2018-11-06 RX ORDER — BENZTROPINE MESYLATE 1 MG/1
1 TABLET ORAL 2 TIMES DAILY
Status: DISCONTINUED | OUTPATIENT
Start: 2018-11-06 | End: 2018-11-08 | Stop reason: HOSPADM

## 2018-11-06 RX ORDER — FLUPHENAZINE DECANOATE 25 MG/ML
25 INJECTION, SOLUTION INTRAMUSCULAR; SUBCUTANEOUS
Status: DISCONTINUED | OUTPATIENT
Start: 2018-11-06 | End: 2018-11-08 | Stop reason: HOSPADM

## 2018-11-06 RX ADMIN — QUETIAPINE FUMARATE 25 MG: 25 TABLET ORAL at 08:03

## 2018-11-06 RX ADMIN — NICOTINE POLACRILEX 4 MG: 4 GUM, CHEWING BUCCAL at 20:46

## 2018-11-06 RX ADMIN — BENZTROPINE MESYLATE 1 MG: 1 TABLET ORAL at 13:04

## 2018-11-06 RX ADMIN — DIVALPROEX SODIUM 500 MG: 500 TABLET, DELAYED RELEASE ORAL at 13:57

## 2018-11-06 RX ADMIN — QUETIAPINE FUMARATE 25 MG: 25 TABLET ORAL at 20:43

## 2018-11-06 RX ADMIN — TRAZODONE HYDROCHLORIDE 50 MG: 50 TABLET ORAL at 20:43

## 2018-11-06 RX ADMIN — FLUPHENAZINE HYDROCHLORIDE 5 MG: 5 TABLET, FILM COATED ORAL at 08:02

## 2018-11-06 RX ADMIN — BUSPIRONE HYDROCHLORIDE 10 MG: 10 TABLET ORAL at 20:43

## 2018-11-06 RX ADMIN — FLUPHENAZINE HYDROCHLORIDE 5 MG: 5 TABLET, FILM COATED ORAL at 20:43

## 2018-11-06 RX ADMIN — BENZTROPINE MESYLATE 1 MG: 1 TABLET ORAL at 20:43

## 2018-11-06 RX ADMIN — DIVALPROEX SODIUM 500 MG: 500 TABLET, DELAYED RELEASE ORAL at 06:29

## 2018-11-06 RX ADMIN — DIVALPROEX SODIUM 500 MG: 500 TABLET, DELAYED RELEASE ORAL at 20:43

## 2018-11-06 RX ADMIN — FLUPHENAZINE DECANOATE 25 MG: 25 INJECTION, SOLUTION INTRAMUSCULAR; SUBCUTANEOUS at 13:05

## 2018-11-06 RX ADMIN — QUETIAPINE FUMARATE 25 MG: 25 TABLET ORAL at 13:57

## 2018-11-06 RX ADMIN — BUSPIRONE HYDROCHLORIDE 10 MG: 10 TABLET ORAL at 08:03

## 2018-11-06 RX ADMIN — BUSPIRONE HYDROCHLORIDE 10 MG: 10 TABLET ORAL at 13:57

## 2018-11-06 NOTE — FLOWSHEET NOTE
Pt has isolated to room all afternoon sleeping. Pt out only for meals. Pt is bright when out. Pt denies SI/HI/AVH, depression and anxiety. Pt has been calm cooperative and polite. Pt reports good sleep and appetite.

## 2018-11-06 NOTE — PROGRESS NOTES
Pt. refused to attend the 1000 skills group, despite staff encouragement. Electronically signed by Nilton Atkinson, [address] on 11/6/2018 at 1:22 PM

## 2018-11-07 LAB — VALPROIC ACID LEVEL: 64.3 UG/ML (ref 50–100)

## 2018-11-07 PROCEDURE — 6370000000 HC RX 637 (ALT 250 FOR IP): Performed by: PSYCHIATRY & NEUROLOGY

## 2018-11-07 PROCEDURE — 99232 SBSQ HOSP IP/OBS MODERATE 35: CPT | Performed by: PSYCHIATRY & NEUROLOGY

## 2018-11-07 PROCEDURE — 80164 ASSAY DIPROPYLACETIC ACD TOT: CPT

## 2018-11-07 PROCEDURE — 1240000000 HC EMOTIONAL WELLNESS R&B

## 2018-11-07 PROCEDURE — 36415 COLL VENOUS BLD VENIPUNCTURE: CPT

## 2018-11-07 RX ADMIN — DIVALPROEX SODIUM 500 MG: 500 TABLET, DELAYED RELEASE ORAL at 13:51

## 2018-11-07 RX ADMIN — BUSPIRONE HYDROCHLORIDE 10 MG: 10 TABLET ORAL at 20:23

## 2018-11-07 RX ADMIN — DIVALPROEX SODIUM 500 MG: 500 TABLET, DELAYED RELEASE ORAL at 20:23

## 2018-11-07 RX ADMIN — BUSPIRONE HYDROCHLORIDE 10 MG: 10 TABLET ORAL at 13:51

## 2018-11-07 RX ADMIN — FLUPHENAZINE HYDROCHLORIDE 5 MG: 5 TABLET, FILM COATED ORAL at 08:29

## 2018-11-07 RX ADMIN — BENZTROPINE MESYLATE 1 MG: 1 TABLET ORAL at 08:29

## 2018-11-07 RX ADMIN — BENZTROPINE MESYLATE 1 MG: 1 TABLET ORAL at 20:23

## 2018-11-07 RX ADMIN — BUSPIRONE HYDROCHLORIDE 10 MG: 10 TABLET ORAL at 08:29

## 2018-11-07 RX ADMIN — FLUPHENAZINE HYDROCHLORIDE 5 MG: 5 TABLET, FILM COATED ORAL at 20:23

## 2018-11-07 RX ADMIN — QUETIAPINE FUMARATE 25 MG: 25 TABLET ORAL at 20:24

## 2018-11-07 RX ADMIN — QUETIAPINE FUMARATE 25 MG: 25 TABLET ORAL at 13:51

## 2018-11-07 RX ADMIN — TRAZODONE HYDROCHLORIDE 50 MG: 50 TABLET ORAL at 20:24

## 2018-11-07 RX ADMIN — QUETIAPINE FUMARATE 25 MG: 25 TABLET ORAL at 08:29

## 2018-11-07 RX ADMIN — NICOTINE POLACRILEX 4 MG: 4 GUM, CHEWING BUCCAL at 18:43

## 2018-11-07 RX ADMIN — DIVALPROEX SODIUM 500 MG: 500 TABLET, DELAYED RELEASE ORAL at 08:28

## 2018-11-07 NOTE — PROGRESS NOTES
BEHAVIORAL HEALTH FOLLOW-UP NOTE     11/7/2018     Patient was seen and examined in person, Chart reviewed   Patient's case discussed with staff/team    Chief Complaint: psychosis    Interim History:     Doing better  Focused on discharge  Received prolixin shot yesterday  Denies AH or VH  No agitation  Has been seclusive in his room  Appetite:   [x] Normal/Unchanged  [] Increased  [] Decreased      Sleep:       [] Normal/Unchanged  [x] Fair       [] Poor              Energy:    [x] Normal/Unchanged  [] Increased  [] Decreased        SI [] Present  [x] Absent    HI  []Present  [x] Absent     Aggression:  [x] yes  [] no    Patient is [] able  [x] unable to CONTRACT FOR SAFETY     PAST MEDICAL/PSYCHIATRIC HISTORY:   Past Medical History:   Diagnosis Date    Anxiety 1/4/2012    Bipolar affective disorder ([facility] [address] 11/25/2015    Chronic back pain 4/20/2017    Depression     Drug abuse and dependence ([facility] [address] 1/4/2012    Fracture of left side of mandibular body ([facility] [address] 06/17/2018    ACUTE LEFT CONDYLAR NECK FRACTURE    Fracture of navicular bone of foot, closed 09/03/2012    lt    Fracture of temporal bone ([facility] [address] 2015    History of pulmonary embolism     Schizophrenia ([facility] [address]     Seizures ([facility] [address]     Talus fracture 9/2012    lt    TBI (traumatic brain injury) ([facility] [address] 2015    Wound of left leg 9/14/2012       FAMILY/SOCIAL HISTORY:  Family History   Problem Relation Age of Onset    Family history unknown: Yes     Social History     Social History    Marital status: Single     Spouse name: N/A    Number of children: 0    Years of education: 15     Occupational History    Not on file.      Social History Main Topics    Smoking status: Current Some Day Smoker     Packs/day: 0.50     Years: 1.00     Last attempt to quit: 11/28/2011    Smokeless tobacco: Current User     Types: Chew    Alcohol use No      Comment: pt denies    Drug use: Yes     Frequency: 3.0 times per week     Types: Marijuana      Comment: used today, 10/28/18    Sexual activity: Not Currently     Partners: Female     Other Topics Concern    Not on file     Social History Narrative    Lives w family               ROS:  [x] All negative/unchanged except if checked.  Explain positive(checked items) below:  [] Constitutional  [] Eyes  [] Ear/Nose/Mouth/Throat  [] Respiratory  [] CV  [] GI  []   [] Musculoskeletal  [] Skin/Breast  [] Neurological  [] Endocrine  [] Heme/Lymph  [] Allergic/Immunologic    Explanation:     MEDICATIONS:    Current Facility-Administered Medications:     benztropine (COGENTIN) tablet 1 mg, 1 mg, Oral, BID, Lyndon Dye MD, 1 mg at 11/07/18 7938    fluPHENAZine decanoate (PROLIXIN) injection 25 mg, 25 mg, Intramuscular, Q21 Days, Lyndon Dye MD, 25 mg at 11/06/18 1305    QUEtiapine (SEROQUEL) tablet 25 mg, 25 mg, Oral, TID, Rafita Black MD, 25 mg at 11/07/18 1351    ibuprofen (ADVIL;MOTRIN) tablet 400 mg, 400 mg, Oral, Q6H PRN, Rafita Black MD    divalproex (DEPAKOTE) DR tablet 500 mg, 500 mg, Oral, 3 times per day, Rafita Black MD, 500 mg at 11/07/18 1351    busPIRone (BUSPAR) tablet 10 mg, 10 mg, Oral, TID, Rafita Black MD, 10 mg at 11/07/18 1351    haloperidol (HALDOL) tablet 5 mg, 5 mg, Oral, Q4H PRN, 5 mg at 11/01/18 2117 **OR** haloperidol lactate (HALDOL) injection 10 mg, 10 mg, Intramuscular, Q4H PRN, Lyndon Dye MD    fluPHENAZine HCl (PROLIXIN) tablet 5 mg, 5 mg, Oral, 2 times per day, Lyndon Dye MD, 5 mg at 11/07/18 0829    nicotine polacrilex (NICORETTE) gum 4 mg, 4 mg, Oral, PRN, Lyndon Dye MD, 4 mg at 11/06/18 2046    acetaminophen (TYLENOL) tablet 650 mg, 650 mg, Oral, Q4H PRN, Rafita Black MD    hydrOXYzine (VISTARIL) capsule 50 mg, 50 mg, Oral, Q6H PRN, Rafita Black MD, 50 mg at 11/03/18 1336    traZODone (DESYREL) tablet 50 mg, 50 mg, Oral, Nightly PRN, Rafita Black MD, 50 mg at 11/06/18 2043    magnesium hydroxide (MILK OF MAGNESIA) 400 MG/5ML suspension

## 2018-11-08 VITALS
HEART RATE: 75 BPM | DIASTOLIC BLOOD PRESSURE: 81 MMHG | OXYGEN SATURATION: 100 % | RESPIRATION RATE: 18 BRPM | SYSTOLIC BLOOD PRESSURE: 122 MMHG | TEMPERATURE: 98 F

## 2018-11-08 LAB
CHOLESTEROL, FASTING: 124 MG/DL (ref 0–199)
HDLC SERPL-MCNC: 36 MG/DL (ref 40–59)
LDL CHOLESTEROL CALCULATED: 67 MG/DL (ref 0–129)
TRIGLYCERIDE, FASTING: 103 MG/DL (ref 0–200)

## 2018-11-08 PROCEDURE — 80061 LIPID PANEL: CPT

## 2018-11-08 PROCEDURE — 36415 COLL VENOUS BLD VENIPUNCTURE: CPT

## 2018-11-08 PROCEDURE — 6370000000 HC RX 637 (ALT 250 FOR IP): Performed by: PSYCHIATRY & NEUROLOGY

## 2018-11-08 PROCEDURE — 99239 HOSP IP/OBS DSCHRG MGMT >30: CPT | Performed by: PSYCHIATRY & NEUROLOGY

## 2018-11-08 RX ORDER — BENZTROPINE MESYLATE 1 MG/1
1 TABLET ORAL 2 TIMES DAILY
Qty: 30 TABLET | Refills: 2 | Status: SHIPPED | OUTPATIENT
Start: 2018-11-08 | End: 2019-02-01

## 2018-11-08 RX ORDER — BUSPIRONE HYDROCHLORIDE 10 MG/1
10 TABLET ORAL 3 TIMES DAILY
Qty: 45 TABLET | Refills: 2 | Status: SHIPPED | OUTPATIENT
Start: 2018-11-08 | End: 2019-02-01

## 2018-11-08 RX ORDER — QUETIAPINE FUMARATE 25 MG/1
25 TABLET, FILM COATED ORAL 3 TIMES DAILY
Qty: 45 TABLET | Refills: 2 | Status: ON HOLD | OUTPATIENT
Start: 2018-11-08 | End: 2018-12-14 | Stop reason: HOSPADM

## 2018-11-08 RX ORDER — FLUPHENAZINE DECANOATE 25 MG/ML
25 INJECTION, SOLUTION INTRAMUSCULAR; SUBCUTANEOUS
Qty: 1 ML | Refills: 2 | Status: SHIPPED | OUTPATIENT
Start: 2018-11-27 | End: 2019-02-01 | Stop reason: ALTCHOICE

## 2018-11-08 RX ORDER — DIVALPROEX SODIUM 500 MG/1
500 TABLET, DELAYED RELEASE ORAL EVERY 8 HOURS SCHEDULED
Qty: 45 TABLET | Refills: 2 | Status: ON HOLD | OUTPATIENT
Start: 2018-11-08 | End: 2018-12-14 | Stop reason: HOSPADM

## 2018-11-08 RX ORDER — FLUPHENAZINE HYDROCHLORIDE 5 MG/1
5 TABLET ORAL EVERY 12 HOURS SCHEDULED
Qty: 45 TABLET | Refills: 0 | Status: ON HOLD | OUTPATIENT
Start: 2018-11-08 | End: 2018-12-14 | Stop reason: HOSPADM

## 2018-11-08 RX ADMIN — BUSPIRONE HYDROCHLORIDE 10 MG: 10 TABLET ORAL at 13:29

## 2018-11-08 RX ADMIN — BENZTROPINE MESYLATE 1 MG: 1 TABLET ORAL at 08:14

## 2018-11-08 RX ADMIN — QUETIAPINE FUMARATE 25 MG: 25 TABLET ORAL at 08:14

## 2018-11-08 RX ADMIN — BUSPIRONE HYDROCHLORIDE 10 MG: 10 TABLET ORAL at 08:14

## 2018-11-08 RX ADMIN — DIVALPROEX SODIUM 500 MG: 500 TABLET, DELAYED RELEASE ORAL at 06:33

## 2018-11-08 RX ADMIN — QUETIAPINE FUMARATE 25 MG: 25 TABLET ORAL at 13:28

## 2018-11-08 RX ADMIN — FLUPHENAZINE HYDROCHLORIDE 5 MG: 5 TABLET, FILM COATED ORAL at 08:14

## 2018-11-08 RX ADMIN — DIVALPROEX SODIUM 500 MG: 500 TABLET, DELAYED RELEASE ORAL at 13:28

## 2018-11-08 NOTE — PROGRESS NOTES
Pt is visible on unit. In dining room for breakfast. Med compliant. Pt denies SI/HI/AVH. Denies depression and anxiety. Showers daily. Reports good sleep and eating. Eye contact is good. Pt is cooperative with assessment. He did not attend AM groups.

## 2018-11-08 NOTE — PLAN OF CARE
Problem: Anger Management/Homicidal Ideation:  Goal: Ability to verbalize frustrations and anger appropriately will improve  Ability to verbalize frustrations and anger appropriately will improve   Outcome: Ongoing    Goal: Absence of angry outbursts  Absence of angry outbursts   Outcome: Ongoing    Goal: Participates in care planning  Participates in care planning   Outcome: Ongoing      Problem: Altered Mood, Depressive Behavior:  Goal: Able to verbalize and/or display a decrease in depressive symptoms  Able to verbalize and/or display a decrease in depressive symptoms   Outcome: Ongoing    Goal: Absence of self-harm  Absence of self-harm   Outcome: Ongoing      Problem: Falls - Risk of:  Goal: Will remain free from falls  Will remain free from falls   Outcome: Ongoing    Goal: Absence of physical injury  Absence of physical injury   Outcome: Ongoing

## 2018-12-06 ENCOUNTER — HOSPITAL ENCOUNTER (INPATIENT)
Age: 28
LOS: 7 days | Discharge: HOME | DRG: 885 | End: 2018-12-14
Attending: EMERGENCY MEDICINE | Admitting: PSYCHIATRY & NEUROLOGY
Payer: MEDICARE

## 2018-12-06 DIAGNOSIS — F31.81 BIPOLAR 2 DISORDER (HCC): Primary | ICD-10-CM

## 2018-12-06 LAB
ACETAMINOPHEN LEVEL: <5 UG/ML (ref 10–30)
ALBUMIN SERPL-MCNC: 4.5 G/DL (ref 3.9–4.9)
ALP BLD-CCNC: 74 U/L (ref 35–104)
ALT SERPL-CCNC: 34 U/L (ref 0–41)
AMPHETAMINE SCREEN, URINE: ABNORMAL
ANION GAP SERPL CALCULATED.3IONS-SCNC: 10 MEQ/L (ref 7–13)
AST SERPL-CCNC: 31 U/L (ref 0–40)
BARBITURATE SCREEN URINE: ABNORMAL
BASOPHILS ABSOLUTE: 0.1 K/UL (ref 0–0.2)
BASOPHILS RELATIVE PERCENT: 0.7 %
BENZODIAZEPINE SCREEN, URINE: ABNORMAL
BILIRUB SERPL-MCNC: 0.3 MG/DL (ref 0–1.2)
BILIRUBIN URINE: NEGATIVE
BLOOD, URINE: NEGATIVE
BUN BLDV-MCNC: 27 MG/DL (ref 6–20)
CALCIUM SERPL-MCNC: 9.5 MG/DL (ref 8.6–10.2)
CANNABINOID SCREEN URINE: POSITIVE
CHLORIDE BLD-SCNC: 103 MEQ/L (ref 98–107)
CK MB: 4 NG/ML (ref 0–6.7)
CLARITY: CLEAR
CO2: 28 MEQ/L (ref 22–29)
COCAINE METABOLITE SCREEN URINE: POSITIVE
COLOR: YELLOW
CREAT SERPL-MCNC: 0.84 MG/DL (ref 0.7–1.2)
CREATINE KINASE-MB INDEX: 0.8 % (ref 0–3.5)
EOSINOPHILS ABSOLUTE: 0.1 K/UL (ref 0–0.7)
EOSINOPHILS RELATIVE PERCENT: 0.7 %
ETHANOL PERCENT: NORMAL G/DL
ETHANOL: <10 MG/DL (ref 0–0.08)
GFR AFRICAN AMERICAN: >60
GFR NON-AFRICAN AMERICAN: >60
GLOBULIN: 2.6 G/DL (ref 2.3–3.5)
GLUCOSE BLD-MCNC: 131 MG/DL (ref 74–109)
GLUCOSE URINE: NEGATIVE MG/DL
HCT VFR BLD CALC: 42.1 % (ref 42–52)
HEMOGLOBIN: 14.8 G/DL (ref 14–18)
KETONES, URINE: NEGATIVE MG/DL
LEUKOCYTE ESTERASE, URINE: NEGATIVE
LYMPHOCYTES ABSOLUTE: 1.4 K/UL (ref 1–4.8)
LYMPHOCYTES RELATIVE PERCENT: 18.6 %
Lab: ABNORMAL
MCH RBC QN AUTO: 32.8 PG (ref 27–31.3)
MCHC RBC AUTO-ENTMCNC: 35.1 % (ref 33–37)
MCV RBC AUTO: 93.6 FL (ref 80–100)
MONOCYTES ABSOLUTE: 0.5 K/UL (ref 0.2–0.8)
MONOCYTES RELATIVE PERCENT: 6.6 %
NEUTROPHILS ABSOLUTE: 5.7 K/UL (ref 1.4–6.5)
NEUTROPHILS RELATIVE PERCENT: 73.4 %
NITRITE, URINE: NEGATIVE
OPIATE SCREEN URINE: ABNORMAL
PDW BLD-RTO: 13.1 % (ref 11.5–14.5)
PH UA: 7.5 (ref 5–9)
PHENCYCLIDINE SCREEN URINE: ABNORMAL
PLATELET # BLD: 208 K/UL (ref 130–400)
POTASSIUM SERPL-SCNC: 4.2 MEQ/L (ref 3.5–5.1)
PROTEIN UA: NEGATIVE MG/DL
RBC # BLD: 4.5 M/UL (ref 4.7–6.1)
SALICYLATE, SERUM: <0.3 MG/DL (ref 15–30)
SODIUM BLD-SCNC: 141 MEQ/L (ref 132–144)
SPECIFIC GRAVITY UA: 1.02 (ref 1–1.03)
TOTAL CK: 475 U/L (ref 0–190)
TOTAL PROTEIN: 7.1 G/DL (ref 6.4–8.1)
TSH SERPL DL<=0.05 MIU/L-ACNC: 0.96 UIU/ML (ref 0.27–4.2)
URINE REFLEX TO CULTURE: NORMAL
UROBILINOGEN, URINE: 0.2 E.U./DL
WBC # BLD: 7.8 K/UL (ref 4.8–10.8)

## 2018-12-06 PROCEDURE — 80164 ASSAY DIPROPYLACETIC ACD TOT: CPT

## 2018-12-06 PROCEDURE — G0480 DRUG TEST DEF 1-7 CLASSES: HCPCS

## 2018-12-06 PROCEDURE — 99285 EMERGENCY DEPT VISIT HI MDM: CPT

## 2018-12-06 PROCEDURE — 80053 COMPREHEN METABOLIC PANEL: CPT

## 2018-12-06 PROCEDURE — 36415 COLL VENOUS BLD VENIPUNCTURE: CPT

## 2018-12-06 PROCEDURE — 82553 CREATINE MB FRACTION: CPT

## 2018-12-06 PROCEDURE — 80307 DRUG TEST PRSMV CHEM ANLYZR: CPT

## 2018-12-06 PROCEDURE — 81003 URINALYSIS AUTO W/O SCOPE: CPT

## 2018-12-06 PROCEDURE — 84443 ASSAY THYROID STIM HORMONE: CPT

## 2018-12-06 PROCEDURE — 82550 ASSAY OF CK (CPK): CPT

## 2018-12-06 PROCEDURE — 85025 COMPLETE CBC W/AUTO DIFF WBC: CPT

## 2018-12-06 RX ORDER — BUSPIRONE HYDROCHLORIDE 10 MG/1
10 TABLET ORAL ONCE
Status: COMPLETED | OUTPATIENT
Start: 2018-12-07 | End: 2018-12-07

## 2018-12-06 RX ORDER — QUETIAPINE FUMARATE 25 MG/1
25 TABLET, FILM COATED ORAL ONCE
Status: COMPLETED | OUTPATIENT
Start: 2018-12-07 | End: 2018-12-07

## 2018-12-07 LAB — VALPROIC ACID LEVEL: <2.8 UG/ML (ref 50–100)

## 2018-12-07 PROCEDURE — 6360000002 HC RX W HCPCS: Performed by: PSYCHIATRY & NEUROLOGY

## 2018-12-07 PROCEDURE — 6370000000 HC RX 637 (ALT 250 FOR IP): Performed by: EMERGENCY MEDICINE

## 2018-12-07 PROCEDURE — 6370000000 HC RX 637 (ALT 250 FOR IP): Performed by: PSYCHIATRY & NEUROLOGY

## 2018-12-07 PROCEDURE — 1240000000 HC EMOTIONAL WELLNESS R&B

## 2018-12-07 RX ORDER — BENZTROPINE MESYLATE 1 MG/1
1 TABLET ORAL 2 TIMES DAILY
Status: DISCONTINUED | OUTPATIENT
Start: 2018-12-07 | End: 2018-12-14 | Stop reason: HOSPADM

## 2018-12-07 RX ORDER — BENZTROPINE MESYLATE 1 MG/ML
2 INJECTION INTRAMUSCULAR; INTRAVENOUS 2 TIMES DAILY PRN
Status: DISCONTINUED | OUTPATIENT
Start: 2018-12-07 | End: 2018-12-14 | Stop reason: HOSPADM

## 2018-12-07 RX ORDER — TRAZODONE HYDROCHLORIDE 50 MG/1
50 TABLET ORAL NIGHTLY PRN
Status: DISCONTINUED | OUTPATIENT
Start: 2018-12-07 | End: 2018-12-14 | Stop reason: HOSPADM

## 2018-12-07 RX ORDER — BENZTROPINE MESYLATE 1 MG/1
1 TABLET ORAL ONCE
Status: COMPLETED | OUTPATIENT
Start: 2018-12-07 | End: 2018-12-07

## 2018-12-07 RX ORDER — MAGNESIUM HYDROXIDE/ALUMINUM HYDROXICE/SIMETHICONE 120; 1200; 1200 MG/30ML; MG/30ML; MG/30ML
30 SUSPENSION ORAL PRN
Status: DISCONTINUED | OUTPATIENT
Start: 2018-12-07 | End: 2018-12-14 | Stop reason: HOSPADM

## 2018-12-07 RX ORDER — FLUPHENAZINE HYDROCHLORIDE 5 MG/ML
5 SOLUTION, CONCENTRATE ORAL ONCE
Status: COMPLETED | OUTPATIENT
Start: 2018-12-07 | End: 2018-12-07

## 2018-12-07 RX ORDER — DIVALPROEX SODIUM 500 MG/1
500 TABLET, DELAYED RELEASE ORAL EVERY 8 HOURS SCHEDULED
Status: DISCONTINUED | OUTPATIENT
Start: 2018-12-07 | End: 2018-12-14 | Stop reason: HOSPADM

## 2018-12-07 RX ORDER — QUETIAPINE FUMARATE 25 MG/1
25 TABLET, FILM COATED ORAL 3 TIMES DAILY
Status: DISCONTINUED | OUTPATIENT
Start: 2018-12-07 | End: 2018-12-14 | Stop reason: HOSPADM

## 2018-12-07 RX ORDER — FLUPHENAZINE DECANOATE 25 MG/ML
25 INJECTION, SOLUTION INTRAMUSCULAR; SUBCUTANEOUS
Status: DISCONTINUED | OUTPATIENT
Start: 2018-12-07 | End: 2018-12-14 | Stop reason: HOSPADM

## 2018-12-07 RX ORDER — LORAZEPAM 2 MG/ML
2 INJECTION INTRAMUSCULAR ONCE
Status: DISCONTINUED | OUTPATIENT
Start: 2018-12-07 | End: 2018-12-14 | Stop reason: HOSPADM

## 2018-12-07 RX ORDER — BUSPIRONE HYDROCHLORIDE 5 MG/1
10 TABLET ORAL 3 TIMES DAILY
Status: DISCONTINUED | OUTPATIENT
Start: 2018-12-07 | End: 2018-12-14 | Stop reason: HOSPADM

## 2018-12-07 RX ORDER — ACETAMINOPHEN 325 MG/1
650 TABLET ORAL EVERY 4 HOURS PRN
Status: DISCONTINUED | OUTPATIENT
Start: 2018-12-07 | End: 2018-12-14 | Stop reason: HOSPADM

## 2018-12-07 RX ORDER — HYDROXYZINE HYDROCHLORIDE 50 MG/ML
50 INJECTION, SOLUTION INTRAMUSCULAR EVERY 6 HOURS PRN
Status: DISCONTINUED | OUTPATIENT
Start: 2018-12-07 | End: 2018-12-14 | Stop reason: HOSPADM

## 2018-12-07 RX ORDER — HALOPERIDOL 5 MG/ML
5 INJECTION INTRAMUSCULAR EVERY 6 HOURS PRN
Status: DISCONTINUED | OUTPATIENT
Start: 2018-12-07 | End: 2018-12-14 | Stop reason: HOSPADM

## 2018-12-07 RX ORDER — HYDROXYZINE PAMOATE 50 MG/1
50 CAPSULE ORAL EVERY 6 HOURS PRN
Status: DISCONTINUED | OUTPATIENT
Start: 2018-12-07 | End: 2018-12-14 | Stop reason: HOSPADM

## 2018-12-07 RX ORDER — DIVALPROEX SODIUM 500 MG/1
500 TABLET, EXTENDED RELEASE ORAL ONCE
Status: COMPLETED | OUTPATIENT
Start: 2018-12-07 | End: 2018-12-07

## 2018-12-07 RX ORDER — HALOPERIDOL 5 MG
5 TABLET ORAL EVERY 6 HOURS PRN
Status: DISCONTINUED | OUTPATIENT
Start: 2018-12-07 | End: 2018-12-14 | Stop reason: HOSPADM

## 2018-12-07 RX ORDER — FLUPHENAZINE HYDROCHLORIDE 5 MG/1
5 TABLET ORAL EVERY 12 HOURS SCHEDULED
Status: DISCONTINUED | OUTPATIENT
Start: 2018-12-07 | End: 2018-12-14 | Stop reason: HOSPADM

## 2018-12-07 RX ADMIN — BENZTROPINE MESYLATE 1 MG: 1 TABLET ORAL at 04:41

## 2018-12-07 RX ADMIN — FLUPHENAZINE HYDROCHLORIDE 5 MG: 5 TABLET, FILM COATED ORAL at 20:35

## 2018-12-07 RX ADMIN — TRAZODONE HYDROCHLORIDE 50 MG: 50 TABLET ORAL at 20:34

## 2018-12-07 RX ADMIN — BUSPIRONE HYDROCHLORIDE 10 MG: 10 TABLET ORAL at 00:24

## 2018-12-07 RX ADMIN — FLUPHENAZINE HYDROCHLORIDE 5 MG: 5 SOLUTION, CONCENTRATE ORAL at 04:41

## 2018-12-07 RX ADMIN — NICOTINE POLACRILEX 4 MG: 4 GUM, CHEWING BUCCAL at 20:35

## 2018-12-07 RX ADMIN — BENZTROPINE MESYLATE 1 MG: 1 TABLET ORAL at 20:34

## 2018-12-07 RX ADMIN — FLUPHENAZINE DECANOATE 25 MG: 25 INJECTION, SOLUTION INTRAMUSCULAR; SUBCUTANEOUS at 22:36

## 2018-12-07 RX ADMIN — BUSPIRONE HYDROCHLORIDE 10 MG: 5 TABLET ORAL at 20:34

## 2018-12-07 RX ADMIN — QUETIAPINE FUMARATE 25 MG: 25 TABLET ORAL at 20:34

## 2018-12-07 RX ADMIN — HYDROXYZINE PAMOATE 50 MG: 50 CAPSULE ORAL at 20:35

## 2018-12-07 RX ADMIN — DIVALPROEX SODIUM 500 MG: 500 TABLET, EXTENDED RELEASE ORAL at 04:40

## 2018-12-07 RX ADMIN — DIVALPROEX SODIUM 500 MG: 500 TABLET, DELAYED RELEASE ORAL at 20:34

## 2018-12-07 RX ADMIN — QUETIAPINE FUMARATE 25 MG: 25 TABLET ORAL at 00:24

## 2018-12-07 ASSESSMENT — SLEEP AND FATIGUE QUESTIONNAIRES
DIFFICULTY FALLING ASLEEP: NO
AVERAGE NUMBER OF SLEEP HOURS: 6
DIFFICULTY STAYING ASLEEP: NO
RESTFUL SLEEP: YES
SLEEP PATTERN: NORMAL
DO YOU USE A SLEEP AID: NO
DIFFICULTY ARISING: NO
DO YOU HAVE DIFFICULTY SLEEPING: NO

## 2018-12-07 NOTE — ED NOTES
Pt in bed, resp are even and unlabored. No s/s of distress noted. Will cont to monitor.  Electronically signed by Saurav Burkett LPN on 31/7/0246 at 13:12 AM       Jovanny Carroll LPN  27/92/22 2721

## 2018-12-07 NOTE — ED NOTES
Pt in bed with eyes closed, resp are even and unlabored. No s/s of distress. Will cont to monitor.  Electronically signed by Julia June LPN on 09/2/9369 at 1:82 AM       Ondina Whittaker LPN  25/44/38 8916

## 2018-12-07 NOTE — ED NOTES
Patient is awake, no S/S of distress noted at this time.      [facility]  99/25/41 1428

## 2018-12-07 NOTE — ED NOTES
[facility] called to advise EKG needed to send pt chart for review to [facility].      Rich Noel RN  12/07/18 1004

## 2018-12-07 NOTE — ED NOTES
Dr Stanley Gilliam has seen pt at bedside for medical screening. Discussion disposition and pt condition . Will complete assessments.      Elena Osman RN  12/07/18 0000

## 2018-12-07 NOTE — ED NOTES
Pt sitting on edge of bed, eating, Pt denies needs at this time. No s/s of distress noted. Will cont to monitor.  Electronically signed by Earline Goodpasture, LPN on 38/7/1089 at 1:77 PM       Sabra Lanes, LPN  87/68/43 8037

## 2018-12-07 NOTE — ED PROVIDER NOTES
Frequency: 3.0 times per week     Types: Marijuana      Comment: used today,  10/28/18    Sexual activity: Not Currently     Partners: Female     Other Topics Concern    None     Social History Narrative    Lives w family           SCREENINGS      @FLOW([id number])@      PHYSICAL EXAM    (up to 7 for level 4, 8 or more for level 5)     ED Triage Vitals [12/06/18 1938]   BP Temp Temp Source Pulse Resp SpO2 Height Weight   123/83 98.6 °F (37 °C) Oral 93 18 97 % 6' (1.829 m) 200 lb (90.7 kg)       Physical Exam     CONST: -Well-developed well-nourished ;                -In no acute distress. -Vitals reviewed. EYES: -EOM intact, TIFFANIE:              -Sclera normal and conjunctiva: clear bilaterally. ENT: - Normal pharynx pink and moist.    NECK: -Supple (chin-to-chest). CARD: -Rate and rhythm: Regular              -Murmurs: No  RESP: -Respiratory effort and chest excursion with respirations: Normal             -Breath sounds equal bilaterally: Clear             -Wheezes: No             -Rales: No    BACK: -Flank pain: No              -Pain on palpation: No    ABD: -Distended: No           -Bruits: No           -Bowel sounds: Normal.           -Deep palpation: Non-tender           -Organomegaly palpable: No           -Abnormal masses: No    EXT: Gross appearance and use of all four extremities: Normal     SKIN: -Good turgor warm and dry. -Apparent lesions or rashes: No    NEURO: -Patient: alert                -Oriented to: person, place and time.                 -Appearance and judgment: appropriate.                -Cranial Nerves: Normal.                -Speech: Normal          DIAGNOSTIC RESULTS     EKG: All EKG's are interpreted by the Emergency Department Physician who either signs or Co-signsthis chart in the absence of a cardiologist.        RADIOLOGY:   Non-plain filmimages such as CT, Ultrasound and MRI are read by the radiologist. Plain radiographic images are visualized and Resp: 18   Temp: 98.6 °F (37 °C)   TempSrc: Oral   SpO2: 97%   Weight: 200 lb (90.7 kg)   Height: 6' (1.829 m)           MDM    CRITICAL CARE TIME   Total Critical Care time was  minutes, excluding separately reportableprocedures. There was a high probability of clinicallysignificant/life threatening deterioration in the patient's condition which required my urgent intervention. CONSULTS:  None    PROCEDURES:  Unless otherwise noted below, none     Procedures    FINAL IMPRESSION      1. Acute adjustment disorder with anxiety    2.  Homicidal ideation          DISPOSITION/PLAN   DISPOSITION Decision To Transfer 12/07/2018 03:38:46 AM      PATIENT REFERRED TO:  Behavioral health specialist as soon as possible          [place] [address]             DISCHARGE MEDICATIONS:  New Prescriptions    No medications on file          (Please note that portions of this note were completed with a voice recognition program.  Efforts were made to edit the dictations but occasionally words are mis-transcribed.)    Nakita Quick MD (electronically signed)  Attending Emergency Physician          Nakita Quick MD  12/07/18 0001       Nakita Quick MD  12/07/18 6812

## 2018-12-07 NOTE — ED NOTES
Pt sitting in recliner watching television, no c/o voiced at this time.      Tati Figueroa LPN  69/45/91 8360

## 2018-12-08 PROCEDURE — 6370000000 HC RX 637 (ALT 250 FOR IP): Performed by: PSYCHIATRY & NEUROLOGY

## 2018-12-08 PROCEDURE — 1240000000 HC EMOTIONAL WELLNESS R&B

## 2018-12-08 RX ADMIN — DIVALPROEX SODIUM 500 MG: 500 TABLET, DELAYED RELEASE ORAL at 20:56

## 2018-12-08 RX ADMIN — TRAZODONE HYDROCHLORIDE 50 MG: 50 TABLET ORAL at 20:56

## 2018-12-08 RX ADMIN — QUETIAPINE FUMARATE 25 MG: 25 TABLET ORAL at 14:00

## 2018-12-08 RX ADMIN — DIVALPROEX SODIUM 500 MG: 500 TABLET, DELAYED RELEASE ORAL at 06:14

## 2018-12-08 RX ADMIN — BENZTROPINE MESYLATE 1 MG: 1 TABLET ORAL at 10:47

## 2018-12-08 RX ADMIN — BUSPIRONE HYDROCHLORIDE 10 MG: 5 TABLET ORAL at 14:00

## 2018-12-08 RX ADMIN — BUSPIRONE HYDROCHLORIDE 10 MG: 5 TABLET ORAL at 10:47

## 2018-12-08 RX ADMIN — FLUPHENAZINE HYDROCHLORIDE 5 MG: 5 TABLET, FILM COATED ORAL at 20:56

## 2018-12-08 RX ADMIN — BENZTROPINE MESYLATE 1 MG: 1 TABLET ORAL at 20:56

## 2018-12-08 RX ADMIN — QUETIAPINE FUMARATE 25 MG: 25 TABLET ORAL at 10:47

## 2018-12-08 RX ADMIN — BUSPIRONE HYDROCHLORIDE 10 MG: 5 TABLET ORAL at 20:59

## 2018-12-08 RX ADMIN — DIVALPROEX SODIUM 500 MG: 500 TABLET, DELAYED RELEASE ORAL at 14:00

## 2018-12-08 RX ADMIN — HYDROXYZINE PAMOATE 50 MG: 50 CAPSULE ORAL at 20:59

## 2018-12-08 RX ADMIN — FLUPHENAZINE HYDROCHLORIDE 5 MG: 5 TABLET, FILM COATED ORAL at 10:48

## 2018-12-08 RX ADMIN — QUETIAPINE FUMARATE 25 MG: 25 TABLET ORAL at 20:56

## 2018-12-08 NOTE — H&P
2015    Wound of left leg 9/14/2012       Past Surgical History:        Procedure Laterality Date    WRIST SURGERY  2009    pin in wrist left       Allergies:   Shellfish-derived products and Shrimp flavor    Family History  Family History   Problem Relation Age of Onset    Family history unknown: Yes       Social History:     Born and Raised: [place]  Describes Childhood:   supportive  Education: some College  Employment: Unemployed, seeking work  Relationships: single  Children: no children  Current Support: grandparents    Legal Hx: on probation  Access to weapons?:  No      REVIEW OF SYSTEMS:    ROS:  [x] All negative/unchanged except if checked. Explain positive(checked items) below:  [] Constitutional  [] Eyes  [] Ear/Nose/Mouth/Throat  [] Respiratory  [] CV  [] GI  []   [] Musculoskeletal  [] Skin/Breast  [] Neurological  [] Endocrine  [] Heme/Lymph  [] Allergic/Immunologic    Explanation:       PHYSICAL EXAM:  Vitals:  /76   Pulse 66   Temp 98.6 °F (37 °C) (Oral)   Resp 18   Ht 6' (1.829 m)   Wt 200 lb (90.7 kg)   SpO2 100%   BMI 27.12 kg/m²      Neurologic Exam:   Muscle Strength & Tone: full ROM  Gait: normal gait   Involuntary Movements: No    Mental Status Examination:    Level of consciousness:  Alert, awake, oriented   Appearance:  Fair grooming  Behavior/Motor: no psychomotor agitation or retardation  Attitude toward examiner:  guarded  Speech:   With normal tone, rate, volume   Mood: dysthymic, irritable  Affect:  mood congruent  Thought processes: relatively organized  Thought content:  Denies suicidal or homicidal ideations  Delusions: some paranoia  Perceptual Disturbance: denies; may be minimizing though  Cognition:  oriented to person, place, and time   Concentration poor  Memory intact  Insight poor   Judgement poor   Fund of Knowledge limited      DIAGNOSIS:    Schizoaffective disorder; Bipolar type  Cannabis use disorder  Stimulant (cocaine) use disorder     RISK

## 2018-12-08 NOTE — H&P
[address] MEDICINE    HISTORY AND PHYSICAL EXAM    PATIENT NAME:  Christina Parra    MRN:  [id number]  SERVICE DATE:  12/8/2018   SERVICE TIME:  11:52 AM    Primary Care Physician: No primary care provider on file. SUBJECTIVE  CHIEF COMPLAINT:  Medical clear for inpatient psychiatry admission. Consult for medical H/P encounter. HPI:  This is a 29 y.o. male who presents to [address] with complaint of aggression and impulse control. He currently denies any needs at this time and denies cp/sob/n/v/d/fever/chills/rash. PAST MEDICAL HISTORY:    Past Medical History:   Diagnosis Date    Anxiety 1/4/2012    Bipolar affective disorder ([facility] [address] 11/25/2015    Chronic back pain 4/20/2017    Depression     Drug abuse and dependence ([facility] [address] 1/4/2012    Fracture of left side of mandibular body ([facility] [address] 06/17/2018    ACUTE LEFT CONDYLAR NECK FRACTURE    Fracture of navicular bone of foot, closed 09/03/2012    lt    Fracture of temporal bone ([facility] [address] 2015    History of pulmonary embolism     Schizophrenia ([address]     Seizures ([address]     Talus fracture 9/2012    lt    TBI (traumatic brain injury) ([facility] [address] 2015    Wound of left leg 9/14/2012     PAST SURGICAL HISTORY:    Past Surgical History:   Procedure Laterality Date    WRIST SURGERY  2009    pin in wrist left     FAMILY HISTORY:    Family History   Problem Relation Age of Onset    Family history unknown: Yes     SOCIAL HISTORY:    Social History     Social History    Marital status: Single     Spouse name: N/A    Number of children: 0    Years of education: 15     Occupational History    Not on file.      Social History Main Topics    Smoking status: Current Some Day Smoker     Packs/day: 0.50     Years: 1.00     Last attempt to quit: 11/28/2011    Smokeless tobacco: Current User     Types: Chew    Alcohol use No      Comment: pt denies    Drug use: Yes     Frequency: 3.0 times per week     Types: Marijuana      Comment: used today,  10/28/18 examination and not medical management. The patient is to contact and follow up with their primary care physician and go over any abnormal labs, imaging, findings, medical concerns, or conditions that we have and have not addressed during this encounter.     Plan of care discussed with: patient    SIGNATURE: ALICE Rowley CNP  DATE: December 8, 2018  TIME: 11:52 AM

## 2018-12-09 PROCEDURE — 1240000000 HC EMOTIONAL WELLNESS R&B

## 2018-12-09 PROCEDURE — 6370000000 HC RX 637 (ALT 250 FOR IP): Performed by: PSYCHIATRY & NEUROLOGY

## 2018-12-09 RX ADMIN — BUSPIRONE HYDROCHLORIDE 10 MG: 5 TABLET ORAL at 14:21

## 2018-12-09 RX ADMIN — HYDROXYZINE PAMOATE 50 MG: 50 CAPSULE ORAL at 21:13

## 2018-12-09 RX ADMIN — BUSPIRONE HYDROCHLORIDE 10 MG: 5 TABLET ORAL at 21:13

## 2018-12-09 RX ADMIN — DIVALPROEX SODIUM 500 MG: 500 TABLET, DELAYED RELEASE ORAL at 21:13

## 2018-12-09 RX ADMIN — BENZTROPINE MESYLATE 1 MG: 1 TABLET ORAL at 09:01

## 2018-12-09 RX ADMIN — QUETIAPINE FUMARATE 25 MG: 25 TABLET ORAL at 21:13

## 2018-12-09 RX ADMIN — BUSPIRONE HYDROCHLORIDE 10 MG: 5 TABLET ORAL at 09:00

## 2018-12-09 RX ADMIN — QUETIAPINE FUMARATE 25 MG: 25 TABLET ORAL at 09:01

## 2018-12-09 RX ADMIN — BENZTROPINE MESYLATE 1 MG: 1 TABLET ORAL at 21:13

## 2018-12-09 RX ADMIN — QUETIAPINE FUMARATE 25 MG: 25 TABLET ORAL at 14:21

## 2018-12-09 RX ADMIN — DIVALPROEX SODIUM 500 MG: 500 TABLET, DELAYED RELEASE ORAL at 14:21

## 2018-12-09 RX ADMIN — FLUPHENAZINE HYDROCHLORIDE 5 MG: 5 TABLET, FILM COATED ORAL at 09:01

## 2018-12-09 RX ADMIN — TRAZODONE HYDROCHLORIDE 50 MG: 50 TABLET ORAL at 21:13

## 2018-12-09 RX ADMIN — DIVALPROEX SODIUM 500 MG: 500 TABLET, DELAYED RELEASE ORAL at 06:45

## 2018-12-09 RX ADMIN — FLUPHENAZINE HYDROCHLORIDE 5 MG: 5 TABLET, FILM COATED ORAL at 21:16

## 2018-12-10 PROCEDURE — 1240000000 HC EMOTIONAL WELLNESS R&B

## 2018-12-10 PROCEDURE — 6370000000 HC RX 637 (ALT 250 FOR IP): Performed by: PSYCHIATRY & NEUROLOGY

## 2018-12-10 PROCEDURE — 99232 SBSQ HOSP IP/OBS MODERATE 35: CPT | Performed by: PSYCHIATRY & NEUROLOGY

## 2018-12-10 RX ADMIN — FLUPHENAZINE HYDROCHLORIDE 5 MG: 5 TABLET, FILM COATED ORAL at 09:15

## 2018-12-10 RX ADMIN — BENZTROPINE MESYLATE 1 MG: 1 TABLET ORAL at 09:15

## 2018-12-10 RX ADMIN — NICOTINE POLACRILEX 4 MG: 4 GUM, CHEWING BUCCAL at 21:45

## 2018-12-10 RX ADMIN — BUSPIRONE HYDROCHLORIDE 10 MG: 5 TABLET ORAL at 21:46

## 2018-12-10 RX ADMIN — QUETIAPINE FUMARATE 25 MG: 25 TABLET ORAL at 13:30

## 2018-12-10 RX ADMIN — BUSPIRONE HYDROCHLORIDE 10 MG: 5 TABLET ORAL at 09:15

## 2018-12-10 RX ADMIN — FLUPHENAZINE HYDROCHLORIDE 5 MG: 5 TABLET, FILM COATED ORAL at 21:46

## 2018-12-10 RX ADMIN — QUETIAPINE FUMARATE 25 MG: 25 TABLET ORAL at 09:15

## 2018-12-10 RX ADMIN — DIVALPROEX SODIUM 500 MG: 500 TABLET, DELAYED RELEASE ORAL at 06:26

## 2018-12-10 RX ADMIN — HYDROXYZINE PAMOATE 50 MG: 50 CAPSULE ORAL at 21:46

## 2018-12-10 RX ADMIN — BENZTROPINE MESYLATE 1 MG: 1 TABLET ORAL at 21:46

## 2018-12-10 RX ADMIN — TRAZODONE HYDROCHLORIDE 50 MG: 50 TABLET ORAL at 21:46

## 2018-12-10 RX ADMIN — DIVALPROEX SODIUM 500 MG: 500 TABLET, DELAYED RELEASE ORAL at 13:30

## 2018-12-10 RX ADMIN — QUETIAPINE FUMARATE 25 MG: 25 TABLET ORAL at 21:45

## 2018-12-10 RX ADMIN — BUSPIRONE HYDROCHLORIDE 10 MG: 5 TABLET ORAL at 13:30

## 2018-12-10 RX ADMIN — DIVALPROEX SODIUM 500 MG: 500 TABLET, DELAYED RELEASE ORAL at 21:46

## 2018-12-10 RX ADMIN — NICOTINE POLACRILEX 4 MG: 4 GUM, CHEWING BUCCAL at 18:11

## 2018-12-10 NOTE — CARE COORDINATION
Patient did not attend group despite staff encouragement.   Electronically signed by Joann Pritchett, [facility] [place] on 12/10/2018 at 4:17 PM

## 2018-12-10 NOTE — PLAN OF CARE
Problem: Anger Management/Homicidal Ideation:  Goal: Able to display appropriate communication and problem solving  Able to display appropriate communication and problem solving   Outcome: Ongoing    Goal: Ability to verbalize frustrations and anger appropriately will improve  Ability to verbalize frustrations and anger appropriately will improve   Outcome: Ongoing

## 2018-12-11 PROCEDURE — 6370000000 HC RX 637 (ALT 250 FOR IP): Performed by: PSYCHIATRY & NEUROLOGY

## 2018-12-11 PROCEDURE — 1240000000 HC EMOTIONAL WELLNESS R&B

## 2018-12-11 PROCEDURE — 99232 SBSQ HOSP IP/OBS MODERATE 35: CPT | Performed by: PSYCHIATRY & NEUROLOGY

## 2018-12-11 RX ADMIN — QUETIAPINE FUMARATE 25 MG: 25 TABLET ORAL at 13:57

## 2018-12-11 RX ADMIN — NICOTINE POLACRILEX 4 MG: 4 GUM, CHEWING BUCCAL at 20:37

## 2018-12-11 RX ADMIN — HYDROXYZINE PAMOATE 50 MG: 50 CAPSULE ORAL at 21:25

## 2018-12-11 RX ADMIN — DIVALPROEX SODIUM 500 MG: 500 TABLET, DELAYED RELEASE ORAL at 13:57

## 2018-12-11 RX ADMIN — DIVALPROEX SODIUM 500 MG: 500 TABLET, DELAYED RELEASE ORAL at 21:25

## 2018-12-11 RX ADMIN — BENZTROPINE MESYLATE 1 MG: 1 TABLET ORAL at 09:18

## 2018-12-11 RX ADMIN — BUSPIRONE HYDROCHLORIDE 10 MG: 5 TABLET ORAL at 09:18

## 2018-12-11 RX ADMIN — FLUPHENAZINE HYDROCHLORIDE 5 MG: 5 TABLET, FILM COATED ORAL at 21:25

## 2018-12-11 RX ADMIN — TRAZODONE HYDROCHLORIDE 50 MG: 50 TABLET ORAL at 21:25

## 2018-12-11 RX ADMIN — BUSPIRONE HYDROCHLORIDE 10 MG: 5 TABLET ORAL at 21:25

## 2018-12-11 RX ADMIN — BENZTROPINE MESYLATE 1 MG: 1 TABLET ORAL at 21:25

## 2018-12-11 RX ADMIN — QUETIAPINE FUMARATE 25 MG: 25 TABLET ORAL at 09:18

## 2018-12-11 RX ADMIN — BUSPIRONE HYDROCHLORIDE 10 MG: 5 TABLET ORAL at 13:57

## 2018-12-11 RX ADMIN — FLUPHENAZINE HYDROCHLORIDE 5 MG: 5 TABLET, FILM COATED ORAL at 09:18

## 2018-12-11 RX ADMIN — DIVALPROEX SODIUM 500 MG: 500 TABLET, DELAYED RELEASE ORAL at 06:18

## 2018-12-11 RX ADMIN — QUETIAPINE FUMARATE 25 MG: 25 TABLET ORAL at 21:25

## 2018-12-11 ASSESSMENT — LIFESTYLE VARIABLES: HISTORY_ALCOHOL_USE: NO

## 2018-12-12 PROCEDURE — 6370000000 HC RX 637 (ALT 250 FOR IP): Performed by: PSYCHIATRY & NEUROLOGY

## 2018-12-12 PROCEDURE — 99232 SBSQ HOSP IP/OBS MODERATE 35: CPT | Performed by: PSYCHIATRY & NEUROLOGY

## 2018-12-12 PROCEDURE — 1240000000 HC EMOTIONAL WELLNESS R&B

## 2018-12-12 RX ADMIN — BENZTROPINE MESYLATE 1 MG: 1 TABLET ORAL at 20:49

## 2018-12-12 RX ADMIN — BENZTROPINE MESYLATE 1 MG: 1 TABLET ORAL at 09:02

## 2018-12-12 RX ADMIN — DIVALPROEX SODIUM 500 MG: 500 TABLET, DELAYED RELEASE ORAL at 13:49

## 2018-12-12 RX ADMIN — NICOTINE POLACRILEX 4 MG: 4 GUM, CHEWING BUCCAL at 17:53

## 2018-12-12 RX ADMIN — BUSPIRONE HYDROCHLORIDE 10 MG: 5 TABLET ORAL at 13:49

## 2018-12-12 RX ADMIN — FLUPHENAZINE HYDROCHLORIDE 5 MG: 5 TABLET, FILM COATED ORAL at 20:49

## 2018-12-12 RX ADMIN — TRAZODONE HYDROCHLORIDE 50 MG: 50 TABLET ORAL at 20:49

## 2018-12-12 RX ADMIN — QUETIAPINE FUMARATE 25 MG: 25 TABLET ORAL at 09:02

## 2018-12-12 RX ADMIN — BUSPIRONE HYDROCHLORIDE 10 MG: 5 TABLET ORAL at 20:49

## 2018-12-12 RX ADMIN — FLUPHENAZINE HYDROCHLORIDE 5 MG: 5 TABLET, FILM COATED ORAL at 09:02

## 2018-12-12 RX ADMIN — DIVALPROEX SODIUM 500 MG: 500 TABLET, DELAYED RELEASE ORAL at 06:23

## 2018-12-12 RX ADMIN — DIVALPROEX SODIUM 500 MG: 500 TABLET, DELAYED RELEASE ORAL at 20:52

## 2018-12-12 RX ADMIN — QUETIAPINE FUMARATE 25 MG: 25 TABLET ORAL at 20:49

## 2018-12-12 RX ADMIN — NICOTINE POLACRILEX 4 MG: 4 GUM, CHEWING BUCCAL at 13:49

## 2018-12-12 RX ADMIN — HYDROXYZINE PAMOATE 50 MG: 50 CAPSULE ORAL at 20:49

## 2018-12-12 RX ADMIN — BUSPIRONE HYDROCHLORIDE 10 MG: 5 TABLET ORAL at 09:02

## 2018-12-12 RX ADMIN — QUETIAPINE FUMARATE 25 MG: 25 TABLET ORAL at 13:49

## 2018-12-12 NOTE — CARE COORDINATION
Pt denies any current suicidal ideation, homicidal ideation or hallucinations. Pt presents disheveled. Pt denies any hallucinations, typically this pt will admit to hearing voices. Pt also admits to feeling better than when he came in and laughed when he reported so. Pt is sleeping or resting in bed a good deal of the time and is not overly social with peers. Pt has been mildly irritable but cooperative with staff. Pt states he is being discharged Friday and is looking forward to going home.

## 2018-12-13 PROCEDURE — 6370000000 HC RX 637 (ALT 250 FOR IP): Performed by: PSYCHIATRY & NEUROLOGY

## 2018-12-13 PROCEDURE — 99232 SBSQ HOSP IP/OBS MODERATE 35: CPT | Performed by: PSYCHIATRY & NEUROLOGY

## 2018-12-13 PROCEDURE — 1240000000 HC EMOTIONAL WELLNESS R&B

## 2018-12-13 RX ADMIN — BUSPIRONE HYDROCHLORIDE 10 MG: 5 TABLET ORAL at 20:34

## 2018-12-13 RX ADMIN — QUETIAPINE FUMARATE 25 MG: 25 TABLET ORAL at 08:54

## 2018-12-13 RX ADMIN — QUETIAPINE FUMARATE 25 MG: 25 TABLET ORAL at 13:46

## 2018-12-13 RX ADMIN — FLUPHENAZINE HYDROCHLORIDE 5 MG: 5 TABLET, FILM COATED ORAL at 08:54

## 2018-12-13 RX ADMIN — BENZTROPINE MESYLATE 1 MG: 1 TABLET ORAL at 19:47

## 2018-12-13 RX ADMIN — BUSPIRONE HYDROCHLORIDE 10 MG: 5 TABLET ORAL at 13:46

## 2018-12-13 RX ADMIN — BENZTROPINE MESYLATE 1 MG: 1 TABLET ORAL at 08:54

## 2018-12-13 RX ADMIN — DIVALPROEX SODIUM 500 MG: 500 TABLET, DELAYED RELEASE ORAL at 13:46

## 2018-12-13 RX ADMIN — DIVALPROEX SODIUM 500 MG: 500 TABLET, DELAYED RELEASE ORAL at 05:45

## 2018-12-13 RX ADMIN — FLUPHENAZINE HYDROCHLORIDE 5 MG: 5 TABLET, FILM COATED ORAL at 19:46

## 2018-12-13 RX ADMIN — NICOTINE POLACRILEX 4 MG: 4 GUM, CHEWING BUCCAL at 19:47

## 2018-12-13 RX ADMIN — DIVALPROEX SODIUM 500 MG: 500 TABLET, DELAYED RELEASE ORAL at 19:49

## 2018-12-13 RX ADMIN — HYDROXYZINE PAMOATE 50 MG: 50 CAPSULE ORAL at 19:47

## 2018-12-13 RX ADMIN — BUSPIRONE HYDROCHLORIDE 10 MG: 5 TABLET ORAL at 08:54

## 2018-12-13 RX ADMIN — TRAZODONE HYDROCHLORIDE 50 MG: 50 TABLET ORAL at 19:46

## 2018-12-13 RX ADMIN — QUETIAPINE FUMARATE 25 MG: 25 TABLET ORAL at 19:46

## 2018-12-13 NOTE — BH NOTE
Pt did not attend Wrap Up group at 2100.     Electronically signed by Oracio Stafford on 12/12/2018 at 11:06 PM

## 2018-12-14 VITALS
TEMPERATURE: 97 F | WEIGHT: 200 LBS | HEIGHT: 72 IN | OXYGEN SATURATION: 98 % | RESPIRATION RATE: 18 BRPM | DIASTOLIC BLOOD PRESSURE: 82 MMHG | HEART RATE: 77 BPM | SYSTOLIC BLOOD PRESSURE: 120 MMHG | BODY MASS INDEX: 27.09 KG/M2

## 2018-12-14 PROCEDURE — 6370000000 HC RX 637 (ALT 250 FOR IP): Performed by: PSYCHIATRY & NEUROLOGY

## 2018-12-14 PROCEDURE — 99239 HOSP IP/OBS DSCHRG MGMT >30: CPT | Performed by: PSYCHIATRY & NEUROLOGY

## 2018-12-14 RX ORDER — DIVALPROEX SODIUM 500 MG/1
500 TABLET, DELAYED RELEASE ORAL EVERY 8 HOURS SCHEDULED
Qty: 45 TABLET | Refills: 2 | Status: SHIPPED | OUTPATIENT
Start: 2018-12-14 | End: 2019-02-01

## 2018-12-14 RX ORDER — FLUPHENAZINE HYDROCHLORIDE 5 MG/1
5 TABLET ORAL EVERY 12 HOURS SCHEDULED
Qty: 30 TABLET | Refills: 2 | Status: SHIPPED | OUTPATIENT
Start: 2018-12-14 | End: 2019-02-01

## 2018-12-14 RX ORDER — QUETIAPINE FUMARATE 25 MG/1
25 TABLET, FILM COATED ORAL 3 TIMES DAILY
Qty: 45 TABLET | Refills: 2 | Status: SHIPPED | OUTPATIENT
Start: 2018-12-14 | End: 2019-02-01

## 2018-12-14 RX ADMIN — QUETIAPINE FUMARATE 25 MG: 25 TABLET ORAL at 08:29

## 2018-12-14 RX ADMIN — BENZTROPINE MESYLATE 1 MG: 1 TABLET ORAL at 08:29

## 2018-12-14 RX ADMIN — BUSPIRONE HYDROCHLORIDE 10 MG: 5 TABLET ORAL at 13:59

## 2018-12-14 RX ADMIN — DIVALPROEX SODIUM 500 MG: 500 TABLET, DELAYED RELEASE ORAL at 05:52

## 2018-12-14 RX ADMIN — QUETIAPINE FUMARATE 25 MG: 25 TABLET ORAL at 13:59

## 2018-12-14 RX ADMIN — BUSPIRONE HYDROCHLORIDE 10 MG: 5 TABLET ORAL at 08:28

## 2018-12-14 RX ADMIN — FLUPHENAZINE HYDROCHLORIDE 5 MG: 5 TABLET, FILM COATED ORAL at 08:29

## 2018-12-14 RX ADMIN — DIVALPROEX SODIUM 500 MG: 500 TABLET, DELAYED RELEASE ORAL at 13:59

## 2018-12-14 NOTE — PLAN OF CARE
Problem: Anger Management/Homicidal Ideation:  Goal: Able to display appropriate communication and problem solving  Able to display appropriate communication and problem solving   Outcome: Met This Shift    Goal: Ability to verbalize frustrations and anger appropriately will improve  Ability to verbalize frustrations and anger appropriately will improve   Outcome: Met This Shift

## 2018-12-14 NOTE — PROGRESS NOTES
[facility] FOLLOW-UP NOTE     12/13/2018     Patient was seen and examined in person, Chart reviewed   Patient's case discussed with staff/team    Chief Complaint: Psychosis    Interim History:     Has been cooperative  Less paranoid  Focus on discharge  Denies SI/HI  No AH or VH  Has been compliant with medication  Appetite:   [x] Normal/Unchanged  [] Increased  [] Decreased      Sleep:       [] Normal/Unchanged  [x] Fair       [] Poor              Energy:    [] Normal/Unchanged  [] Increased  [x] Decreased        SI [] Present  [x] Absent    HI  []Present  [x] Absent     Aggression:  [] yes  [x] no    Patient is [x] able  [] unable to CONTRACT FOR SAFETY     PAST MEDICAL/PSYCHIATRIC HISTORY:   Past Medical History:   Diagnosis Date    Anxiety 1/4/2012    Bipolar affective disorder ([facility] 11/25/2015    Chronic back pain 4/20/2017    Depression     Drug abuse and dependence ([facility] 1/4/2012    Fracture of left side of mandibular body ([facility] 06/17/2018    ACUTE LEFT CONDYLAR NECK FRACTURE    Fracture of navicular bone of foot, closed 09/03/2012    lt    Fracture of temporal bone ([facility] 2015    History of pulmonary embolism     Schizophrenia ([facility]     Seizures ([facility]     Talus fracture 9/2012    lt    TBI (traumatic brain injury) ([facility] 2015    Wound of left leg 9/14/2012       FAMILY/SOCIAL HISTORY:  Family History   Problem Relation Age of Onset    Family history unknown: Yes     Social History     Social History    Marital status: Single     Spouse name: N/A    Number of children: 0    Years of education: 15     Occupational History    Not on file.      Social History Main Topics    Smoking status: Current Some Day Smoker     Packs/day: 0.50     Years: 1.00     Last attempt to quit: 11/28/2011    Smokeless tobacco: Current User     Types: Chew    Alcohol use No      Comment: pt denies    Drug use: Yes     Frequency: 3.0 times per week     Types: Marijuana      Comment: used today,  10/28/18
Group Therapy Note    Date: 12/11/18  Start Time: 1430  End Time:  1500    Number of Participants:8    Type of Group: Psychoeducation    Patient's Goal:  To participate in mood management group. Notes: Patient declined to attend psychoeducation group at 1430 despite encouragement by staff.      Discipline Responsible: /Counselor    CHRIS Farr
Group Therapy Note    Date: 12/12/18  Start Time: 1450  End Time:  4390    Number of Participants:7    Type of Group: Psychoeducation    Patient's Goal: To participate in mood management group. Notes: Patient declined to attend psychoeducation group [id number] despite encouragement by staff.      Discipline Responsible: /Counselor    CHRIS Archuleta
Isolated to room all morning except for breakfast. Denies SI, depression or hallucinations. At first he said he didn't know why he was here but then admitted he missed his appointment with GILBERTO for his long acting injection. Pt biggest concern is missing an appointment with his  today. Calm, cooperative, eating and sleeping.
Pat refused vitals this am, did not want to do an assessment, refused meds, \"I'll take later. \"  Patient did take meds at 1045
Pt denies SI/HI or AVH. Patient isolates to room, cooperative with meds and assessment and comes out for meals.  Electronically signed by Georgi Argueta LPN on 38/81/1450 at 12:36 PM
Pt. refused to attend the 1000 skills group, despite staff encouragement.  Electronically signed by Sissy Linares on 12/8/2018 at 1:18 PM
Pt. refused to attend the 1100 skills group, despite staff encouragement. Electronically signed by Champ Mcmahon, [facility] on 12/14/2018 at 12:28 PM
Stays to self and isolates to room  Remains concerned about  and court today.  Responds appropriately to questions denies suicidal/homicidal ideation /denies hallucinations or delusions  Taking medications without diffcuklty  Would like to be discharged soon   mood stable this evening
No      Comment: pt denies    Drug use: Yes     Frequency: 3.0 times per week     Types: Marijuana      Comment: used today,  10/28/18    Sexual activity: Not Currently     Partners: Female     Other Topics Concern    Not on file     Social History Narrative    Lives w family               ROS:  [x] All negative/unchanged except if checked.  Explain positive(checked items) below:  [] Constitutional  [] Eyes  [] Ear/Nose/Mouth/Throat  [] Respiratory  [] CV  [] GI  []   [] Musculoskeletal  [] Skin/Breast  [] Neurological  [] Endocrine  [] Heme/Lymph  [] Allergic/Immunologic    Explanation:     MEDICATIONS:    Current Facility-Administered Medications:     LORazepam (ATIVAN) injection 2 mg, 2 mg, Intramuscular, Once, Leslie Trinidad MD    benztropine (COGENTIN) tablet 1 mg, 1 mg, Oral, BID, Misty Anne MD, 1 mg at 12/10/18 0915    busPIRone (BUSPAR) tablet 10 mg, 10 mg, Oral, TID, Misty Anne MD, 10 mg at 12/10/18 0915    divalproex (DEPAKOTE) DR tablet 500 mg, 500 mg, Oral, 3 times per day, Misty Anne MD, 500 mg at 12/10/18 6892    fluPHENAZine decanoate (PROLIXIN) injection 25 mg, 25 mg, Intramuscular, Q21 Days, Misty Anne MD, 25 mg at 12/07/18 2236    fluPHENAZine HCl (PROLIXIN) tablet 5 mg, 5 mg, Oral, 2 times per day, Misty Anne MD, 5 mg at 12/10/18 0915    QUEtiapine (SEROQUEL) tablet 25 mg, 25 mg, Oral, TID, Misty Anne MD, 25 mg at 12/10/18 0915    acetaminophen (TYLENOL) tablet 650 mg, 650 mg, Oral, Q4H PRN, Misty Anne MD    traZODone (DESYREL) tablet 50 mg, 50 mg, Oral, Nightly PRN, Misty Anne MD, 50 mg at 12/09/18 2113    benztropine mesylate (COGENTIN) injection 2 mg, 2 mg, Intramuscular, BID PRN, Misty Anne MD    magnesium hydroxide (MILK OF MAGNESIA) 400 MG/5ML suspension 30 mL, 30 mL, Oral, Daily PRN, Misty Anne MD    aluminum & magnesium hydroxide-simethicone (MAALOX) 200-200-20 MG/5ML suspension 30 mL, 30 mL, Oral, PRN,
mg, Intramuscular, Q6H PRN, Lyndon Dye MD    hydrOXYzine (VISTARIL) capsule 50 mg, 50 mg, Oral, Q6H PRN, 50 mg at 12/11/18 4097 **OR** hydrOXYzine (VISTARIL) injection 50 mg, 50 mg, Intramuscular, Q6H PRN, Lyndon Dye MD    nicotine polacrilex (NICORETTE) gum 4 mg, 4 mg, Oral, PRN, Rafita Black MD, 4 mg at 12/12/18 1349      Examination:  /62   Pulse 62   Temp 97 °F (36.1 °C) (Oral)   Resp 20   Ht 6' (1.829 m)   Wt 200 lb (90.7 kg)   SpO2 99%   BMI 27.12 kg/m²   Gait - steady  Medication side effects(SE): no    Mental Status Examination:    Level of consciousness:  within normal limits   Appearance:  fair grooming and fair hygiene  Behavior/Motor:  no abnormalities noted  Attitude toward examiner:  cooperative  Speech:  slow   Mood: dysthymic  Affect:  anxious  Thought processes:  slow   Thought content:  Delusions:less  paranoid  Cognition:  oriented to person, place, and time   Concentration intact  Insight fair   Judgement fair     ASSESSMENT:   Patient symptoms are:  [] Well controlled  [x] Improving  [] Worsening  [] No change      Diagnosis:   Principal Problem:    Schizoaffective disorder, bipolar type ([facility]  Resolved Problems:    * No resolved hospital problems. *      LABS:    No results for input(s): WBC, HGB, PLT in the last 72 hours. No results for input(s): NA, K, CL, CO2, BUN, CREATININE, GLUCOSE in the last 72 hours. No results for input(s): BILITOT, ALKPHOS, AST, ALT in the last 72 hours. Lab Results   Component Value Date    LABAMPH Neg 12/06/2018    BARBSCNU Neg 12/06/2018    LABBENZ Neg 12/06/2018    LABBENZ NotDTCD 03/10/2013    OPIATESCREENURINE Neg 12/06/2018    PHENCYCLIDINESCREENURINE Neg 12/06/2018    ETOH <10 12/06/2018     Lab Results   Component Value Date    TSH 0.960 12/06/2018     No results found for: LITHIUM  Lab Results   Component Value Date    VALPROATE <2.8 (L) 12/06/2018       Treatment Plan:  Reviewed current Medications with the patient.
Continue Current Medications  [x] Continue Follow-up  [x] Continue Labs      [x] Risks, benefits, side effects, drug-to-drug interactions and alternatives to treatment were discussed in my usual manner.     Reason for more than one antipsychotic:  [x] N/A  [] 3 failed monotherapy(drugs tried):  [] Cross over to a new antipsychotic  [] Taper to monotherapy from polypharmacy  [] Augmentation of Clozapine therapy due to treatment resistance to single therapy      Electronically signed by Ju Osuna MD on 12/9/2018 at 5:58 PM

## 2018-12-14 NOTE — DISCHARGE SUMMARY
he stopped taking his medications; he said he \"doesn't know\".  He otherwise said he was \"OK\", and he denied having been aggressive towards his grandmother.      The patient is not currently receiving care for the above psychiatric illness.     Medications Prior to Admission:   Prescriptions Prior to Admission: busPIRone (BUSPAR) 10 MG tablet, Take 1 tablet by mouth 3 times daily  divalproex (DEPAKOTE) 500 MG DR tablet, Take 1 tablet by mouth every 8 hours  benztropine (COGENTIN) 1 MG tablet, Take 1 tablet by mouth 2 times daily  fluPHENAZine decanoate (PROLIXIN) 25 MG/ML injection, Inject 1 mL into the muscle every 21 days  fluPHENAZine HCl (PROLIXIN) 5 MG tablet, Take 1 tablet by mouth every 12 hours  QUEtiapine (SEROQUEL) 25 MG tablet, Take 1 tablet by mouth 3 times daily     Compliance:no     Psychiatric Review of Systems       Depression: yes     Layne or Hypomania:  yes      Panic Attacks:  no     Phobias:  no     Obsessions and Compulsions:  no     PTSD : no     Hallucinations:  yes      Delusions:  yes      Substance Abuse History:  ETOH: he did not want to talk about it  Marijuana: yes daily  Opiates: no  Other Drugs: cocaine  Toxicology screen was positive for THC and cocaine     Past Psychiatric History:  Prior Diagnosis: Schizoaffective disorder, Substance disorders:  Polysubstance abuse  Psychiatrist: [facility]  Therapist: no  Hospitalizations: yes  Hx of Suicidal Attempts: no  Hx of violence:  no  ECT: no         PAST MEDICAL/PSYCHIATRIC HISTORY:   Past Medical History:   Diagnosis Date    Anxiety 1/4/2012    Bipolar affective disorder ([address] 11/25/2015    Chronic back pain 4/20/2017    Depression     Drug abuse and dependence ([address] 1/4/2012    Fracture of left side of mandibular body ([address] 06/17/2018    ACUTE LEFT CONDYLAR NECK FRACTURE    Fracture of navicular bone of foot, closed 09/03/2012    lt    Fracture of temporal bone ([address] 2015    History of pulmonary embolism     [facility] ([facility]     Seizures ([facility]     Talus fracture 9/2012    lt    TBI (traumatic brain injury) ([facility] 2015    Wound of left leg 9/14/2012       FAMILY/SOCIAL HISTORY:  Family History   Problem Relation Age of Onset    Family history unknown: Yes     Social History     Social History    Marital status: Single     Spouse name: N/A    Number of children: 0    Years of education: 15     Occupational History    Not on file.      Social History Main Topics    Smoking status: Current Some Day Smoker     Packs/day: 0.50     Years: 1.00     Last attempt to quit: 11/28/2011    Smokeless tobacco: Current User     Types: Chew    Alcohol use No      Comment: pt denies    Drug use: Yes     Frequency: 3.0 times per week     Types: Marijuana      Comment: used today,  10/28/18    Sexual activity: Not Currently     Partners: Female     Other Topics Concern    Not on file     Social History Narrative    Lives w family           MEDICATIONS:    Current Facility-Administered Medications:     LORazepam (ATIVAN) injection 2 mg, 2 mg, Intramuscular, Once, Mukesh Temple MD    benztropine (COGENTIN) tablet 1 mg, 1 mg, Oral, BID, Cheli Cardoso MD, 1 mg at 12/14/18 0829    busPIRone (BUSPAR) tablet 10 mg, 10 mg, Oral, TID, Cheli Cardoso MD, 10 mg at 12/14/18 0828    divalproex (DEPAKOTE) DR tablet 500 mg, 500 mg, Oral, 3 times per day, Cheli Cardoso MD, 500 mg at 12/14/18 0552    fluPHENAZine decanoate (PROLIXIN) injection 25 mg, 25 mg, Intramuscular, Q21 Days, Cheli Cardoso MD, 25 mg at 12/07/18 2236    fluPHENAZine HCl (PROLIXIN) tablet 5 mg, 5 mg, Oral, 2 times per day, Cheli Cardoso MD, 5 mg at 12/14/18 0829    QUEtiapine (SEROQUEL) tablet 25 mg, 25 mg, Oral, TID, Cheli Cardoso MD, 25 mg at 12/14/18 0829    acetaminophen (TYLENOL) tablet 650 mg, 650 mg, Oral, Q4H PRN, Cheli Cardoso MD    traZODone (DESYREL) tablet 50 mg, 50 mg, Oral, Nightly PRN, Cheli Cardoso MD, 50 mg at 12/13/18

## 2019-01-13 ENCOUNTER — HOSPITAL ENCOUNTER (EMERGENCY)
Age: 29
Discharge: HOME | End: 2019-01-15
Payer: MEDICARE

## 2019-01-13 ENCOUNTER — HOSPITAL ENCOUNTER (EMERGENCY)
Age: 29
Discharge: OTHER INSTITUTION | End: 2019-01-13
Attending: EMERGENCY MEDICINE
Payer: MEDICARE

## 2019-01-13 VITALS
TEMPERATURE: 98.6 F | SYSTOLIC BLOOD PRESSURE: 155 MMHG | OXYGEN SATURATION: 100 % | DIASTOLIC BLOOD PRESSURE: 97 MMHG | RESPIRATION RATE: 18 BRPM | HEIGHT: 72 IN | BODY MASS INDEX: 23.03 KG/M2 | HEART RATE: 85 BPM | WEIGHT: 170 LBS

## 2019-01-13 DIAGNOSIS — F12.10 DRUG ABUSE, MARIJUANA: ICD-10-CM

## 2019-01-13 DIAGNOSIS — F12.10 MARIJUANA ABUSE: ICD-10-CM

## 2019-01-13 DIAGNOSIS — F14.10 COCAINE ABUSE (HCC): ICD-10-CM

## 2019-01-13 DIAGNOSIS — R45.851 SUICIDAL IDEATION: Primary | ICD-10-CM

## 2019-01-13 DIAGNOSIS — F11.10 OPIATE ABUSE, EPISODIC (HCC): ICD-10-CM

## 2019-01-13 DIAGNOSIS — F20.81 SCHIZOPHRENIFORM DISORDER (HCC): ICD-10-CM

## 2019-01-13 DIAGNOSIS — F14.10 DRUG ABUSE, COCAINE TYPE (HCC): ICD-10-CM

## 2019-01-13 DIAGNOSIS — F25.0 SCHIZOAFFECTIVE DISORDER, BIPOLAR TYPE (HCC): Primary | ICD-10-CM

## 2019-01-13 LAB
ACETAMINOPHEN LEVEL: <15 UG/ML (ref 10–30)
AMPHETAMINE SCREEN, URINE: ABNORMAL
ANION GAP SERPL CALCULATED.3IONS-SCNC: 17 MEQ/L (ref 7–13)
BARBITURATE SCREEN URINE: ABNORMAL
BASOPHILS ABSOLUTE: 0.1 K/UL (ref 0–0.2)
BASOPHILS RELATIVE PERCENT: 0.6 %
BENZODIAZEPINE SCREEN, URINE: ABNORMAL
BILIRUBIN URINE: NEGATIVE
BLOOD, URINE: NEGATIVE
BUN BLDV-MCNC: 13 MG/DL (ref 6–20)
CALCIUM SERPL-MCNC: 9.9 MG/DL (ref 8.6–10.2)
CANNABINOID SCREEN URINE: POSITIVE
CHLORIDE BLD-SCNC: 100 MEQ/L (ref 98–107)
CLARITY: CLEAR
CO2: 27 MEQ/L (ref 22–29)
COCAINE METABOLITE SCREEN URINE: POSITIVE
COLOR: YELLOW
CREAT SERPL-MCNC: 1.07 MG/DL (ref 0.7–1.2)
EKG ATRIAL RATE: 69 BPM
EKG P AXIS: 67 DEGREES
EKG P-R INTERVAL: 144 MS
EKG Q-T INTERVAL: 426 MS
EKG QRS DURATION: 94 MS
EKG QTC CALCULATION (BAZETT): 456 MS
EKG R AXIS: 75 DEGREES
EKG T AXIS: 44 DEGREES
EKG VENTRICULAR RATE: 69 BPM
EOSINOPHILS ABSOLUTE: 0 K/UL (ref 0–0.7)
EOSINOPHILS RELATIVE PERCENT: 0.2 %
ETHANOL PERCENT: NORMAL G/DL
ETHANOL: <10 MG/DL (ref 0–0.08)
GFR AFRICAN AMERICAN: >60
GFR NON-AFRICAN AMERICAN: >60
GLUCOSE BLD-MCNC: 96 MG/DL (ref 74–109)
GLUCOSE URINE: NEGATIVE MG/DL
HCT VFR BLD CALC: 46.7 % (ref 42–52)
HEMOGLOBIN: 16.1 G/DL (ref 14–18)
KETONES, URINE: 15 MG/DL
LEUKOCYTE ESTERASE, URINE: NEGATIVE
LYMPHOCYTES ABSOLUTE: 1.6 K/UL (ref 1–4.8)
LYMPHOCYTES RELATIVE PERCENT: 17.8 %
Lab: ABNORMAL
MCH RBC QN AUTO: 31.8 PG (ref 27–31.3)
MCHC RBC AUTO-ENTMCNC: 34.3 % (ref 33–37)
MCV RBC AUTO: 92.7 FL (ref 80–100)
MONOCYTES ABSOLUTE: 0.6 K/UL (ref 0.2–0.8)
MONOCYTES RELATIVE PERCENT: 6.4 %
NEUTROPHILS ABSOLUTE: 6.9 K/UL (ref 1.4–6.5)
NEUTROPHILS RELATIVE PERCENT: 75 %
NITRITE, URINE: NEGATIVE
OPIATE SCREEN URINE: POSITIVE
PDW BLD-RTO: 12.9 % (ref 11.5–14.5)
PH UA: 7 (ref 5–9)
PHENCYCLIDINE SCREEN URINE: ABNORMAL
PLATELET # BLD: 213 K/UL (ref 130–400)
POTASSIUM SERPL-SCNC: 3.7 MEQ/L (ref 3.5–5.1)
PROTEIN UA: NEGATIVE MG/DL
RBC # BLD: 5.04 M/UL (ref 4.7–6.1)
SALICYLATE, SERUM: <0.3 MG/DL (ref 15–30)
SODIUM BLD-SCNC: 144 MEQ/L (ref 132–144)
SPECIFIC GRAVITY UA: 1.02 (ref 1–1.03)
TRICYCLIC, URINE: ABNORMAL
URINE REFLEX TO CULTURE: ABNORMAL
UROBILINOGEN, URINE: 1 E.U./DL
WBC # BLD: 9.2 K/UL (ref 4.8–10.8)

## 2019-01-13 PROCEDURE — 81003 URINALYSIS AUTO W/O SCOPE: CPT

## 2019-01-13 PROCEDURE — 93005 ELECTROCARDIOGRAM TRACING: CPT

## 2019-01-13 PROCEDURE — G0480 DRUG TEST DEF 1-7 CLASSES: HCPCS

## 2019-01-13 PROCEDURE — 80048 BASIC METABOLIC PNL TOTAL CA: CPT

## 2019-01-13 PROCEDURE — 99285 EMERGENCY DEPT VISIT HI MDM: CPT

## 2019-01-13 PROCEDURE — 85025 COMPLETE CBC W/AUTO DIFF WBC: CPT

## 2019-01-13 PROCEDURE — 6370000000 HC RX 637 (ALT 250 FOR IP): Performed by: PHYSICIAN ASSISTANT

## 2019-01-13 PROCEDURE — 36415 COLL VENOUS BLD VENIPUNCTURE: CPT

## 2019-01-13 PROCEDURE — 80306 DRUG TEST PRSMV INSTRMNT: CPT

## 2019-01-13 RX ORDER — 0.9 % SODIUM CHLORIDE 0.9 %
1000 INTRAVENOUS SOLUTION INTRAVENOUS ONCE
Status: DISCONTINUED | OUTPATIENT
Start: 2019-01-13 | End: 2019-01-13

## 2019-01-13 RX ADMIN — NICOTINE POLACRILEX 2 MG: 2 GUM, CHEWING BUCCAL at 18:22

## 2019-01-13 ASSESSMENT — ENCOUNTER SYMPTOMS
SHORTNESS OF BREATH: 0
COLOR CHANGE: 0
APNEA: 0
SHORTNESS OF BREATH: 0
SORE THROAT: 0
ABDOMINAL PAIN: 0
DIARRHEA: 0
TROUBLE SWALLOWING: 0
SINUS PRESSURE: 0
RHINORRHEA: 0
ABDOMINAL DISTENTION: 0
EYE PAIN: 0
CONSTIPATION: 0
PHOTOPHOBIA: 0
ABDOMINAL PAIN: 0
EYE PAIN: 0
ALLERGIC/IMMUNOLOGIC NEGATIVE: 1
WHEEZING: 0
APNEA: 0
BACK PAIN: 0
NAUSEA: 0
COLOR CHANGE: 0
COUGH: 0
VOMITING: 0

## 2019-01-14 PROCEDURE — 6370000000 HC RX 637 (ALT 250 FOR IP): Performed by: PHYSICIAN ASSISTANT

## 2019-01-14 PROCEDURE — 96372 THER/PROPH/DIAG INJ SC/IM: CPT

## 2019-01-14 RX ADMIN — NICOTINE POLACRILEX 2 MG: 2 GUM, CHEWING BUCCAL at 18:27

## 2019-01-15 VITALS
HEIGHT: 73 IN | SYSTOLIC BLOOD PRESSURE: 128 MMHG | RESPIRATION RATE: 18 BRPM | HEART RATE: 69 BPM | TEMPERATURE: 97.5 F | DIASTOLIC BLOOD PRESSURE: 88 MMHG | OXYGEN SATURATION: 100 % | BODY MASS INDEX: 22.53 KG/M2 | WEIGHT: 170 LBS

## 2019-01-15 PROCEDURE — 90791 PSYCH DIAGNOSTIC EVALUATION: CPT | Performed by: PSYCHIATRY & NEUROLOGY

## 2019-01-15 PROCEDURE — 6360000002 HC RX W HCPCS: Performed by: STUDENT IN AN ORGANIZED HEALTH CARE EDUCATION/TRAINING PROGRAM

## 2019-01-15 PROCEDURE — 6370000000 HC RX 637 (ALT 250 FOR IP): Performed by: STUDENT IN AN ORGANIZED HEALTH CARE EDUCATION/TRAINING PROGRAM

## 2019-01-15 PROCEDURE — 6370000000 HC RX 637 (ALT 250 FOR IP): Performed by: PHYSICIAN ASSISTANT

## 2019-01-15 RX ORDER — DIVALPROEX SODIUM 500 MG/1
500 TABLET, EXTENDED RELEASE ORAL ONCE
Status: COMPLETED | OUTPATIENT
Start: 2019-01-15 | End: 2019-01-15

## 2019-01-15 RX ORDER — FLUPHENAZINE DECANOATE 25 MG/ML
25 INJECTION, SOLUTION INTRAMUSCULAR; SUBCUTANEOUS ONCE
Status: COMPLETED | OUTPATIENT
Start: 2019-01-15 | End: 2019-01-15

## 2019-01-15 RX ORDER — FLUPHENAZINE DECANOATE 25 MG/ML
25 INJECTION, SOLUTION INTRAMUSCULAR; SUBCUTANEOUS ONCE
Status: DISCONTINUED | OUTPATIENT
Start: 2019-01-15 | End: 2019-01-15 | Stop reason: SDUPTHER

## 2019-01-15 RX ADMIN — FLUPHENAZINE DECANOATE 25 MG: 25 INJECTION, SOLUTION INTRAMUSCULAR; SUBCUTANEOUS at 11:22

## 2019-01-15 RX ADMIN — NICOTINE POLACRILEX 2 MG: 2 GUM, CHEWING BUCCAL at 12:06

## 2019-01-15 RX ADMIN — DIVALPROEX SODIUM 500 MG: 500 TABLET, EXTENDED RELEASE ORAL at 11:22

## 2019-01-31 ENCOUNTER — HOSPITAL ENCOUNTER (EMERGENCY)
Age: 29
Discharge: HOME | End: 2019-02-01
Payer: MEDICARE

## 2019-01-31 DIAGNOSIS — F25.0 SCHIZOAFFECTIVE DISORDER, BIPOLAR TYPE (HCC): Primary | ICD-10-CM

## 2019-01-31 DIAGNOSIS — F12.10 MARIJUANA ABUSE: ICD-10-CM

## 2019-01-31 DIAGNOSIS — F14.10 COCAINE ABUSE (HCC): ICD-10-CM

## 2019-01-31 LAB
ACETAMINOPHEN LEVEL: <5 UG/ML (ref 10–30)
ALBUMIN SERPL-MCNC: 5.1 G/DL (ref 3.9–4.9)
ALP BLD-CCNC: 80 U/L (ref 35–104)
ALT SERPL-CCNC: 24 U/L (ref 0–41)
ANION GAP SERPL CALCULATED.3IONS-SCNC: 13 MEQ/L (ref 7–13)
AST SERPL-CCNC: 22 U/L (ref 0–40)
BASOPHILS ABSOLUTE: 0.1 K/UL (ref 0–0.2)
BASOPHILS RELATIVE PERCENT: 1.3 %
BILIRUB SERPL-MCNC: 0.7 MG/DL (ref 0–1.2)
BUN BLDV-MCNC: 20 MG/DL (ref 6–20)
CALCIUM SERPL-MCNC: 9.4 MG/DL (ref 8.6–10.2)
CHLORIDE BLD-SCNC: 101 MEQ/L (ref 98–107)
CO2: 29 MEQ/L (ref 22–29)
CREAT SERPL-MCNC: 1.03 MG/DL (ref 0.7–1.2)
EKG ATRIAL RATE: 77 BPM
EKG P AXIS: 76 DEGREES
EKG P-R INTERVAL: 146 MS
EKG Q-T INTERVAL: 406 MS
EKG QRS DURATION: 98 MS
EKG QTC CALCULATION (BAZETT): 459 MS
EKG R AXIS: 86 DEGREES
EKG T AXIS: 49 DEGREES
EKG VENTRICULAR RATE: 77 BPM
EOSINOPHILS ABSOLUTE: 0 K/UL (ref 0–0.7)
EOSINOPHILS RELATIVE PERCENT: 0.4 %
ETHANOL PERCENT: NORMAL G/DL
ETHANOL: <10 MG/DL (ref 0–0.08)
GFR AFRICAN AMERICAN: >60
GFR NON-AFRICAN AMERICAN: >60
GLOBULIN: 2.5 G/DL (ref 2.3–3.5)
GLUCOSE BLD-MCNC: 76 MG/DL (ref 74–109)
HCT VFR BLD CALC: 46.1 % (ref 42–52)
HEMOGLOBIN: 16.1 G/DL (ref 14–18)
LYMPHOCYTES ABSOLUTE: 2 K/UL (ref 1–4.8)
LYMPHOCYTES RELATIVE PERCENT: 22.2 %
MCH RBC QN AUTO: 32.8 PG (ref 27–31.3)
MCHC RBC AUTO-ENTMCNC: 34.9 % (ref 33–37)
MCV RBC AUTO: 94.2 FL (ref 80–100)
MONOCYTES ABSOLUTE: 0.7 K/UL (ref 0.2–0.8)
MONOCYTES RELATIVE PERCENT: 8.1 %
NEUTROPHILS ABSOLUTE: 6.1 K/UL (ref 1.4–6.5)
NEUTROPHILS RELATIVE PERCENT: 68 %
PDW BLD-RTO: 13.2 % (ref 11.5–14.5)
PLATELET # BLD: 212 K/UL (ref 130–400)
POTASSIUM SERPL-SCNC: 3.7 MEQ/L (ref 3.5–5.1)
RBC # BLD: 4.89 M/UL (ref 4.7–6.1)
SALICYLATE, SERUM: <0.3 MG/DL (ref 15–30)
SODIUM BLD-SCNC: 143 MEQ/L (ref 132–144)
TOTAL CK: 181 U/L (ref 0–190)
TOTAL PROTEIN: 7.6 G/DL (ref 6.4–8.1)
TSH SERPL DL<=0.05 MIU/L-ACNC: 1.99 UIU/ML (ref 0.27–4.2)
WBC # BLD: 9 K/UL (ref 4.8–10.8)

## 2019-01-31 PROCEDURE — 82550 ASSAY OF CK (CPK): CPT

## 2019-01-31 PROCEDURE — G0480 DRUG TEST DEF 1-7 CLASSES: HCPCS

## 2019-01-31 PROCEDURE — 84443 ASSAY THYROID STIM HORMONE: CPT

## 2019-01-31 PROCEDURE — 80307 DRUG TEST PRSMV CHEM ANLYZR: CPT

## 2019-01-31 PROCEDURE — 81003 URINALYSIS AUTO W/O SCOPE: CPT

## 2019-01-31 PROCEDURE — 80053 COMPREHEN METABOLIC PANEL: CPT

## 2019-01-31 PROCEDURE — 80164 ASSAY DIPROPYLACETIC ACD TOT: CPT

## 2019-01-31 PROCEDURE — 99285 EMERGENCY DEPT VISIT HI MDM: CPT

## 2019-01-31 PROCEDURE — 85025 COMPLETE CBC W/AUTO DIFF WBC: CPT

## 2019-01-31 PROCEDURE — 93005 ELECTROCARDIOGRAM TRACING: CPT

## 2019-01-31 PROCEDURE — 36415 COLL VENOUS BLD VENIPUNCTURE: CPT

## 2019-01-31 ASSESSMENT — ENCOUNTER SYMPTOMS
COLOR CHANGE: 0
VOMITING: 0
COUGH: 0
NAUSEA: 0
BLOOD IN STOOL: 0
DIARRHEA: 0
ABDOMINAL PAIN: 0
SORE THROAT: 0
SHORTNESS OF BREATH: 0
RHINORRHEA: 0

## 2019-02-01 VITALS
WEIGHT: 176 LBS | TEMPERATURE: 98.4 F | OXYGEN SATURATION: 97 % | RESPIRATION RATE: 18 BRPM | SYSTOLIC BLOOD PRESSURE: 129 MMHG | HEIGHT: 72 IN | DIASTOLIC BLOOD PRESSURE: 75 MMHG | BODY MASS INDEX: 23.84 KG/M2 | HEART RATE: 69 BPM

## 2019-02-01 LAB
AMPHETAMINE SCREEN, URINE: ABNORMAL
BARBITURATE SCREEN URINE: ABNORMAL
BENZODIAZEPINE SCREEN, URINE: ABNORMAL
BILIRUBIN URINE: NEGATIVE
BLOOD, URINE: NEGATIVE
CANNABINOID SCREEN URINE: POSITIVE
CLARITY: CLEAR
COCAINE METABOLITE SCREEN URINE: POSITIVE
COLOR: YELLOW
GLUCOSE URINE: NEGATIVE MG/DL
KETONES, URINE: NEGATIVE MG/DL
LEUKOCYTE ESTERASE, URINE: NEGATIVE
Lab: ABNORMAL
NITRITE, URINE: NEGATIVE
OPIATE SCREEN URINE: ABNORMAL
PH UA: 6.5 (ref 5–9)
PHENCYCLIDINE SCREEN URINE: ABNORMAL
PROTEIN UA: NEGATIVE MG/DL
SPECIFIC GRAVITY UA: 1.02 (ref 1–1.03)
URINE REFLEX TO CULTURE: NORMAL
UROBILINOGEN, URINE: 1 E.U./DL
VALPROIC ACID LEVEL: <2.8 UG/ML (ref 50–100)

## 2019-02-01 PROCEDURE — 93010 ELECTROCARDIOGRAM REPORT: CPT | Performed by: INTERNAL MEDICINE

## 2019-02-01 PROCEDURE — 93005 ELECTROCARDIOGRAM TRACING: CPT

## 2019-02-01 RX ORDER — FLUPHENAZINE HYDROCHLORIDE 5 MG/1
5 TABLET ORAL 2 TIMES DAILY
Qty: 10 TABLET | Refills: 0 | Status: ON HOLD | OUTPATIENT
Start: 2019-02-01 | End: 2019-08-26 | Stop reason: HOSPADM

## 2019-02-01 RX ORDER — BUSPIRONE HYDROCHLORIDE 10 MG/1
10 TABLET ORAL 3 TIMES DAILY
Qty: 15 TABLET | Refills: 0 | Status: SHIPPED | OUTPATIENT
Start: 2019-02-01 | End: 2019-02-06

## 2019-02-01 RX ORDER — DIVALPROEX SODIUM 500 MG/1
500 TABLET, DELAYED RELEASE ORAL EVERY 8 HOURS SCHEDULED
Qty: 15 TABLET | Refills: 0 | Status: ON HOLD | OUTPATIENT
Start: 2019-02-01 | End: 2019-08-26 | Stop reason: HOSPADM

## 2019-02-01 RX ORDER — QUETIAPINE FUMARATE 25 MG/1
25 TABLET, FILM COATED ORAL 3 TIMES DAILY
Qty: 15 TABLET | Refills: 0 | Status: ON HOLD | OUTPATIENT
Start: 2019-02-01 | End: 2019-08-26 | Stop reason: HOSPADM

## 2019-02-01 RX ORDER — BENZTROPINE MESYLATE 1 MG/1
1 TABLET ORAL 2 TIMES DAILY
Qty: 10 TABLET | Refills: 0 | Status: ON HOLD | OUTPATIENT
Start: 2019-02-01 | End: 2019-08-26 | Stop reason: HOSPADM

## 2019-07-02 ENCOUNTER — HOSPITAL ENCOUNTER (OUTPATIENT)
Dept: LAB | Age: 29
Discharge: HOME | End: 2019-07-02
Payer: MEDICARE

## 2019-07-02 LAB — VALPROIC ACID LEVEL: 75.5 UG/ML (ref 50–100)

## 2019-07-02 PROCEDURE — 80164 ASSAY DIPROPYLACETIC ACD TOT: CPT

## 2019-07-02 PROCEDURE — 36415 COLL VENOUS BLD VENIPUNCTURE: CPT

## 2019-08-14 ENCOUNTER — HOSPITAL ENCOUNTER (INPATIENT)
Age: 29
LOS: 11 days | Discharge: HOME | DRG: 885 | End: 2019-08-26
Attending: PSYCHIATRY & NEUROLOGY | Admitting: PSYCHIATRY & NEUROLOGY
Payer: MEDICARE

## 2019-08-14 DIAGNOSIS — R45.851 SUICIDAL IDEATION: ICD-10-CM

## 2019-08-14 DIAGNOSIS — T50.902A INTENTIONAL DRUG OVERDOSE, INITIAL ENCOUNTER (HCC): Primary | ICD-10-CM

## 2019-08-14 PROCEDURE — 99285 EMERGENCY DEPT VISIT HI MDM: CPT

## 2019-08-15 PROBLEM — F31.81 BIPOLAR 2 DISORDER, MAJOR DEPRESSIVE EPISODE (HCC): Status: ACTIVE | Noted: 2019-08-15

## 2019-08-15 LAB
ACETAMINOPHEN LEVEL: <5 UG/ML (ref 10–30)
ALBUMIN SERPL-MCNC: 5.2 G/DL (ref 3.5–4.6)
ALP BLD-CCNC: 90 U/L (ref 35–104)
ALT SERPL-CCNC: 26 U/L (ref 0–41)
AMPHETAMINE SCREEN, URINE: ABNORMAL
ANION GAP SERPL CALCULATED.3IONS-SCNC: 18 MEQ/L (ref 9–15)
AST SERPL-CCNC: 23 U/L (ref 0–40)
BARBITURATE SCREEN URINE: ABNORMAL
BASOPHILS ABSOLUTE: 0 K/UL (ref 0–0.2)
BASOPHILS RELATIVE PERCENT: 0.6 %
BENZODIAZEPINE SCREEN, URINE: ABNORMAL
BILIRUB SERPL-MCNC: 0.8 MG/DL (ref 0.2–0.7)
BILIRUBIN URINE: NEGATIVE
BLOOD, URINE: NEGATIVE
BUN BLDV-MCNC: 20 MG/DL (ref 6–20)
CALCIUM SERPL-MCNC: 9.8 MG/DL (ref 8.5–9.9)
CANNABINOID SCREEN URINE: POSITIVE
CHLORIDE BLD-SCNC: 100 MEQ/L (ref 95–107)
CK MB: 2.1 NG/ML (ref 0–6.7)
CLARITY: CLEAR
CO2: 26 MEQ/L (ref 20–31)
COCAINE METABOLITE SCREEN URINE: POSITIVE
COLOR: YELLOW
CREAT SERPL-MCNC: 1.06 MG/DL (ref 0.7–1.2)
CREATINE KINASE-MB INDEX: 0.9 % (ref 0–3.5)
EKG ATRIAL RATE: 94 BPM
EKG P AXIS: 73 DEGREES
EKG P-R INTERVAL: 138 MS
EKG Q-T INTERVAL: 374 MS
EKG QRS DURATION: 92 MS
EKG QTC CALCULATION (BAZETT): 467 MS
EKG R AXIS: 88 DEGREES
EKG T AXIS: 50 DEGREES
EKG VENTRICULAR RATE: 94 BPM
EOSINOPHILS ABSOLUTE: 0 K/UL (ref 0–0.7)
EOSINOPHILS RELATIVE PERCENT: 0.4 %
ETHANOL PERCENT: NORMAL G/DL
ETHANOL: <10 MG/DL (ref 0–0.08)
GFR AFRICAN AMERICAN: >60
GFR NON-AFRICAN AMERICAN: >60
GLOBULIN: 3.2 G/DL (ref 2.3–3.5)
GLUCOSE BLD-MCNC: 91 MG/DL (ref 70–99)
GLUCOSE URINE: NEGATIVE MG/DL
HCT VFR BLD CALC: 49.4 % (ref 42–52)
HEMOGLOBIN: 17.8 G/DL (ref 14–18)
KETONES, URINE: NEGATIVE MG/DL
LEUKOCYTE ESTERASE, URINE: NEGATIVE
LYMPHOCYTES ABSOLUTE: 2.1 K/UL (ref 1–4.8)
LYMPHOCYTES RELATIVE PERCENT: 24.5 %
Lab: ABNORMAL
MCH RBC QN AUTO: 33.7 PG (ref 27–31.3)
MCHC RBC AUTO-ENTMCNC: 36.1 % (ref 33–37)
MCV RBC AUTO: 93.4 FL (ref 80–100)
MONOCYTES ABSOLUTE: 0.7 K/UL (ref 0.2–0.8)
MONOCYTES RELATIVE PERCENT: 7.9 %
NEUTROPHILS ABSOLUTE: 5.6 K/UL (ref 1.4–6.5)
NEUTROPHILS RELATIVE PERCENT: 66.6 %
NITRITE, URINE: NEGATIVE
OPIATE SCREEN URINE: ABNORMAL
PDW BLD-RTO: 13 % (ref 11.5–14.5)
PH UA: 6.5 (ref 5–9)
PHENCYCLIDINE SCREEN URINE: ABNORMAL
PLATELET # BLD: ABNORMAL K/UL (ref 130–400)
PLATELET SLIDE REVIEW: ABNORMAL
POTASSIUM SERPL-SCNC: 3.6 MEQ/L (ref 3.4–4.9)
PROTEIN UA: NEGATIVE MG/DL
RBC # BLD: 5.28 M/UL (ref 4.7–6.1)
SALICYLATE, SERUM: <0.3 MG/DL (ref 15–30)
SLIDE REVIEW: ABNORMAL
SODIUM BLD-SCNC: 144 MEQ/L (ref 135–144)
SPECIFIC GRAVITY UA: 1.02 (ref 1–1.03)
TOTAL CK: 224 U/L (ref 0–190)
TOTAL PROTEIN: 8.4 G/DL (ref 6.3–8)
TROPONIN: <0.01 NG/ML (ref 0–0.01)
TSH SERPL DL<=0.05 MIU/L-ACNC: 2.21 UIU/ML (ref 0.44–3.86)
URINE REFLEX TO CULTURE: ABNORMAL
UROBILINOGEN, URINE: 2 E.U./DL
WBC # BLD: 8.4 K/UL (ref 4.8–10.8)

## 2019-08-15 PROCEDURE — 82553 CREATINE MB FRACTION: CPT

## 2019-08-15 PROCEDURE — G0480 DRUG TEST DEF 1-7 CLASSES: HCPCS

## 2019-08-15 PROCEDURE — 93005 ELECTROCARDIOGRAM TRACING: CPT | Performed by: PHYSICIAN ASSISTANT

## 2019-08-15 PROCEDURE — 93010 ELECTROCARDIOGRAM REPORT: CPT | Performed by: INTERNAL MEDICINE

## 2019-08-15 PROCEDURE — 84484 ASSAY OF TROPONIN QUANT: CPT

## 2019-08-15 PROCEDURE — 2580000003 HC RX 258: Performed by: PHYSICIAN ASSISTANT

## 2019-08-15 PROCEDURE — 1240000000 HC EMOTIONAL WELLNESS R&B

## 2019-08-15 PROCEDURE — 36415 COLL VENOUS BLD VENIPUNCTURE: CPT

## 2019-08-15 PROCEDURE — 81003 URINALYSIS AUTO W/O SCOPE: CPT

## 2019-08-15 PROCEDURE — 80307 DRUG TEST PRSMV CHEM ANLYZR: CPT

## 2019-08-15 PROCEDURE — 84443 ASSAY THYROID STIM HORMONE: CPT

## 2019-08-15 PROCEDURE — 85025 COMPLETE CBC W/AUTO DIFF WBC: CPT

## 2019-08-15 PROCEDURE — 80053 COMPREHEN METABOLIC PANEL: CPT

## 2019-08-15 PROCEDURE — 82550 ASSAY OF CK (CPK): CPT

## 2019-08-15 RX ORDER — ACETAMINOPHEN 325 MG/1
650 TABLET ORAL EVERY 4 HOURS PRN
Status: DISCONTINUED | OUTPATIENT
Start: 2019-08-15 | End: 2019-08-26 | Stop reason: HOSPADM

## 2019-08-15 RX ORDER — HALOPERIDOL 5 MG/ML
5 INJECTION INTRAMUSCULAR EVERY 6 HOURS PRN
Status: DISCONTINUED | OUTPATIENT
Start: 2019-08-15 | End: 2019-08-26 | Stop reason: HOSPADM

## 2019-08-15 RX ORDER — HYDROXYZINE HYDROCHLORIDE 50 MG/ML
50 INJECTION, SOLUTION INTRAMUSCULAR EVERY 6 HOURS PRN
Status: DISCONTINUED | OUTPATIENT
Start: 2019-08-15 | End: 2019-08-26 | Stop reason: HOSPADM

## 2019-08-15 RX ORDER — HALOPERIDOL 5 MG
5 TABLET ORAL EVERY 6 HOURS PRN
Status: DISCONTINUED | OUTPATIENT
Start: 2019-08-15 | End: 2019-08-26 | Stop reason: HOSPADM

## 2019-08-15 RX ORDER — HYDROXYZINE PAMOATE 50 MG/1
50 CAPSULE ORAL EVERY 6 HOURS PRN
Status: DISCONTINUED | OUTPATIENT
Start: 2019-08-15 | End: 2019-08-26 | Stop reason: HOSPADM

## 2019-08-15 RX ORDER — 0.9 % SODIUM CHLORIDE 0.9 %
1000 INTRAVENOUS SOLUTION INTRAVENOUS ONCE
Status: COMPLETED | OUTPATIENT
Start: 2019-08-15 | End: 2019-08-15

## 2019-08-15 RX ORDER — BENZTROPINE MESYLATE 1 MG/ML
2 INJECTION INTRAMUSCULAR; INTRAVENOUS 2 TIMES DAILY PRN
Status: DISCONTINUED | OUTPATIENT
Start: 2019-08-15 | End: 2019-08-26 | Stop reason: HOSPADM

## 2019-08-15 RX ORDER — TRAZODONE HYDROCHLORIDE 50 MG/1
50 TABLET ORAL NIGHTLY PRN
Status: DISCONTINUED | OUTPATIENT
Start: 2019-08-15 | End: 2019-08-26 | Stop reason: HOSPADM

## 2019-08-15 RX ADMIN — SODIUM CHLORIDE 1000 ML: 9 INJECTION, SOLUTION INTRAVENOUS at 02:04

## 2019-08-15 ASSESSMENT — ENCOUNTER SYMPTOMS
ABDOMINAL PAIN: 0
DIARRHEA: 0
SHORTNESS OF BREATH: 0
COUGH: 0
PHOTOPHOBIA: 0
EYE PAIN: 0
SORE THROAT: 0
VOMITING: 0
BACK PAIN: 0
NAUSEA: 0
RHINORRHEA: 0
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
## 2019-08-15 NOTE — ED TRIAGE NOTES
Pt walked into ER with c/o suicide attempt. Pt states he has been  taking \"boxes\" of coricidin and doing crack in attempt to \"poison myself\".

## 2019-08-16 PROCEDURE — 6370000000 HC RX 637 (ALT 250 FOR IP): Performed by: PSYCHIATRY & NEUROLOGY

## 2019-08-16 PROCEDURE — 1240000000 HC EMOTIONAL WELLNESS R&B

## 2019-08-16 PROCEDURE — 6370000000 HC RX 637 (ALT 250 FOR IP): Performed by: INTERNAL MEDICINE

## 2019-08-16 PROCEDURE — 99222 1ST HOSP IP/OBS MODERATE 55: CPT | Performed by: PSYCHIATRY & NEUROLOGY

## 2019-08-16 RX ORDER — DIVALPROEX SODIUM 500 MG/1
500 TABLET, DELAYED RELEASE ORAL EVERY 8 HOURS SCHEDULED
Status: DISCONTINUED | OUTPATIENT
Start: 2019-08-16 | End: 2019-08-26 | Stop reason: HOSPADM

## 2019-08-16 RX ADMIN — TRAZODONE HYDROCHLORIDE 50 MG: 50 TABLET ORAL at 21:03

## 2019-08-16 RX ADMIN — HYDROXYZINE PAMOATE 50 MG: 50 CAPSULE ORAL at 21:03

## 2019-08-16 RX ADMIN — DIVALPROEX SODIUM 500 MG: 500 TABLET, DELAYED RELEASE ORAL at 21:59

## 2019-08-16 ASSESSMENT — LIFESTYLE VARIABLES: HISTORY_ALCOHOL_USE: NO
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
## 2019-08-16 NOTE — CARE COORDINATION
Brief Intervention and Referral to Treatment Summary    Patient was provided PHQ-9, AUDIT and DAST Screening:      PHQ-9 Score: nc  AUDIT Score:  0  DAST Score:  9    Patients substance use is considered     Low Risk/Healthy  Moderate Risk   Harmful x  Dependent    Patients depression is considered: NC    Minimal  Mild   Moderate  Moderately Severe  Severe    Brief Education Was Provided    Patient was receptivex  Patient was not receptive      Brief Intervention Is Provided (Only for AUDIT or DAST)     Patient reports readiness to decrease and/or stop use and a plan was discussed x  Patient denies readiness to decrease and/or stop use and a plan was not discussed      Recommendations/Referrals for Brief and/or Specialized Treatment Provided to Patient   Patient tested positive for THC and cocaine. Would like to meet with LGR.   Electronically signed by Andrea Jimenes [facility] on 8/16/2019 at 3:55 PM

## 2019-08-16 NOTE — H&P
suspension 30 mL  30 mL Oral Daily PRN Chilango Lamb MD        traZODone (DESYREL) tablet 50 mg  50 mg Oral Nightly PRN Chilango Lamb MD        benztropine mesylate (COGENTIN) injection 2 mg  2 mg Intramuscular BID PRN Chilango Lamb MD        haloperidol (HALDOL) tablet 5 mg  5 mg Oral Q6H PRN Chilango Lamb MD        Or    haloperidol lactate (HALDOL) injection 5 mg  5 mg Intramuscular Q6H PRN Chilango Lamb MD        hydrOXYzine (VISTARIL) capsule 50 mg  50 mg Oral Q6H PRN Chilango Lamb MD        Or    hydrOXYzine (VISTARIL) injection 50 mg  50 mg Intramuscular Q6H PRN Chilango Lamb MD           ALLERGIES: Shellfish-derived products and Shrimp flavor    REVIEW OF SYSTEM:   ROS as noted in HPI, 12 point ROS reviewed and otherwise negative. OBJECTIVE  PHYSICAL EXAM: /75   Pulse 99   Temp 97 °F (36.1 °C) (Oral)   Resp 18   Ht 6' (1.829 m)   Wt 180 lb (81.6 kg)   SpO2 98%   BMI 24.41 kg/m²   CONSTITUTIONAL:  awake, alert, cooperative, no apparent distress, and appears stated age  EYES:  Lids and lashes normal, pupils equal, round and reactive to light, extra ocular muscles intact, sclera clear, conjunctiva normal  ENT:  Normocephalic, without obvious abnormality, atraumatic, sinuses nontender on palpation, external ears without lesions, oral pharynx with moist mucus membranes, tonsils without erythema or exudates, gums normal and good dentition.   NECK:  Supple, symmetrical, trachea midline, no adenopathy, thyroid symmetric, not enlarged and no tenderness, skin normal  LUNGS:  No increased work of breathing, good air exchange, clear to auscultation bilaterally, no crackles or wheezing  CARDIOVASCULAR:  Normal apical impulse, regular rate and rhythm, normal S1 and S2, no S3 or S4, and no murmur noted  ABDOMEN:  No scars, normal bowel sounds, soft, non-distended, non-tender, no masses palpated, no hepatosplenomegally  MUSCULOSKELETAL:  There is no redness, warmth, or

## 2019-08-16 NOTE — PROGRESS NOTES
Pt. is sound asleep and unable to arouse at this time. Assessment deferred until condition improves.  Electronically signed by Serina Ocampo on 8/16/2019 at 1:41 PM

## 2019-08-17 PROCEDURE — 1240000000 HC EMOTIONAL WELLNESS R&B

## 2019-08-17 PROCEDURE — 6370000000 HC RX 637 (ALT 250 FOR IP): Performed by: PSYCHIATRY & NEUROLOGY

## 2019-08-17 PROCEDURE — 6370000000 HC RX 637 (ALT 250 FOR IP): Performed by: INTERNAL MEDICINE

## 2019-08-17 RX ORDER — NICOTINE 21 MG/24HR
1 PATCH, TRANSDERMAL 24 HOURS TRANSDERMAL DAILY
Status: DISCONTINUED | OUTPATIENT
Start: 2019-08-17 | End: 2019-08-26 | Stop reason: HOSPADM

## 2019-08-17 RX ADMIN — DIVALPROEX SODIUM 500 MG: 500 TABLET, DELAYED RELEASE ORAL at 14:13

## 2019-08-17 RX ADMIN — DIVALPROEX SODIUM 500 MG: 500 TABLET, DELAYED RELEASE ORAL at 06:03

## 2019-08-17 RX ADMIN — TRAZODONE HYDROCHLORIDE 50 MG: 50 TABLET ORAL at 20:43

## 2019-08-17 RX ADMIN — HYDROXYZINE PAMOATE 50 MG: 50 CAPSULE ORAL at 20:43

## 2019-08-17 RX ADMIN — DIVALPROEX SODIUM 500 MG: 500 TABLET, DELAYED RELEASE ORAL at 20:43

## 2019-08-17 RX ADMIN — HYDROXYZINE PAMOATE 50 MG: 50 CAPSULE ORAL at 12:52

## 2019-08-17 NOTE — PROGRESS NOTES
Pt. presents awake, resting in his bed. Has been approached 2x in attempt to complete the leisure assessment and refuses to respond to any questions or comments. Is familiar with unit and 3Wt activities staff from past admissions, but is unwilling to respond. Unkempt. Disheveled appearance  Remains in room and refuses all groups and activities. As pts condition improves, pt. will be encouraged to attend all  Unit programs and activities. Folder given.  Electronically signed by Meliza Jauregui on 8/17/2019 at 9:50 AM

## 2019-08-18 PROCEDURE — 6370000000 HC RX 637 (ALT 250 FOR IP): Performed by: PSYCHIATRY & NEUROLOGY

## 2019-08-18 PROCEDURE — 1240000000 HC EMOTIONAL WELLNESS R&B

## 2019-08-18 PROCEDURE — 6370000000 HC RX 637 (ALT 250 FOR IP): Performed by: INTERNAL MEDICINE

## 2019-08-18 RX ADMIN — DIVALPROEX SODIUM 500 MG: 500 TABLET, DELAYED RELEASE ORAL at 14:14

## 2019-08-18 RX ADMIN — HYDROXYZINE PAMOATE 50 MG: 50 CAPSULE ORAL at 20:31

## 2019-08-18 RX ADMIN — DIVALPROEX SODIUM 500 MG: 500 TABLET, DELAYED RELEASE ORAL at 20:33

## 2019-08-18 RX ADMIN — DIVALPROEX SODIUM 500 MG: 500 TABLET, DELAYED RELEASE ORAL at 05:50

## 2019-08-18 RX ADMIN — TRAZODONE HYDROCHLORIDE 50 MG: 50 TABLET ORAL at 20:31

## 2019-08-18 NOTE — PROGRESS NOTES
Pt isolating to room most of shift, pt denies SI HI AVH positive for depression and anxiety 8/10. Pt reports he has no desire to come out of room his goal for today is to shower, pt given things to shower with will attempt later this shift. Pt flat affect, sad depressed.

## 2019-08-18 NOTE — PROGRESS NOTES
Isolative to room throughout evening shift, out on unit for a few moments for meds/ snack. Pt is watchful while on unit. Pt appears disheveled and malodorous despite report of pt showering today. Pt medicated per request with PRN vistaril 50 mg PO for anxiety and PRN trazodone 50 mg PO for sleep @ 2043.

## 2019-08-18 NOTE — PROGRESS NOTES
Pt. declined to attend the 0900 community meeting, despite staff encouragement.  Electronically signed by Warden Otero on 8/18/2019 at 12:05 PM

## 2019-08-19 PROCEDURE — 6370000000 HC RX 637 (ALT 250 FOR IP): Performed by: PSYCHIATRY & NEUROLOGY

## 2019-08-19 PROCEDURE — 1240000000 HC EMOTIONAL WELLNESS R&B

## 2019-08-19 PROCEDURE — 6370000000 HC RX 637 (ALT 250 FOR IP): Performed by: INTERNAL MEDICINE

## 2019-08-19 RX ORDER — CITALOPRAM 10 MG/1
10 TABLET ORAL DAILY
Status: DISCONTINUED | OUTPATIENT
Start: 2019-08-19 | End: 2019-08-20

## 2019-08-19 RX ADMIN — DIVALPROEX SODIUM 500 MG: 500 TABLET, DELAYED RELEASE ORAL at 21:18

## 2019-08-19 RX ADMIN — TRAZODONE HYDROCHLORIDE 50 MG: 50 TABLET ORAL at 21:18

## 2019-08-19 RX ADMIN — CITALOPRAM HYDROBROMIDE 10 MG: 10 TABLET ORAL at 20:02

## 2019-08-19 RX ADMIN — DIVALPROEX SODIUM 500 MG: 500 TABLET, DELAYED RELEASE ORAL at 06:12

## 2019-08-19 RX ADMIN — DIVALPROEX SODIUM 500 MG: 500 TABLET, DELAYED RELEASE ORAL at 14:55

## 2019-08-19 NOTE — PROGRESS NOTES
Pt showered this evening shift. Pt appears restless and jittery and is tremulous. Medicated per request with PRN vistaril and trazodone @ 2031 for restlessness/ anxiety and for sleep. Voices no concerns.

## 2019-08-20 PROCEDURE — 6370000000 HC RX 637 (ALT 250 FOR IP): Performed by: PSYCHIATRY & NEUROLOGY

## 2019-08-20 PROCEDURE — 1240000000 HC EMOTIONAL WELLNESS R&B

## 2019-08-20 PROCEDURE — 6370000000 HC RX 637 (ALT 250 FOR IP): Performed by: INTERNAL MEDICINE

## 2019-08-20 RX ORDER — CITALOPRAM 20 MG/1
20 TABLET ORAL DAILY
Status: DISCONTINUED | OUTPATIENT
Start: 2019-08-21 | End: 2019-08-26 | Stop reason: HOSPADM

## 2019-08-20 RX ADMIN — TRAZODONE HYDROCHLORIDE 50 MG: 50 TABLET ORAL at 21:06

## 2019-08-20 RX ADMIN — CITALOPRAM HYDROBROMIDE 10 MG: 10 TABLET ORAL at 08:37

## 2019-08-20 RX ADMIN — DIVALPROEX SODIUM 500 MG: 500 TABLET, DELAYED RELEASE ORAL at 06:05

## 2019-08-20 RX ADMIN — DIVALPROEX SODIUM 500 MG: 500 TABLET, DELAYED RELEASE ORAL at 13:36

## 2019-08-20 RX ADMIN — DIVALPROEX SODIUM 500 MG: 500 TABLET, DELAYED RELEASE ORAL at 21:08

## 2019-08-20 RX ADMIN — HYDROXYZINE PAMOATE 50 MG: 50 CAPSULE ORAL at 21:06

## 2019-08-21 PROCEDURE — 6370000000 HC RX 637 (ALT 250 FOR IP): Performed by: PSYCHIATRY & NEUROLOGY

## 2019-08-21 PROCEDURE — 1240000000 HC EMOTIONAL WELLNESS R&B

## 2019-08-21 PROCEDURE — 6370000000 HC RX 637 (ALT 250 FOR IP): Performed by: INTERNAL MEDICINE

## 2019-08-21 RX ORDER — ARIPIPRAZOLE 5 MG/1
5 TABLET ORAL DAILY
Status: DISCONTINUED | OUTPATIENT
Start: 2019-08-21 | End: 2019-08-23

## 2019-08-21 RX ADMIN — HYDROXYZINE PAMOATE 50 MG: 50 CAPSULE ORAL at 20:46

## 2019-08-21 RX ADMIN — DIVALPROEX SODIUM 500 MG: 500 TABLET, DELAYED RELEASE ORAL at 06:05

## 2019-08-21 RX ADMIN — TRAZODONE HYDROCHLORIDE 50 MG: 50 TABLET ORAL at 20:47

## 2019-08-21 RX ADMIN — ARIPIPRAZOLE 5 MG: 5 TABLET ORAL at 20:49

## 2019-08-21 RX ADMIN — DIVALPROEX SODIUM 500 MG: 500 TABLET, DELAYED RELEASE ORAL at 20:46

## 2019-08-21 RX ADMIN — CITALOPRAM HYDROBROMIDE 20 MG: 20 TABLET ORAL at 09:46

## 2019-08-21 RX ADMIN — DIVALPROEX SODIUM 500 MG: 500 TABLET, DELAYED RELEASE ORAL at 13:58

## 2019-08-21 NOTE — PROGRESS NOTES
CREATININE 1.06 08/15/2019    GFRAA >60.0 08/15/2019    AGRATIO 2.0 07/18/2018    LABGLOM >60.0 08/15/2019    GLUCOSE 91 08/15/2019    GLUCOSE 108 07/18/2018    PROT 8.4 08/15/2019    LABALBU 5.2 08/15/2019    LABALBU 4.7 07/18/2018    CALCIUM 9.8 08/15/2019    BILITOT 0.8 08/15/2019    ALKPHOS 90 08/15/2019    AST 23 08/15/2019    ALT 26 08/15/2019     TSH:    Lab Results   Component Value Date    TSH 2.210 08/15/2019     VITAMIN B12: No components found for: B12  FOLATE:  No results found for: FOLATE    Medications  Current Facility-Administered Medications: citalopram (CELEXA) tablet 20 mg, 20 mg, Oral, Daily  nicotine (NICODERM CQ) 21 MG/24HR 1 patch, 1 patch, Transdermal, Daily  divalproex (DEPAKOTE) DR tablet 500 mg, 500 mg, Oral, 3 times per day  acetaminophen (TYLENOL) tablet 650 mg, 650 mg, Oral, Q4H PRN  magnesium hydroxide (MILK OF MAGNESIA) 400 MG/5ML suspension 30 mL, 30 mL, Oral, Daily PRN  traZODone (DESYREL) tablet 50 mg, 50 mg, Oral, Nightly PRN  benztropine mesylate (COGENTIN) injection 2 mg, 2 mg, Intramuscular, BID PRN  haloperidol (HALDOL) tablet 5 mg, 5 mg, Oral, Q6H PRN **OR** haloperidol lactate (HALDOL) injection 5 mg, 5 mg, Intramuscular, Q6H PRN  hydrOXYzine (VISTARIL) capsule 50 mg, 50 mg, Oral, Q6H PRN **OR** hydrOXYzine (VISTARIL) injection 50 mg, 50 mg, Intramuscular, Q6H PRN    ASSESSMENT AND PLAN    Bipolar disorder disorder depressed  Stimulant use disorder  Continue meds. Lexus Alcazar     Plan:  Continue Depakote  Increase Celexa to 20 mg daily for severe depression and impulse  Add Abilify 5 mg daily for augmentation and negative symptoms  Encourage group, milieu  Psycho-education  Educated that if he chooses to continue to use drugs, he may potentially act out impulsively, resulting in serious harm to self or others, even though unintentional. Also educated that mental health treatment can not be optimized with ongoing use of drugs.  He demonstrated understanding and has the capacity to understand the concepts.   More stability, optimize medication

## 2019-08-21 NOTE — PROGRESS NOTES
Pt remains with nict patch on , took am celexa , up for breakfast, then retreated back to bed, refused a new patch now, may ask later for it.

## 2019-08-22 PROCEDURE — 1240000000 HC EMOTIONAL WELLNESS R&B

## 2019-08-22 PROCEDURE — 6370000000 HC RX 637 (ALT 250 FOR IP): Performed by: PSYCHIATRY & NEUROLOGY

## 2019-08-22 PROCEDURE — 6370000000 HC RX 637 (ALT 250 FOR IP): Performed by: INTERNAL MEDICINE

## 2019-08-22 RX ADMIN — DIVALPROEX SODIUM 500 MG: 500 TABLET, DELAYED RELEASE ORAL at 06:46

## 2019-08-22 RX ADMIN — HYDROXYZINE PAMOATE 50 MG: 50 CAPSULE ORAL at 20:30

## 2019-08-22 RX ADMIN — ARIPIPRAZOLE 5 MG: 5 TABLET ORAL at 08:52

## 2019-08-22 RX ADMIN — TRAZODONE HYDROCHLORIDE 50 MG: 50 TABLET ORAL at 20:30

## 2019-08-22 RX ADMIN — CITALOPRAM HYDROBROMIDE 20 MG: 20 TABLET ORAL at 08:52

## 2019-08-22 RX ADMIN — DIVALPROEX SODIUM 500 MG: 500 TABLET, DELAYED RELEASE ORAL at 13:54

## 2019-08-22 RX ADMIN — DIVALPROEX SODIUM 500 MG: 500 TABLET, DELAYED RELEASE ORAL at 20:31

## 2019-08-22 NOTE — PROGRESS NOTES
Pt. refused to attend the 1000 skills group, despite staff encouragement.  Electronically signed by Saritha Mims on 8/22/2019 at 1:53 PM

## 2019-08-22 NOTE — BH NOTE
Shannan Courtney is mostly isolative. He has fine tremors of his hands and denies feeling suicidal or homicidal. He denies hallucinations or delusions. He expressed desire to be discharged. He expressed need to stop the drugs recognizing that crack is a large contributing factor. He used to ride [employer] and sold them all. He was encouraged to consider residential treatment and make small steps to save for another dirt bike to get him back to healthy hobbies.

## 2019-08-23 PROCEDURE — 6370000000 HC RX 637 (ALT 250 FOR IP): Performed by: PSYCHIATRY & NEUROLOGY

## 2019-08-23 PROCEDURE — 6370000000 HC RX 637 (ALT 250 FOR IP): Performed by: INTERNAL MEDICINE

## 2019-08-23 PROCEDURE — 1240000000 HC EMOTIONAL WELLNESS R&B

## 2019-08-23 RX ORDER — ARIPIPRAZOLE 10 MG/1
10 TABLET ORAL DAILY
Status: DISCONTINUED | OUTPATIENT
Start: 2019-08-24 | End: 2019-08-26 | Stop reason: HOSPADM

## 2019-08-23 RX ADMIN — DIVALPROEX SODIUM 500 MG: 500 TABLET, DELAYED RELEASE ORAL at 21:02

## 2019-08-23 RX ADMIN — ARIPIPRAZOLE 5 MG: 5 TABLET ORAL at 09:21

## 2019-08-23 RX ADMIN — HYDROXYZINE PAMOATE 50 MG: 50 CAPSULE ORAL at 21:02

## 2019-08-23 RX ADMIN — DIVALPROEX SODIUM 500 MG: 500 TABLET, DELAYED RELEASE ORAL at 05:58

## 2019-08-23 RX ADMIN — CITALOPRAM HYDROBROMIDE 20 MG: 20 TABLET ORAL at 09:21

## 2019-08-23 RX ADMIN — DIVALPROEX SODIUM 500 MG: 500 TABLET, DELAYED RELEASE ORAL at 14:10

## 2019-08-23 RX ADMIN — TRAZODONE HYDROCHLORIDE 50 MG: 50 TABLET ORAL at 21:02

## 2019-08-23 NOTE — CARE COORDINATION
Patient did not attend group despite staff encouragement.   Electronically signed by Panfilo Varela on 8/23/2019 at 11:41 AM

## 2019-08-24 PROCEDURE — 6370000000 HC RX 637 (ALT 250 FOR IP): Performed by: PSYCHIATRY & NEUROLOGY

## 2019-08-24 PROCEDURE — 6370000000 HC RX 637 (ALT 250 FOR IP): Performed by: INTERNAL MEDICINE

## 2019-08-24 PROCEDURE — 1240000000 HC EMOTIONAL WELLNESS R&B

## 2019-08-24 RX ADMIN — TRAZODONE HYDROCHLORIDE 50 MG: 50 TABLET ORAL at 21:19

## 2019-08-24 RX ADMIN — CITALOPRAM HYDROBROMIDE 20 MG: 20 TABLET ORAL at 09:04

## 2019-08-24 RX ADMIN — ARIPIPRAZOLE 10 MG: 10 TABLET ORAL at 09:04

## 2019-08-24 RX ADMIN — HYDROXYZINE PAMOATE 50 MG: 50 CAPSULE ORAL at 21:19

## 2019-08-24 RX ADMIN — DIVALPROEX SODIUM 500 MG: 500 TABLET, DELAYED RELEASE ORAL at 13:28

## 2019-08-24 RX ADMIN — DIVALPROEX SODIUM 500 MG: 500 TABLET, DELAYED RELEASE ORAL at 21:19

## 2019-08-24 RX ADMIN — DIVALPROEX SODIUM 500 MG: 500 TABLET, DELAYED RELEASE ORAL at 06:02

## 2019-08-24 NOTE — GROUP NOTE
Pt attended wrap up group. Goals discussed and methods to achieve goals. Pt listens attentively and participated verbalizing during session.

## 2019-08-24 NOTE — PROGRESS NOTES
Pt. refused to attend the 1000 skills group, despite staff encouragement. Electronically signed by Nunu Jaeger, [address] on 8/24/2019 at 11:41 AM

## 2019-08-24 NOTE — PROGRESS NOTES
Pt. declined to attend the 0900 community meeting, despite staff encouragement. Electronically signed by Chucho Hunter, [address] on 8/24/2019 at 9:52 AM

## 2019-08-25 PROCEDURE — 6370000000 HC RX 637 (ALT 250 FOR IP): Performed by: INTERNAL MEDICINE

## 2019-08-25 PROCEDURE — 6370000000 HC RX 637 (ALT 250 FOR IP): Performed by: PSYCHIATRY & NEUROLOGY

## 2019-08-25 PROCEDURE — 1240000000 HC EMOTIONAL WELLNESS R&B

## 2019-08-25 RX ADMIN — HYDROXYZINE PAMOATE 50 MG: 50 CAPSULE ORAL at 09:10

## 2019-08-25 RX ADMIN — DIVALPROEX SODIUM 500 MG: 500 TABLET, DELAYED RELEASE ORAL at 14:08

## 2019-08-25 RX ADMIN — DIVALPROEX SODIUM 500 MG: 500 TABLET, DELAYED RELEASE ORAL at 20:26

## 2019-08-25 RX ADMIN — DIVALPROEX SODIUM 500 MG: 500 TABLET, DELAYED RELEASE ORAL at 06:15

## 2019-08-25 RX ADMIN — CITALOPRAM HYDROBROMIDE 20 MG: 20 TABLET ORAL at 09:10

## 2019-08-25 RX ADMIN — ARIPIPRAZOLE 10 MG: 10 TABLET ORAL at 09:10

## 2019-08-25 RX ADMIN — HYDROXYZINE PAMOATE 50 MG: 50 CAPSULE ORAL at 20:22

## 2019-08-25 RX ADMIN — TRAZODONE HYDROCHLORIDE 50 MG: 50 TABLET ORAL at 20:22

## 2019-08-25 NOTE — PROGRESS NOTES
Pt. refused to attend the 1000 skills group, despite staff encouragement. Electronically signed by Favio Zarco, [address] on 8/25/2019 at 1:08 PM

## 2019-08-25 NOTE — FLOWSHEET NOTE
Pt cooperative with morning assessment. Pt ate well for BF. Out and Social with a select peer. Pt sat up after bf. Denies depression and anxiety. Denies SI, HI, and AVH. Encouraged to shower this day.

## 2019-08-25 NOTE — PROGRESS NOTES
Pt. declined to attend the 0900 community meeting, despite staff encouragement. Electronically signed by Enid Addison, [address] on 8/25/2019 at 9:35 AM

## 2019-08-26 VITALS
HEART RATE: 108 BPM | WEIGHT: 180 LBS | HEIGHT: 72 IN | RESPIRATION RATE: 18 BRPM | OXYGEN SATURATION: 99 % | BODY MASS INDEX: 24.38 KG/M2 | SYSTOLIC BLOOD PRESSURE: 107 MMHG | TEMPERATURE: 97 F | DIASTOLIC BLOOD PRESSURE: 68 MMHG

## 2019-08-26 PROCEDURE — 6370000000 HC RX 637 (ALT 250 FOR IP): Performed by: PSYCHIATRY & NEUROLOGY

## 2019-08-26 PROCEDURE — 99239 HOSP IP/OBS DSCHRG MGMT >30: CPT | Performed by: PSYCHIATRY & NEUROLOGY

## 2019-08-26 PROCEDURE — 6370000000 HC RX 637 (ALT 250 FOR IP): Performed by: INTERNAL MEDICINE

## 2019-08-26 RX ORDER — CITALOPRAM 20 MG/1
20 TABLET ORAL DAILY
Qty: 15 TABLET | Refills: 2 | Status: ON HOLD | OUTPATIENT
Start: 2019-08-27 | End: 2019-11-06 | Stop reason: HOSPADM

## 2019-08-26 RX ORDER — ARIPIPRAZOLE 10 MG/1
10 TABLET ORAL DAILY
Qty: 30 TABLET | Refills: 0 | Status: ON HOLD | OUTPATIENT
Start: 2019-08-27 | End: 2019-11-06 | Stop reason: SDUPTHER

## 2019-08-26 RX ORDER — DIVALPROEX SODIUM 500 MG/1
500 TABLET, DELAYED RELEASE ORAL EVERY 8 HOURS SCHEDULED
Qty: 45 TABLET | Refills: 2 | Status: ON HOLD | OUTPATIENT
Start: 2019-08-26 | End: 2019-11-06 | Stop reason: HOSPADM

## 2019-08-26 RX ADMIN — DIVALPROEX SODIUM 500 MG: 500 TABLET, DELAYED RELEASE ORAL at 05:45

## 2019-08-26 RX ADMIN — CITALOPRAM HYDROBROMIDE 20 MG: 20 TABLET ORAL at 09:52

## 2019-08-26 RX ADMIN — ARIPIPRAZOLE 10 MG: 10 TABLET ORAL at 09:52

## 2019-08-26 NOTE — PROGRESS NOTES
Pt. refused to attend the 1000 skills group, despite staff encouragement.  Electronically signed by Deepa Morton on 8/26/2019 at 12:05 PM

## 2019-08-26 NOTE — GROUP NOTE
Group Therapy Note    Date: August 25    Group Start Time: 2100  Group End Time: 2115  Group Topic: Wrap-Up    [facility]W [facility]    Timmothy Prader        Group Therapy Note    Attendees: 10/25    Pt attended and participated in group.      Signature:  Timmothy Prader

## 2019-08-26 NOTE — DISCHARGE SUMMARY
organizations: Not on file     Relationship status: Not on file    Intimate partner violence:     Fear of current or ex partner: Not on file     Emotionally abused: Not on file     Physically abused: Not on file     Forced sexual activity: Not on file   Other Topics Concern    Not on file   Social History Narrative    Lives w family       MEDICATIONS:    Current Facility-Administered Medications:     ARIPiprazole ER (ABILIFY MAINTENA) injection 300 mg, 300 mg, Intramuscular, Q30 Days, Jessica Honeycutt MD    ARIPiprazole (ABILIFY) tablet 10 mg, 10 mg, Oral, Daily, Radha Way MD, 10 mg at 08/26/19 5859    citalopram (CELEXA) tablet 20 mg, 20 mg, Oral, Daily, Radha Way MD, 20 mg at 08/26/19 0952    nicotine (NICODERM CQ) 21 MG/24HR 1 patch, 1 patch, Transdermal, Daily, Mehdi Huerta MD, 1 patch at 08/25/19 0910    divalproex (DEPAKOTE) DR tablet 500 mg, 500 mg, Oral, 3 times per day, Bryant Fung MD, 500 mg at 08/26/19 0545    acetaminophen (TYLENOL) tablet 650 mg, 650 mg, Oral, Q4H PRN, Jessica Honeycutt MD    magnesium hydroxide (MILK OF MAGNESIA) 400 MG/5ML suspension 30 mL, 30 mL, Oral, Daily PRN, Jessica Honeycutt MD    traZODone (DESYREL) tablet 50 mg, 50 mg, Oral, Nightly PRN, Jessica Honeycutt MD, 50 mg at 08/25/19 2022    benztropine mesylate (COGENTIN) injection 2 mg, 2 mg, Intramuscular, BID PRN, Jessica Honeycutt MD    haloperidol (HALDOL) tablet 5 mg, 5 mg, Oral, Q6H PRN **OR** haloperidol lactate (HALDOL) injection 5 mg, 5 mg, Intramuscular, Q6H PRN, Jessica Honeycutt MD    hydrOXYzine (VISTARIL) capsule 50 mg, 50 mg, Oral, Q6H PRN, 50 mg at 08/25/19 2022 **OR** hydrOXYzine (VISTARIL) injection 50 mg, 50 mg, Intramuscular, Q6H PRN, Jessica Gun, MD    Examination:  /68   Pulse 108   Temp 97 °F (36.1 °C) (Oral)   Resp 18   Ht 6' (1.829 m)   Wt 180 lb (81.6 kg)   SpO2 99%   BMI 24.41 kg/m²   Gait - steady    HOSPITAL COURSE[de-identified]  Following admission to [facility]           Where to Get Your Medications      Information about where to get these medications is not yet available    Ask your nurse or doctor about these medications  · ARIPiprazole 10 MG tablet  · ARIPiprazole  MG Srer injection  · citalopram 20 MG tablet  · divalproex 500 MG DR tablet           TIME SPEND - 35 MINUTES TO COMPLETE THE EVALUATION, DISCHARGE SUMMARY, MEDICATION RECONCILIATION AND FOLLOW UP CARE     Charlette Madera  8/26/2019  10:22 AM

## 2019-08-28 ENCOUNTER — HOSPITAL ENCOUNTER (EMERGENCY)
Age: 29
Discharge: HOME | End: 2019-08-28
Attending: EMERGENCY MEDICINE
Payer: MEDICARE

## 2019-08-28 VITALS
OXYGEN SATURATION: 98 % | HEART RATE: 116 BPM | SYSTOLIC BLOOD PRESSURE: 142 MMHG | TEMPERATURE: 98 F | WEIGHT: 180 LBS | RESPIRATION RATE: 18 BRPM | DIASTOLIC BLOOD PRESSURE: 95 MMHG | BODY MASS INDEX: 24.38 KG/M2 | HEIGHT: 72 IN

## 2019-08-28 DIAGNOSIS — R00.2 PALPITATIONS: ICD-10-CM

## 2019-08-28 DIAGNOSIS — F19.10 POLYSUBSTANCE ABUSE (HCC): Primary | ICD-10-CM

## 2019-08-28 DIAGNOSIS — F41.1 ANXIETY STATE: ICD-10-CM

## 2019-08-28 LAB
ACETAMINOPHEN LEVEL: <15 UG/ML (ref 10–30)
ALBUMIN SERPL-MCNC: 5.1 G/DL (ref 3.5–4.6)
ALP BLD-CCNC: 84 U/L (ref 35–104)
ALT SERPL-CCNC: 19 U/L (ref 0–41)
AMPHETAMINE SCREEN, URINE: NEGATIVE
ANION GAP SERPL CALCULATED.3IONS-SCNC: 14 MEQ/L (ref 9–15)
AST SERPL-CCNC: 22 U/L (ref 0–40)
BARBITURATE SCREEN URINE: NEGATIVE
BASOPHILS ABSOLUTE: 0.1 K/UL (ref 0–0.2)
BASOPHILS RELATIVE PERCENT: 0.9 %
BENZODIAZEPINE SCREEN, URINE: NEGATIVE
BILIRUB SERPL-MCNC: 0.5 MG/DL (ref 0.2–0.7)
BILIRUBIN URINE: NEGATIVE
BLOOD, URINE: NEGATIVE
BUN BLDV-MCNC: 14 MG/DL (ref 6–20)
CALCIUM SERPL-MCNC: 9.5 MG/DL (ref 8.5–9.9)
CANNABINOID SCREEN URINE: NEGATIVE
CHLORIDE BLD-SCNC: 100 MEQ/L (ref 95–107)
CLARITY: CLEAR
CO2: 29 MEQ/L (ref 20–31)
COCAINE METABOLITE SCREEN URINE: POSITIVE
COLOR: NORMAL
CREAT SERPL-MCNC: 1.02 MG/DL (ref 0.7–1.2)
EKG ATRIAL RATE: 136 BPM
EKG P AXIS: 76 DEGREES
EKG P-R INTERVAL: 134 MS
EKG Q-T INTERVAL: 302 MS
EKG QRS DURATION: 92 MS
EKG QTC CALCULATION (BAZETT): 454 MS
EKG R AXIS: 91 DEGREES
EKG T AXIS: 41 DEGREES
EKG VENTRICULAR RATE: 136 BPM
EOSINOPHILS ABSOLUTE: 0 K/UL (ref 0–0.7)
EOSINOPHILS RELATIVE PERCENT: 0.3 %
ETHANOL PERCENT: NORMAL G/DL
ETHANOL: <10 MG/DL (ref 0–0.08)
GFR AFRICAN AMERICAN: >60
GFR NON-AFRICAN AMERICAN: >60
GLOBULIN: 2.8 G/DL (ref 2.3–3.5)
GLUCOSE BLD-MCNC: 88 MG/DL (ref 70–99)
GLUCOSE URINE: NEGATIVE MG/DL
HCT VFR BLD CALC: 47.3 % (ref 42–52)
HEMOGLOBIN: 16.3 G/DL (ref 14–18)
KETONES, URINE: NEGATIVE MG/DL
LEUKOCYTE ESTERASE, URINE: NEGATIVE
LYMPHOCYTES ABSOLUTE: 2.1 K/UL (ref 1–4.8)
LYMPHOCYTES RELATIVE PERCENT: 23.2 %
Lab: NORMAL
MAGNESIUM: 2.3 MG/DL (ref 1.7–2.4)
MCH RBC QN AUTO: 32 PG (ref 27–31.3)
MCHC RBC AUTO-ENTMCNC: 34.4 % (ref 33–37)
MCV RBC AUTO: 93.1 FL (ref 80–100)
MONOCYTES ABSOLUTE: 1.3 K/UL (ref 0.2–0.8)
MONOCYTES RELATIVE PERCENT: 14.4 %
NEUTROPHILS ABSOLUTE: 5.6 K/UL (ref 1.4–6.5)
NEUTROPHILS RELATIVE PERCENT: 61.2 %
NITRITE, URINE: NEGATIVE
OPIATE SCREEN URINE: NEGATIVE
PDW BLD-RTO: 12.2 % (ref 11.5–14.5)
PH UA: 7 (ref 5–9)
PHENCYCLIDINE SCREEN URINE: NEGATIVE
PLATELET # BLD: 201 K/UL (ref 130–400)
POTASSIUM SERPL-SCNC: 3.3 MEQ/L (ref 3.4–4.9)
PROTEIN UA: NEGATIVE MG/DL
RBC # BLD: 5.08 M/UL (ref 4.7–6.1)
SALICYLATE, SERUM: <0.3 MG/DL (ref 15–30)
SODIUM BLD-SCNC: 143 MEQ/L (ref 135–144)
SPECIFIC GRAVITY UA: 1.01 (ref 1–1.03)
TOTAL PROTEIN: 7.9 G/DL (ref 6.3–8)
TRICYCLIC, URINE: NEGATIVE
TROPONIN: <0.01 NG/ML (ref 0–0.01)
URINE REFLEX TO CULTURE: NORMAL
URINE TYPE: NORMAL
UROBILINOGEN, URINE: 0.2 E.U./DL
WBC # BLD: 9.1 K/UL (ref 4.8–10.8)

## 2019-08-28 PROCEDURE — 93005 ELECTROCARDIOGRAM TRACING: CPT

## 2019-08-28 PROCEDURE — 80306 DRUG TEST PRSMV INSTRMNT: CPT

## 2019-08-28 PROCEDURE — 36415 COLL VENOUS BLD VENIPUNCTURE: CPT

## 2019-08-28 PROCEDURE — 93010 ELECTROCARDIOGRAM REPORT: CPT | Performed by: INTERNAL MEDICINE

## 2019-08-28 PROCEDURE — 99283 EMERGENCY DEPT VISIT LOW MDM: CPT

## 2019-08-28 PROCEDURE — 80053 COMPREHEN METABOLIC PANEL: CPT

## 2019-08-28 PROCEDURE — G0480 DRUG TEST DEF 1-7 CLASSES: HCPCS

## 2019-08-28 PROCEDURE — 84484 ASSAY OF TROPONIN QUANT: CPT

## 2019-08-28 PROCEDURE — 85025 COMPLETE CBC W/AUTO DIFF WBC: CPT

## 2019-08-28 PROCEDURE — 2580000003 HC RX 258: Performed by: EMERGENCY MEDICINE

## 2019-08-28 PROCEDURE — 81003 URINALYSIS AUTO W/O SCOPE: CPT

## 2019-08-28 PROCEDURE — 80164 ASSAY DIPROPYLACETIC ACD TOT: CPT

## 2019-08-28 PROCEDURE — 6360000002 HC RX W HCPCS: Performed by: EMERGENCY MEDICINE

## 2019-08-28 PROCEDURE — 96374 THER/PROPH/DIAG INJ IV PUSH: CPT

## 2019-08-28 PROCEDURE — 83735 ASSAY OF MAGNESIUM: CPT

## 2019-08-28 RX ORDER — SODIUM CHLORIDE 0.9 % (FLUSH) 0.9 %
3 SYRINGE (ML) INJECTION EVERY 8 HOURS
Status: DISCONTINUED | OUTPATIENT
Start: 2019-08-28 | End: 2019-08-28 | Stop reason: HOSPADM

## 2019-08-28 RX ORDER — 0.9 % SODIUM CHLORIDE 0.9 %
1000 INTRAVENOUS SOLUTION INTRAVENOUS ONCE
Status: COMPLETED | OUTPATIENT
Start: 2019-08-28 | End: 2019-08-28

## 2019-08-28 RX ORDER — LORAZEPAM 2 MG/ML
1 INJECTION INTRAMUSCULAR ONCE
Status: COMPLETED | OUTPATIENT
Start: 2019-08-28 | End: 2019-08-28

## 2019-08-28 RX ORDER — POTASSIUM CHLORIDE 20 MEQ/1
20 TABLET, EXTENDED RELEASE ORAL ONCE
Status: DISCONTINUED | OUTPATIENT
Start: 2019-08-28 | End: 2019-08-28 | Stop reason: HOSPADM

## 2019-08-28 RX ADMIN — SODIUM CHLORIDE 1000 ML: 9 INJECTION, SOLUTION INTRAVENOUS at 08:46

## 2019-08-28 RX ADMIN — SODIUM CHLORIDE 1000 ML: 9 INJECTION, SOLUTION INTRAVENOUS at 09:37

## 2019-08-28 RX ADMIN — LORAZEPAM 1 MG: 2 INJECTION INTRAMUSCULAR; INTRAVENOUS at 08:47

## 2019-08-28 ASSESSMENT — ENCOUNTER SYMPTOMS
BLOOD IN STOOL: 0
EYE DISCHARGE: 0
SINUS PRESSURE: 0
STRIDOR: 0
TROUBLE SWALLOWING: 0
VOICE CHANGE: 0
COUGH: 0
ABDOMINAL PAIN: 0
SHORTNESS OF BREATH: 0
WHEEZING: 0
BACK PAIN: 0
EYE PAIN: 0
CHOKING: 0
CHEST TIGHTNESS: 0
SORE THROAT: 0
DIARRHEA: 0
CONSTIPATION: 0
EYE REDNESS: 0
VOMITING: 0
FACIAL SWELLING: 0

## 2019-08-28 NOTE — ED TRIAGE NOTES
Presents here via squad due to abuse of coricidin cold medicine. He does not answer questions when asked. Alert at this time.

## 2019-08-28 NOTE — ED PROVIDER NOTES
dictations but occasionally words are mis-transcribed.)    Clearance MD Shreya (electronically signed)  Attending Emergency Physician       Clearance MD Shreya  08/28/19 2329

## 2019-08-30 ENCOUNTER — HOSPITAL ENCOUNTER (EMERGENCY)
Age: 29
Discharge: HOME | End: 2019-08-30
Payer: MEDICARE

## 2019-08-30 VITALS
DIASTOLIC BLOOD PRESSURE: 99 MMHG | HEART RATE: 126 BPM | HEIGHT: 72 IN | WEIGHT: 180 LBS | TEMPERATURE: 97.9 F | OXYGEN SATURATION: 99 % | BODY MASS INDEX: 24.38 KG/M2 | SYSTOLIC BLOOD PRESSURE: 170 MMHG | RESPIRATION RATE: 17 BRPM

## 2019-08-30 DIAGNOSIS — F14.90 COCAINE USE: ICD-10-CM

## 2019-08-30 DIAGNOSIS — F19.10 POLYSUBSTANCE ABUSE (HCC): Primary | ICD-10-CM

## 2019-08-30 LAB
EKG ATRIAL RATE: 117 BPM
EKG P AXIS: 67 DEGREES
EKG P-R INTERVAL: 138 MS
EKG Q-T INTERVAL: 336 MS
EKG QRS DURATION: 90 MS
EKG QTC CALCULATION (BAZETT): 467 MS
EKG R AXIS: 82 DEGREES
EKG T AXIS: 33 DEGREES
EKG VENTRICULAR RATE: 116 BPM

## 2019-08-30 PROCEDURE — 99284 EMERGENCY DEPT VISIT MOD MDM: CPT

## 2019-08-30 RX ORDER — LORAZEPAM 1 MG/1
1 TABLET ORAL ONCE
Status: DISCONTINUED | OUTPATIENT
Start: 2019-08-30 | End: 2019-08-30 | Stop reason: HOSPADM

## 2019-08-30 RX ORDER — 0.9 % SODIUM CHLORIDE 0.9 %
1000 INTRAVENOUS SOLUTION INTRAVENOUS ONCE
Status: DISCONTINUED | OUTPATIENT
Start: 2019-08-30 | End: 2019-08-30 | Stop reason: HOSPADM

## 2019-08-30 ASSESSMENT — ENCOUNTER SYMPTOMS
COUGH: 0
RHINORRHEA: 0
ABDOMINAL PAIN: 0
SHORTNESS OF BREATH: 0
DIARRHEA: 0
NAUSEA: 0
BLOOD IN STOOL: 0
COLOR CHANGE: 0
SORE THROAT: 0
VOMITING: 0

## 2019-08-30 NOTE — ED NOTES
This RN at bedside to assess pt. Pt A/Ox4, skin p/w/d, resp even and unlabored, msp's intact.      Efrem Persaud, CINDA  08/30/19 1928

## 2019-08-31 LAB
VALPROIC ACID % FREE: ABNORMAL % (ref 5–18)
VALPROIC ACID TOTAL: 15 UG/ML (ref 50–125)
VALPROIC ACID, FREE: <7 UG/ML (ref 7–23)

## 2019-10-21 ENCOUNTER — HOSPITAL ENCOUNTER (EMERGENCY)
Age: 29
End: 2019-10-21
Payer: MEDICARE

## 2019-10-21 ENCOUNTER — HOSPITAL ENCOUNTER (OUTPATIENT)
Age: 29
Setting detail: SPECIMEN
Discharge: HOME | End: 2019-10-21
Payer: MEDICARE

## 2019-10-21 ENCOUNTER — HOSPITAL ENCOUNTER (EMERGENCY)
Dept: LAB | Age: 29
Discharge: HOME | End: 2019-10-21
Payer: MEDICARE

## 2019-10-21 ENCOUNTER — OFFICE VISIT (OUTPATIENT)
Dept: FAMILY MEDICINE CLINIC | Age: 29
End: 2019-10-21
Payer: MEDICARE

## 2019-10-21 VITALS
TEMPERATURE: 97 F | HEART RATE: 88 BPM | WEIGHT: 190 LBS | SYSTOLIC BLOOD PRESSURE: 116 MMHG | BODY MASS INDEX: 25.73 KG/M2 | RESPIRATION RATE: 18 BRPM | DIASTOLIC BLOOD PRESSURE: 72 MMHG | HEIGHT: 72 IN | OXYGEN SATURATION: 98 %

## 2019-10-21 DIAGNOSIS — Z11.3 SCREENING EXAMINATION FOR STD (SEXUALLY TRANSMITTED DISEASE): Primary | ICD-10-CM

## 2019-10-21 DIAGNOSIS — L24.5 IRRITANT CONTACT DERMATITIS DUE TO OTHER CHEMICAL PRODUCTS: ICD-10-CM

## 2019-10-21 DIAGNOSIS — Z11.3 SCREENING EXAMINATION FOR STD (SEXUALLY TRANSMITTED DISEASE): ICD-10-CM

## 2019-10-21 DIAGNOSIS — Z23 NEED FOR HPV VACCINATION: ICD-10-CM

## 2019-10-21 LAB
HBV SURFACE AB TITR SER: NORMAL MIU/ML
HEPATITIS C ANTIBODY INTERPRETATION: NORMAL

## 2019-10-21 PROCEDURE — 90651 9VHPV VACCINE 2/3 DOSE IM: CPT | Performed by: PHYSICIAN ASSISTANT

## 2019-10-21 PROCEDURE — 87491 CHLMYD TRACH DNA AMP PROBE: CPT

## 2019-10-21 PROCEDURE — 99213 OFFICE O/P EST LOW 20 MIN: CPT | Performed by: PHYSICIAN ASSISTANT

## 2019-10-21 PROCEDURE — 87591 N.GONORRHOEAE DNA AMP PROB: CPT

## 2019-10-21 PROCEDURE — G8419 CALC BMI OUT NRM PARAM NOF/U: HCPCS | Performed by: PHYSICIAN ASSISTANT

## 2019-10-21 PROCEDURE — 86803 HEPATITIS C AB TEST: CPT

## 2019-10-21 PROCEDURE — 86706 HEP B SURFACE ANTIBODY: CPT

## 2019-10-21 PROCEDURE — 4004F PT TOBACCO SCREEN RCVD TLK: CPT | Performed by: PHYSICIAN ASSISTANT

## 2019-10-21 PROCEDURE — G8427 DOCREV CUR MEDS BY ELIG CLIN: HCPCS | Performed by: PHYSICIAN ASSISTANT

## 2019-10-21 PROCEDURE — 36415 COLL VENOUS BLD VENIPUNCTURE: CPT

## 2019-10-21 PROCEDURE — 86592 SYPHILIS TEST NON-TREP QUAL: CPT

## 2019-10-21 PROCEDURE — 90471 IMMUNIZATION ADMIN: CPT | Performed by: PHYSICIAN ASSISTANT

## 2019-10-21 PROCEDURE — G8484 FLU IMMUNIZE NO ADMIN: HCPCS | Performed by: PHYSICIAN ASSISTANT

## 2019-10-21 PROCEDURE — 87389 HIV-1 AG W/HIV-1&-2 AB AG IA: CPT

## 2019-10-21 PROCEDURE — 87661 TRICHOMONAS VAGINALIS AMPLIF: CPT

## 2019-10-21 RX ORDER — PREDNISONE 20 MG/1
20 TABLET ORAL 2 TIMES DAILY
Qty: 10 TABLET | Refills: 0 | Status: SHIPPED | OUTPATIENT
Start: 2019-10-21 | End: 2019-10-26

## 2019-10-22 ENCOUNTER — HOSPITAL ENCOUNTER (EMERGENCY)
Age: 29
Discharge: HOME | End: 2019-10-22
Attending: EMERGENCY MEDICINE
Payer: MEDICARE

## 2019-10-22 VITALS
HEIGHT: 72 IN | WEIGHT: 190 LBS | RESPIRATION RATE: 18 BRPM | DIASTOLIC BLOOD PRESSURE: 70 MMHG | HEART RATE: 99 BPM | OXYGEN SATURATION: 100 % | SYSTOLIC BLOOD PRESSURE: 151 MMHG | BODY MASS INDEX: 25.73 KG/M2 | TEMPERATURE: 97.9 F

## 2019-10-22 DIAGNOSIS — L25.9 CONTACT DERMATITIS, UNSPECIFIED CONTACT DERMATITIS TYPE, UNSPECIFIED TRIGGER: Primary | ICD-10-CM

## 2019-10-22 PROCEDURE — 6370000000 HC RX 637 (ALT 250 FOR IP): Performed by: EMERGENCY MEDICINE

## 2019-10-22 PROCEDURE — 6360000002 HC RX W HCPCS: Performed by: EMERGENCY MEDICINE

## 2019-10-22 PROCEDURE — 99282 EMERGENCY DEPT VISIT SF MDM: CPT

## 2019-10-22 RX ORDER — FAMOTIDINE 20 MG/1
20 TABLET, FILM COATED ORAL 2 TIMES DAILY
Qty: 6 TABLET | Refills: 0 | Status: SHIPPED | OUTPATIENT
Start: 2019-10-22 | End: 2020-12-20

## 2019-10-22 RX ORDER — DEXAMETHASONE SODIUM PHOSPHATE 10 MG/ML
10 INJECTION, SOLUTION INTRAMUSCULAR; INTRAVENOUS ONCE
Status: COMPLETED | OUTPATIENT
Start: 2019-10-22 | End: 2019-10-22

## 2019-10-22 RX ORDER — FAMOTIDINE 20 MG/1
20 TABLET, FILM COATED ORAL ONCE
Status: COMPLETED | OUTPATIENT
Start: 2019-10-22 | End: 2019-10-22

## 2019-10-22 RX ORDER — DIPHENHYDRAMINE HCL 25 MG
25 TABLET ORAL ONCE
Status: COMPLETED | OUTPATIENT
Start: 2019-10-22 | End: 2019-10-22

## 2019-10-22 RX ORDER — DIPHENHYDRAMINE HCL 25 MG
25 CAPSULE ORAL EVERY 8 HOURS PRN
Qty: 9 CAPSULE | Refills: 0 | Status: SHIPPED | OUTPATIENT
Start: 2019-10-22 | End: 2019-10-25

## 2019-10-22 RX ADMIN — FAMOTIDINE 20 MG: 20 TABLET, FILM COATED ORAL at 16:23

## 2019-10-22 RX ADMIN — DEXAMETHASONE SODIUM PHOSPHATE 10 MG: 10 INJECTION, SOLUTION INTRAMUSCULAR; INTRAVENOUS at 16:23

## 2019-10-22 RX ADMIN — DIPHENHYDRAMINE HCL 25 MG: 25 TABLET ORAL at 16:23

## 2019-10-22 ASSESSMENT — ENCOUNTER SYMPTOMS
COUGH: 0
NAUSEA: 0
SHORTNESS OF BREATH: 0
ABDOMINAL PAIN: 0
WHEEZING: 0
SHORTNESS OF BREATH: 0
COUGH: 0
BACK PAIN: 0
VOMITING: 0

## 2019-10-23 LAB
HIV 1,2 COMBO ANTIGEN/ANTIBODY: NEGATIVE
RPR: NORMAL

## 2019-10-24 LAB
SPECIMEN SOURCE: NORMAL
T. VAGINALIS AMPLIFIED: NEGATIVE

## 2019-10-25 LAB
C. TRACHOMATIS DNA ,URINE: NEGATIVE
N. GONORRHOEAE DNA, URINE: NEGATIVE

## 2019-11-02 ENCOUNTER — HOSPITAL ENCOUNTER (INPATIENT)
Age: 29
LOS: 4 days | Discharge: HOME | DRG: 885 | End: 2019-11-06
Attending: EMERGENCY MEDICINE | Admitting: PSYCHIATRY & NEUROLOGY
Payer: MEDICARE

## 2019-11-02 ENCOUNTER — HOSPITAL ENCOUNTER (EMERGENCY)
Age: 29
Discharge: ANOTHER HOSPITAL | End: 2019-11-02
Attending: EMERGENCY MEDICINE
Payer: MEDICARE

## 2019-11-02 VITALS
SYSTOLIC BLOOD PRESSURE: 133 MMHG | BODY MASS INDEX: 24.38 KG/M2 | WEIGHT: 180 LBS | OXYGEN SATURATION: 98 % | TEMPERATURE: 98.6 F | HEART RATE: 99 BPM | DIASTOLIC BLOOD PRESSURE: 80 MMHG | RESPIRATION RATE: 16 BRPM | HEIGHT: 72 IN

## 2019-11-02 DIAGNOSIS — R45.851 SUICIDAL IDEATION: ICD-10-CM

## 2019-11-02 DIAGNOSIS — F19.10 MULTIPLE SUBSTANCE ABUSE (HCC): Primary | ICD-10-CM

## 2019-11-02 DIAGNOSIS — F25.9 SCHIZOAFFECTIVE DISORDER, UNSPECIFIED TYPE (HCC): Primary | ICD-10-CM

## 2019-11-02 LAB
ACETAMINOPHEN LEVEL: <15 UG/ML (ref 10–30)
ALBUMIN SERPL-MCNC: 5.1 G/DL (ref 3.5–4.6)
ALP BLD-CCNC: 100 U/L (ref 35–104)
ALT SERPL-CCNC: 24 U/L (ref 0–41)
AMPHETAMINE SCREEN, URINE: POSITIVE
ANION GAP SERPL CALCULATED.3IONS-SCNC: 14 MEQ/L (ref 9–15)
AST SERPL-CCNC: 23 U/L (ref 0–40)
BARBITURATE SCREEN URINE: ABNORMAL
BASOPHILS ABSOLUTE: 0.1 K/UL (ref 0–0.2)
BASOPHILS RELATIVE PERCENT: 0.6 %
BENZODIAZEPINE SCREEN, URINE: ABNORMAL
BILIRUB SERPL-MCNC: 0.7 MG/DL (ref 0.2–0.7)
BUN BLDV-MCNC: 20 MG/DL (ref 6–20)
CALCIUM SERPL-MCNC: 10.1 MG/DL (ref 8.5–9.9)
CANNABINOID SCREEN URINE: POSITIVE
CHLORIDE BLD-SCNC: 100 MEQ/L (ref 95–107)
CO2: 28 MEQ/L (ref 20–31)
COCAINE METABOLITE SCREEN URINE: POSITIVE
CREAT SERPL-MCNC: 1.15 MG/DL (ref 0.7–1.2)
EKG ATRIAL RATE: 108 BPM
EKG P AXIS: 66 DEGREES
EKG P-R INTERVAL: 136 MS
EKG Q-T INTERVAL: 354 MS
EKG QRS DURATION: 90 MS
EKG QTC CALCULATION (BAZETT): 474 MS
EKG R AXIS: 82 DEGREES
EKG T AXIS: 37 DEGREES
EKG VENTRICULAR RATE: 108 BPM
EOSINOPHILS ABSOLUTE: 0.2 K/UL (ref 0–0.7)
EOSINOPHILS RELATIVE PERCENT: 1.3 %
ETHANOL PERCENT: NORMAL G/DL
ETHANOL: <10 MG/DL (ref 0–0.08)
GFR AFRICAN AMERICAN: >60
GFR NON-AFRICAN AMERICAN: >60
GLOBULIN: 2.7 G/DL (ref 2.3–3.5)
GLUCOSE BLD-MCNC: 90 MG/DL (ref 70–99)
HCT VFR BLD CALC: 47 % (ref 42–52)
HEMOGLOBIN: 15.9 G/DL (ref 14–18)
LYMPHOCYTES ABSOLUTE: 1.9 K/UL (ref 1–4.8)
LYMPHOCYTES RELATIVE PERCENT: 14.2 %
Lab: ABNORMAL
MCH RBC QN AUTO: 31.8 PG (ref 27–31.3)
MCHC RBC AUTO-ENTMCNC: 33.9 % (ref 33–37)
MCV RBC AUTO: 93.6 FL (ref 80–100)
MONOCYTES ABSOLUTE: 1.4 K/UL (ref 0.2–0.8)
MONOCYTES RELATIVE PERCENT: 10.5 %
NEUTROPHILS ABSOLUTE: 9.7 K/UL (ref 1.4–6.5)
NEUTROPHILS RELATIVE PERCENT: 73.4 %
OPIATE SCREEN URINE: ABNORMAL
PDW BLD-RTO: 12.8 % (ref 11.5–14.5)
PHENCYCLIDINE SCREEN URINE: ABNORMAL
PLATELET # BLD: 255 K/UL (ref 130–400)
POTASSIUM SERPL-SCNC: 3.5 MEQ/L (ref 3.4–4.9)
RBC # BLD: 5.02 M/UL (ref 4.7–6.1)
SALICYLATE, SERUM: <0.3 MG/DL (ref 15–30)
SODIUM BLD-SCNC: 142 MEQ/L (ref 135–144)
TOTAL PROTEIN: 7.8 G/DL (ref 6.3–8)
TRICYCLIC, URINE: POSITIVE
TSH SERPL DL<=0.05 MIU/L-ACNC: 1.11 UIU/ML (ref 0.44–3.86)
WBC # BLD: 13.2 K/UL (ref 4.8–10.8)

## 2019-11-02 PROCEDURE — G0480 DRUG TEST DEF 1-7 CLASSES: HCPCS

## 2019-11-02 PROCEDURE — 1240000000 HC EMOTIONAL WELLNESS R&B

## 2019-11-02 PROCEDURE — 99285 EMERGENCY DEPT VISIT HI MDM: CPT

## 2019-11-02 PROCEDURE — 6370000000 HC RX 637 (ALT 250 FOR IP): Performed by: PSYCHIATRY & NEUROLOGY

## 2019-11-02 PROCEDURE — 85025 COMPLETE CBC W/AUTO DIFF WBC: CPT

## 2019-11-02 PROCEDURE — 93005 ELECTROCARDIOGRAM TRACING: CPT

## 2019-11-02 PROCEDURE — 99284 EMERGENCY DEPT VISIT MOD MDM: CPT

## 2019-11-02 PROCEDURE — 84443 ASSAY THYROID STIM HORMONE: CPT

## 2019-11-02 PROCEDURE — 80306 DRUG TEST PRSMV INSTRMNT: CPT

## 2019-11-02 PROCEDURE — 80053 COMPREHEN METABOLIC PANEL: CPT

## 2019-11-02 RX ORDER — TRAZODONE HYDROCHLORIDE 50 MG/1
50 TABLET ORAL NIGHTLY PRN
Status: DISCONTINUED | OUTPATIENT
Start: 2019-11-02 | End: 2019-11-06 | Stop reason: HOSPADM

## 2019-11-02 RX ORDER — ARIPIPRAZOLE 5 MG/1
5 TABLET ORAL DAILY
Status: DISCONTINUED | OUTPATIENT
Start: 2019-11-02 | End: 2019-11-03

## 2019-11-02 RX ORDER — ACETAMINOPHEN 325 MG/1
650 TABLET ORAL EVERY 4 HOURS PRN
Status: DISCONTINUED | OUTPATIENT
Start: 2019-11-02 | End: 2019-11-06 | Stop reason: HOSPADM

## 2019-11-02 RX ORDER — NICOTINE 21 MG/24HR
1 PATCH, TRANSDERMAL 24 HOURS TRANSDERMAL ONCE
Status: COMPLETED | OUTPATIENT
Start: 2019-11-02 | End: 2019-11-03

## 2019-11-02 RX ORDER — HALOPERIDOL 5 MG/ML
5 INJECTION INTRAMUSCULAR EVERY 6 HOURS PRN
Status: DISCONTINUED | OUTPATIENT
Start: 2019-11-02 | End: 2019-11-06 | Stop reason: HOSPADM

## 2019-11-02 RX ORDER — HALOPERIDOL 5 MG
5 TABLET ORAL EVERY 6 HOURS PRN
Status: DISCONTINUED | OUTPATIENT
Start: 2019-11-02 | End: 2019-11-06 | Stop reason: HOSPADM

## 2019-11-02 RX ORDER — HYDROXYZINE HYDROCHLORIDE 50 MG/ML
50 INJECTION, SOLUTION INTRAMUSCULAR EVERY 6 HOURS PRN
Status: DISCONTINUED | OUTPATIENT
Start: 2019-11-02 | End: 2019-11-06 | Stop reason: HOSPADM

## 2019-11-02 RX ORDER — HYDROXYZINE PAMOATE 50 MG/1
50 CAPSULE ORAL EVERY 6 HOURS PRN
Status: DISCONTINUED | OUTPATIENT
Start: 2019-11-02 | End: 2019-11-06 | Stop reason: HOSPADM

## 2019-11-02 RX ADMIN — TRAZODONE HYDROCHLORIDE 50 MG: 50 TABLET ORAL at 21:23

## 2019-11-02 RX ADMIN — HYDROXYZINE PAMOATE 50 MG: 50 CAPSULE ORAL at 18:33

## 2019-11-02 RX ADMIN — ARIPIPRAZOLE 5 MG: 5 TABLET ORAL at 18:33

## 2019-11-02 ASSESSMENT — SLEEP AND FATIGUE QUESTIONNAIRES
DIFFICULTY FALLING ASLEEP: YES
DO YOU USE A SLEEP AID: NO
DIFFICULTY STAYING ASLEEP: YES
RESTFUL SLEEP: NO
AVERAGE NUMBER OF SLEEP HOURS: 2
DIFFICULTY ARISING: NO
SLEEP PATTERN: DIFFICULTY FALLING ASLEEP;INSOMNIA;DISTURBED/INTERRUPTED SLEEP
DO YOU HAVE DIFFICULTY SLEEPING: YES

## 2019-11-02 ASSESSMENT — ENCOUNTER SYMPTOMS
ABDOMINAL PAIN: 0
BACK PAIN: 0
NAUSEA: 0
SHORTNESS OF BREATH: 0
CONSTIPATION: 0
ABDOMINAL PAIN: 0
PHOTOPHOBIA: 0
RHINORRHEA: 0
NAUSEA: 0
WHEEZING: 0
SINUS PRESSURE: 0
SORE THROAT: 0
EYE REDNESS: 0
ABDOMINAL DISTENTION: 0
VOMITING: 0
BLOOD IN STOOL: 0
COUGH: 0
EYE PAIN: 0
ABDOMINAL DISTENTION: 0
CHEST TIGHTNESS: 0
VOMITING: 0
PHOTOPHOBIA: 0
SORE THROAT: 0
APNEA: 0
COLOR CHANGE: 0
WHEEZING: 0
SHORTNESS OF BREATH: 0
COUGH: 0
EYE DISCHARGE: 0
DIARRHEA: 0

## 2019-11-03 PROCEDURE — 1240000000 HC EMOTIONAL WELLNESS R&B

## 2019-11-03 PROCEDURE — 93010 ELECTROCARDIOGRAM REPORT: CPT | Performed by: INTERNAL MEDICINE

## 2019-11-03 PROCEDURE — 6370000000 HC RX 637 (ALT 250 FOR IP): Performed by: PSYCHIATRY & NEUROLOGY

## 2019-11-03 RX ORDER — OXCARBAZEPINE 150 MG/1
150 TABLET, FILM COATED ORAL 2 TIMES DAILY
Status: DISCONTINUED | OUTPATIENT
Start: 2019-11-03 | End: 2019-11-06 | Stop reason: HOSPADM

## 2019-11-03 RX ORDER — ARIPIPRAZOLE 10 MG/1
10 TABLET ORAL DAILY
Status: DISCONTINUED | OUTPATIENT
Start: 2019-11-04 | End: 2019-11-06 | Stop reason: HOSPADM

## 2019-11-03 RX ADMIN — OXCARBAZEPINE 150 MG: 150 TABLET, FILM COATED ORAL at 10:22

## 2019-11-03 RX ADMIN — TRAZODONE HYDROCHLORIDE 50 MG: 50 TABLET ORAL at 21:33

## 2019-11-03 RX ADMIN — OXCARBAZEPINE 150 MG: 150 TABLET, FILM COATED ORAL at 21:30

## 2019-11-03 RX ADMIN — ARIPIPRAZOLE 5 MG: 5 TABLET ORAL at 08:43

## 2019-11-03 ASSESSMENT — PATIENT HEALTH QUESTIONNAIRE - PHQ9: SUM OF ALL RESPONSES TO PHQ QUESTIONS 1-9: 14

## 2019-11-03 ASSESSMENT — LIFESTYLE VARIABLES: HISTORY_ALCOHOL_USE: NO

## 2019-11-04 PROCEDURE — 99232 SBSQ HOSP IP/OBS MODERATE 35: CPT | Performed by: PSYCHIATRY & NEUROLOGY

## 2019-11-04 PROCEDURE — 6370000000 HC RX 637 (ALT 250 FOR IP): Performed by: PSYCHIATRY & NEUROLOGY

## 2019-11-04 PROCEDURE — 6370000000 HC RX 637 (ALT 250 FOR IP): Performed by: PHYSICIAN ASSISTANT

## 2019-11-04 PROCEDURE — 1240000000 HC EMOTIONAL WELLNESS R&B

## 2019-11-04 RX ORDER — NICOTINE 21 MG/24HR
1 PATCH, TRANSDERMAL 24 HOURS TRANSDERMAL DAILY
Status: DISCONTINUED | OUTPATIENT
Start: 2019-11-04 | End: 2019-11-06 | Stop reason: HOSPADM

## 2019-11-04 RX ADMIN — OXCARBAZEPINE 150 MG: 150 TABLET, FILM COATED ORAL at 09:13

## 2019-11-04 RX ADMIN — BENZOCAINE AND MENTHOL 1 LOZENGE: 15; 3.6 LOZENGE ORAL at 20:52

## 2019-11-04 RX ADMIN — ARIPIPRAZOLE 10 MG: 10 TABLET ORAL at 09:13

## 2019-11-04 RX ADMIN — BENZOCAINE AND MENTHOL 1 LOZENGE: 15; 3.6 LOZENGE ORAL at 23:01

## 2019-11-04 RX ADMIN — HYDROXYZINE PAMOATE 50 MG: 50 CAPSULE ORAL at 20:52

## 2019-11-04 RX ADMIN — TRAZODONE HYDROCHLORIDE 50 MG: 50 TABLET ORAL at 20:33

## 2019-11-04 RX ADMIN — OXCARBAZEPINE 150 MG: 150 TABLET, FILM COATED ORAL at 20:33

## 2019-11-05 PROCEDURE — 6370000000 HC RX 637 (ALT 250 FOR IP): Performed by: PSYCHIATRY & NEUROLOGY

## 2019-11-05 PROCEDURE — 99232 SBSQ HOSP IP/OBS MODERATE 35: CPT | Performed by: PSYCHIATRY & NEUROLOGY

## 2019-11-05 PROCEDURE — 6370000000 HC RX 637 (ALT 250 FOR IP): Performed by: PHYSICIAN ASSISTANT

## 2019-11-05 PROCEDURE — 1240000000 HC EMOTIONAL WELLNESS R&B

## 2019-11-05 RX ADMIN — TRAZODONE HYDROCHLORIDE 50 MG: 50 TABLET ORAL at 20:51

## 2019-11-05 RX ADMIN — ARIPIPRAZOLE 10 MG: 10 TABLET ORAL at 10:20

## 2019-11-05 RX ADMIN — OXCARBAZEPINE 150 MG: 150 TABLET, FILM COATED ORAL at 20:51

## 2019-11-05 RX ADMIN — BENZOCAINE AND MENTHOL 1 LOZENGE: 15; 3.6 LOZENGE ORAL at 20:51

## 2019-11-05 RX ADMIN — BENZOCAINE AND MENTHOL 1 LOZENGE: 15; 3.6 LOZENGE ORAL at 11:16

## 2019-11-05 RX ADMIN — OXCARBAZEPINE 150 MG: 150 TABLET, FILM COATED ORAL at 09:46

## 2019-11-05 RX ADMIN — HYDROXYZINE PAMOATE 50 MG: 50 CAPSULE ORAL at 20:51

## 2019-11-06 VITALS
HEIGHT: 72 IN | DIASTOLIC BLOOD PRESSURE: 82 MMHG | HEART RATE: 108 BPM | BODY MASS INDEX: 25.73 KG/M2 | WEIGHT: 190 LBS | SYSTOLIC BLOOD PRESSURE: 116 MMHG | RESPIRATION RATE: 20 BRPM | OXYGEN SATURATION: 93 % | TEMPERATURE: 97 F

## 2019-11-06 PROCEDURE — 99239 HOSP IP/OBS DSCHRG MGMT >30: CPT | Performed by: PSYCHIATRY & NEUROLOGY

## 2019-11-06 PROCEDURE — 6370000000 HC RX 637 (ALT 250 FOR IP): Performed by: PSYCHIATRY & NEUROLOGY

## 2019-11-06 RX ORDER — OXCARBAZEPINE 150 MG/1
150 TABLET, FILM COATED ORAL 2 TIMES DAILY
Qty: 30 TABLET | Refills: 2 | Status: SHIPPED | OUTPATIENT
Start: 2019-11-06 | End: 2020-12-20

## 2019-11-06 RX ORDER — ARIPIPRAZOLE 10 MG/1
10 TABLET ORAL DAILY
Qty: 30 TABLET | Refills: 0 | Status: SHIPPED | OUTPATIENT
Start: 2019-11-06 | End: 2020-12-20

## 2019-11-06 RX ADMIN — OXCARBAZEPINE 150 MG: 150 TABLET, FILM COATED ORAL at 09:10

## 2019-11-06 RX ADMIN — ARIPIPRAZOLE 10 MG: 10 TABLET ORAL at 09:10

## 2020-01-13 ENCOUNTER — HOSPITAL ENCOUNTER (EMERGENCY)
Age: 30
Discharge: OTHER INSTITUTION | End: 2020-01-13
Attending: EMERGENCY MEDICINE
Payer: MEDICARE

## 2020-01-13 VITALS
SYSTOLIC BLOOD PRESSURE: 135 MMHG | DIASTOLIC BLOOD PRESSURE: 87 MMHG | HEIGHT: 72 IN | RESPIRATION RATE: 18 BRPM | WEIGHT: 190 LBS | BODY MASS INDEX: 25.73 KG/M2 | TEMPERATURE: 98 F | HEART RATE: 85 BPM | OXYGEN SATURATION: 98 %

## 2020-01-13 LAB
ACETAMINOPHEN LEVEL: <5 UG/ML (ref 10–30)
ALBUMIN SERPL-MCNC: 5.3 G/DL (ref 3.5–4.6)
ALP BLD-CCNC: 100 U/L (ref 35–104)
ALT SERPL-CCNC: 21 U/L (ref 0–41)
AMPHETAMINE SCREEN, URINE: ABNORMAL
ANION GAP SERPL CALCULATED.3IONS-SCNC: 16 MEQ/L (ref 9–15)
AST SERPL-CCNC: 26 U/L (ref 0–40)
BARBITURATE SCREEN URINE: ABNORMAL
BASOPHILS ABSOLUTE: 0.1 K/UL (ref 0–0.2)
BASOPHILS RELATIVE PERCENT: 0.8 %
BENZODIAZEPINE SCREEN, URINE: ABNORMAL
BILIRUB SERPL-MCNC: 1 MG/DL (ref 0.2–0.7)
BILIRUBIN URINE: NEGATIVE
BLOOD, URINE: NEGATIVE
BUN BLDV-MCNC: 21 MG/DL (ref 6–20)
CALCIUM SERPL-MCNC: 9.8 MG/DL (ref 8.5–9.9)
CANNABINOID SCREEN URINE: POSITIVE
CHLORIDE BLD-SCNC: 99 MEQ/L (ref 95–107)
CK MB: 4.2 NG/ML (ref 0–6.7)
CLARITY: CLEAR
CO2: 25 MEQ/L (ref 20–31)
COCAINE METABOLITE SCREEN URINE: POSITIVE
COLOR: ABNORMAL
CREAT SERPL-MCNC: 0.91 MG/DL (ref 0.7–1.2)
CREATINE KINASE-MB INDEX: 1.2 % (ref 0–3.5)
EOSINOPHILS ABSOLUTE: 0 K/UL (ref 0–0.7)
EOSINOPHILS RELATIVE PERCENT: 0.4 %
ETHANOL PERCENT: NORMAL G/DL
ETHANOL: <10 MG/DL (ref 0–0.08)
GFR AFRICAN AMERICAN: >60
GFR NON-AFRICAN AMERICAN: >60
GLOBULIN: 2.4 G/DL (ref 2.3–3.5)
GLUCOSE BLD-MCNC: 96 MG/DL (ref 70–99)
GLUCOSE URINE: NEGATIVE MG/DL
HCT VFR BLD CALC: 46.7 % (ref 42–52)
HEMOGLOBIN: 15.8 G/DL (ref 14–18)
KETONES, URINE: 15 MG/DL
LEUKOCYTE ESTERASE, URINE: NEGATIVE
LYMPHOCYTES ABSOLUTE: 1.9 K/UL (ref 1–4.8)
LYMPHOCYTES RELATIVE PERCENT: 17.2 %
Lab: ABNORMAL
MCH RBC QN AUTO: 31.7 PG (ref 27–31.3)
MCHC RBC AUTO-ENTMCNC: 34 % (ref 33–37)
MCV RBC AUTO: 93.4 FL (ref 80–100)
METHADONE SCREEN, URINE: ABNORMAL
MONOCYTES ABSOLUTE: 0.9 K/UL (ref 0.2–0.8)
MONOCYTES RELATIVE PERCENT: 8.5 %
NEUTROPHILS ABSOLUTE: 7.9 K/UL (ref 1.4–6.5)
NEUTROPHILS RELATIVE PERCENT: 73.1 %
NITRITE, URINE: NEGATIVE
OPIATE SCREEN URINE: ABNORMAL
OXYCODONE URINE: ABNORMAL
PDW BLD-RTO: 12.8 % (ref 11.5–14.5)
PH UA: 5 (ref 5–9)
PHENCYCLIDINE SCREEN URINE: ABNORMAL
PLATELET # BLD: 274 K/UL (ref 130–400)
POTASSIUM SERPL-SCNC: 3.6 MEQ/L (ref 3.4–4.9)
PROPOXYPHENE SCREEN: ABNORMAL
PROTEIN UA: ABNORMAL MG/DL
RBC # BLD: 5 M/UL (ref 4.7–6.1)
SALICYLATE, SERUM: <0.3 MG/DL (ref 15–30)
SODIUM BLD-SCNC: 140 MEQ/L (ref 135–144)
SPECIFIC GRAVITY UA: 1.03 (ref 1–1.03)
TOTAL CK: 339 U/L (ref 0–190)
TOTAL PROTEIN: 7.7 G/DL (ref 6.3–8)
TSH SERPL DL<=0.05 MIU/L-ACNC: 1.71 UIU/ML (ref 0.44–3.86)
URINE REFLEX TO CULTURE: ABNORMAL
UROBILINOGEN, URINE: 1 E.U./DL
WBC # BLD: 10.9 K/UL (ref 4.8–10.8)

## 2020-01-13 PROCEDURE — 84443 ASSAY THYROID STIM HORMONE: CPT

## 2020-01-13 PROCEDURE — 99285 EMERGENCY DEPT VISIT HI MDM: CPT

## 2020-01-13 PROCEDURE — 80053 COMPREHEN METABOLIC PANEL: CPT

## 2020-01-13 PROCEDURE — 85025 COMPLETE CBC W/AUTO DIFF WBC: CPT

## 2020-01-13 PROCEDURE — 82553 CREATINE MB FRACTION: CPT

## 2020-01-13 PROCEDURE — G0480 DRUG TEST DEF 1-7 CLASSES: HCPCS

## 2020-01-13 PROCEDURE — 82550 ASSAY OF CK (CPK): CPT

## 2020-01-13 PROCEDURE — 36415 COLL VENOUS BLD VENIPUNCTURE: CPT

## 2020-01-13 PROCEDURE — 80307 DRUG TEST PRSMV CHEM ANLYZR: CPT

## 2020-01-13 PROCEDURE — 81003 URINALYSIS AUTO W/O SCOPE: CPT

## 2020-01-13 ASSESSMENT — ENCOUNTER SYMPTOMS
ABDOMINAL PAIN: 0
SORE THROAT: 0
CHEST TIGHTNESS: 0
VOMITING: 0
SHORTNESS OF BREATH: 0
NAUSEA: 0
EYE PAIN: 0

## 2020-01-13 NOTE — ED NOTES
Has eaten 2 sandwiches and milk. Behavior in control and appropriate.      Robert Borges, CHERRI  41/09/78 3383

## 2020-01-13 NOTE — ED TRIAGE NOTES
Here from Emergency appt at [facility]. Wants help to get of crack. Feels suicidal with no plan. Wants court date pushed back. Is scheduled for 2/10/2020. Has contracted not to hurt self while here.

## 2020-01-13 NOTE — ED NOTES
Bed: BAC02  Expected date: 1/13/20  Expected time: 9:38 AM  Means of arrival: [facility]  Comments:  29M, SI from tanvir agrawal RN  01/13/20 8751

## 2020-01-13 NOTE — ED PROVIDER NOTES
Female   Lifestyle    Physical activity:     Days per week: None     Minutes per session: None    Stress: None   Relationships    Social connections:     Talks on phone: None     Gets together: None     Attends Druze service: None     Active member of club or organization: None     Attends meetings of clubs or organizations: None     Relationship status: None    Intimate partner violence:     Fear of current or ex partner: None     Emotionally abused: None     Physically abused: None     Forced sexual activity: None   Other Topics Concern    None   Social History Narrative    Lives w family       SCREENINGS              PHYSICAL EXAM    (up to 7 for level 4, 8 or more for level 5)     ED Triage Vitals [01/13/20 0949]   BP Temp Temp Source Pulse Resp SpO2 Height Weight   (!) 148/93 98 °F (36.7 °C) Oral 86 19 99 % 6' (1.829 m) 190 lb (86.2 kg)       Physical Exam  Constitutional:       General: He is not in acute distress. Appearance: He is well-developed. He is not diaphoretic. HENT:      Head: Normocephalic and atraumatic. Right Ear: External ear normal.      Left Ear: External ear normal.      Mouth/Throat:      Pharynx: No oropharyngeal exudate. Eyes:      Conjunctiva/sclera: Conjunctivae normal.      Pupils: Pupils are equal, round, and reactive to light. Neck:      Musculoskeletal: Normal range of motion and neck supple. Thyroid: No thyromegaly. Vascular: No JVD. Trachea: No tracheal deviation. Cardiovascular:      Rate and Rhythm: Normal rate. Heart sounds: Normal heart sounds. No murmur. Pulmonary:      Effort: Pulmonary effort is normal. No respiratory distress. Breath sounds: Normal breath sounds. No wheezing. Abdominal:      General: Bowel sounds are normal.      Palpations: Abdomen is soft. Tenderness: There is no tenderness. There is no guarding. Musculoskeletal: Normal range of motion. Skin:     General: Skin is warm and dry.       Findings:

## 2020-01-14 NOTE — ED NOTES
Pink slipped faxed to [facility] per [facility] request     Yusef Riggs.  Tosin Patel RN  01/13/20 8455

## 2020-01-14 NOTE — ED NOTES
Report to Genaro Dueñas and Elwood Fabry RNs' and Eugenio LPN. Christiana Chaco Levan Cushing  01/13/20 1924

## 2020-01-14 NOTE — ED NOTES
Attempt to call report to [facility], no answer     Lj Quintanilla.  [facility]  01/13/20 6687
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
## 2020-03-16 ENCOUNTER — HOSPITAL ENCOUNTER (EMERGENCY)
Age: 30
Discharge: OTHER INSTITUTION | End: 2020-03-16
Attending: EMERGENCY MEDICINE
Payer: MEDICARE

## 2020-03-16 VITALS
BODY MASS INDEX: 24.38 KG/M2 | RESPIRATION RATE: 18 BRPM | OXYGEN SATURATION: 98 % | TEMPERATURE: 98 F | DIASTOLIC BLOOD PRESSURE: 95 MMHG | WEIGHT: 180 LBS | HEART RATE: 71 BPM | HEIGHT: 72 IN | SYSTOLIC BLOOD PRESSURE: 149 MMHG

## 2020-03-16 LAB
ACETAMINOPHEN LEVEL: <5 UG/ML (ref 10–30)
ALBUMIN SERPL-MCNC: 4.7 G/DL (ref 3.5–4.6)
ALP BLD-CCNC: 86 U/L (ref 35–104)
ALT SERPL-CCNC: 33 U/L (ref 0–41)
AMPHETAMINE SCREEN, URINE: ABNORMAL
ANION GAP SERPL CALCULATED.3IONS-SCNC: 14 MEQ/L (ref 9–15)
AST SERPL-CCNC: 27 U/L (ref 0–40)
BARBITURATE SCREEN URINE: ABNORMAL
BASOPHILS ABSOLUTE: 0.1 K/UL (ref 0–0.2)
BASOPHILS RELATIVE PERCENT: 0.7 %
BENZODIAZEPINE SCREEN, URINE: ABNORMAL
BILIRUB SERPL-MCNC: 0.6 MG/DL (ref 0.2–0.7)
BILIRUBIN URINE: NEGATIVE
BLOOD, URINE: NEGATIVE
BUN BLDV-MCNC: 13 MG/DL (ref 6–20)
CALCIUM SERPL-MCNC: 9.5 MG/DL (ref 8.5–9.9)
CANNABINOID SCREEN URINE: POSITIVE
CHLORIDE BLD-SCNC: 100 MEQ/L (ref 95–107)
CHOLESTEROL, TOTAL: 110 MG/DL (ref 0–199)
CK MB: 2.6 NG/ML (ref 0–6.7)
CLARITY: CLEAR
CO2: 27 MEQ/L (ref 20–31)
COCAINE METABOLITE SCREEN URINE: POSITIVE
COLOR: YELLOW
CREAT SERPL-MCNC: 0.83 MG/DL (ref 0.7–1.2)
CREATINE KINASE-MB INDEX: 0.9 % (ref 0–3.5)
EOSINOPHILS ABSOLUTE: 0.2 K/UL (ref 0–0.7)
EOSINOPHILS RELATIVE PERCENT: 1.7 %
ETHANOL PERCENT: NORMAL G/DL
ETHANOL: <10 MG/DL (ref 0–0.08)
GFR AFRICAN AMERICAN: >60
GFR NON-AFRICAN AMERICAN: >60
GLOBULIN: 2.7 G/DL (ref 2.3–3.5)
GLUCOSE BLD-MCNC: 134 MG/DL (ref 70–99)
GLUCOSE URINE: NEGATIVE MG/DL
HCT VFR BLD CALC: 43.8 % (ref 42–52)
HDLC SERPL-MCNC: 35 MG/DL (ref 40–59)
HEMOGLOBIN: 15.2 G/DL (ref 14–18)
KETONES, URINE: NEGATIVE MG/DL
LDL CHOLESTEROL CALCULATED: 64 MG/DL (ref 0–129)
LEUKOCYTE ESTERASE, URINE: NEGATIVE
LYMPHOCYTES ABSOLUTE: 1.2 K/UL (ref 1–4.8)
LYMPHOCYTES RELATIVE PERCENT: 12.1 %
Lab: ABNORMAL
MCH RBC QN AUTO: 31.8 PG (ref 27–31.3)
MCHC RBC AUTO-ENTMCNC: 34.7 % (ref 33–37)
MCV RBC AUTO: 91.7 FL (ref 80–100)
METHADONE SCREEN, URINE: ABNORMAL
MONOCYTES ABSOLUTE: 0.5 K/UL (ref 0.2–0.8)
MONOCYTES RELATIVE PERCENT: 5.5 %
NEUTROPHILS ABSOLUTE: 8 K/UL (ref 1.4–6.5)
NEUTROPHILS RELATIVE PERCENT: 80 %
NITRITE, URINE: NEGATIVE
OPIATE SCREEN URINE: ABNORMAL
OXYCODONE URINE: ABNORMAL
PDW BLD-RTO: 13 % (ref 11.5–14.5)
PH UA: 6.5 (ref 5–9)
PHENCYCLIDINE SCREEN URINE: ABNORMAL
PLATELET # BLD: 235 K/UL (ref 130–400)
POTASSIUM SERPL-SCNC: 4.4 MEQ/L (ref 3.4–4.9)
PROPOXYPHENE SCREEN: ABNORMAL
PROTEIN UA: NEGATIVE MG/DL
RBC # BLD: 4.78 M/UL (ref 4.7–6.1)
SALICYLATE, SERUM: <0.3 MG/DL (ref 15–30)
SODIUM BLD-SCNC: 141 MEQ/L (ref 135–144)
SPECIFIC GRAVITY UA: 1.01 (ref 1–1.03)
TOTAL CK: 295 U/L (ref 0–190)
TOTAL PROTEIN: 7.4 G/DL (ref 6.3–8)
TRIGL SERPL-MCNC: 57 MG/DL (ref 0–150)
TSH SERPL DL<=0.05 MIU/L-ACNC: 1.16 UIU/ML (ref 0.44–3.86)
URINE REFLEX TO CULTURE: NORMAL
UROBILINOGEN, URINE: 0.2 E.U./DL
WBC # BLD: 10 K/UL (ref 4.8–10.8)

## 2020-03-16 PROCEDURE — 80307 DRUG TEST PRSMV CHEM ANLYZR: CPT

## 2020-03-16 PROCEDURE — 80061 LIPID PANEL: CPT

## 2020-03-16 PROCEDURE — 82550 ASSAY OF CK (CPK): CPT

## 2020-03-16 PROCEDURE — G0480 DRUG TEST DEF 1-7 CLASSES: HCPCS

## 2020-03-16 PROCEDURE — 80053 COMPREHEN METABOLIC PANEL: CPT

## 2020-03-16 PROCEDURE — 36415 COLL VENOUS BLD VENIPUNCTURE: CPT

## 2020-03-16 PROCEDURE — 84443 ASSAY THYROID STIM HORMONE: CPT

## 2020-03-16 PROCEDURE — 99284 EMERGENCY DEPT VISIT MOD MDM: CPT

## 2020-03-16 PROCEDURE — 85025 COMPLETE CBC W/AUTO DIFF WBC: CPT

## 2020-03-16 PROCEDURE — 82553 CREATINE MB FRACTION: CPT

## 2020-03-16 PROCEDURE — 81003 URINALYSIS AUTO W/O SCOPE: CPT

## 2020-03-16 ASSESSMENT — ENCOUNTER SYMPTOMS
RHINORRHEA: 0
FACIAL SWELLING: 0
VOMITING: 0
COLOR CHANGE: 0
SHORTNESS OF BREATH: 0
EYE DISCHARGE: 0
ABDOMINAL PAIN: 0

## 2020-03-16 NOTE — ED PROVIDER NOTES
[facility] ED  eMERGENCY dEPARTMENT eNCOUnter      Pt Name: Jamil Rodriguez  MRN: [id number]  [place] 1990  Date of evaluation: 3/16/2020  [facility]       Chief Complaint   Patient presents with    Psychiatric Evaluation         HISTORY OF PRESENT ILLNESS   (Location/Symptom, Timing/Onset,Context/Setting, Quality, Duration, Modifying Factors, Severity)  Note limiting factors. Jamil Rodriguez is a 34 y.o. male who presents to the emergency department for a chief complaint of having weird thoughts associated with drinking cough syrup last night. He states that this is how it always feels but he was at [facility] and they were being cautious and sent him here for testing. He claims he just need time to sleep off the effects. He denies any suicidal ideas or attempts. He denies any homicidal tendencies. HPI    NursingNotes were reviewed. REVIEW OF SYSTEMS    (2-9 systems for level 4, 10 or more for level 5)     Review of Systems   Constitutional: Negative for activity change, appetite change and fatigue. HENT: Negative for congestion, facial swelling and rhinorrhea. Eyes: Positive for visual disturbance. Negative for discharge. Respiratory: Negative for shortness of breath. Cardiovascular: Negative for chest pain. Gastrointestinal: Negative for abdominal pain and vomiting. Endocrine: Negative for cold intolerance. Musculoskeletal: Negative for arthralgias and myalgias. Skin: Negative for color change. Allergic/Immunologic: Negative for environmental allergies. Neurological: Negative for dizziness and light-headedness. Hematological: Negative for adenopathy. Psychiatric/Behavioral: Positive for hallucinations. Negative for behavioral problems. Except as noted above the remainder of the review of systems was reviewed and negative.        PAST MEDICAL HISTORY     Past Medical History:   Diagnosis Date    Anxiety 1/4/2012 visualized and preliminarily interpreted by the emergency physician with the below findings:        Interpretation per the Radiologist below, if available at the time ofthis note:    No orders to display         ED BEDSIDE ULTRASOUND:   Performed by ED Physician - none    LABS:  Labs Reviewed   ACETAMINOPHEN LEVEL - Abnormal; Notable for the following components:       Result Value    Acetaminophen Level <5 (*)     All other components within normal limits   CBC WITH AUTO DIFFERENTIAL - Abnormal; Notable for the following components:    MCH 31.8 (*)     Neutrophils Absolute 8.0 (*)     All other components within normal limits   CK-MB INDEX - Abnormal; Notable for the following components: Total  (*)     All other components within normal limits   COMPREHENSIVE METABOLIC PANEL - Abnormal; Notable for the following components:    Glucose 134 (*)     Alb 4.7 (*)     All other components within normal limits   LIPID PANEL - Abnormal; Notable for the following components:    HDL 35 (*)     All other components within normal limits   SALICYLATE LEVEL - Abnormal; Notable for the following components:    Salicylate, Serum <5.8 (*)     All other components within normal limits   URINE DRUG SCREEN - Abnormal; Notable for the following components:    Cannabinoid Scrn, Ur POSITIVE (*)     Cocaine Metabolite Screen, Urine POSITIVE (*)     All other components within normal limits   ETHANOL   TSH WITHOUT REFLEX   URINE RT REFLEX TO CULTURE       All other labs were within normal range or not returned as of this dictation. EMERGENCY DEPARTMENT COURSE and DIFFERENTIAL DIAGNOSIS/MDM:   Vitals:    Vitals:    03/16/20 1224   BP: (!) 149/95   Pulse: 71   Resp: 18   Temp: 98 °F (36.7 °C)   TempSrc: Temporal   SpO2: 98%   Weight: 180 lb (81.6 kg)   Height: 6' (1.829 m)       Patient is medically cleared at 2:40 PM with further disposition pending psychiatric evaluation and decision making.     MDM    CRITICAL CARE TIME

## 2020-03-16 NOTE — ED NOTES
Provisional Diagnosis:    Substance Induced Mood Disorder    Psychosocial and Contextual Factors:    Pt lives alone. Pt recently discharged from [facility] and step down treatment at [facility]. [facility]    C-SSRS Summary:     Patient: Rhonda Khanna Screening  1) Within the past month, have you wished you were dead or wished you could go to sleep and not wake up? : No  2) Have you actually had any thoughts of killing yourself? : No  6) Have you ever done anything, started to do anything, or prepared to do anything to end your life?: No    Family: none    Agency: [facility]    [facility]  Treatment History: Previous psychiatric diagnoses and treatments  Clinical Status (Recent): Substance abuse or dependence  Protective Factors (Recent): Identifies reasons for living, Responsibility to family or others/living with family, Fear of death or dying due to pain and suffering, Belief that suicide is immoral/ high spirituality     Abuse Assessment  Physical Abuse: Denies  Verbal Abuse: Denies  Emotional abuse: Denies  Financial Abuse: Denies  Sexual abuse: Denies  Elder abuse: No    Clinical Summary:    Pt behavior cooperative. Pt referred by [id number] reports today was his his first day to report for rehab, but he admitted to drinking cough syrup last night on way to [facility] and that he has been using cocaine since he has been home from [facility]. Pt complained of feelings of paranoia and trouble with his thoughts to [facility] today[facility] was not sure if this was drug induced from his recent use or if pt needed further  Mental health stabilization. Pt admits that he believes he just needed to rest and would like to return to [facility]. Pt is currently denying any paranoia or delusions. Pt denies AV/H.  Pt denies SI/HI    Level of Care Disposition:      pending     Ki Vanessa RN  03/16/20 0446

## 2020-03-16 NOTE — ED NOTES
Gordon Barnes at [employer] unable to provide transportation requesting that pt be taxi'd  to [address] [place] [address] Street [address] [place] [address]  03/16/20 8558

## 2020-03-16 NOTE — ED NOTES
Pt unable to provide urine at this time. Pt verbalized understanding need specimen.      Christelle Ayoub RN  03/16/20 9979

## 2020-03-16 NOTE — ED NOTES
[facility] [phone] Enid Mckay at the [employer] if the pt is discharged Enid Mckay can provide transportation.      Emerson Her RN  03/16/20 0710

## 2020-03-16 NOTE — ED NOTES
[facility] with [facility]. Pt was sent to ED because pt was feeling a little uncomfortable with experiencing paranoid thoughts and they believed he was having thought issues. [facility] did not know if these thought issues was just drug induced and will wear off or if pt needs further mental health stabilization. Pt feels that he just needs some rest and that these are not new thought disturbances.      Travis Cantu, CINDA  03/16/20 7017

## 2020-04-16 ENCOUNTER — HOSPITAL ENCOUNTER (EMERGENCY)
Age: 30
Discharge: ANOTHER HOSPITAL | End: 2020-04-17
Payer: MEDICARE

## 2020-04-16 VITALS
SYSTOLIC BLOOD PRESSURE: 142 MMHG | HEART RATE: 90 BPM | DIASTOLIC BLOOD PRESSURE: 88 MMHG | RESPIRATION RATE: 18 BRPM | OXYGEN SATURATION: 96 % | HEIGHT: 72 IN | WEIGHT: 180 LBS | BODY MASS INDEX: 24.38 KG/M2 | TEMPERATURE: 98.4 F

## 2020-04-16 LAB
ACETAMINOPHEN LEVEL: <5 UG/ML (ref 10–30)
ALBUMIN SERPL-MCNC: 5.4 G/DL (ref 3.5–4.6)
ALP BLD-CCNC: 117 U/L (ref 35–104)
ALT SERPL-CCNC: 28 U/L (ref 0–41)
AMPHETAMINE SCREEN, URINE: ABNORMAL
ANION GAP SERPL CALCULATED.3IONS-SCNC: 18 MEQ/L (ref 9–15)
AST SERPL-CCNC: 39 U/L (ref 0–40)
BARBITURATE SCREEN URINE: ABNORMAL
BASOPHILS ABSOLUTE: 0.1 K/UL (ref 0–0.2)
BASOPHILS RELATIVE PERCENT: 0.8 %
BENZODIAZEPINE SCREEN, URINE: POSITIVE
BILIRUB SERPL-MCNC: 0.9 MG/DL (ref 0.2–0.7)
BILIRUBIN URINE: NEGATIVE
BLOOD, URINE: NEGATIVE
BUN BLDV-MCNC: 16 MG/DL (ref 6–20)
CALCIUM SERPL-MCNC: 10.2 MG/DL (ref 8.5–9.9)
CANNABINOID SCREEN URINE: POSITIVE
CHLORIDE BLD-SCNC: 89 MEQ/L (ref 95–107)
CHOLESTEROL, TOTAL: 125 MG/DL (ref 0–199)
CK MB: 8.9 NG/ML (ref 0–6.7)
CLARITY: CLEAR
CO2: 27 MEQ/L (ref 20–31)
COCAINE METABOLITE SCREEN URINE: POSITIVE
COLOR: ABNORMAL
CREAT SERPL-MCNC: 1.01 MG/DL (ref 0.7–1.2)
CREATINE KINASE-MB INDEX: 1.3 % (ref 0–3.5)
EOSINOPHILS ABSOLUTE: 0.1 K/UL (ref 0–0.7)
EOSINOPHILS RELATIVE PERCENT: 0.8 %
ETHANOL PERCENT: NORMAL G/DL
ETHANOL: <10 MG/DL (ref 0–0.08)
GFR AFRICAN AMERICAN: >60
GFR NON-AFRICAN AMERICAN: >60
GLOBULIN: 3.6 G/DL (ref 2.3–3.5)
GLUCOSE BLD-MCNC: 126 MG/DL (ref 70–99)
GLUCOSE URINE: NEGATIVE MG/DL
HCT VFR BLD CALC: 51.2 % (ref 42–52)
HDLC SERPL-MCNC: 41 MG/DL (ref 40–59)
HEMOGLOBIN: 17.3 G/DL (ref 14–18)
KETONES, URINE: ABNORMAL MG/DL
LDL CHOLESTEROL CALCULATED: 77 MG/DL (ref 0–129)
LEUKOCYTE ESTERASE, URINE: NEGATIVE
LYMPHOCYTES ABSOLUTE: 2.5 K/UL (ref 1–4.8)
LYMPHOCYTES RELATIVE PERCENT: 26.1 %
Lab: ABNORMAL
MCH RBC QN AUTO: 30.8 PG (ref 27–31.3)
MCHC RBC AUTO-ENTMCNC: 33.7 % (ref 33–37)
MCV RBC AUTO: 91.4 FL (ref 80–100)
METHADONE SCREEN, URINE: ABNORMAL
METHAMPHETAMINE, URINE: ABNORMAL
MONOCYTES ABSOLUTE: 1.2 K/UL (ref 0.2–0.8)
MONOCYTES RELATIVE PERCENT: 12.3 %
NEUTROPHILS ABSOLUTE: 5.8 K/UL (ref 1.4–6.5)
NEUTROPHILS RELATIVE PERCENT: 60 %
NITRITE, URINE: NEGATIVE
OPIATE SCREEN URINE: ABNORMAL
PDW BLD-RTO: 13 % (ref 11.5–14.5)
PH UA: 5 (ref 5–9)
PHENCYCLIDINE SCREEN URINE: ABNORMAL
PLATELET # BLD: 274 K/UL (ref 130–400)
POTASSIUM SERPL-SCNC: 3.5 MEQ/L (ref 3.4–4.9)
PROPOXYPHENE SCREEN, URINE: ABNORMAL
PROTEIN UA: ABNORMAL MG/DL
RBC # BLD: 5.6 M/UL (ref 4.7–6.1)
SALICYLATE, SERUM: <0.3 MG/DL (ref 15–30)
SODIUM BLD-SCNC: 134 MEQ/L (ref 135–144)
SPECIFIC GRAVITY UA: 1.02 (ref 1–1.03)
TOTAL CK: 661 U/L (ref 0–190)
TOTAL PROTEIN: 9 G/DL (ref 6.3–8)
TRICYCLIC, URINE: ABNORMAL
TRIGL SERPL-MCNC: 33 MG/DL (ref 0–150)
TSH SERPL DL<=0.05 MIU/L-ACNC: 2.18 UIU/ML (ref 0.44–3.86)
UR OXYCODONE RAPID SCREEN: POSITIVE
URINE REFLEX TO CULTURE: ABNORMAL
UROBILINOGEN, URINE: 1 E.U./DL
WBC # BLD: 9.6 K/UL (ref 4.8–10.8)

## 2020-04-16 PROCEDURE — G0480 DRUG TEST DEF 1-7 CLASSES: HCPCS

## 2020-04-16 PROCEDURE — 80306 DRUG TEST PRSMV INSTRMNT: CPT

## 2020-04-16 PROCEDURE — 36415 COLL VENOUS BLD VENIPUNCTURE: CPT

## 2020-04-16 PROCEDURE — 6370000000 HC RX 637 (ALT 250 FOR IP): Performed by: PERSONAL EMERGENCY RESPONSE ATTENDANT

## 2020-04-16 PROCEDURE — 81003 URINALYSIS AUTO W/O SCOPE: CPT

## 2020-04-16 PROCEDURE — 85025 COMPLETE CBC W/AUTO DIFF WBC: CPT

## 2020-04-16 PROCEDURE — 84443 ASSAY THYROID STIM HORMONE: CPT

## 2020-04-16 PROCEDURE — 80061 LIPID PANEL: CPT

## 2020-04-16 PROCEDURE — 82550 ASSAY OF CK (CPK): CPT

## 2020-04-16 PROCEDURE — 80053 COMPREHEN METABOLIC PANEL: CPT

## 2020-04-16 PROCEDURE — 82553 CREATINE MB FRACTION: CPT

## 2020-04-16 PROCEDURE — 99285 EMERGENCY DEPT VISIT HI MDM: CPT

## 2020-04-16 RX ORDER — LORAZEPAM 1 MG/1
TABLET ORAL
Status: DISCONTINUED
Start: 2020-04-16 | End: 2020-04-17 | Stop reason: HOSPADM

## 2020-04-16 RX ORDER — LORAZEPAM 1 MG/1
2 TABLET ORAL ONCE
Status: COMPLETED | OUTPATIENT
Start: 2020-04-16 | End: 2020-04-16

## 2020-04-16 RX ADMIN — LORAZEPAM 2 MG: 1 TABLET ORAL at 23:21

## 2020-04-16 ASSESSMENT — ENCOUNTER SYMPTOMS
DIARRHEA: 0
RHINORRHEA: 0
NAUSEA: 0
ABDOMINAL PAIN: 0
BLOOD IN STOOL: 0
COUGH: 0
VOMITING: 0
SHORTNESS OF BREATH: 0
COLOR CHANGE: 0
SORE THROAT: 0

## 2020-04-17 NOTE — ED NOTES
[facility] called for transport. State they will call supervisor and call back with ETA.      Keagan Ulloa RN  04/17/20 9878

## 2020-04-17 NOTE — ED NOTES
Report called to [facility].      Jerrell Russell RN  04/17/20 5961

## 2020-04-25 ENCOUNTER — HOSPITAL ENCOUNTER (EMERGENCY)
Age: 30
Discharge: HOME | End: 2020-04-25
Payer: MEDICARE

## 2020-04-25 VITALS
DIASTOLIC BLOOD PRESSURE: 88 MMHG | HEIGHT: 72 IN | TEMPERATURE: 97.2 F | HEART RATE: 96 BPM | RESPIRATION RATE: 18 BRPM | OXYGEN SATURATION: 97 % | WEIGHT: 190 LBS | SYSTOLIC BLOOD PRESSURE: 114 MMHG | BODY MASS INDEX: 25.73 KG/M2

## 2020-04-25 PROCEDURE — 96376 TX/PRO/DX INJ SAME DRUG ADON: CPT

## 2020-04-25 PROCEDURE — 2580000003 HC RX 258: Performed by: NURSE PRACTITIONER

## 2020-04-25 PROCEDURE — 96375 TX/PRO/DX INJ NEW DRUG ADDON: CPT

## 2020-04-25 PROCEDURE — 96374 THER/PROPH/DIAG INJ IV PUSH: CPT

## 2020-04-25 PROCEDURE — 6360000002 HC RX W HCPCS: Performed by: NURSE PRACTITIONER

## 2020-04-25 PROCEDURE — 99284 EMERGENCY DEPT VISIT MOD MDM: CPT

## 2020-04-25 PROCEDURE — 6360000002 HC RX W HCPCS

## 2020-04-25 RX ORDER — LORAZEPAM 2 MG/ML
1 INJECTION INTRAMUSCULAR ONCE
Status: COMPLETED | OUTPATIENT
Start: 2020-04-25 | End: 2020-04-25

## 2020-04-25 RX ORDER — 0.9 % SODIUM CHLORIDE 0.9 %
1000 INTRAVENOUS SOLUTION INTRAVENOUS ONCE
Status: COMPLETED | OUTPATIENT
Start: 2020-04-25 | End: 2020-04-25

## 2020-04-25 RX ORDER — ONDANSETRON 2 MG/ML
INJECTION INTRAMUSCULAR; INTRAVENOUS
Status: COMPLETED
Start: 2020-04-25 | End: 2020-04-25

## 2020-04-25 RX ADMIN — SODIUM CHLORIDE 1000 ML: 9 INJECTION, SOLUTION INTRAVENOUS at 02:12

## 2020-04-25 RX ADMIN — LORAZEPAM 1 MG: 2 INJECTION, SOLUTION INTRAMUSCULAR; INTRAVENOUS at 02:14

## 2020-04-25 RX ADMIN — ONDANSETRON 4 MG: 2 INJECTION INTRAMUSCULAR; INTRAVENOUS at 04:15

## 2020-04-25 RX ADMIN — LORAZEPAM 1 MG: 2 INJECTION, SOLUTION INTRAMUSCULAR; INTRAVENOUS at 02:55

## 2020-04-25 ASSESSMENT — ENCOUNTER SYMPTOMS
SHORTNESS OF BREATH: 0
ABDOMINAL PAIN: 0
EYE REDNESS: 0
VOMITING: 0
EYE PAIN: 0
DIARRHEA: 0
TROUBLE SWALLOWING: 0
BLOOD IN STOOL: 0
COUGH: 0
BACK PAIN: 0
CONSTIPATION: 0
SORE THROAT: 0
EYE DISCHARGE: 0
COLOR CHANGE: 0
WHEEZING: 0
NAUSEA: 0
RHINORRHEA: 0

## 2020-04-25 NOTE — ED PROVIDER NOTES
[address] [facility] ED  EMERGENCY DEPARTMENT ENCOUNTER      Pt Name: Sarah Barragan  MRN: [id number]  [place] 1990  Date of evaluation: 4/25/2020  Provider: ALICE Covarrubias CNP    CHIEF COMPLAINT       Chief Complaint   Patient presents with    Drug Overdose         HISTORY OF PRESENT ILLNESS   (Location/Symptom, Timing/Onset,Context/Setting, Quality, Duration, Modifying Factors, Severity)  Note limiting factors. Sarah Barragan is a 34 y.o. male who presents to the emergency department with a chart reviewed past medical history of meningitis, overdose, pulmonary embolism, subdural hematoma, schizoaffective disorder, polydrug use, bipolar, and rhabdomyolysis for chief complaint of drug overdose. Patient reports that he took combination of meth, crack, and weed. He was never unconscious per his statement but reports that he continues to do that and get to his point and he states that he no longer wants to use any drugs and is seeking help. Nursing Notes were reviewed. REVIEW OF SYSTEMS    (2-9 systems for level 4, 10 or more for level 5)     Review of Systems   Constitutional: Negative for activity change, appetite change, fatigue and fever. HENT: Negative for congestion, ear pain, rhinorrhea, sore throat and trouble swallowing. Eyes: Negative for pain, discharge and redness. Respiratory: Negative for cough, shortness of breath and wheezing. Cardiovascular: Negative for chest pain and palpitations. Gastrointestinal: Negative for abdominal pain, blood in stool, constipation, diarrhea, nausea and vomiting. Endocrine: Negative for polydipsia and polyuria. Genitourinary: Negative for decreased urine volume, dysuria, flank pain and hematuria. Musculoskeletal: Negative for arthralgias, back pain and myalgias. Skin: Negative for color change, rash and wound. Neurological: Negative for dizziness, syncope, weakness, light-headedness and headaches.    Psychiatric/Behavioral: Positive for behavioral problems. Negative for suicidal ideas. The patient is nervous/anxious. All other systems reviewed and are negative. Except as noted above the remainder of the review of systems was reviewed and negative.        PAST MEDICAL HISTORY     Past Medical History:   Diagnosis Date    Anxiety 2012    Bipolar affective disorder ([facility] 2015    Chronic back pain 2017    Depression     [facility]    Fracture of left side of mandibular body ([facility] 2018    ACUTE LEFT CONDYLAR NECK FRACTURE    Fracture of navicular bone of foot, closed 2012    lt    Fracture of temporal bone ([facility]     History of pulmonary embolism     Schizophrenia ([facility]     Seizures ([facility]     Talus fracture 2012    lt    [facility] 2015    Wound of left leg 2012     Past Surgical History:   Procedure Laterality Date    WRIST SURGERY  2009    pin in wrist left     Social History     Socioeconomic History    Marital status: Single     Spouse name: None    Number of children: 0    Years of education: 15    Highest education level: None   Occupational History    None   Social Needs    Financial resource strain: None    Food insecurity     Worry: None     Inability: None    Transportation needs     Medical: None     Non-medical: None   Tobacco Use    Smoking status: Current Some Day Smoker     Packs/day: 1.00     Years: 1.00     Pack years: 1.00     Last attempt to quit: 2011     Years since quittin.4    Smokeless tobacco: Current User     Types: Chew   Substance and Sexual Activity    Alcohol use: No     Comment: pt denies    Drug use: Yes     Frequency: 7.0 times per week     Types: Marijuana, Cocaine, Other-see comments, Methamphetamines     Comment: crack, meth, weed tonight    Sexual activity: Not Currently     Partners: Female   Lifestyle    Physical activity     Days per week: None     Minutes per session: None    Stress: None   Relationships    Social connections     Talks on phone: None     Gets together: None     Attends Shinto service: None     Active member of club or organization: None     Attends meetings of clubs or organizations: None     Relationship status: None    Intimate partner violence     Fear of current or ex partner: None     Emotionally abused: None     Physically abused: None     Forced sexual activity: None   Other Topics Concern    None   Social History Narrative    Lives w family       SCREENINGS             PHYSICAL EXAM    (up to 7 for level 4, 8 or more for level 5)     ED Triage Vitals [04/25/20 0132]   BP Temp Temp Source Pulse Resp SpO2 Height Weight   (!) 120/100 97.8 °F (36.6 °C) Oral 92 20 99 % 6' (1.829 m) 190 lb (86.2 kg)       Physical Exam  Vitals signs and nursing note reviewed. Constitutional:       General: He is not in acute distress. Appearance: He is well-developed. He is not diaphoretic. HENT:      Head: Normocephalic and atraumatic. Nose: Nose normal.   Eyes:      Conjunctiva/sclera: Conjunctivae normal.      Pupils: Pupils are equal, round, and reactive to light. Neck:      Musculoskeletal: Normal range of motion and neck supple. Cardiovascular:      Rate and Rhythm: Normal rate and regular rhythm. Heart sounds: Normal heart sounds. Pulmonary:      Effort: Pulmonary effort is normal. No respiratory distress. Breath sounds: Normal breath sounds. No wheezing or rales. Abdominal:      General: Bowel sounds are normal.      Palpations: Abdomen is soft. Tenderness: There is no abdominal tenderness. Skin:     General: Skin is warm and dry. Capillary Refill: Capillary refill takes less than 2 seconds. Findings: No rash. Neurological:      Mental Status: He is alert and oriented to person, place, and time. Cranial Nerves: No cranial nerve deficit.    Psychiatric:         Behavior: Behavior normal. RESULTS     EKG: All EKG's are interpreted by the Emergency Department Physician who either signs or Co-signsthis chart in the absence of a cardiologist.        RADIOLOGY:   Non-plain filmimages such as CT, Ultrasound and MRI are read by the radiologist. Plain radiographic images are visualized and preliminarily interpreted by the emergency physician with the below findings:        Interpretation per the Radiologist below, if available at the time ofthis note:    No orders to display         ED BEDSIDE ULTRASOUND:   Performed by ED Physician - none    LABS:  Labs Reviewed   URINE DRUG SCREEN       All other labs were within normal range or not returned as of this dictation. EMERGENCY DEPARTMENT COURSE and DIFFERENTIAL DIAGNOSIS/MDM:   Vitals:    Vitals:    04/25/20 0230 04/25/20 0300 04/25/20 0341 04/25/20 0503   BP: 139/89 (!) 144/81 125/80 120/75   Pulse: 93 107 95 95   Resp: 11 18 18 18   Temp:       TempSrc:       SpO2: 99% 98% 98% 95%   Weight:       Height:                MDM   Patient is a 24-year-old male presenting emergency department chief complaint of possible overdose on meth, crack, and weak. He reports that he no longer wants to use any drugs and [facility]. He is hemodynamically stable nontoxic-appearing. Patient had an episode of vomiting and was given Zofran and is feeling much better right now. He was watched in the ER for 3 and half hours. He fell asleep. Patient is discharged home with a diagnoses of polysubstance abuse in stable condition stable vital signs. CRITICAL CARE TIME       CONSULTS:  None    PROCEDURES:  Unless otherwise noted below, none     Procedures    FINAL IMPRESSION      1. Heroin abuse ([facility]    2. Polysubstance abuse [facility])          DISPOSITION/PLAN   DISPOSITION Decision To Discharge 04/25/2020 05:06:56 AM      PATIENT REFERRED TO:  [address] [place] [address].   [address] [place], [place] [id number] [phone]    Schedule an appointment as soon as possible for a visit in 1 day        DISCHARGE MEDICATIONS:  New Prescriptions    No medications on file          (Please notethat portions of this note were completed with a voice recognition program.  Efforts were made to edit the dictations but occasionally words are mis-transcribed.)    ALICE Jiménez CNP (electronically signed)  Attending Emergency Physician          ALICE Noel CNP  04/25/20 0561

## 2020-04-25 NOTE — ED NOTES
Patient stumbling around his room with unsteady gait   Patient had an episode of emesis   zofran given per order  Patient was given gatorade per his request.  This RN informed patient that he may continue to vomit if he does not give his stomach time to settle   BRIGITTE Streeter at bedside with this RN      Milagro Baeza RN  04/25/20 7827

## 2020-04-25 NOTE — ED NOTES
[facility] informed provider that pt in not interested in getting help at this time. [facility] left information at bedside.      Annika Bullard RN  68/54/12 9987

## 2020-04-25 NOTE — ED NOTES
Woke pt up to tell him he is discharged and ask if he has someone to come pick him up. Pt states no one drives. Pt \"falling asleep\" while talking, unable to stay alert. Pt was medicated with 2mg Ativan IV.      Christy Urban RN  10/52/55 9460

## 2020-04-25 NOTE — ED TRIAGE NOTES
Pt to ED via EMS. EMS was called for possible overdose. Pt awake, alert and oriented, sts \" I need to clarify things and get some help. Why do I keep doing this? \" pt sts very stressed and not sleeping at home. Pt jittery and cant sit still currently. sts used crack, meth and some weed prior to coming to the ED. Pt sts \"here all the time, spend all my money on drugs, why?\". Sts wants medicine to help him stop using. Becomes tearful during triage.

## 2020-04-25 NOTE — ED NOTES
Pt stable, 0 problems, 0 c/o, restless at times, pt resting in bed, skin w/d/pink, 0 pain, 0 n&v, sr on monitor. Will monitor.      Leatha Biswas RN  04/25/20 3121

## 2020-04-25 NOTE — ED NOTES
Pt trying to get ride home. Pt eating breakfast.  Tried calling [facility], no answer, tried calling [employer] [facility], no answer.        Kalyan Tapia RN  04/25/20 0083

## 2020-04-25 NOTE — ED NOTES
\"[facility]\" at bedside to assess pt. Pt A&ox4 calmly speaking with pt.      Khadra Mcneil RN  18/87/13 3795

## 2020-04-25 NOTE — ED NOTES
Pt discharged home with ride. Pt calm, alert, ambulating without difficulty. Pt given information regarding [facility] and a number to call for help.      Thiago Martínez RN  04/25/20 Tim 63 [facility] Phyllis Cline RN  04/25/20 8189

## 2020-04-26 ENCOUNTER — HOSPITAL ENCOUNTER (EMERGENCY)
Age: 30
Discharge: HOME | End: 2020-04-27
Payer: MEDICARE

## 2020-04-26 ENCOUNTER — HOSPITAL ENCOUNTER (EMERGENCY)
Age: 30
Discharge: HOME | End: 2020-04-26
Attending: EMERGENCY MEDICINE
Payer: MEDICARE

## 2020-04-26 VITALS
OXYGEN SATURATION: 98 % | TEMPERATURE: 96.9 F | SYSTOLIC BLOOD PRESSURE: 149 MMHG | RESPIRATION RATE: 20 BRPM | DIASTOLIC BLOOD PRESSURE: 95 MMHG | HEART RATE: 97 BPM

## 2020-04-26 VITALS
HEART RATE: 73 BPM | SYSTOLIC BLOOD PRESSURE: 127 MMHG | RESPIRATION RATE: 18 BRPM | TEMPERATURE: 98.1 F | DIASTOLIC BLOOD PRESSURE: 75 MMHG | OXYGEN SATURATION: 97 %

## 2020-04-26 LAB
ACETAMINOPHEN LEVEL: <5 UG/ML (ref 10–30)
ALBUMIN SERPL-MCNC: 4.3 G/DL (ref 3.5–4.6)
ALP BLD-CCNC: 90 U/L (ref 35–104)
ALT SERPL-CCNC: 23 U/L (ref 0–41)
ANION GAP SERPL CALCULATED.3IONS-SCNC: 11 MEQ/L (ref 9–15)
AST SERPL-CCNC: 31 U/L (ref 0–40)
BASOPHILS ABSOLUTE: 0.1 K/UL (ref 0–0.2)
BASOPHILS RELATIVE PERCENT: 1.1 %
BILIRUB SERPL-MCNC: 0.4 MG/DL (ref 0.2–0.7)
BILIRUBIN URINE: NEGATIVE
BLOOD, URINE: NEGATIVE
BUN BLDV-MCNC: 14 MG/DL (ref 6–20)
CALCIUM SERPL-MCNC: 8.5 MG/DL (ref 8.5–9.9)
CHLORIDE BLD-SCNC: 101 MEQ/L (ref 95–107)
CHOLESTEROL, TOTAL: 80 MG/DL (ref 0–199)
CLARITY: CLEAR
CO2: 26 MEQ/L (ref 20–31)
COLOR: YELLOW
CREAT SERPL-MCNC: 0.74 MG/DL (ref 0.7–1.2)
EOSINOPHILS ABSOLUTE: 0.3 K/UL (ref 0–0.7)
EOSINOPHILS RELATIVE PERCENT: 4.2 %
ETHANOL PERCENT: NORMAL G/DL
ETHANOL: <10 MG/DL (ref 0–0.08)
GFR AFRICAN AMERICAN: >60
GFR NON-AFRICAN AMERICAN: >60
GLOBULIN: 2.7 G/DL (ref 2.3–3.5)
GLUCOSE BLD-MCNC: 90 MG/DL (ref 70–99)
GLUCOSE URINE: NEGATIVE MG/DL
HCT VFR BLD CALC: 43.6 % (ref 42–52)
HDLC SERPL-MCNC: 29 MG/DL (ref 40–59)
HEMOGLOBIN: 14.8 G/DL (ref 14–18)
KETONES, URINE: ABNORMAL MG/DL
LDL CHOLESTEROL CALCULATED: 38 MG/DL (ref 0–129)
LEUKOCYTE ESTERASE, URINE: NEGATIVE
LYMPHOCYTES ABSOLUTE: 2.6 K/UL (ref 1–4.8)
LYMPHOCYTES RELATIVE PERCENT: 34.1 %
MCH RBC QN AUTO: 31.5 PG (ref 27–31.3)
MCHC RBC AUTO-ENTMCNC: 34 % (ref 33–37)
MCV RBC AUTO: 92.5 FL (ref 80–100)
MONOCYTES ABSOLUTE: 0.7 K/UL (ref 0.2–0.8)
MONOCYTES RELATIVE PERCENT: 8.6 %
NEUTROPHILS ABSOLUTE: 4 K/UL (ref 1.4–6.5)
NEUTROPHILS RELATIVE PERCENT: 52 %
NITRITE, URINE: NEGATIVE
PDW BLD-RTO: 12.9 % (ref 11.5–14.5)
PH UA: 6.5 (ref 5–9)
PLATELET # BLD: 230 K/UL (ref 130–400)
POTASSIUM SERPL-SCNC: 3.8 MEQ/L (ref 3.4–4.9)
PROTEIN UA: NEGATIVE MG/DL
RBC # BLD: 4.71 M/UL (ref 4.7–6.1)
SALICYLATE, SERUM: <0.3 MG/DL (ref 15–30)
SODIUM BLD-SCNC: 138 MEQ/L (ref 135–144)
SPECIFIC GRAVITY UA: 1.03 (ref 1–1.03)
TOTAL CK: 504 U/L (ref 0–190)
TOTAL PROTEIN: 7 G/DL (ref 6.3–8)
TRIGL SERPL-MCNC: 66 MG/DL (ref 0–150)
TSH SERPL DL<=0.05 MIU/L-ACNC: 0.63 UIU/ML (ref 0.44–3.86)
URINE REFLEX TO CULTURE: ABNORMAL
UROBILINOGEN, URINE: 1 E.U./DL
WBC # BLD: 7.8 K/UL (ref 4.8–10.8)

## 2020-04-26 PROCEDURE — 80307 DRUG TEST PRSMV CHEM ANLYZR: CPT

## 2020-04-26 PROCEDURE — 80053 COMPREHEN METABOLIC PANEL: CPT

## 2020-04-26 PROCEDURE — 99283 EMERGENCY DEPT VISIT LOW MDM: CPT

## 2020-04-26 PROCEDURE — 36415 COLL VENOUS BLD VENIPUNCTURE: CPT

## 2020-04-26 PROCEDURE — 85025 COMPLETE CBC W/AUTO DIFF WBC: CPT

## 2020-04-26 PROCEDURE — 99285 EMERGENCY DEPT VISIT HI MDM: CPT

## 2020-04-26 PROCEDURE — G0480 DRUG TEST DEF 1-7 CLASSES: HCPCS

## 2020-04-26 PROCEDURE — 6370000000 HC RX 637 (ALT 250 FOR IP): Performed by: EMERGENCY MEDICINE

## 2020-04-26 PROCEDURE — 82550 ASSAY OF CK (CPK): CPT

## 2020-04-26 PROCEDURE — 84443 ASSAY THYROID STIM HORMONE: CPT

## 2020-04-26 PROCEDURE — 82553 CREATINE MB FRACTION: CPT

## 2020-04-26 PROCEDURE — 81003 URINALYSIS AUTO W/O SCOPE: CPT

## 2020-04-26 PROCEDURE — 80061 LIPID PANEL: CPT

## 2020-04-26 PROCEDURE — 6370000000 HC RX 637 (ALT 250 FOR IP): Performed by: PHYSICIAN ASSISTANT

## 2020-04-26 RX ORDER — HYDROXYZINE HYDROCHLORIDE 25 MG/1
25 TABLET, FILM COATED ORAL ONCE
Status: COMPLETED | OUTPATIENT
Start: 2020-04-26 | End: 2020-04-26

## 2020-04-26 RX ORDER — HYDROXYZINE HYDROCHLORIDE 25 MG/1
25 TABLET, FILM COATED ORAL EVERY 6 HOURS PRN
Qty: 20 TABLET | Refills: 0 | Status: SHIPPED | OUTPATIENT
Start: 2020-04-26 | End: 2020-05-06

## 2020-04-26 RX ORDER — LORAZEPAM 1 MG/1
1 TABLET ORAL ONCE
Status: COMPLETED | OUTPATIENT
Start: 2020-04-26 | End: 2020-04-26

## 2020-04-26 RX ADMIN — LORAZEPAM 1 MG: 1 TABLET ORAL at 23:26

## 2020-04-26 RX ADMIN — HYDROXYZINE HYDROCHLORIDE 25 MG: 25 TABLET, FILM COATED ORAL at 21:22

## 2020-04-26 ASSESSMENT — ENCOUNTER SYMPTOMS
COUGH: 0
EYE PAIN: 0
ABDOMINAL PAIN: 0
ABDOMINAL PAIN: 0
CONSTIPATION: 0
NAUSEA: 0
SHORTNESS OF BREATH: 0
VOMITING: 0
SHORTNESS OF BREATH: 0
SINUS PRESSURE: 0
TROUBLE SWALLOWING: 0
PHOTOPHOBIA: 0
SORE THROAT: 0
ALLERGIC/IMMUNOLOGIC NEGATIVE: 1
COLOR CHANGE: 0
ABDOMINAL DISTENTION: 0
BACK PAIN: 0
COLOR CHANGE: 0
APNEA: 0
WHEEZING: 0
DIARRHEA: 0
RHINORRHEA: 0
EYE PAIN: 0
APNEA: 0

## 2020-04-27 LAB
AMPHETAMINE SCREEN, URINE: POSITIVE
BARBITURATE SCREEN URINE: ABNORMAL
BENZODIAZEPINE SCREEN, URINE: ABNORMAL
CANNABINOID SCREEN URINE: POSITIVE
CK MB: 5.1 NG/ML (ref 0–6.7)
COCAINE METABOLITE SCREEN URINE: POSITIVE
CREATINE KINASE-MB INDEX: 1 % (ref 0–3.5)
Lab: ABNORMAL
METHADONE SCREEN, URINE: ABNORMAL
OPIATE SCREEN URINE: ABNORMAL
OXYCODONE URINE: ABNORMAL
PHENCYCLIDINE SCREEN URINE: ABNORMAL
PROPOXYPHENE SCREEN: ABNORMAL

## 2020-04-27 NOTE — ED NOTES
Patient resting quietly respirations remain even and unlabored no distress noted at this time.      Cristhian Callejas RN  04/27/20 94

## 2020-04-27 NOTE — ED NOTES
Pt medicated per orders. Pt states he needs something strong to calm down. Medication explained multiple times, coupled with the fact that pt doing cocaine is counterproductive to calming down. [facility] pamphlet provided. A representative from them was at [facility] yesterday to eval pt and he declined help.       Garfield Nolasco RN  04/26/20 2123

## 2020-04-27 NOTE — ED NOTES
Angelita Kingston called from [facility] to advise that [facility] will not accept pt due to his insurance.  Angelita Kingston requesting that labs and clinicals's be faxed to [address] [place] [address]      Denny Richter RN  04/27/20 8660

## 2020-04-27 NOTE — ED NOTES
[facility] states patient wants to go to rehab in the morning. [facility] states they can be back around 0900 to pickup.      Keagan Ulloa RN  04/27/20 3577

## 2020-04-27 NOTE — ED PROVIDER NOTES
[facility] ED  eMERGENCY dEPARTMENT eNCOUnter      Pt Name: Michael Ma  MRN: [id number]  [place] 1990  Date of evaluation: 4/26/2020  Provider: Gonzalo Arthur MD    CHIEF COMPLAINT       Chief Complaint   Patient presents with    Anxiety     pt c/o anxiety for 2-3 weeks, frustrated, uses cocaine last used 1pm today, Pt states he tried to calm down using coke, pt states he feels very stressed, pt states [facility] and [facility] out states he was there for questionable overdose. pt states he spoke with [facility] yesterday at the hospital but did not follow up with them today as instructed         HISTORY OF PRESENT ILLNESS   (Location/Symptom, Timing/Onset,Context/Setting, Quality, Duration, Modifying Factors, Severity)  Note limiting factors. Michael Ma is a 34 y.o. male who presents to the emergency department with complaint of anxiety since 2 to 3 weeks. Also admits to use of cocaine, oxycodone, THC continuously over the last 3 weeks. Patient was seen in [facility] yesterday with Suicidal thoughts and discharged around 9:30 this morning to follow-up with [facility]. He was seen by [facility] staff\" and asked to call for follow-up but he did not. He however did not call to follow-up. He presents with anxiety, but denies suicidal or homicidal intents or ideation. Asking for medication to help with sleep. He used cocaine 1 PM today. Denies any other systemic symptoms. HPI    Nursing Notes were reviewed. REVIEW OF SYSTEMS    (2-9 systems for level 4, 10 or more for level 5)     Review of Systems   Constitutional: Negative. Negative for activity change, appetite change, chills, fatigue and fever. HENT: Negative for congestion, ear discharge, ear pain, hearing loss, rhinorrhea, sinus pressure and sore throat. Eyes: Negative for photophobia, pain and visual disturbance.    Respiratory: Negative for apnea, Critical Care time was  minutes, excluding separately reportable procedures. There was a high probability of clinically significant/life threatening deterioration in the patient's condition which required my urgentintervention. CONSULTS:  None    PROCEDURES:  Unless otherwise noted below, none     Procedures    FINAL IMPRESSION      1. Anxiety state    2. Multiple substance abuse [facility])          DISPOSITION/PLAN   DISPOSITION Decision To Discharge 04/26/2020 09:26:31 PM      PATIENT REFERRED TO:  [address] [place] [address]. [address] [place] [address]  [phone]    In 3 days      Follow-up with [facility] as already scheduled.             DISCHARGE MEDICATIONS:  New Prescriptions    HYDROXYZINE (ATARAX) 25 MG TABLET    Take 1 tablet by mouth every 6 hours as needed for Anxiety          (Please note that portions of this note were completed with a voice recognitionprogram.  Efforts were made to edit the dictations but occasionally words are mis-transcribed.)    Katharina Khanna MD (electronically signed)  Attending Emergency Physician          Katharina Khanna MD  04/26/20 4629

## 2020-04-27 NOTE — ED PROVIDER NOTES
[address] [facility] ED  eMERGENCY dEPARTMENTeNCOUnter      Pt Name: Antonio Thompson  MRN: [id number]  [place] 1990  Date ofevaluation: 4/26/2020  Provider: ALICE Byers CNP    CHIEF COMPLAINT       Chief Complaint   Patient presents with   [facility] Anxiety    Drug / Alcohol Assessment     wants [facility]/Rehab         HISTORY OF PRESENT ILLNESS   (Location/Symptom, Timing/Onset,Context/Setting, Quality, Duration, Modifying Factors, Severity)  Note limiting factors. Antonio Thompson is a 34 y.o. male who presents to the emergency department with complaints of stress and anxiety and drug addiction. Patient has had multiple emergency department visits over the past 2 days for the same complaints. Patient denies any current suicidal or homicidal ideation, auditory visual hallucinations. Patient is agitated and states \"I want medications\". HPI    NursingNotes were reviewed. REVIEW OF SYSTEMS    (2-9 systems for level 4, 10 or more for level 5)     Review of Systems   Constitutional: Negative for diaphoresis and fever. HENT: Negative for hearing loss and trouble swallowing. Eyes: Negative for pain. Respiratory: Negative for apnea and shortness of breath. Cardiovascular: Negative for chest pain. Gastrointestinal: Negative for abdominal pain. Endocrine: Negative. Genitourinary: Negative for hematuria. Musculoskeletal: Negative for neck pain and neck stiffness. Skin: Negative for color change. Allergic/Immunologic: Negative. Neurological: Negative for dizziness and numbness. Hematological: Negative. Psychiatric/Behavioral: Positive for agitation. The patient is nervous/anxious. All other systems reviewed and are negative. Except as noted above the remainder of the review of systems was reviewed and negative.        PAST MEDICAL HISTORY     Past Medical History:   Diagnosis Date    Anxiety 1/4/2012    Bipolar affective disorder ([facility] 11/25/2015    Chronic back pain 2017    Depression     Drug abuse and dependence ([facility] 2012    Fracture of left side of mandibular body ([facility] 2018    ACUTE LEFT CONDYLAR NECK FRACTURE    Fracture of navicular bone of foot, closed 2012    lt    [facility]    History of pulmonary embolism     [facility]     Seizures [facility]     Talus fracture 2012    lt    TBI (traumatic brain injury) ([facility] 2015    Wound of left leg 2012         SURGICALHISTORY       Past Surgical History:   Procedure Laterality Date    WRIST SURGERY      pin in wrist left         CURRENT MEDICATIONS       Previous Medications    ARIPIPRAZOLE (ABILIFY) 10 MG TABLET    Take 1 tablet by mouth daily    FAMOTIDINE (PEPCID) 20 MG TABLET    Take 1 tablet by mouth 2 times daily for 3 days    HYDROXYZINE (ATARAX) 25 MG TABLET    Take 1 tablet by mouth every 6 hours as needed for Anxiety    OXCARBAZEPINE (TRILEPTAL) 150 MG TABLET    Take 1 tablet by mouth 2 times daily       ALLERGIES     Shellfish-derived products and Shrimp flavor    FAMILY HISTORY       Family History   Family history unknown: Yes          SOCIAL HISTORY       Social History     Socioeconomic History    Marital status: Single     Spouse name: None    Number of children: 0    Years of education: 15    Highest education level: None   Occupational History    None   Social Needs    Financial resource strain: None    Food insecurity     Worry: None     Inability: None    Transportation needs     Medical: None     Non-medical: None   Tobacco Use    Smoking status: Current Some Day Smoker     Packs/day: 1.00     Years: 1.00     Pack years: 1.00     Last attempt to quit: 2011     Years since quittin.4    Smokeless tobacco: Current User     Types: Chew   Substance and Sexual Activity    Alcohol use: No     Comment: pt denies    Drug use: Yes     Frequency: 7.0 times per week     Types: Marijuana, Cocaine, Other-see comments,

## 2020-04-27 NOTE — ED NOTES
[facility] arrived to transport pt [place] for treatment on voluntary. Pt irritable stated he was not going there because he just go out there \"I thought [facility] was going to pick me up\". Pt demanding to be discharged home than go to [facility]. Jamilah Emanuel [address] and advised pt refusing to sign himself in for treatment.      Yifan Rosales RN  04/27/20 2008

## 2020-05-04 ENCOUNTER — HOSPITAL ENCOUNTER (EMERGENCY)
Age: 30
Discharge: HOME | End: 2020-05-05
Attending: EMERGENCY MEDICINE
Payer: MEDICARE

## 2020-05-04 VITALS
OXYGEN SATURATION: 97 % | TEMPERATURE: 98 F | WEIGHT: 190 LBS | DIASTOLIC BLOOD PRESSURE: 98 MMHG | HEART RATE: 119 BPM | HEIGHT: 72 IN | SYSTOLIC BLOOD PRESSURE: 150 MMHG | RESPIRATION RATE: 18 BRPM | BODY MASS INDEX: 25.73 KG/M2

## 2020-05-04 LAB
EKG ATRIAL RATE: 105 BPM
EKG P AXIS: 71 DEGREES
EKG P-R INTERVAL: 142 MS
EKG Q-T INTERVAL: 352 MS
EKG QRS DURATION: 92 MS
EKG QTC CALCULATION (BAZETT): 465 MS
EKG R AXIS: 77 DEGREES
EKG T AXIS: 30 DEGREES
EKG VENTRICULAR RATE: 105 BPM

## 2020-05-04 PROCEDURE — 85025 COMPLETE CBC W/AUTO DIFF WBC: CPT

## 2020-05-04 PROCEDURE — 36415 COLL VENOUS BLD VENIPUNCTURE: CPT

## 2020-05-04 PROCEDURE — 99285 EMERGENCY DEPT VISIT HI MDM: CPT

## 2020-05-04 PROCEDURE — 93005 ELECTROCARDIOGRAM TRACING: CPT

## 2020-05-04 PROCEDURE — 80053 COMPREHEN METABOLIC PANEL: CPT

## 2020-05-04 PROCEDURE — 84484 ASSAY OF TROPONIN QUANT: CPT

## 2020-05-04 PROCEDURE — G0480 DRUG TEST DEF 1-7 CLASSES: HCPCS

## 2020-05-05 LAB
ACETAMINOPHEN LEVEL: <15 UG/ML (ref 10–30)
ALBUMIN SERPL-MCNC: 5.4 G/DL (ref 3.5–4.6)
ALP BLD-CCNC: 109 U/L (ref 35–104)
ALT SERPL-CCNC: 39 U/L (ref 0–41)
ANION GAP SERPL CALCULATED.3IONS-SCNC: 16 MEQ/L (ref 9–15)
AST SERPL-CCNC: 37 U/L (ref 0–40)
BASOPHILS ABSOLUTE: 0 K/UL (ref 0–0.2)
BASOPHILS RELATIVE PERCENT: 0.3 %
BILIRUB SERPL-MCNC: 0.7 MG/DL (ref 0.2–0.7)
BUN BLDV-MCNC: 24 MG/DL (ref 6–20)
CALCIUM SERPL-MCNC: 9.9 MG/DL (ref 8.5–9.9)
CHLORIDE BLD-SCNC: 96 MEQ/L (ref 95–107)
CO2: 28 MEQ/L (ref 20–31)
CREAT SERPL-MCNC: 1.15 MG/DL (ref 0.7–1.2)
EOSINOPHILS ABSOLUTE: 0.3 K/UL (ref 0–0.7)
EOSINOPHILS RELATIVE PERCENT: 2.8 %
GFR AFRICAN AMERICAN: >60
GFR NON-AFRICAN AMERICAN: >60
GLOBULIN: 2.7 G/DL (ref 2.3–3.5)
GLUCOSE BLD-MCNC: 111 MG/DL (ref 70–99)
HCT VFR BLD CALC: 50.1 % (ref 42–52)
HEMOGLOBIN: 17 G/DL (ref 14–18)
LYMPHOCYTES ABSOLUTE: 2.7 K/UL (ref 1–4.8)
LYMPHOCYTES RELATIVE PERCENT: 28.6 %
MCH RBC QN AUTO: 31.5 PG (ref 27–31.3)
MCHC RBC AUTO-ENTMCNC: 33.9 % (ref 33–37)
MCV RBC AUTO: 93.1 FL (ref 80–100)
MONOCYTES ABSOLUTE: 1 K/UL (ref 0.2–0.8)
MONOCYTES RELATIVE PERCENT: 11 %
NEUTROPHILS ABSOLUTE: 5.3 K/UL (ref 1.4–6.5)
NEUTROPHILS RELATIVE PERCENT: 57.3 %
PDW BLD-RTO: 12.8 % (ref 11.5–14.5)
PLATELET # BLD: 277 K/UL (ref 130–400)
POTASSIUM SERPL-SCNC: 3.6 MEQ/L (ref 3.4–4.9)
RBC # BLD: 5.38 M/UL (ref 4.7–6.1)
SALICYLATE, SERUM: <0.3 MG/DL (ref 15–30)
SODIUM BLD-SCNC: 140 MEQ/L (ref 135–144)
TOTAL PROTEIN: 8.1 G/DL (ref 6.3–8)
TROPONIN: <0.01 NG/ML (ref 0–0.01)
WBC # BLD: 9.3 K/UL (ref 4.8–10.8)

## 2020-05-05 PROCEDURE — 93010 ELECTROCARDIOGRAM REPORT: CPT | Performed by: INTERNAL MEDICINE

## 2020-05-05 NOTE — ED NOTES
ENCOURAGED PT TO GO REHAB TOMORROW AT SOME POINT.  DISCHARGE PAPERS SIGNED     Oumou Rebolledo RN  05/05/20 0030

## 2020-05-05 NOTE — ED PROVIDER NOTES
documented in HPI. Remainder of 10 systems, all negative except for mentioned above      Except as noted above the remainder of the review of systems was reviewed and negative.        PAST MEDICAL HISTORY     Past Medical History:   Diagnosis Date    Anxiety 1/4/2012    Bipolar affective disorder ([facility] 11/25/2015    Chronic back pain 4/20/2017    Depression     Drug abuse and dependence ([facility] 1/4/2012    Fracture of left side of mandibular body ([facility] 06/17/2018    ACUTE LEFT CONDYLAR NECK FRACTURE    Fracture of navicular bone of foot, closed 09/03/2012    lt    Fracture of temporal bone ([facility] 2015    History of pulmonary embolism     Schizophrenia ([facility]     Seizures ([facility]     Talus fracture 9/2012    lt    TBI (traumatic brain injury) ([facility] 2015    Wound of left leg 9/14/2012         SURGICALHISTORY       Past Surgical History:   Procedure Laterality Date    WRIST SURGERY  2009    pin in wrist left         CURRENT MEDICATIONS       Previous Medications    ARIPIPRAZOLE (ABILIFY) 10 MG TABLET    Take 1 tablet by mouth daily    FAMOTIDINE (PEPCID) 20 MG TABLET    Take 1 tablet by mouth 2 times daily for 3 days    HYDROXYZINE (ATARAX) 25 MG TABLET    Take 1 tablet by mouth every 6 hours as needed for Anxiety    OXCARBAZEPINE (TRILEPTAL) 150 MG TABLET    Take 1 tablet by mouth 2 times daily       ALLERGIES     Shellfish-derived products and Shrimp flavor    FAMILY HISTORY       Family History   Family history unknown: Yes          SOCIAL HISTORY       Social History     Socioeconomic History    Marital status: Single     Spouse name: None    Number of children: 0    Years of education: 15    Highest education level: None   Occupational History    None   Social Needs    Financial resource strain: None    Food insecurity     Worry: None     Inability: None    Transportation needs     Medical: None     Non-medical: None   Tobacco Use    Smoking status: Current Some Day Smoker     Packs/day: 1.00

## 2020-07-14 ENCOUNTER — HOSPITAL ENCOUNTER (INPATIENT)
Age: 30
LOS: 1 days | Discharge: HOME | DRG: 918 | End: 2020-07-15
Attending: EMERGENCY MEDICINE | Admitting: INTERNAL MEDICINE
Payer: MEDICARE

## 2020-07-14 PROCEDURE — 96372 THER/PROPH/DIAG INJ SC/IM: CPT

## 2020-07-14 PROCEDURE — 6360000002 HC RX W HCPCS: Performed by: EMERGENCY MEDICINE

## 2020-07-14 PROCEDURE — 99285 EMERGENCY DEPT VISIT HI MDM: CPT

## 2020-07-14 RX ORDER — HYDROCODONE BITARTRATE AND ACETAMINOPHEN 5; 325 MG/1; MG/1
1 TABLET ORAL EVERY 6 HOURS PRN
Qty: 8 TABLET | Refills: 0 | Status: SHIPPED | OUTPATIENT
Start: 2020-07-14 | End: 2020-07-15 | Stop reason: CLARIF

## 2020-07-14 RX ORDER — KETOROLAC TROMETHAMINE 30 MG/ML
30 INJECTION, SOLUTION INTRAMUSCULAR; INTRAVENOUS ONCE
Status: COMPLETED | OUTPATIENT
Start: 2020-07-14 | End: 2020-07-14

## 2020-07-14 RX ORDER — AMOXICILLIN AND CLAVULANATE POTASSIUM 875; 125 MG/1; MG/1
1 TABLET, FILM COATED ORAL 2 TIMES DAILY
Qty: 20 TABLET | Refills: 0 | Status: SHIPPED | OUTPATIENT
Start: 2020-07-14 | End: 2020-07-15 | Stop reason: CLARIF

## 2020-07-14 RX ORDER — DEXAMETHASONE SODIUM PHOSPHATE 10 MG/ML
5 INJECTION, SOLUTION INTRAMUSCULAR; INTRAVENOUS ONCE
Status: COMPLETED | OUTPATIENT
Start: 2020-07-14 | End: 2020-07-14

## 2020-07-14 RX ADMIN — KETOROLAC TROMETHAMINE 30 MG: 30 INJECTION, SOLUTION INTRAMUSCULAR at 23:56

## 2020-07-14 RX ADMIN — DEXAMETHASONE SODIUM PHOSPHATE 5 MG: 10 INJECTION, SOLUTION INTRAMUSCULAR; INTRAVENOUS at 23:56

## 2020-07-15 ENCOUNTER — APPOINTMENT (OUTPATIENT)
Dept: CT IMAGING | Age: 30
DRG: 918 | End: 2020-07-15
Payer: MEDICARE

## 2020-07-15 VITALS
BODY MASS INDEX: 25.73 KG/M2 | OXYGEN SATURATION: 98 % | TEMPERATURE: 98.1 F | SYSTOLIC BLOOD PRESSURE: 144 MMHG | WEIGHT: 190 LBS | HEART RATE: 63 BPM | DIASTOLIC BLOOD PRESSURE: 81 MMHG | HEIGHT: 72 IN | RESPIRATION RATE: 17 BRPM

## 2020-07-15 PROBLEM — F55.8 ACETAMINOPHEN ABUSE: Status: ACTIVE | Noted: 2020-07-15

## 2020-07-15 LAB
ACETAMINOPHEN LEVEL: 126 UG/ML (ref 10–30)
ACETAMINOPHEN LEVEL: 17 UG/ML (ref 10–30)
ALBUMIN SERPL-MCNC: 4.5 G/DL (ref 3.5–4.6)
ALBUMIN SERPL-MCNC: 4.7 G/DL (ref 3.5–4.6)
ALP BLD-CCNC: 83 U/L (ref 35–104)
ALP BLD-CCNC: 92 U/L (ref 35–104)
ALT SERPL-CCNC: 35 U/L (ref 0–41)
ALT SERPL-CCNC: 37 U/L (ref 0–41)
AMPHETAMINE SCREEN, URINE: ABNORMAL
AMYLASE: 112 U/L (ref 22–93)
ANION GAP SERPL CALCULATED.3IONS-SCNC: 13 MEQ/L (ref 9–15)
AST SERPL-CCNC: 31 U/L (ref 0–40)
AST SERPL-CCNC: 34 U/L (ref 0–40)
BARBITURATE SCREEN URINE: ABNORMAL
BASOPHILS ABSOLUTE: 0.1 K/UL (ref 0–0.2)
BASOPHILS RELATIVE PERCENT: 0.3 %
BENZODIAZEPINE SCREEN, URINE: POSITIVE
BILIRUB SERPL-MCNC: 0.3 MG/DL (ref 0.2–0.7)
BILIRUB SERPL-MCNC: 0.6 MG/DL (ref 0.2–0.7)
BILIRUBIN DIRECT: <0.2 MG/DL (ref 0–0.4)
BILIRUBIN, INDIRECT: NORMAL MG/DL (ref 0–0.6)
BUN BLDV-MCNC: 18 MG/DL (ref 6–20)
CALCIUM SERPL-MCNC: 9.2 MG/DL (ref 8.5–9.9)
CANNABINOID SCREEN URINE: POSITIVE
CHLORIDE BLD-SCNC: 104 MEQ/L (ref 95–107)
CO2: 25 MEQ/L (ref 20–31)
COCAINE METABOLITE SCREEN URINE: POSITIVE
CREAT SERPL-MCNC: 1.08 MG/DL (ref 0.7–1.2)
EOSINOPHILS ABSOLUTE: 0.1 K/UL (ref 0–0.7)
EOSINOPHILS RELATIVE PERCENT: 0.8 %
ETHANOL PERCENT: NORMAL G/DL
ETHANOL: <10 MG/DL (ref 0–0.08)
GFR AFRICAN AMERICAN: >60
GFR NON-AFRICAN AMERICAN: >60
GLOBULIN: 2.5 G/DL (ref 2.3–3.5)
GLUCOSE BLD-MCNC: 127 MG/DL (ref 70–99)
HCT VFR BLD CALC: 44.6 % (ref 42–52)
HEMOGLOBIN: 15.1 G/DL (ref 14–18)
LACTIC ACID: 1.9 MMOL/L (ref 0.5–2.2)
LIPASE: 51 U/L (ref 12–95)
LYMPHOCYTES ABSOLUTE: 1.5 K/UL (ref 1–4.8)
LYMPHOCYTES RELATIVE PERCENT: 9.6 %
Lab: ABNORMAL
MCH RBC QN AUTO: 31.7 PG (ref 27–31.3)
MCHC RBC AUTO-ENTMCNC: 34 % (ref 33–37)
MCV RBC AUTO: 93.2 FL (ref 80–100)
METHADONE SCREEN, URINE: ABNORMAL
METHAMPHETAMINE, URINE: ABNORMAL
MONOCYTES ABSOLUTE: 0.1 K/UL (ref 0.2–0.8)
MONOCYTES RELATIVE PERCENT: 0.6 %
NEUTROPHILS ABSOLUTE: 13.4 K/UL (ref 1.4–6.5)
NEUTROPHILS RELATIVE PERCENT: 88.7 %
OPIATE SCREEN URINE: ABNORMAL
PDW BLD-RTO: 13.3 % (ref 11.5–14.5)
PHENCYCLIDINE SCREEN URINE: ABNORMAL
PLATELET # BLD: 265 K/UL (ref 130–400)
POTASSIUM SERPL-SCNC: 4.3 MEQ/L (ref 3.4–4.9)
PROPOXYPHENE SCREEN, URINE: ABNORMAL
RBC # BLD: 4.78 M/UL (ref 4.7–6.1)
SALICYLATE, SERUM: 2.7 MG/DL (ref 15–30)
SALICYLATE, SERUM: 8.8 MG/DL (ref 15–30)
SODIUM BLD-SCNC: 142 MEQ/L (ref 135–144)
TOTAL PROTEIN: 7 G/DL (ref 6.3–8)
TOTAL PROTEIN: 7.2 G/DL (ref 6.3–8)
TRICYCLIC, URINE: ABNORMAL
UR OXYCODONE RAPID SCREEN: ABNORMAL
WBC # BLD: 15.1 K/UL (ref 4.8–10.8)

## 2020-07-15 PROCEDURE — 83690 ASSAY OF LIPASE: CPT

## 2020-07-15 PROCEDURE — 36415 COLL VENOUS BLD VENIPUNCTURE: CPT

## 2020-07-15 PROCEDURE — 82150 ASSAY OF AMYLASE: CPT

## 2020-07-15 PROCEDURE — 80306 DRUG TEST PRSMV INSTRMNT: CPT

## 2020-07-15 PROCEDURE — 2580000003 HC RX 258: Performed by: INTERNAL MEDICINE

## 2020-07-15 PROCEDURE — 96366 THER/PROPH/DIAG IV INF ADDON: CPT

## 2020-07-15 PROCEDURE — G0480 DRUG TEST DEF 1-7 CLASSES: HCPCS

## 2020-07-15 PROCEDURE — 6360000002 HC RX W HCPCS: Performed by: EMERGENCY MEDICINE

## 2020-07-15 PROCEDURE — 80053 COMPREHEN METABOLIC PANEL: CPT

## 2020-07-15 PROCEDURE — 2580000003 HC RX 258: Performed by: EMERGENCY MEDICINE

## 2020-07-15 PROCEDURE — 85025 COMPLETE CBC W/AUTO DIFF WBC: CPT

## 2020-07-15 PROCEDURE — 1210000000 HC MED SURG R&B

## 2020-07-15 PROCEDURE — 96367 TX/PROPH/DG ADDL SEQ IV INF: CPT

## 2020-07-15 PROCEDURE — 96365 THER/PROPH/DIAG IV INF INIT: CPT

## 2020-07-15 PROCEDURE — 70486 CT MAXILLOFACIAL W/O DYE: CPT

## 2020-07-15 PROCEDURE — 6360000002 HC RX W HCPCS: Performed by: INTERNAL MEDICINE

## 2020-07-15 PROCEDURE — 96375 TX/PRO/DX INJ NEW DRUG ADDON: CPT

## 2020-07-15 PROCEDURE — 83605 ASSAY OF LACTIC ACID: CPT

## 2020-07-15 RX ORDER — POLYETHYLENE GLYCOL 3350 17 G/17G
17 POWDER, FOR SOLUTION ORAL DAILY PRN
Status: DISCONTINUED | OUTPATIENT
Start: 2020-07-15 | End: 2020-07-15 | Stop reason: HOSPADM

## 2020-07-15 RX ORDER — SODIUM CHLORIDE 0.9 % (FLUSH) 0.9 %
10 SYRINGE (ML) INJECTION PRN
Status: DISCONTINUED | OUTPATIENT
Start: 2020-07-15 | End: 2020-07-15 | Stop reason: HOSPADM

## 2020-07-15 RX ORDER — THIAMINE HYDROCHLORIDE 100 MG/ML
100 INJECTION, SOLUTION INTRAMUSCULAR; INTRAVENOUS DAILY
Status: DISCONTINUED | OUTPATIENT
Start: 2020-07-15 | End: 2020-07-15 | Stop reason: HOSPADM

## 2020-07-15 RX ORDER — SODIUM CHLORIDE 0.9 % (FLUSH) 0.9 %
10 SYRINGE (ML) INJECTION EVERY 12 HOURS SCHEDULED
Status: DISCONTINUED | OUTPATIENT
Start: 2020-07-15 | End: 2020-07-15 | Stop reason: HOSPADM

## 2020-07-15 RX ORDER — ONDANSETRON 2 MG/ML
4 INJECTION INTRAMUSCULAR; INTRAVENOUS EVERY 6 HOURS PRN
Status: DISCONTINUED | OUTPATIENT
Start: 2020-07-15 | End: 2020-07-15 | Stop reason: HOSPADM

## 2020-07-15 RX ORDER — OXYCODONE HYDROCHLORIDE 5 MG/1
5 TABLET ORAL EVERY 4 HOURS PRN
Status: DISCONTINUED | OUTPATIENT
Start: 2020-07-15 | End: 2020-07-15 | Stop reason: HOSPADM

## 2020-07-15 RX ORDER — MORPHINE SULFATE 2 MG/ML
1 INJECTION, SOLUTION INTRAMUSCULAR; INTRAVENOUS EVERY 4 HOURS PRN
Status: DISCONTINUED | OUTPATIENT
Start: 2020-07-15 | End: 2020-07-15 | Stop reason: HOSPADM

## 2020-07-15 RX ORDER — PROMETHAZINE HYDROCHLORIDE 12.5 MG/1
12.5 TABLET ORAL EVERY 6 HOURS PRN
Status: DISCONTINUED | OUTPATIENT
Start: 2020-07-15 | End: 2020-07-15 | Stop reason: HOSPADM

## 2020-07-15 RX ORDER — ARIPIPRAZOLE 5 MG/1
10 TABLET ORAL DAILY
Status: DISCONTINUED | OUTPATIENT
Start: 2020-07-15 | End: 2020-07-15 | Stop reason: HOSPADM

## 2020-07-15 RX ORDER — SODIUM CHLORIDE 9 MG/ML
INJECTION, SOLUTION INTRAVENOUS CONTINUOUS
Status: DISCONTINUED | OUTPATIENT
Start: 2020-07-15 | End: 2020-07-15 | Stop reason: HOSPADM

## 2020-07-15 RX ORDER — OXCARBAZEPINE 150 MG/1
150 TABLET, FILM COATED ORAL 2 TIMES DAILY
Status: DISCONTINUED | OUTPATIENT
Start: 2020-07-15 | End: 2020-07-15 | Stop reason: HOSPADM

## 2020-07-15 RX ORDER — NAPROXEN 500 MG/1
500 TABLET ORAL 2 TIMES DAILY WITH MEALS
Qty: 15 TABLET | Refills: 3 | Status: SHIPPED | OUTPATIENT
Start: 2020-07-15 | End: 2020-12-20

## 2020-07-15 RX ADMIN — SODIUM CHLORIDE 3 G: 9 INJECTION, SOLUTION INTRAVENOUS at 04:45

## 2020-07-15 RX ADMIN — SODIUM CHLORIDE: 9 INJECTION, SOLUTION INTRAVENOUS at 05:38

## 2020-07-15 RX ADMIN — ACETYLCYSTEINE 8620 MG: 200 INJECTION, SOLUTION INTRAVENOUS at 08:21

## 2020-07-15 RX ADMIN — ACETYLCYSTEINE 12940 MG: 200 INJECTION, SOLUTION INTRAVENOUS at 02:15

## 2020-07-15 RX ADMIN — ACETYLCYSTEINE 4320 MG: 200 INJECTION, SOLUTION INTRAVENOUS at 03:20

## 2020-07-15 RX ADMIN — THIAMINE HYDROCHLORIDE 100 MG: 100 INJECTION, SOLUTION INTRAMUSCULAR; INTRAVENOUS at 08:24

## 2020-07-15 NOTE — PROGRESS NOTES
Pt admitted to room 204 with dx acetaminophen abuse. Pt a & o x4. Oriented to room. Head to toe complete. Pt denies any pain, abd pain, or s/s of jaundice. Instructed to notify nurse it pt experiences such. Tele on and monitoring. Call light in reach. Bed alarm on. Pt updated with POC and in agreeance. Will continue to monitor.

## 2020-07-15 NOTE — DISCHARGE INSTR - COC
Continuity of Care Form    Patient Name: Herlinda Borrego   :  1990  MRN:  [id number]    Admit date:  2020  Discharge date:  ***    Code Status Order: Full Code   Advance Directives:   [facility] Documentation     Date/Time Healthcare Directive Type of Healthcare Directive Copy in [address] Agent's Name Healthcare Agent's Phone Number    07/15/20 0354  No, patient does not have an advance directive for healthcare treatment -- -- -- -- --          Admitting Physician:  Brien Miller MD  PCP: Riddhi Naranjo    Discharging Nurse: [facility] Unit/Room#: 0204/0204-01  Discharging Unit Phone Number: ***    Emergency Contact:   Extended Emergency Contact Information  Primary Emergency Contact: [address] Phone: [phone]  Relation: Grandparent    Past Surgical History:  Past Surgical History:   Procedure Laterality Date    WRIST SURGERY  2009    pin in wrist left       Immunization History:   Immunization History   Administered Date(s) Administered    HPV 9-valent Shelba Handsome) 10/21/2019    Pneumococcal Polysaccharide (Kuiyxbywo74) 2015    Tdap (Boostrix, Adacel) 2015       Active Problems:  Patient Active Problem List   Diagnosis Code    Anxiety F41.9    Sleep disorder G47.9    Fracture of navicular bone of foot, closed S92.253A    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    Chondromalacia of left knee M94.262    Pain of left thumb M79.645    Left foot pain M79.672    CSF leak G96.0    Meningitis G03.9    Pulmonary embolism (HCC) I26.99    Subdural hematoma ([address] S06.5X9A    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, 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    Schizoaffective disorder, bipolar type ([address] F25.0    Chronic back pain M54.9, G89.29    Knee pain, bilateral M25.561, M25.562    Error, refractive, myopia H52.10    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    Polydrug abuse (HCC) F19.10    Wrist pain M25.539    Drug-induced hallucinosis ([facility]) F19.951    Schizophrenia ([facility]) F20.9    Rhabdomyolysis M62.82    Bipolar 2 disorder (HCC) F31.81    Bipolar 2 disorder, major depressive episode (HCC) F31.81    Schizoaffective disorder (HCC) F25.9    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       Isolation/Infection:   Isolation          No Isolation        Patient Infection Status     None to display          Nurse Assessment:  Last Vital Signs: BP (!) 144/81   Pulse 63   Temp 98.1 °F (36.7 °C) (Oral)   Resp 17   Ht 6' (1.829 m)   Wt 190 lb (86.2 kg)   SpO2 98%   BMI 25.77 kg/m²     Last documented pain score (0-10 scale):    Last Weight:   Wt Readings from Last 1 Encounters:   07/14/20 190 lb (86.2 kg)     Mental Status:  {IP PT MENTAL STATUS:44878}    IV Access:  { BHARATI IV ACCESS:088043581}    Nursing Mobility/ADLs:  Walking   {P DME TJGY:064391450}  Transfer  {P DME OCFJ:543577704}  Bathing  {P DME BIHS:368514988}  Dressing  {CHP DME HYJZ:761710186}  Toileting  {P DME KNNR:323811728}  Feeding  {[facility] DME FRMP:423162664}  Med Admin  {P DME KNIL:651706561}  Med Delivery   { BHARATI MED Delivery:710968319}    Wound Care Documentation and Therapy:        Elimination:  Continence:   · Bowel: {YES / QU:09065}  · Bladder: {YES / IW:56979}  Urinary Catheter: {Urinary Catheter:574359182}   Colostomy/Ileostomy/Ileal Conduit: {YES / MARTÍN:39532}       Date of Last BM: ***  No intake or output data in the 24 hours ending 07/15/20 1015  No intake/output data recorded.     Safety Concerns:     [address] Safety Concerns:851395736}    Impairments/Disabilities:      [address] Impairments/Disabilities:327255607}    Nutrition Therapy:  Current Nutrition Therapy:   [address] Diet List:774217847}    Routes of Feeding: {[facility] DME Other Feedings:956841380}  Liquids: {Slp liquid thickness:75142}  Daily Fluid Restriction: {CHP DME Yes amt example:204479062}  Last Modified Barium Swallow with Video (Video Swallowing Test): {Done Not Done [facility]:213621181}    Treatments at the Time of Hospital Discharge:   Respiratory Treatments: ***  Oxygen Therapy:  {Therapy; copd oxygen:05219}  Ventilator:    {[facility] Vent FXGO:530621725}    Rehab Therapies: {THERAPEUTIC INTERVENTION:9813117131}  Weight Bearing Status/Restrictions: {[facility] Weight Bearin}  Other Medical Equipment (for information only, NOT a DME order):  {EQUIPMENT:716881638}  Other Treatments: ***    Patient's personal belongings (please select all that are sent with patient):  {[facility] DME Belongings:463465427}    RN SIGNATURE:  {Esignature:670077923}    CASE MANAGEMENT/SOCIAL WORK SECTION    Inpatient Status Date: ***    Readmission Risk Assessment Score:  Readmission Risk              Risk of Unplanned Readmission:        20           Discharging to Facility/ Agency   · Name:   · Address:  · Phone:  · Fax:    Dialysis Facility (if applicable)   · Name:  · Address:  · Dialysis Schedule:  · Phone:  · Fax:    / signature: {Esignature:643201718}    PHYSICIAN SECTION    Prognosis: {Prognosis:7459084790}    Condition at Discharge: [address] [place] [address] Patient Condition:004439828}    Rehab Potential (if transferring to Rehab): {Prognosis:6335246264}    Recommended Labs or Other Treatments After Discharge: ***    Physician Certification: I certify the above information and transfer of Iveth Mclaughlin  is necessary for the continuing treatment of the diagnosis listed and that he requires {Admit to Appropriate Level of Care:30785} for {GREATER/LESS:792870591} 30 days.      Update Admission H&P: {CHP DME Changes in FWLVB:015541197}    PHYSICIAN SIGNATURE:  {Esignature:685680700}

## 2020-07-15 NOTE — H&P
History and Physical    Admit Date: 7/14/2020  PCP: [facility]    CHIEF COMPLAINT:    Chief Complaint   Patient presents with    Other     \"Thinks he took too many acetaminophen over the last 3-4 days\" States took \"15-20 500 mg tablets over the course of those days\" States girlfriend is \"tripping balls about it\"        HISTORY OF PRESENT ILLNESS:    The patient is a 34 y.o. male with a past medical history of bipolar disorder, anxiety, history of fracture of the left mandibular condyle 2018, seizure, schizophrenia, traumatic brain injury who presented to the hospital with dental pain. Patient initially presented emergency room yesterday with dental pain. Patient took Tylenol over the last 3 to 4 days to treat dental pain. The pain was not relieved so he kept taking more Tylenol. Patient was actually discharged from the emergency room but was called back and after his level of Tylenol was noted to be 126. This level was within 4 to 8 hours of taking Tylenol. Patient has stable mood today and yesterday per report. No depressed mood today or yesterday. No suicidality. Patient tox screen was positive for cocaine, THC, benzos. His Tylenol level is 17 this morning. He was placed on an acetylcysteine last night. His hepatic function is normal yesterday and today. He does not have any abdominal symptoms today. Maxillofacial CT scan showed anterior dislocation of the left mandibular condyle in addition to chronic deformity of the left mandibular neck and condyle secondary to prior fracture. He had a fracture in 2018.   I discussed this with ENT and they recommended maxillofacial oral surgery referral.    Past Medical History:        Diagnosis Date    Anxiety 1/4/2012    Bipolar affective disorder ([facility] 11/25/2015    Chronic back pain 4/20/2017    Depression     Drug abuse and dependence ([facility] 1/4/2012    Fracture of left side of mandibular body ([facility] 06/17/2018    ACUTE LEFT CONDYLAR NECK FRACTURE    Fracture of navicular bone of foot, closed 2012    lt    [facility] 2015    History of pulmonary embolism     Schizophrenia ([facility]     Seizures ([facility]     Talus fracture 2012    lt    TBI (traumatic brain injury) ([facility] 2015    Wound of left leg 2012       Past Surgical History:        Procedure Laterality Date    WRIST SURGERY  2009    pin in wrist left       Social History:   Social History     Socioeconomic History    Marital status: Single     Spouse name: Not on file    Number of children: 0    Years of education: 15    Highest education level: Not on file   Occupational History    Not on file   Social Needs    Financial resource strain: Not on file    Food insecurity     Worry: Not on file     Inability: Not on file   [employer] needs     Medical: Not on file     Non-medical: Not on file   Tobacco Use    Smoking status: Current Some Day Smoker     Packs/day: 1.00     Years: 1.00     Pack years: 1.00     Types: Cigarettes     Last attempt to quit: 2011     Years since quittin.6    Smokeless tobacco: Current User     Types: Chew   Substance and Sexual Activity    Alcohol use: Yes     Comment: drank 2 shots of vodka prior to arrival    Drug use: Yes     Frequency: 7.0 times per week     Types: Marijuana, Cocaine, Other-see comments, Methamphetamines     Comment: crack, meth, weed tonight    Sexual activity: Not Currently     Partners: Female   Lifestyle    Physical activity     Days per week: Not on file     Minutes per session: Not on file    Stress: Not on file   Relationships    Social connections     Talks on phone: Not on file     Gets together: Not on file     Attends Jehovah's witness service: Not on file     Active member of club or organization: Not on file     Attends meetings of clubs or organizations: Not on file     Relationship status: Not on file    Intimate partner violence     Fear of current or ex partner: Not on file     Emotionally abused: Not on file     Physically abused: Not on file     Forced sexual activity: Not on file   Other Topics Concern    Not on file   Social History Narrative    Lives w family       Family History:   Family History   Family history unknown: Yes       Medications Prior to Admission:    Prior to Admission medications    Medication Sig Start Date End Date Taking? Authorizing Provider   naproxen (NAPROSYN) 500 MG tablet Take 1 tablet by mouth 2 times daily (with meals) 7/15/20  Yes Ally Alves,    OXcarbazepine (TRILEPTAL) 150 MG tablet Take 1 tablet by mouth 2 times daily 19   Thania Mo MD   ARIPiprazole (ABILIFY) 10 MG tablet Take 1 tablet by mouth daily 19   Thania Mo MD   famotidine (PEPCID) 20 MG tablet Take 1 tablet by mouth 2 times daily for 3 days 10/22/19 10/25/19  Arva Prime, DO       Allergies:  Shellfish-derived products and Shrimp flavor    REVIEW OF SYSTEMS:  All systems reviewed and negative except for what is in HPI. PHYSICAL EXAM:  Vitals:  BP (!) 144/81   Pulse 63   Temp 98.1 °F (36.7 °C) (Oral)   Resp 17   Ht 6' (1.829 m)   Wt 190 lb (86.2 kg)   SpO2 98%   BMI 25.77 kg/m²   BMI Classification: Overweight (BMI 25.0-29. 9)  Pulse Ox: SpO2  Av.3 %  Min: 97 %  Max: 98 %  Supplemental O2:      CONSTITUTIONAL:  awake, alert, cooperative, no apparent distress, and appears stated age  HEENT: Normocephalic, PERRLA  NECK: no JVD, no LAD  HEART: RRR, no murmurs, gallops, or rubs  LUNGS: clear to auscultation bilaterally, no wheezes, crackles, or rhonchi.   ABDOMEN: soft/NT/ND, positive BS  MUSCULOSKELETAL: negative for edema, +2 pulses  SKIN: intact without rash or jaundice  NEURO:  CN intact and no focal deficits    DATA:  Recent Results (from the past 24 hour(s))   Acetaminophen Level    Collection Time: 07/15/20 12:07 AM   Result Value Ref Range    Acetaminophen Level 126 (HH) 10 - 30 ug/mL   CBC Auto Differential    Collection Time: 07/15/20  1:12 AM   Result Value Ref Range    WBC 15.1 (H) 4.8 - 10.8 K/uL    RBC 4.78 4.70 - 6.10 M/uL    Hemoglobin 15.1 14.0 - 18.0 g/dL    Hematocrit 44.6 42.0 - 52.0 %    MCV 93.2 80.0 - 100.0 fL    MCH 31.7 (H) 27.0 - 31.3 pg    MCHC 34.0 33.0 - 37.0 %    RDW 13.3 11.5 - 14.5 %    Platelets 120 733 - 636 K/uL    Neutrophils % 88.7 %    Lymphocytes % 9.6 %    Monocytes % 0.6 %    Eosinophils % 0.8 %    Basophils % 0.3 %    Neutrophils Absolute 13.4 (H) 1.4 - 6.5 K/uL    Lymphocytes Absolute 1.5 1.0 - 4.8 K/uL    Monocytes Absolute 0.1 (L) 0.2 - 0.8 K/uL    Eosinophils Absolute 0.1 0.0 - 0.7 K/uL    Basophils Absolute 0.1 0.0 - 0.2 K/uL   Comprehensive Metabolic Panel    Collection Time: 07/15/20  1:51 AM   Result Value Ref Range    Sodium 142 135 - 144 mEq/L    Potassium 4.3 3.4 - 4.9 mEq/L    Chloride 104 95 - 107 mEq/L    CO2 25 20 - 31 mEq/L    Anion Gap 13 9 - 15 mEq/L    Glucose 127 (H) 70 - 99 mg/dL    BUN 18 6 - 20 mg/dL    CREATININE 1.08 0.70 - 1.20 mg/dL    GFR Non-African American >60.0 >60    GFR  >60.0 >60    Calcium 9.2 8.5 - 9.9 mg/dL    Total Protein 7.2 6.3 - 8.0 g/dL    Alb 4.7 (H) 3.5 - 4.6 g/dL    Total Bilirubin 0.3 0.2 - 0.7 mg/dL    Alkaline Phosphatase 92 35 - 104 U/L    ALT 35 0 - 41 U/L    AST 34 0 - 40 U/L    Globulin 2.5 2.3 - 3.5 g/dL   Lactic Acid, Plasma    Collection Time: 07/15/20  1:51 AM   Result Value Ref Range    Lactic Acid 1.9 0.5 - 2.2 mmol/L   Lipase    Collection Time: 07/15/20  1:51 AM   Result Value Ref Range    Lipase 51 12 - 95 U/L   Amylase    Collection Time: 07/15/20  1:51 AM   Result Value Ref Range    Amylase 112 (H) 22 - 93 U/L   Salicylate    Collection Time: 07/15/20  1:51 AM   Result Value Ref Range    Salicylate, Serum 8.8 (L) 15.0 - 30.0 mg/dL   Ethanol    Collection Time: 07/15/20  1:51 AM   Result Value Ref Range    Ethanol Lvl <10 mg/dL    Ethanol percent Not indicated G/dL   Urine Drug Screen, Comprehensive    Collection Time: 07/15/20 2:00 AM   Result Value Ref Range    PCP Screen, Urine Neg Negative <25 ng/mL    Benzodiazepine Screen, Urine POSITIVE (A) Negative <150 ng/mL    Cocaine Metabolite Screen, Urine POSITIVE (A) Negative <150 ng/mL    Amphetamine Screen, Urine Neg Negative <500 ng/mL    Cannabinoid Scrn, Ur POSITIVE (A) Negative <50 ng/mL    Opiate Scrn, Ur Neg Negative <100 ng/mL    Barbiturate Screen, Ur Neg Negative <051 ng/mL    Tricyclic Neg Negative <115 ng/mL    Methadone Screen, Urine Neg Negative <200 ng/mL    Propoxyphene Screen, Urine Neg Negative <300 ng/mL    Methamphetamine, Urine Neg Negative <1000 ng/mL    UR Oxycodone Rapid Screen Neg Negative <100 ng/mL    Drug Screen Comment: see below    Hepatic Function Panel    Collection Time: 07/15/20  8:19 AM   Result Value Ref Range    Total Protein 7.0 6.3 - 8.0 g/dL    Alb 4.5 3.5 - 4.6 g/dL    Alkaline Phosphatase 83 35 - 104 U/L    ALT 37 0 - 41 U/L    AST 31 0 - 40 U/L    Total Bilirubin 0.6 0.2 - 0.7 mg/dL    Bilirubin, Direct <0.2 0.0 - 0.4 mg/dL    Bilirubin, Indirect see below 0.0 - 0.6 mg/dL   SALICYLATE LEVEL    Collection Time: 07/15/20  8:51 AM   Result Value Ref Range    Salicylate, Serum 2.7 (L) 15.0 - 30.0 mg/dL   ACETAMINOPHEN LEVEL    Collection Time: 07/15/20  8:51 AM   Result Value Ref Range    Acetaminophen Level 17 10 - 30 ug/mL           ASSESSMENT AND PLAN:  1) anterior dislocation of the left mandibular condyle    -Discussed with ENT, they recommended oral maxillofacial surgery referral.  We will give the patient naproxen for pain. Avoid Tylenol for couple days, do not use more than 2 to 4 g total daily. Patient was educated on this. 2) unintentional Tylenol overdose       -Patient hepatic function is normal yesterday and today. His Tylenol level is down to 17. Patient mood is stable this morning. No depressed mood. He denies suicidality. His thought process is appropriate this morning.   He was educated extensively on taking medication over-the-counter and avoiding any overdose. 3) schizophrenia/anxiety       -His mood is stable, negative for hallucinations. No active psychosis    4) polysubstance abuse     -I spent 20 to 30minutes and patient room educated him about substance abuse cessation. Patient reporting to me that he is not working and he uses about $100-$200 per day worth of cocaine but he is working on cutting down and quitting. I encouraged him to stop using cocaine and to find a hobby and a job. I also educated him on the harmful effect of cocaine. Patient reported to me that he has been using marijuana and this was has been helping him to cut down on cocaine. I educated him that this is not the best alternative and that he does not have a current medical condition to use marijuana and is not recommended for him especially given his schizophrenia. DISCHARGE PLANNING  Discharged with oral maxilla surgery follow-up with Dr. Georgi Sena.     Code status: Full Code    Electronically signed by Nataly Conteh DO on 7/15/20 at 10:01 AM EDT

## 2020-07-15 NOTE — ED PROVIDER NOTES
[facility] ED  eMERGENCY dEPARTMENT eNCOUnter      Pt Name: Ken Strong  MRN: [id number]  [place] 1990  Date of evaluation: 7/14/2020  Provider: Karon Guillory MD    CHIEF COMPLAINT       Chief Complaint   Patient presents with    Other     \"Thinks he took too many acetaminophen over the last 3-4 days\" States took \"15-20 500 mg tablets over the course of those days\" States girlfriend is \"tripping balls about it\"         HISTORY OF PRESENT ILLNESS   (Location/Symptom, Timing/Onset,Context/Setting, Quality, Duration, Modifying Factors, Severity)  Note limiting factors. Ken Strong is a 34 y.o. male who presents to the emergency department has dental pain, left upper molar area, and thinks he took too much Tylenol. He thinks he probably took about 18 over the course of 2 days. [place] He is not sure. There are no symptoms of jaundice, there are no symptoms of abdominal pain. Last Tylenol that he took was a couple of hours ago. He took several at that time. Prior to that, he took some 2 hours before that and thi a.m. He did not know that he was not supposed to take this too much, and he was not having any type of homicidal/suicidal ideation, he has taken no other prescription medication however he did smoke pot today, did drink a little bit of alcohol earlier this morning    HPI    NursingNotes were reviewed. REVIEW OF SYSTEMS    (2-9 systems for level 4, 10 or more for level 5)     Review of Systems     Constitutional symptoms:  no Fatigue, no fever, no chills. Skin symptoms:  Negative except as documented in HPI. ENMT symptoms:  Negative except as documented in HPI. Respiratory symptoms:  Negative except as documented in HPI. Cardiovascular symptoms:  Negative except as documented in HPI. Gastrointestinal symptoms: Negative except for documented as above in the HPI   Genitourinary symptoms:  Negative except as documented in HPI.    Musculoskeletal symptoms:  Negative except as documented in HPI. Neurologic symptoms:  Negative except as documented in HPI. Remainder of 10 systems, all negative except for mentioned above      Except as noted above the remainder of the review of systems was reviewed and negative.        PAST MEDICAL HISTORY     Past Medical History:   Diagnosis Date    Anxiety 1/4/2012    Bipolar affective disorder ([facility] 11/25/2015    Chronic back pain 4/20/2017    Depression     Drug abuse and dependence ([facility] 1/4/2012    Fracture of left side of mandibular body ([facility] 06/17/2018    ACUTE LEFT CONDYLAR NECK FRACTURE    Fracture of navicular bone of foot, closed 09/03/2012    lt    Fracture of temporal bone ([facility] 2015    History of pulmonary embolism     Schizophrenia ([facility]     Seizures ([facility]     Talus fracture 9/2012    lt    [facility] 2015    Wound of left leg 9/14/2012         SURGICALHISTORY       Past Surgical History:   Procedure Laterality Date    WRIST SURGERY  2009    pin in wrist left         CURRENT MEDICATIONS       Previous Medications    ARIPIPRAZOLE (ABILIFY) 10 MG TABLET    Take 1 tablet by mouth daily    FAMOTIDINE (PEPCID) 20 MG TABLET    Take 1 tablet by mouth 2 times daily for 3 days    OXCARBAZEPINE (TRILEPTAL) 150 MG TABLET    Take 1 tablet by mouth 2 times daily       ALLERGIES     Shellfish-derived products and Shrimp flavor    FAMILY HISTORY       Family History   Family history unknown: Yes          SOCIAL HISTORY       Social History     Socioeconomic History    Marital status: Single     Spouse name: None    Number of children: 0    Years of education: 15    Highest education level: None   Occupational History    None   Social Needs    Financial resource strain: None    Food insecurity     Worry: None     Inability: None    Transportation needs     Medical: None     Non-medical: None   Tobacco Use    Smoking status: Current Some Day Smoker     Packs/day: 1.00     Years: 1.00     Pack years: 1.00 Normal             -Breath sounds equal bilaterally: Clear             -Wheezes: No             -Rales: No    BACK: -Flank pain: No              -Pain on palpation: No    ABD: -Distended: No           -Bruits: No           -Bowel sounds: Normal.           -Deep palpation: Non-tender           -Organomegaly palpable: No           -Abnormal masses: No    EXT: Gross appearance and use of all four extremities: Normal     SKIN: -Good turgor warm and dry. -Apparent lesions or rashes: No    NEURO: -Patient: alert                -Oriented to: person, place and time. -Appearance and judgment: appropriate.                -Cranial Nerves: Normal.                -Speech: Normal          DIAGNOSTIC RESULTS     EKG: All EKG's are interpreted by the Emergency Department Physician who either signs or Co-signsthis chart in the absence of a cardiologist.        RADIOLOGY:   Non-plain filmimages such as CT, Ultrasound and MRI are read by the radiologist. Plain radiographic images are visualized and preliminarily interpreted by the emergency physician with the below findings:      Patient insisted on leaving did not wish to stay for his Tylenol level. He was alert and oriented at the time of this statement I told him to keep his phone on because if this were too high and we did need to call him, we would do so. He is to return either way for abdominal pain or jaundice. Understands that leaving right now could preclude treatment for hepatotoxicity and potential for causing death. I doubt very much that he is hepatotoxic                     Tylenol level is 125, I have notified the patient that he needs to come in for treatment. He admits that he may have taken twice as much as the amount that he told me. There are no symptoms at this juncture, no appetite problem and no abdominal pain.   He asked if he could come in tomorrow and get treated I told him this would not be a good idea in the least, 8-hour treatment window is very favorable for prognosis but anything greater than 12 hours is less and less favorable      Patient in my view is not a flight risk, I called the hospitalist who accepted the patient for observation status and administration of NAC    Interpretation per the Radiologist below, if available at the time ofthis note:    No orders to display         ED BEDSIDE ULTRASOUND:   Performed by ED Physician - none    LABS:  Labs Reviewed   ACETAMINOPHEN LEVEL       All other labs were within normal range or not returned as of this dictation. EMERGENCY DEPARTMENT COURSE and DIFFERENTIAL DIAGNOSIS/MDM:   Vitals:    Vitals:    07/14/20 2321 07/14/20 2323   BP:  (!) 142/82   Pulse:  85   Resp:  18   Temp: 98.2 °F (36.8 °C)    SpO2:  97%   Weight:  190 lb (86.2 kg)   Height:  6' (1.829 m)           MDM             CRITICAL CARE TIME   Total Critical Care time was  minutes, excluding separately reportableprocedures. There was a high probability of clinicallysignificant/life threatening deterioration in the patient's condition which required my urgent intervention. CONSULTS:  None    PROCEDURES:  Unless otherwise noted below, none     Procedures    FINAL IMPRESSION      1. Accidental paracetamol poisoning, initial encounter    2. Dental abscess          DISPOSITION/PLAN   DISPOSITION Decision To Discharge 07/14/2020 11:45:50 PM      PATIENT REFERRED TO:  [address] [place] [address]. [address] [place] [address]  [phone]    In 3 days  As needed      DISCHARGE MEDICATIONS:  New Prescriptions    AMOXICILLIN-CLAVULANATE (AUGMENTIN) 875-125 MG PER TABLET    Take 1 tablet by mouth 2 times daily for 10 days    HYDROCODONE-ACETAMINOPHEN (NORCO) 5-325 MG PER TABLET    Take 1 tablet by mouth every 6 hours as needed for Pain for up to 3 days.           (Please note that portions of this note were completed with a voice recognition program.  Efforts were made to edit the dictations but occasionally words

## 2020-07-15 NOTE — DISCHARGE SUMMARY
Hospital Medicine Discharge Summary    Mike Ghosh  :  1990  MRN:  [id number]    Admit date:  2020  Discharge date:  7/15/2020    Admitting Physician:  Segun Alexander MD  Primary Care Physician:  [facility]      Discharge Diagnoses:      Tylenol overdose-unintentional, no suicidal thoughts  Polysubstance abuse-cocaine, THC and benzos  Dental pain  Anterior dislocation of the left mandibular condyle-TMJ dislocation  History of chronic deformity of the left mandibular neck and condyle secondary to prior fracture in 2018. Chief Complaint   Patient presents with    Other     \"Thinks he took too many acetaminophen over the last 3-4 days\" States took \"15-20 500 mg tablets over the course of those days\" States girlfriend is \"tripping balls about it\"     Hospital Course:     1) anterior dislocation of the left mandibular condyle    -Discussed with ENT, they recommended oral maxillofacial surgery referral.  We will give the patient naproxen for pain. Avoid Tylenol for couple days, do not use more than 2 to 4 g total daily. Patient was educated on this. 2) unintentional Tylenol overdose       -Patient hepatic function is normal yesterday and today. His Tylenol level is down to 17. Patient mood is stable this morning. No depressed mood. He denies suicidality. His thought process is appropriate this morning. He was educated extensively on taking medication over-the-counter and avoiding any overdose. 3) schizophrenia/anxiety       -His mood is stable, negative for hallucinations. No active psychosis    4) polysubstance abuse     -I spent 20 to 30minutes and patient room educated him about substance abuse cessation. Patient reporting to me that he is not working and he uses about $100-$200 per day worth of cocaine but he is working on cutting down and quitting. I encouraged him to stop using cocaine and to find a hobby and a job.   I also educated him on the harmful effect of cocaine. Patient reported to me that he has been using marijuana and this was has been helping him to cut down on cocaine. I educated him that this is not the best alternative and that he does not have a current medical condition to use marijuana and is not recommended for him especially given his schizophrenia. Exam on discharge:   BP (!) 144/81   Pulse 63   Temp 98.1 °F (36.7 °C) (Oral)   Resp 17   Ht 6' (1.829 m)   Wt 190 lb (86.2 kg)   SpO2 98%   BMI 25.77 kg/m²   General appearance: No apparent distress, appears stated age and cooperative. HEENT: Pupils equal, round, and reactive to light. Conjunctivae/corneas clear. Mild tenderness to palpation over the left TMJ area, no erythema or swelling noted. Neck: Supple, with full range of motion. No jugular venous distention. Trachea midline. Respiratory:  Normal respiratory effort. Clear to auscultation, bilaterally without Rales/Wheezes/Rhonchi. Cardiovascular: Regular rate and rhythm with normal S1/S2 without murmurs, rubs or gallops. Abdomen: Soft, non-tender, non-distended with normal bowel sounds. Musculoskeletal: No clubbing, cyanosis or edema bilaterally. Full range of motion without deformity. Skin: Skin color, texture, turgor normal.  No rashes or lesions. Neuro: Non Focal. Symetrical motor and tone. Nl Comprehension, Alert,awake and oriented. NL CN. Symetrical tone and reflexes. Psychiatric: Alert and oriented, thought content appropriate, normal insight  Capillary Refill: Brisk,< 3 seconds   Peripheral Pulses: +2 palpable, equal bilaterally     Patient was seen by the following consultants while admitted to [facility]:   Consults:  None    Significant Diagnostic Studies:    Refer to chart     Please refer to chart if no studies are shown here    Ct Maxillofacial Wo Contrast    Result Date: 7/15/2020  EXAM: CT MAXILLOFACIAL WO CONTRAST HISTORY: Left maxillary pain.  TECHNIQUE: Multiple contiguous axial images were obtained of the facial bones without contrast. Multiplanar reformats were obtained. COMPARISON: CT facial bones from June 17, 2018 FINDINGS: No acute facial bone fracture. Orbital rims are intact. Orbital contents are within normal limits. Chronic deformity with angulation of the left mandibular neck and condyle secondary to prior fracture. Anterior dislocation of the left mandibular condyle. The paranasal sinuses are clear. Mild right nasal septum deviation. Visualized mastoid air cells are clear. Facial soft tissues are within normal limits. Anterior dislocation of the left mandibular condyle. Chronic deformity of the left mandibular neck and condyle secondary to prior fracture. All CT scans at this facility use dose modulation, iterative reconstruction, and/or weight based dosing when appropriate to reduce radiation dose to as low as reasonably achievable. Discharge Medications:       [facility] Medication Instructions [id number]    Printed on:07/15/20 1013   Medication Information                      ARIPiprazole (ABILIFY) 10 MG tablet  Take 1 tablet by mouth daily             famotidine (PEPCID) 20 MG tablet  Take 1 tablet by mouth 2 times daily for 3 days             naproxen (NAPROSYN) 500 MG tablet  Take 1 tablet by mouth 2 times daily (with meals)             OXcarbazepine (TRILEPTAL) 150 MG tablet  Take 1 tablet by mouth 2 times daily                 Disposition:   If discharged to Home, Any [place] [address] needs that were indicated and/or required as been addressed and set up by Social Work. Condition at discharge: good     Activity: activity as tolerated    Total time taken for discharging this patient: 40 minutes. Greater than 70% of time was spent focused exclusively on this patient. Time was taken to review chart, discuss plans with consultants, reconciling medications, discussing plan answering questions with patient.      Soledad Chaudhry  7/15/2020, 10:13

## 2020-07-15 NOTE — PROGRESS NOTES
I called Dr. Ana Martines in [facility] and that doctor is no longer seeing any pts. Call placed to Dr Harley Allen in [place] and left a voice message, gave the phone number and name to the pt. To call and make appt. As soon as possible.

## 2020-11-21 ENCOUNTER — HOSPITAL ENCOUNTER (EMERGENCY)
Age: 30
Discharge: HOME | End: 2020-11-21
Attending: EMERGENCY MEDICINE
Payer: MEDICARE

## 2020-11-21 VITALS
BODY MASS INDEX: 24.41 KG/M2 | OXYGEN SATURATION: 98 % | WEIGHT: 180 LBS | TEMPERATURE: 98 F | DIASTOLIC BLOOD PRESSURE: 89 MMHG | SYSTOLIC BLOOD PRESSURE: 151 MMHG | HEART RATE: 99 BPM | RESPIRATION RATE: 18 BRPM

## 2020-11-21 PROCEDURE — 99282 EMERGENCY DEPT VISIT SF MDM: CPT

## 2020-11-21 ASSESSMENT — ENCOUNTER SYMPTOMS
DIARRHEA: 0
NAUSEA: 0
SHORTNESS OF BREATH: 0
BACK PAIN: 0
ABDOMINAL PAIN: 0
COUGH: 0
SORE THROAT: 0
VOMITING: 0

## 2020-11-21 NOTE — ED TRIAGE NOTES
Patient states he feels more anxious lately, Dr. Chong Doeap in to encourage patient to take home Rx for bipolar and possibly be transferred to [facility] and re established with psych meds he refused that and states he wants to just go home or elsewhere he is safe.

## 2020-11-21 NOTE — ED NOTES
Patient asked to be discharged, Dr. Reddy  in to give him paperwork.      Jalyn Gomez RN  11/21/20 2580

## 2020-11-21 NOTE — ED PROVIDER NOTES
[facility] ED  eMERGENCYdEPARTMENT eNCOUnter      Pt Name: Oswald Burroughs  MRN: [id number]  [place] 1990  Date of evaluation: 11/21/2020  Author MD Kalyan    CHIEF COMPLAINT           HPI  Oswald Burroughs is a 27 y.o. male per chart review has a h/o PE, bipolar, depression/anxiety schizophrenia, TBI presents to the ED with anxiety. Pt notes gradual onset, moderate, constant, worsening anxiety x 1 month. Pt denies depression, SI/HI, AVH. Pt requesting Xanax prescription to go home with. Pt is supposed to take trileptal and abilify. Pt unsure of when he last took them. ROS  Review of Systems   Constitutional: Negative for activity change, chills and fever. HENT: Negative for ear pain and sore throat. Eyes: Negative for visual disturbance. Respiratory: Negative for cough and shortness of breath. Cardiovascular: Negative for chest pain, palpitations and leg swelling. Gastrointestinal: Negative for abdominal pain, diarrhea, nausea and vomiting. Genitourinary: Negative for dysuria. Musculoskeletal: Negative for back pain. Skin: Negative for rash. Neurological: Negative for dizziness and weakness. Psychiatric/Behavioral: The patient is nervous/anxious. Except as noted above the remainder of the review of systems was reviewed and negative.        PAST MEDICAL HISTORY     Past Medical History:   Diagnosis Date    Anxiety 1/4/2012    Bipolar affective disorder ([address] 11/25/2015    Chondromalacia of left knee     Chondromalacia of left knee     Chondromalacia of right knee     Chondromalacia of right knee     Chronic back pain 4/20/2017    Depression     Drug abuse and dependence ([address] 1/4/2012    Fracture of left side of mandibular body ([address] 06/17/2018    ACUTE LEFT CONDYLAR NECK FRACTURE    Fracture of navicular bone of foot, closed 09/03/2012    lt    Fracture of temporal bone ([address] 2015    History of pulmonary embolism     Schizophrenia ([address]     [facility]     Talus fracture 2012    lt    TBI (traumatic brain injury) ([facility] 2015    Wound of left leg 2012         SURGICAL HISTORY       Past Surgical History:   Procedure Laterality Date    WRIST SURGERY  2009    pin in wrist left         CURRENTMEDICATIONS       Previous Medications    ARIPIPRAZOLE (ABILIFY) 10 MG TABLET    Take 1 tablet by mouth daily    FAMOTIDINE (PEPCID) 20 MG TABLET    Take 1 tablet by mouth 2 times daily for 3 days    NAPROXEN (NAPROSYN) 500 MG TABLET    Take 1 tablet by mouth 2 times daily (with meals)    OXCARBAZEPINE (TRILEPTAL) 150 MG TABLET    Take 1 tablet by mouth 2 times daily       ALLERGIES     Shellfish-derived products and Shrimp flavor    FAMILY HISTORY       Family History   Family history unknown: Yes          SOCIAL HISTORY       Social History     Socioeconomic History    Marital status: Single     Spouse name: None    Number of children: 0    Years of education: 15    Highest education level: None   Occupational History    None   Social Needs    Financial resource strain: None    Food insecurity     Worry: None     Inability: None    Transportation needs     Medical: None     Non-medical: None   Tobacco Use    Smoking status: Current Some Day Smoker     Packs/day: 1.00     Years: 1.00     Pack years: 1.00     Types: Cigarettes     Last attempt to quit: 2011     Years since quittin.9    Smokeless tobacco: Current User     Types: Chew   Substance and Sexual Activity    Alcohol use: Yes     Comment: drank 2 shots of vodka prior to arrival    Drug use: Yes     Frequency: 7.0 times per week     Types: Marijuana, Cocaine    Sexual activity: Not Currently     Partners: Female   Lifestyle    Physical activity     Days per week: None     Minutes per session: None    Stress: None   Relationships    Social connections     Talks on phone: None     Gets together: None     Attends Worship service: None     Active member of club or organization: None     Attends meetings of clubs or organizations: None     Relationship status: None    Intimate partner violence     Fear of current or ex partner: None     Emotionally abused: None     Physically abused: None     Forced sexual activity: None   Other Topics Concern    None   Social History Narrative    Lives w family         PHYSICAL EXAM       ED Triage Vitals [11/21/20 0752]   BP Temp Temp src Pulse Resp SpO2 Height Weight   (!) 151/89 -- -- 99 18 98 % -- --       Physical Exam  Vitals signs and nursing note reviewed. Constitutional:       Appearance: He is well-developed. HENT:      Head: Normocephalic. Right Ear: External ear normal.      Left Ear: External ear normal.   Eyes:      Conjunctiva/sclera: Conjunctivae normal.      Pupils: Pupils are equal, round, and reactive to light. Neck:      Musculoskeletal: Normal range of motion and neck supple. Cardiovascular:      Rate and Rhythm: Normal rate and regular rhythm. Heart sounds: Normal heart sounds. Pulmonary:      Effort: Pulmonary effort is normal.      Breath sounds: Normal breath sounds. Abdominal:      General: Bowel sounds are normal. There is no distension. Palpations: Abdomen is soft. Tenderness: There is no abdominal tenderness. Musculoskeletal: Normal range of motion. Skin:     General: Skin is warm and dry. Neurological:      Mental Status: He is alert and oriented to person, place, and time. Psychiatric:      Comments: Flat affect           MDM  28 yo male presents to the ED with anxiety. Pt is afebrile, hemodynamically stable. Pt requesting only a xanax prescription. Pt has never taken xanax before. Pt asked the pt how long he has been without his abilify and trileptal and he was unsure. Pt follows with the [facility] and states he did a virtual visit with them 3 days ago. I offered the pt to go to [facility] for possible admission and restart his meds and the pt refused.   Pt did not meet criteria for pink slip as pt denied SI/HI, AVH and no signs of psychosis. I informed the pt that I was happy to give him meds in the ED but no xanax prescription as that is not a common medication to start in the ED. Pt stated he only wanted xanax and that he would go somewhere else. Pt offered vistaril but says he has that at home. Pt also refused ativan. Pt discharged home and will f/u with [facility]. FINAL IMPRESSION      1.  Anxiety          DISPOSITION/PLAN   DISPOSITION Decision To Discharge 11/21/2020 07:57:38 AM        DISCHARGE MEDICATIONS:  [unfilled]         Syd Mcdaniels MD(electronically signed)  Attending Emergency Physician            Syd Mcdaniels MD  11/21/20 8097

## 2020-11-28 ENCOUNTER — HOSPITAL ENCOUNTER (EMERGENCY)
Age: 30
Discharge: ANOTHER HOSPITAL | End: 2020-11-28
Attending: EMERGENCY MEDICINE
Payer: COMMERCIAL

## 2020-11-28 VITALS
DIASTOLIC BLOOD PRESSURE: 80 MMHG | TEMPERATURE: 98.4 F | HEIGHT: 74 IN | SYSTOLIC BLOOD PRESSURE: 144 MMHG | HEART RATE: 80 BPM | WEIGHT: 180 LBS | RESPIRATION RATE: 16 BRPM | OXYGEN SATURATION: 98 % | BODY MASS INDEX: 23.1 KG/M2

## 2020-11-28 LAB
ACETAMINOPHEN LEVEL: <15 UG/ML (ref 10–30)
ALBUMIN SERPL-MCNC: 4.9 G/DL (ref 3.5–4.6)
ALP BLD-CCNC: 86 U/L (ref 35–104)
ALT SERPL-CCNC: 21 U/L (ref 0–41)
AMPHETAMINE SCREEN, URINE: NORMAL
ANION GAP SERPL CALCULATED.3IONS-SCNC: 15 MEQ/L (ref 9–15)
AST SERPL-CCNC: 37 U/L (ref 0–40)
BARBITURATE SCREEN URINE: NORMAL
BASOPHILS ABSOLUTE: 0 K/UL (ref 0–0.2)
BASOPHILS RELATIVE PERCENT: 0.5 %
BENZODIAZEPINE SCREEN, URINE: NORMAL
BILIRUB SERPL-MCNC: 0.3 MG/DL (ref 0.2–0.7)
BILIRUBIN URINE: NEGATIVE
BLOOD, URINE: NEGATIVE
BUN BLDV-MCNC: 11 MG/DL (ref 6–20)
CALCIUM SERPL-MCNC: 9.7 MG/DL (ref 8.5–9.9)
CANNABINOID SCREEN URINE: NORMAL
CHLORIDE BLD-SCNC: 97 MEQ/L (ref 95–107)
CHOLESTEROL, TOTAL: 102 MG/DL (ref 0–199)
CLARITY: CLEAR
CO2: 25 MEQ/L (ref 20–31)
COCAINE METABOLITE SCREEN URINE: NORMAL
COLOR: YELLOW
CREAT SERPL-MCNC: 0.86 MG/DL (ref 0.7–1.2)
EKG ATRIAL RATE: 86 BPM
EKG P AXIS: 70 DEGREES
EKG P-R INTERVAL: 144 MS
EKG Q-T INTERVAL: 394 MS
EKG QRS DURATION: 82 MS
EKG QTC CALCULATION (BAZETT): 471 MS
EKG R AXIS: 77 DEGREES
EKG T AXIS: 47 DEGREES
EKG VENTRICULAR RATE: 86 BPM
EOSINOPHILS ABSOLUTE: 0.1 K/UL (ref 0–0.7)
EOSINOPHILS RELATIVE PERCENT: 1.3 %
ETHANOL PERCENT: 0.04 G/DL
ETHANOL: 46 MG/DL (ref 0–0.08)
GFR AFRICAN AMERICAN: >60
GFR NON-AFRICAN AMERICAN: >60
GLOBULIN: 2.5 G/DL (ref 2.3–3.5)
GLUCOSE BLD-MCNC: 118 MG/DL (ref 70–99)
GLUCOSE URINE: NEGATIVE MG/DL
HCT VFR BLD CALC: 40.4 % (ref 42–52)
HDLC SERPL-MCNC: 45 MG/DL (ref 40–59)
HEMOGLOBIN: 14.1 G/DL (ref 14–18)
KETONES, URINE: NEGATIVE MG/DL
LDL CHOLESTEROL CALCULATED: 38 MG/DL (ref 0–129)
LEUKOCYTE ESTERASE, URINE: NEGATIVE
LYMPHOCYTES ABSOLUTE: 2.3 K/UL (ref 1–4.8)
LYMPHOCYTES RELATIVE PERCENT: 27.2 %
Lab: NORMAL
MAGNESIUM: 2.5 MG/DL (ref 1.7–2.4)
MCH RBC QN AUTO: 32.6 PG (ref 27–31.3)
MCHC RBC AUTO-ENTMCNC: 34.8 % (ref 33–37)
MCV RBC AUTO: 93.6 FL (ref 80–100)
MONOCYTES ABSOLUTE: 0.7 K/UL (ref 0.2–0.8)
MONOCYTES RELATIVE PERCENT: 8.5 %
NEUTROPHILS ABSOLUTE: 5.3 K/UL (ref 1.4–6.5)
NEUTROPHILS RELATIVE PERCENT: 62.5 %
NITRITE, URINE: NEGATIVE
OPIATE SCREEN URINE: NORMAL
PDW BLD-RTO: 12.7 % (ref 11.5–14.5)
PH UA: 7 (ref 5–9)
PHENCYCLIDINE SCREEN URINE: NORMAL
PLATELET # BLD: 250 K/UL (ref 130–400)
POTASSIUM SERPL-SCNC: 3.9 MEQ/L (ref 3.4–4.9)
PROTEIN UA: NEGATIVE MG/DL
RBC # BLD: 4.32 M/UL (ref 4.7–6.1)
SALICYLATE, SERUM: <0.3 MG/DL (ref 15–30)
SODIUM BLD-SCNC: 137 MEQ/L (ref 135–144)
SPECIFIC GRAVITY UA: 1.01 (ref 1–1.03)
TOTAL CK: 1088 U/L (ref 0–190)
TOTAL PROTEIN: 7.4 G/DL (ref 6.3–8)
TRIGL SERPL-MCNC: 96 MG/DL (ref 0–150)
TSH SERPL DL<=0.05 MIU/L-ACNC: 1.87 UIU/ML (ref 0.44–3.86)
URINE REFLEX TO CULTURE: NORMAL
UROBILINOGEN, URINE: 0.2 E.U./DL
WBC # BLD: 8.5 K/UL (ref 4.8–10.8)

## 2020-11-28 PROCEDURE — 82550 ASSAY OF CK (CPK): CPT

## 2020-11-28 PROCEDURE — 80053 COMPREHEN METABOLIC PANEL: CPT

## 2020-11-28 PROCEDURE — 80061 LIPID PANEL: CPT

## 2020-11-28 PROCEDURE — G0480 DRUG TEST DEF 1-7 CLASSES: HCPCS

## 2020-11-28 PROCEDURE — 2580000003 HC RX 258: Performed by: EMERGENCY MEDICINE

## 2020-11-28 PROCEDURE — 85025 COMPLETE CBC W/AUTO DIFF WBC: CPT

## 2020-11-28 PROCEDURE — 93005 ELECTROCARDIOGRAM TRACING: CPT | Performed by: EMERGENCY MEDICINE

## 2020-11-28 PROCEDURE — 83735 ASSAY OF MAGNESIUM: CPT

## 2020-11-28 PROCEDURE — 84443 ASSAY THYROID STIM HORMONE: CPT

## 2020-11-28 PROCEDURE — 80306 DRUG TEST PRSMV INSTRMNT: CPT

## 2020-11-28 PROCEDURE — 81003 URINALYSIS AUTO W/O SCOPE: CPT

## 2020-11-28 PROCEDURE — 36415 COLL VENOUS BLD VENIPUNCTURE: CPT

## 2020-11-28 PROCEDURE — 93005 ELECTROCARDIOGRAM TRACING: CPT

## 2020-11-28 PROCEDURE — 99283 EMERGENCY DEPT VISIT LOW MDM: CPT

## 2020-11-28 RX ORDER — 0.9 % SODIUM CHLORIDE 0.9 %
1000 INTRAVENOUS SOLUTION INTRAVENOUS ONCE
Status: COMPLETED | OUTPATIENT
Start: 2020-11-28 | End: 2020-11-28

## 2020-11-28 RX ORDER — 0.9 % SODIUM CHLORIDE 0.9 %
1000 INTRAVENOUS SOLUTION INTRAVENOUS ONCE
Status: DISCONTINUED | OUTPATIENT
Start: 2020-11-28 | End: 2020-11-29 | Stop reason: HOSPADM

## 2020-11-28 RX ORDER — GABAPENTIN 100 MG/1
300 CAPSULE ORAL 3 TIMES DAILY
COMMUNITY
End: 2020-12-20

## 2020-11-28 RX ORDER — HYDROXYZINE HYDROCHLORIDE 10 MG/1
25 TABLET, FILM COATED ORAL EVERY 6 HOURS PRN
COMMUNITY
End: 2022-05-30

## 2020-11-28 RX ADMIN — SODIUM CHLORIDE 1000 ML: 9 INJECTION, SOLUTION INTRAVENOUS at 22:38

## 2020-11-28 RX ADMIN — SODIUM CHLORIDE 1000 ML: 9 INJECTION, SOLUTION INTRAVENOUS at 22:37

## 2020-11-28 ASSESSMENT — ENCOUNTER SYMPTOMS
SINUS PRESSURE: 0
WHEEZING: 0
PHOTOPHOBIA: 0
SORE THROAT: 0
CONSTIPATION: 0
COLOR CHANGE: 0
VOMITING: 0
APNEA: 0
RHINORRHEA: 0
EYE PAIN: 0
COUGH: 0
DIARRHEA: 0
ABDOMINAL PAIN: 0
NAUSEA: 0
ABDOMINAL DISTENTION: 0
SHORTNESS OF BREATH: 0
BACK PAIN: 0

## 2020-11-29 ENCOUNTER — HOSPITAL ENCOUNTER (EMERGENCY)
Age: 30
Discharge: ANOTHER HOSPITAL | End: 2020-11-30
Payer: COMMERCIAL

## 2020-11-29 ENCOUNTER — APPOINTMENT (OUTPATIENT)
Dept: GENERAL RADIOLOGY | Age: 30
End: 2020-11-29
Payer: COMMERCIAL

## 2020-11-29 LAB
CK MB: 5.5 NG/ML (ref 0–6.7)
CK MB: 8.1 NG/ML (ref 0–6.7)
CK MB: 9.3 NG/ML (ref 0–6.7)
CREATINE KINASE-MB INDEX: 0.7 % (ref 0–3.5)
CREATINE KINASE-MB INDEX: 0.9 % (ref 0–3.5)
CREATINE KINASE-MB INDEX: 1 % (ref 0–3.5)
SARS-COV-2, NAAT: NOT DETECTED
TOTAL CK: 775 U/L (ref 0–190)
TOTAL CK: 907 U/L (ref 0–190)
TOTAL CK: 925 U/L (ref 0–190)

## 2020-11-29 PROCEDURE — 96375 TX/PRO/DX INJ NEW DRUG ADDON: CPT

## 2020-11-29 PROCEDURE — 36415 COLL VENOUS BLD VENIPUNCTURE: CPT

## 2020-11-29 PROCEDURE — 2500000003 HC RX 250 WO HCPCS: Performed by: EMERGENCY MEDICINE

## 2020-11-29 PROCEDURE — 99285 EMERGENCY DEPT VISIT HI MDM: CPT

## 2020-11-29 PROCEDURE — 82553 CREATINE MB FRACTION: CPT

## 2020-11-29 PROCEDURE — U0002 COVID-19 LAB TEST NON-CDC: HCPCS

## 2020-11-29 PROCEDURE — 6370000000 HC RX 637 (ALT 250 FOR IP): Performed by: STUDENT IN AN ORGANIZED HEALTH CARE EDUCATION/TRAINING PROGRAM

## 2020-11-29 PROCEDURE — 82550 ASSAY OF CK (CPK): CPT

## 2020-11-29 PROCEDURE — 6360000002 HC RX W HCPCS

## 2020-11-29 PROCEDURE — 96372 THER/PROPH/DIAG INJ SC/IM: CPT

## 2020-11-29 PROCEDURE — 96365 THER/PROPH/DIAG IV INF INIT: CPT

## 2020-11-29 PROCEDURE — 71045 X-RAY EXAM CHEST 1 VIEW: CPT

## 2020-11-29 PROCEDURE — 96366 THER/PROPH/DIAG IV INF ADDON: CPT

## 2020-11-29 PROCEDURE — 6370000000 HC RX 637 (ALT 250 FOR IP): Performed by: EMERGENCY MEDICINE

## 2020-11-29 PROCEDURE — 6360000002 HC RX W HCPCS: Performed by: PHYSICIAN ASSISTANT

## 2020-11-29 PROCEDURE — 2580000003 HC RX 258: Performed by: EMERGENCY MEDICINE

## 2020-11-29 RX ORDER — DIPHENHYDRAMINE HYDROCHLORIDE 50 MG/ML
INJECTION INTRAMUSCULAR; INTRAVENOUS
Status: COMPLETED
Start: 2020-11-29 | End: 2020-11-29

## 2020-11-29 RX ORDER — BUPROPION HYDROCHLORIDE 150 MG/1
150 TABLET ORAL EVERY MORNING
COMMUNITY
End: 2020-12-20

## 2020-11-29 RX ORDER — LORAZEPAM 2 MG/ML
2 INJECTION INTRAMUSCULAR ONCE
Status: DISCONTINUED | OUTPATIENT
Start: 2020-11-29 | End: 2020-11-29

## 2020-11-29 RX ORDER — LORAZEPAM 1 MG/1
2 TABLET ORAL ONCE
Status: COMPLETED | OUTPATIENT
Start: 2020-11-29 | End: 2020-11-29

## 2020-11-29 RX ORDER — QUETIAPINE FUMARATE 50 MG/1
50 TABLET, EXTENDED RELEASE ORAL NIGHTLY
COMMUNITY
End: 2020-12-20

## 2020-11-29 RX ORDER — HALOPERIDOL 10 MG/1
10 TABLET ORAL ONCE
Status: COMPLETED | OUTPATIENT
Start: 2020-11-29 | End: 2020-11-29

## 2020-11-29 RX ORDER — DIPHENHYDRAMINE HYDROCHLORIDE 50 MG/ML
25 INJECTION INTRAMUSCULAR; INTRAVENOUS ONCE
Status: COMPLETED | OUTPATIENT
Start: 2020-11-29 | End: 2020-11-29

## 2020-11-29 RX ORDER — LORAZEPAM 2 MG/ML
INJECTION INTRAMUSCULAR
Status: COMPLETED
Start: 2020-11-29 | End: 2020-11-29

## 2020-11-29 RX ORDER — LORAZEPAM 2 MG/ML
1 INJECTION INTRAMUSCULAR ONCE
Status: COMPLETED | OUTPATIENT
Start: 2020-11-29 | End: 2020-11-29

## 2020-11-29 RX ORDER — VILAZODONE HYDROCHLORIDE 10 MG/1
10 TABLET ORAL DAILY
COMMUNITY
End: 2020-12-20

## 2020-11-29 RX ORDER — HALOPERIDOL 5 MG/ML
INJECTION INTRAMUSCULAR
Status: COMPLETED
Start: 2020-11-29 | End: 2020-11-29

## 2020-11-29 RX ORDER — 0.9 % SODIUM CHLORIDE 0.9 %
2000 INTRAVENOUS SOLUTION INTRAVENOUS ONCE
Status: COMPLETED | OUTPATIENT
Start: 2020-11-29 | End: 2020-11-29

## 2020-11-29 RX ORDER — NICOTINE 21 MG/24HR
1 PATCH, TRANSDERMAL 24 HOURS TRANSDERMAL ONCE
Status: COMPLETED | OUTPATIENT
Start: 2020-11-29 | End: 2020-11-30

## 2020-11-29 RX ORDER — HALOPERIDOL 5 MG/ML
5 INJECTION INTRAMUSCULAR ONCE
Status: COMPLETED | OUTPATIENT
Start: 2020-11-29 | End: 2020-11-29

## 2020-11-29 RX ORDER — HALOPERIDOL 5 MG/ML
5 INJECTION INTRAMUSCULAR ONCE
Status: DISCONTINUED | OUTPATIENT
Start: 2020-11-29 | End: 2020-11-29

## 2020-11-29 RX ADMIN — SODIUM CHLORIDE 2000 ML: 9 INJECTION, SOLUTION INTRAVENOUS at 18:21

## 2020-11-29 RX ADMIN — HALOPERIDOL 10 MG: 10 TABLET ORAL at 20:59

## 2020-11-29 RX ADMIN — HALOPERIDOL 5 MG: 5 INJECTION INTRAMUSCULAR at 04:37

## 2020-11-29 RX ADMIN — LORAZEPAM 1 MG: 2 INJECTION INTRAMUSCULAR at 04:34

## 2020-11-29 RX ADMIN — DIPHENHYDRAMINE HYDROCHLORIDE 25 MG: 50 INJECTION, SOLUTION INTRAMUSCULAR; INTRAVENOUS at 04:37

## 2020-11-29 RX ADMIN — HALOPERIDOL LACTATE 5 MG: 5 INJECTION, SOLUTION INTRAMUSCULAR at 04:37

## 2020-11-29 RX ADMIN — LORAZEPAM 1 MG: 2 INJECTION INTRAMUSCULAR; INTRAVENOUS at 04:34

## 2020-11-29 RX ADMIN — LORAZEPAM 1 MG: 2 INJECTION INTRAMUSCULAR; INTRAVENOUS at 01:51

## 2020-11-29 RX ADMIN — DIPHENHYDRAMINE HYDROCHLORIDE 25 MG: 50 INJECTION INTRAMUSCULAR; INTRAVENOUS at 04:37

## 2020-11-29 RX ADMIN — SODIUM BICARBONATE: 84 INJECTION, SOLUTION INTRAVENOUS at 21:04

## 2020-11-29 RX ADMIN — LORAZEPAM 2 MG: 1 TABLET ORAL at 20:59

## 2020-11-29 ASSESSMENT — ENCOUNTER SYMPTOMS
ALLERGIC/IMMUNOLOGIC NEGATIVE: 1
COLOR CHANGE: 0
APNEA: 0
TROUBLE SWALLOWING: 0
SHORTNESS OF BREATH: 0
EYE PAIN: 0
ABDOMINAL PAIN: 0

## 2020-11-29 NOTE — ED NOTES
Dinner tray brought in by supervisor. Patient changing into gown and pants. Susana Rachel.  Meir Mendez  11/28/20 9951

## 2020-11-29 NOTE — ED NOTES
Patient requesting to be transferred to [facility] where he will feel more comfortable talking to someone. Dr Celina Santizo made aware. Diego Wilson.  Lorenzo Thomas, [address] [place] [address]  11/28/20 3154

## 2020-11-29 NOTE — ED NOTES
[facility] called in regards to coming out and speaking with patient due to his increased depression. They stated he has a follow up appt with his [facility] physician on Dec 2 for medication follow up and medication injection. Diego Wilson. Lorenzo Thomas RN  11/28/20 2142       Ralph Bee  11/28/20 6220

## 2020-11-29 NOTE — ED TRIAGE NOTES
Pt comes to the ED from [facility] due to high CK levels. Pt states he is not going to talk about anything until he gets the medication ativan that he is requesting. Pt states he hallucinating but he can't describe what sensory is having the hallucinations.   Pt is not responding to further questions

## 2020-11-29 NOTE — ED NOTES
Patient states he was let out of [facility] 3 days ago and has been walking around on the streets since. Luis Griffith.  Barbara Dejesus  11/28/20 9443
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
## 2020-11-30 VITALS
DIASTOLIC BLOOD PRESSURE: 66 MMHG | WEIGHT: 180 LBS | TEMPERATURE: 98.6 F | HEART RATE: 89 BPM | OXYGEN SATURATION: 100 % | SYSTOLIC BLOOD PRESSURE: 111 MMHG | HEIGHT: 74 IN | BODY MASS INDEX: 23.1 KG/M2 | RESPIRATION RATE: 19 BRPM

## 2020-11-30 LAB
CK MB: 4.9 NG/ML (ref 0–6.7)
CK MB: 6.6 NG/ML (ref 0–6.7)
CREATINE KINASE-MB INDEX: 0.8 % (ref 0–3.5)
CREATINE KINASE-MB INDEX: 0.8 % (ref 0–3.5)
TOTAL CK: 608 U/L (ref 0–190)
TOTAL CK: 809 U/L (ref 0–190)

## 2020-11-30 PROCEDURE — 82550 ASSAY OF CK (CPK): CPT

## 2020-11-30 PROCEDURE — 82553 CREATINE MB FRACTION: CPT

## 2020-11-30 PROCEDURE — 6370000000 HC RX 637 (ALT 250 FOR IP): Performed by: EMERGENCY MEDICINE

## 2020-11-30 PROCEDURE — 36415 COLL VENOUS BLD VENIPUNCTURE: CPT

## 2020-11-30 RX ORDER — BENZTROPINE MESYLATE 1 MG/1
2 TABLET ORAL ONCE
Status: COMPLETED | OUTPATIENT
Start: 2020-11-30 | End: 2020-11-30

## 2020-11-30 RX ORDER — HALOPERIDOL 10 MG/1
10 TABLET ORAL ONCE
Status: COMPLETED | OUTPATIENT
Start: 2020-11-30 | End: 2020-11-30

## 2020-11-30 RX ADMIN — HALOPERIDOL 10 MG: 10 TABLET ORAL at 09:39

## 2020-11-30 RX ADMIN — BENZTROPINE MESYLATE 2 MG: 1 TABLET ORAL at 09:39
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
## 2020-12-01 PROCEDURE — 93010 ELECTROCARDIOGRAM REPORT: CPT | Performed by: INTERNAL MEDICINE

## 2020-12-12 ENCOUNTER — HOSPITAL ENCOUNTER (EMERGENCY)
Age: 30
Discharge: ANOTHER HOSPITAL | End: 2020-12-12
Attending: EMERGENCY MEDICINE
Payer: COMMERCIAL

## 2020-12-12 ENCOUNTER — HOSPITAL ENCOUNTER (EMERGENCY)
Age: 30
Discharge: ANOTHER HOSPITAL | End: 2020-12-13
Payer: COMMERCIAL

## 2020-12-12 VITALS
SYSTOLIC BLOOD PRESSURE: 140 MMHG | WEIGHT: 175 LBS | HEIGHT: 72 IN | DIASTOLIC BLOOD PRESSURE: 85 MMHG | OXYGEN SATURATION: 98 % | HEART RATE: 105 BPM | TEMPERATURE: 98.4 F | BODY MASS INDEX: 23.7 KG/M2 | RESPIRATION RATE: 18 BRPM

## 2020-12-12 LAB
ACETAMINOPHEN LEVEL: <15 UG/ML (ref 10–30)
AMPHETAMINE SCREEN, URINE: ABNORMAL
ANION GAP SERPL CALCULATED.3IONS-SCNC: 14 MEQ/L (ref 9–15)
BARBITURATE SCREEN URINE: ABNORMAL
BASOPHILS ABSOLUTE: 0 K/UL (ref 0–0.2)
BASOPHILS RELATIVE PERCENT: 0.7 %
BENZODIAZEPINE SCREEN, URINE: ABNORMAL
BILIRUBIN URINE: NEGATIVE
BLOOD, URINE: NEGATIVE
BUN BLDV-MCNC: 19 MG/DL (ref 6–20)
CALCIUM SERPL-MCNC: 9.5 MG/DL (ref 8.5–9.9)
CANNABINOID SCREEN URINE: POSITIVE
CHLORIDE BLD-SCNC: 101 MEQ/L (ref 95–107)
CLARITY: CLEAR
CO2: 26 MEQ/L (ref 20–31)
COCAINE METABOLITE SCREEN URINE: ABNORMAL
COLOR: YELLOW
CREAT SERPL-MCNC: 0.93 MG/DL (ref 0.7–1.2)
EKG ATRIAL RATE: 100 BPM
EKG P AXIS: 77 DEGREES
EKG P-R INTERVAL: 138 MS
EKG Q-T INTERVAL: 354 MS
EKG QRS DURATION: 82 MS
EKG QTC CALCULATION (BAZETT): 456 MS
EKG R AXIS: 86 DEGREES
EKG T AXIS: 46 DEGREES
EKG VENTRICULAR RATE: 100 BPM
EOSINOPHILS ABSOLUTE: 0.1 K/UL (ref 0–0.7)
EOSINOPHILS RELATIVE PERCENT: 2 %
ETHANOL PERCENT: 0.01 G/DL
ETHANOL: 12 MG/DL (ref 0–0.08)
GFR AFRICAN AMERICAN: >60
GFR NON-AFRICAN AMERICAN: >60
GLUCOSE BLD-MCNC: 96 MG/DL (ref 70–99)
GLUCOSE URINE: NEGATIVE MG/DL
HCT VFR BLD CALC: 40.1 % (ref 42–52)
HEMOGLOBIN: 13.6 G/DL (ref 14–18)
KETONES, URINE: NEGATIVE MG/DL
LACTIC ACID: 1.8 MMOL/L (ref 0.5–2.2)
LEUKOCYTE ESTERASE, URINE: NEGATIVE
LYMPHOCYTES ABSOLUTE: 2.2 K/UL (ref 1–4.8)
LYMPHOCYTES RELATIVE PERCENT: 31.8 %
Lab: ABNORMAL
MCH RBC QN AUTO: 32.2 PG (ref 27–31.3)
MCHC RBC AUTO-ENTMCNC: 33.8 % (ref 33–37)
MCV RBC AUTO: 95.1 FL (ref 80–100)
MONOCYTES ABSOLUTE: 0.8 K/UL (ref 0.2–0.8)
MONOCYTES RELATIVE PERCENT: 11.6 %
NEUTROPHILS ABSOLUTE: 3.8 K/UL (ref 1.4–6.5)
NEUTROPHILS RELATIVE PERCENT: 53.9 %
NITRITE, URINE: NEGATIVE
OPIATE SCREEN URINE: ABNORMAL
PDW BLD-RTO: 12.9 % (ref 11.5–14.5)
PH UA: 7 (ref 5–9)
PHENCYCLIDINE SCREEN URINE: ABNORMAL
PLATELET # BLD: 223 K/UL (ref 130–400)
POTASSIUM SERPL-SCNC: 4 MEQ/L (ref 3.4–4.9)
PROTEIN UA: NEGATIVE MG/DL
RBC # BLD: 4.22 M/UL (ref 4.7–6.1)
SALICYLATE, SERUM: <0.3 MG/DL (ref 15–30)
SARS-COV-2, NAAT: NOT DETECTED
SARS-COV-2, PCR: NORMAL
SODIUM BLD-SCNC: 141 MEQ/L (ref 135–144)
SPECIFIC GRAVITY UA: 1.01 (ref 1–1.03)
TOTAL CK: 487 U/L (ref 0–190)
TRICYCLIC, URINE: ABNORMAL
UROBILINOGEN, URINE: 0.2 E.U./DL
WBC # BLD: 7 K/UL (ref 4.8–10.8)

## 2020-12-12 PROCEDURE — 80048 BASIC METABOLIC PNL TOTAL CA: CPT

## 2020-12-12 PROCEDURE — G0480 DRUG TEST DEF 1-7 CLASSES: HCPCS

## 2020-12-12 PROCEDURE — 80306 DRUG TEST PRSMV INSTRMNT: CPT

## 2020-12-12 PROCEDURE — 83605 ASSAY OF LACTIC ACID: CPT

## 2020-12-12 PROCEDURE — 99283 EMERGENCY DEPT VISIT LOW MDM: CPT

## 2020-12-12 PROCEDURE — 84443 ASSAY THYROID STIM HORMONE: CPT

## 2020-12-12 PROCEDURE — 85025 COMPLETE CBC W/AUTO DIFF WBC: CPT

## 2020-12-12 PROCEDURE — 99285 EMERGENCY DEPT VISIT HI MDM: CPT

## 2020-12-12 PROCEDURE — 82550 ASSAY OF CK (CPK): CPT

## 2020-12-12 PROCEDURE — 80061 LIPID PANEL: CPT

## 2020-12-12 PROCEDURE — 93005 ELECTROCARDIOGRAM TRACING: CPT

## 2020-12-12 PROCEDURE — 36415 COLL VENOUS BLD VENIPUNCTURE: CPT

## 2020-12-12 PROCEDURE — U0002 COVID-19 LAB TEST NON-CDC: HCPCS

## 2020-12-12 PROCEDURE — 81003 URINALYSIS AUTO W/O SCOPE: CPT

## 2020-12-12 RX ORDER — RISPERIDONE 1 MG/1
1 TABLET, ORALLY DISINTEGRATING ORAL 2 TIMES DAILY
COMMUNITY
End: 2020-12-20

## 2020-12-12 ASSESSMENT — PAIN DESCRIPTION - PAIN TYPE: TYPE: ACUTE PAIN

## 2020-12-12 ASSESSMENT — PAIN SCALES - GENERAL: PAINLEVEL_OUTOF10: 5

## 2020-12-12 ASSESSMENT — ENCOUNTER SYMPTOMS
COUGH: 0
EYE REDNESS: 0
SHORTNESS OF BREATH: 0
EYE DISCHARGE: 0
SORE THROAT: 0
BACK PAIN: 0
VOMITING: 0
NAUSEA: 0
ABDOMINAL PAIN: 0

## 2020-12-12 ASSESSMENT — PAIN DESCRIPTION - LOCATION: LOCATION: GENERALIZED

## 2020-12-13 VITALS
RESPIRATION RATE: 14 BRPM | DIASTOLIC BLOOD PRESSURE: 73 MMHG | OXYGEN SATURATION: 97 % | HEART RATE: 72 BPM | SYSTOLIC BLOOD PRESSURE: 122 MMHG | TEMPERATURE: 98 F

## 2020-12-13 LAB
CHOLESTEROL, TOTAL: 122 MG/DL (ref 0–199)
HDLC SERPL-MCNC: 36 MG/DL (ref 40–59)
LDL CHOLESTEROL CALCULATED: 53 MG/DL (ref 0–129)
TRIGL SERPL-MCNC: 167 MG/DL (ref 0–150)
TSH SERPL DL<=0.05 MIU/L-ACNC: 2.64 UIU/ML (ref 0.44–3.86)

## 2020-12-13 ASSESSMENT — ENCOUNTER SYMPTOMS
VOMITING: 0
PHOTOPHOBIA: 0
COUGH: 0
SHORTNESS OF BREATH: 0
NAUSEA: 0
ABDOMINAL PAIN: 0
WHEEZING: 0

## 2020-12-13 ASSESSMENT — PATIENT HEALTH QUESTIONNAIRE - PHQ9: SUM OF ALL RESPONSES TO PHQ QUESTIONS 1-9: 11

## 2020-12-13 NOTE — ED NOTES
The pt left the unit with [facility] at 0700. All pt belongings were given to EMS at time of transfer.  Attempted to call [facility] times for nurse to nurse report with no answer     Linda Adan RN  12/13/20 4303

## 2020-12-13 NOTE — ED NOTES
Pt states he is here to talk to lets get real. States he wants help getting his life together. Denies current drug or alcohol use. Denies SI or HI at this time. C/o all over body pain 'but its subsiding'. Pt is A&Ox4, respirations even and unlabored. Pt states 'Im getting really pissed off'.       Maryjo Mcintyre RN  12/12/20 2008

## 2020-12-13 NOTE — ED NOTES
Called [facility], talked to [facility], she said there are no beds available at [facility].   Theodore Duran  12/12/20 6667

## 2020-12-13 NOTE — ED NOTES
Called to [address] [facility] ED, Dr. Jake Corona talked to Dr. Spring Jolly.   Moira Richardson [facility]  12/12/20 9734

## 2020-12-13 NOTE — ED PROVIDER NOTES
systems reviewed and are negative. Except as noted above the remainder of the review of systems was reviewed and negative. PAST MEDICAL HISTORY     Past Medical History:   Diagnosis Date    Anxiety 1/4/2012    Bipolar affective disorder ([address] 11/25/2015    Chondromalacia of left knee     Chondromalacia of left knee     Chondromalacia of right knee     Chondromalacia of right knee     Chronic back pain 4/20/2017    Depression     Drug abuse and dependence ([facility] 1/4/2012    Fracture of left side of mandibular body ([address] 06/17/2018    ACUTE LEFT CONDYLAR NECK FRACTURE    Fracture of navicular bone of foot, closed 09/03/2012    lt    Fracture of temporal bone ([facility] 2015    History of pulmonary embolism     Schizophrenia ([facility]     Seizures ([facility]     Talus fracture 9/2012    lt    TBI (traumatic brain injury) ([facility] 2015    Wound of left leg 9/14/2012         SURGICAL HISTORY       Past Surgical History:   Procedure Laterality Date    WRIST SURGERY  2009    pin in wrist left         CURRENT MEDICATIONS       Discharge Medication List as of 12/13/2020  7:13 AM      CONTINUE these medications which have NOT CHANGED    Details   risperiDONE (RISPERDAL M-TABS) 1 MG disintegrating tablet Take 1 mg by mouth 2 times dailyHistorical Med      buPROPion (WELLBUTRIN XL) 150 MG extended release tablet Take 150 mg by mouth every morningHistorical Med      QUEtiapine (SEROQUEL XR) 50 MG extended release tablet Take 50 mg by mouth nightlyHistorical Med      vilazodone HCl (VIIBRYD) 10 MG TABS Take 10 mg by mouth dailyHistorical Med      hydrOXYzine (ATARAX) 10 MG tablet Take 10 mg by mouth 3 times daily as needed for ItchingHistorical Med      gabapentin (NEURONTIN) 100 MG capsule Take 300 mg by mouth 3 times daily.  Historical Med      naproxen (NAPROSYN) 500 MG tablet Take 1 tablet by mouth 2 times daily (with meals), Disp-15 tablet,R-3Normal      OXcarbazepine (TRILEPTAL) 150 MG tablet Take 1 tablet by mouth 2 times daily, Disp-30 tablet, R-2Normal      ARIPiprazole (ABILIFY) 10 MG tablet Take 1 tablet by mouth daily, Disp-30 tablet, R-0Normal      famotidine (PEPCID) 20 MG tablet Take 1 tablet by mouth 2 times daily for 3 days, Disp-6 tablet, R-0Print             ALLERGIES     Shellfish-derived products and Shrimp flavor    FAMILY HISTORY       Family History   Family history unknown: Yes          SOCIAL HISTORY       Social History     Socioeconomic History    Marital status: Single     Spouse name: None    Number of children: 0    Years of education: 15    Highest education level: None   Occupational History    None   Social Needs    Financial resource strain: None    Food insecurity     Worry: None     Inability: None    Transportation needs     Medical: None     Non-medical: None   Tobacco Use    Smoking status: Current Some Day Smoker     Packs/day: 1.00     Years: 1.00     Pack years: 1.00     Types: Cigarettes     Last attempt to quit: 2011     Years since quittin.0    Smokeless tobacco: Current User     Types: Chew   Substance and Sexual Activity    Alcohol use: Yes     Comment: drank 2 shots of vodka prior to arrival    Drug use: Yes     Frequency: 7.0 times per week     Types: Marijuana, Cocaine    Sexual activity: Not Currently     Partners: Female   Lifestyle    Physical activity     Days per week: None     Minutes per session: None    Stress: None   Relationships    Social connections     Talks on phone: None     Gets together: None     Attends Yarsanism service: None     Active member of club or organization: None     Attends meetings of clubs or organizations: None     Relationship status: None    Intimate partner violence     Fear of current or ex partner: None     Emotionally abused: None     Physically abused: None     Forced sexual activity: None   Other Topics Concern    None   Social History Narrative    Lives w family       SCREENINGS        Ancram Coma

## 2020-12-13 NOTE — ED NOTES
Call received from Chase at [facility]. The pt has been accepted to [facility] Unit under the care of Dr. Lex Porter.      Romaine Garg RN  12/13/20 9709
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
## 2020-12-13 NOTE — ED NOTES
Nurse to nurse to report called to Lisbeth Ramirez at [facility].       Oracio Mcdaniel RN  12/13/20 8035

## 2020-12-13 NOTE — ED NOTES
Bed: 08  Expected date: 12/12/20  Expected time: 9:49 PM  Means of arrival: [facility]  Comments:  [address] patient from [facility] CINDA LING  12/12/20 9889

## 2020-12-13 NOTE — ED NOTES
Call placed to RAINA [facility] at [facility].  She will attempt to seek dual diagnosis tx for the pt per patient request.      Chas Crane RN  12/13/20 2969

## 2020-12-13 NOTE — ED NOTES
Provisional Diagnosis:    Schizoaffective disorder, bipolar type. Polydrug abuse    Psychosocial and Contextual Factors:    -Last hospitalized at [facility] in November  -Has an apartment   -Very limited social support   -Connected to the [employer]   -Tox positive for cannabinoids. Hx of polysubstance abuse. -Questionable medication compliance     C-SSRS Summary:     Patient: C-SSRS Suicide Screening  1) Within the past month, have you wished you were dead or wished you could go to sleep and not wake up? : No  2) Have you actually had any thoughts of killing yourself? : No  6) Have you ever done anything, started to do anything, or prepared to do anything to end your life?: No    Family: Pt has limited contact with family, states they have enabled him in the past     Agency: [facility]        Clinical Summary:    The pt presented to the ER asking for help for his polysubstance use and visual hallucinations. The pt has a hx of abusing a variety of substances including alcohol, cocaine, marijuana, and psychedelics. He had a small amount of alcohol on board upon arrival (0.011) and was positive for cannabinoids. Per the pt, he has been attempting to remain sober since he was released from half-way in November. He is finding this difficult in his current environment. He states that he has not had access to his SSDI and essentially lives in a [place] den\" because his apartment is without furnishings/things that make it feel like a home. His thoughts are disorganized and he is easily distracted. He expresses difficulty completing goal driven tasks like figuring out his financial situation. He has been seeing random visual hallucinations like stars, visual trails, and shadows. He does not know if these are from a significant TBI that he had in 2015, his history of LSD use, or if he is \"schizophrenic. \" He speaks briefly about being 'gang-stalked\" which appears to be a paranoid thought that he questions himself.  He denies SI and HI. He is pleasant and cooperative throughout assessment and appears motivated to seek treatment.      Level of Care Disposition:      Per tashi Reis to seek dual diagnosis tx      Shirin Nye, RN  12/13/20 [address], RN  12/13/20 [address], CINDA  12/13/20 9807

## 2020-12-13 NOTE — ED NOTES
Called [facility] center, talked to Vangie Weber, she referred us to [facility] [phone].   Rodrigo Sandhu  12/12/20 1651

## 2020-12-13 NOTE — ED PROVIDER NOTES
back pain 4/20/2017    Depression     Drug abuse and dependence ([facility] 1/4/2012    Fracture of left side of mandibular body ([facility] 06/17/2018    ACUTE LEFT CONDYLAR NECK FRACTURE    Fracture of navicular bone of foot, closed 09/03/2012    lt    Fracture of temporal bone ([facility] 2015    History of pulmonary embolism     Schizophrenia ([facility]     Seizures ([facility]     Talus fracture 9/2012    lt    TBI (traumatic brain injury) ([facility] 2015    Wound of left leg 9/14/2012         SURGICAL HISTORY       Past Surgical History:   Procedure Laterality Date    WRIST SURGERY  2009    pin in wrist left         CURRENT MEDICATIONS       Previous Medications    ARIPIPRAZOLE (ABILIFY) 10 MG TABLET    Take 1 tablet by mouth daily    BUPROPION (WELLBUTRIN XL) 150 MG EXTENDED RELEASE TABLET    Take 150 mg by mouth every morning    FAMOTIDINE (PEPCID) 20 MG TABLET    Take 1 tablet by mouth 2 times daily for 3 days    GABAPENTIN (NEURONTIN) 100 MG CAPSULE    Take 300 mg by mouth 3 times daily.      HYDROXYZINE (ATARAX) 10 MG TABLET    Take 10 mg by mouth 3 times daily as needed for Itching    NAPROXEN (NAPROSYN) 500 MG TABLET    Take 1 tablet by mouth 2 times daily (with meals)    OXCARBAZEPINE (TRILEPTAL) 150 MG TABLET    Take 1 tablet by mouth 2 times daily    QUETIAPINE (SEROQUEL XR) 50 MG EXTENDED RELEASE TABLET    Take 50 mg by mouth nightly    RISPERIDONE (RISPERDAL M-TABS) 1 MG DISINTEGRATING TABLET    Take 1 mg by mouth 2 times daily    VILAZODONE HCL (VIIBRYD) 10 MG TABS    Take 10 mg by mouth daily       ALLERGIES     Shellfish-derived products and Shrimp flavor    FAMILY HISTORY       Family History   Family history unknown: Yes          SOCIAL HISTORY       Social History     Socioeconomic History    Marital status: Single     Spouse name: None    Number of children: 0    Years of education: 15    Highest education level: None   Occupational History    None   Social Needs    Financial resource strain: None    Food and reactive to light. Neck:      Musculoskeletal: Full passive range of motion without pain, normal range of motion and neck supple. Cardiovascular:      Rate and Rhythm: Regular rhythm. Tachycardia present. Pulses: Normal pulses. Heart sounds: Normal heart sounds. Pulmonary:      Effort: Pulmonary effort is normal.      Breath sounds: Normal breath sounds. Abdominal:      General: Abdomen is flat. Bowel sounds are normal.      Palpations: Abdomen is soft. Musculoskeletal: Normal range of motion. Skin:     General: Skin is warm. Capillary Refill: Capillary refill takes less than 2 seconds. Neurological:      General: No focal deficit present. Mental Status: He is alert and oriented to person, place, and time. Deep Tendon Reflexes: Reflexes are normal and symmetric. Psychiatric:         Attention and Perception: He perceives visual hallucinations. Mood and Affect: Mood is anxious. Behavior: Behavior is cooperative. Thought Content:  Thought content is delusional.           LABS:  Labs Reviewed   CBC WITH AUTO DIFFERENTIAL - Abnormal; Notable for the following components:       Result Value    RBC 4.22 (*)     Hemoglobin 13.6 (*)     Hematocrit 40.1 (*)     MCH 32.2 (*)     All other components within normal limits   SALICYLATE LEVEL - Abnormal; Notable for the following components:    Salicylate, Serum <3.2 (*)     All other components within normal limits   URINE DRUG SCREEN, COMPREHENSIVE - Abnormal; Notable for the following components:    Cannabinoid Scrn, Ur POSITIVE (*)     All other components within normal limits   BASIC METABOLIC PANEL   URINALYSIS   ACETAMINOPHEN LEVEL   LACTIC ACID, PLASMA   ETHANOL   COVID-19   TSH WITHOUT REFLEX   LIPID PANEL         MDM:   Vitals:    Vitals:    12/12/20 2002   BP: (!) 140/85   Pulse: 105   Resp: 18   Temp: 98.4 °F (36.9 °C)   TempSrc: Oral   SpO2: 98%   Weight: 175 lb (79.4 kg)   Height: 6' (1.829 m) MDM  Number of Diagnoses or Management Options  Hallucinations  Schizophrenia, unspecified type [facility])  Diagnosis management comments: Patient presents with active hallucinations. He has a history of schizophrenia. Labs, EKG for behavioral health clearance. He is positive for marijuana. I reviewed all of his labs and tests. I called Osmani Maxwell who accepted the transfer to ER at [facility].        Amount and/or Complexity of Data Reviewed  Clinical lab tests: ordered and reviewed  Tests in the medicine section of CPT®: ordered and reviewed         EKG Interpretation    Interpreted by emergency department physician    Rhythm: normal sinus   Rate: tachycardia  Axis: normal  Ectopy: none  Conduction: normal  ST Segments: nonspecific changes  T Waves: no acute change  Q Waves: nonspecific    Clinical Impression: no acute changes    ANIYA NOLASCO     The lab results, radiology and test results were reviewed with the patient and family. The patient will follow up in 2 days with their primary care doctor. If their symptoms change or get worse they will return to the ER. CRITICAL CARE TIME   Total CriticalCare time was 0 minutes, excluding separately reportable procedures. There was a high probability of clinically significant/life threatening deterioration in the patient's condition which required my urgent intervention. PROCEDURES:  Unlessotherwise noted below, none     Procedures      FINAL IMPRESSION      1. Hallucinations    2.  Schizophrenia, unspecified type [facility])          DISPOSITION/PLAN   DISPOSITION Decision To Transfer 12/12/2020 08:49:54 PM          Tank Lo DO (electronically signed)  Attending Emergency Physician          Rishi Griffith DO  12/13/20 7148

## 2020-12-14 PROCEDURE — 93010 ELECTROCARDIOGRAM REPORT: CPT | Performed by: INTERNAL MEDICINE

## 2020-12-20 ENCOUNTER — HOSPITAL ENCOUNTER (EMERGENCY)
Age: 30
Discharge: ANOTHER HOSPITAL | End: 2020-12-20
Attending: EMERGENCY MEDICINE
Payer: COMMERCIAL

## 2020-12-20 ENCOUNTER — HOSPITAL ENCOUNTER (EMERGENCY)
Age: 30
Discharge: OTHER INSTITUTION | End: 2020-12-21
Payer: COMMERCIAL

## 2020-12-20 VITALS
HEART RATE: 84 BPM | SYSTOLIC BLOOD PRESSURE: 134 MMHG | OXYGEN SATURATION: 96 % | TEMPERATURE: 98 F | DIASTOLIC BLOOD PRESSURE: 80 MMHG | RESPIRATION RATE: 20 BRPM

## 2020-12-20 VITALS
OXYGEN SATURATION: 100 % | BODY MASS INDEX: 27.47 KG/M2 | TEMPERATURE: 98.6 F | RESPIRATION RATE: 18 BRPM | DIASTOLIC BLOOD PRESSURE: 74 MMHG | HEIGHT: 67 IN | SYSTOLIC BLOOD PRESSURE: 139 MMHG | WEIGHT: 175 LBS | HEART RATE: 95 BPM

## 2020-12-20 LAB
ACETAMINOPHEN LEVEL: <15 UG/ML (ref 10–30)
AMPHETAMINE SCREEN, URINE: ABNORMAL
ANION GAP SERPL CALCULATED.3IONS-SCNC: 9 MEQ/L (ref 9–15)
BARBITURATE SCREEN URINE: ABNORMAL
BASOPHILS ABSOLUTE: 0 K/UL (ref 0–0.2)
BASOPHILS RELATIVE PERCENT: 0.3 %
BENZODIAZEPINE SCREEN, URINE: ABNORMAL
BILIRUBIN URINE: NEGATIVE
BLOOD, URINE: NEGATIVE
BUN BLDV-MCNC: 22 MG/DL (ref 6–20)
CALCIUM SERPL-MCNC: 9.7 MG/DL (ref 8.5–9.9)
CANNABINOID SCREEN URINE: POSITIVE
CHLORIDE BLD-SCNC: 96 MEQ/L (ref 95–107)
CLARITY: CLEAR
CO2: 28 MEQ/L (ref 20–31)
COCAINE METABOLITE SCREEN URINE: POSITIVE
COLOR: YELLOW
CREAT SERPL-MCNC: 0.69 MG/DL (ref 0.7–1.2)
EKG ATRIAL RATE: 78 BPM
EKG P AXIS: 77 DEGREES
EKG P-R INTERVAL: 142 MS
EKG Q-T INTERVAL: 386 MS
EKG QRS DURATION: 94 MS
EKG QTC CALCULATION (BAZETT): 440 MS
EKG R AXIS: 87 DEGREES
EKG T AXIS: 48 DEGREES
EKG VENTRICULAR RATE: 78 BPM
EOSINOPHILS ABSOLUTE: 0.1 K/UL (ref 0–0.7)
EOSINOPHILS RELATIVE PERCENT: 0.5 %
ETHANOL PERCENT: NORMAL G/DL
ETHANOL: <10 MG/DL (ref 0–0.08)
GFR AFRICAN AMERICAN: >60
GFR NON-AFRICAN AMERICAN: >60
GLUCOSE BLD-MCNC: 99 MG/DL (ref 70–99)
GLUCOSE URINE: NEGATIVE MG/DL
HCT VFR BLD CALC: 40.2 % (ref 42–52)
HEMOGLOBIN: 13.2 G/DL (ref 14–18)
KETONES, URINE: NEGATIVE MG/DL
LACTIC ACID: 1.3 MMOL/L (ref 0.5–2.2)
LEUKOCYTE ESTERASE, URINE: NEGATIVE
LYMPHOCYTES ABSOLUTE: 1.3 K/UL (ref 1–4.8)
LYMPHOCYTES RELATIVE PERCENT: 12.4 %
Lab: ABNORMAL
MCH RBC QN AUTO: 31.9 PG (ref 27–31.3)
MCHC RBC AUTO-ENTMCNC: 32.7 % (ref 33–37)
MCV RBC AUTO: 97.6 FL (ref 80–100)
MONOCYTES ABSOLUTE: 0.7 K/UL (ref 0.2–0.8)
MONOCYTES RELATIVE PERCENT: 6.5 %
NEUTROPHILS ABSOLUTE: 8.1 K/UL (ref 1.4–6.5)
NEUTROPHILS RELATIVE PERCENT: 80.3 %
NITRITE, URINE: NEGATIVE
OPIATE SCREEN URINE: ABNORMAL
PDW BLD-RTO: 13 % (ref 11.5–14.5)
PH UA: 7.5 (ref 5–9)
PHENCYCLIDINE SCREEN URINE: ABNORMAL
PLATELET # BLD: 217 K/UL (ref 130–400)
POTASSIUM SERPL-SCNC: 4 MEQ/L (ref 3.4–4.9)
PROTEIN UA: NEGATIVE MG/DL
RBC # BLD: 4.12 M/UL (ref 4.7–6.1)
SALICYLATE, SERUM: <0.3 MG/DL (ref 15–30)
SARS-COV-2, NAAT: NOT DETECTED
SARS-COV-2, PCR: NORMAL
SODIUM BLD-SCNC: 133 MEQ/L (ref 135–144)
SPECIFIC GRAVITY UA: 1.02 (ref 1–1.03)
TOTAL CK: 219 U/L (ref 0–190)
TRICYCLIC, URINE: ABNORMAL
UROBILINOGEN, URINE: 0.2 E.U./DL
WBC # BLD: 10.1 K/UL (ref 4.8–10.8)

## 2020-12-20 PROCEDURE — 81003 URINALYSIS AUTO W/O SCOPE: CPT

## 2020-12-20 PROCEDURE — 36415 COLL VENOUS BLD VENIPUNCTURE: CPT

## 2020-12-20 PROCEDURE — 80048 BASIC METABOLIC PNL TOTAL CA: CPT

## 2020-12-20 PROCEDURE — 80306 DRUG TEST PRSMV INSTRMNT: CPT

## 2020-12-20 PROCEDURE — 6370000000 HC RX 637 (ALT 250 FOR IP): Performed by: PHYSICIAN ASSISTANT

## 2020-12-20 PROCEDURE — G0480 DRUG TEST DEF 1-7 CLASSES: HCPCS

## 2020-12-20 PROCEDURE — 84443 ASSAY THYROID STIM HORMONE: CPT

## 2020-12-20 PROCEDURE — 83605 ASSAY OF LACTIC ACID: CPT

## 2020-12-20 PROCEDURE — 99283 EMERGENCY DEPT VISIT LOW MDM: CPT

## 2020-12-20 PROCEDURE — U0002 COVID-19 LAB TEST NON-CDC: HCPCS

## 2020-12-20 PROCEDURE — 93005 ELECTROCARDIOGRAM TRACING: CPT

## 2020-12-20 PROCEDURE — 82550 ASSAY OF CK (CPK): CPT

## 2020-12-20 PROCEDURE — 80061 LIPID PANEL: CPT

## 2020-12-20 PROCEDURE — 99284 EMERGENCY DEPT VISIT MOD MDM: CPT

## 2020-12-20 PROCEDURE — 85025 COMPLETE CBC W/AUTO DIFF WBC: CPT

## 2020-12-20 RX ORDER — BENZTROPINE MESYLATE 0.5 MG/1
0.5 TABLET ORAL 2 TIMES DAILY
COMMUNITY
End: 2022-05-30

## 2020-12-20 RX ORDER — HYDROXYZINE PAMOATE 50 MG/1
50 CAPSULE ORAL ONCE
Status: COMPLETED | OUTPATIENT
Start: 2020-12-20 | End: 2020-12-20

## 2020-12-20 RX ORDER — TRAZODONE HYDROCHLORIDE 50 MG/1
50 TABLET ORAL ONCE
Status: COMPLETED | OUTPATIENT
Start: 2020-12-20 | End: 2020-12-20

## 2020-12-20 RX ORDER — RISPERIDONE 2 MG/1
2 TABLET, FILM COATED ORAL 2 TIMES DAILY
COMMUNITY
End: 2022-05-30

## 2020-12-20 RX ADMIN — HYDROXYZINE PAMOATE 50 MG: 50 CAPSULE ORAL at 20:07

## 2020-12-20 RX ADMIN — NICOTINE POLACRILEX 2 MG: 2 GUM, CHEWING BUCCAL at 20:07

## 2020-12-20 RX ADMIN — TRAZODONE HYDROCHLORIDE 50 MG: 50 TABLET ORAL at 22:02

## 2020-12-20 ASSESSMENT — ENCOUNTER SYMPTOMS
BACK PAIN: 0
SORE THROAT: 0
COLOR CHANGE: 0
ABDOMINAL PAIN: 0
EYE PAIN: 0
SHORTNESS OF BREATH: 0
SHORTNESS OF BREATH: 0
EYE REDNESS: 0
TROUBLE SWALLOWING: 0
COUGH: 0
VOMITING: 0
NAUSEA: 0
EYE DISCHARGE: 0
ABDOMINAL PAIN: 0
ALLERGIC/IMMUNOLOGIC NEGATIVE: 1
APNEA: 0

## 2020-12-20 ASSESSMENT — PAIN SCALES - GENERAL: PAINLEVEL_OUTOF10: 5

## 2020-12-20 ASSESSMENT — PAIN DESCRIPTION - ONSET: ONSET: ON-GOING

## 2020-12-20 ASSESSMENT — PAIN DESCRIPTION - PROGRESSION: CLINICAL_PROGRESSION: NOT CHANGED

## 2020-12-20 ASSESSMENT — PAIN DESCRIPTION - FREQUENCY: FREQUENCY: CONTINUOUS

## 2020-12-20 ASSESSMENT — PAIN DESCRIPTION - PAIN TYPE: TYPE: ACUTE PAIN

## 2020-12-20 ASSESSMENT — PATIENT HEALTH QUESTIONNAIRE - PHQ9: SUM OF ALL RESPONSES TO PHQ QUESTIONS 1-9: 19

## 2020-12-20 NOTE — ED PROVIDER NOTES
[facility] ED  EMERGENCY DEPARTMENT ENCOUNTER      Pt Name: Vish French  MRN: [id number]  [place] 1990  Date of evaluation: 12/20/2020  Provider: Meme Land DO        HISTORY OF PRESENT ILLNESS    Vish French is a 27 y.o. male per chart review has ah/o   The history is provided by the patient. Mental Health Problem  Presenting symptoms: hallucinations, paranoid behavior and suicidal thoughts    Degree of incapacity (severity):  Severe  Timing:  Constant  Chronicity:  Recurrent  Context: drug abuse    Treatment compliance:  Unable to specify  Relieved by:  Nothing  Associated symptoms: anxiety    Associated symptoms: no abdominal pain and no chest pain    Risk factors: hx of mental illness and recent psychiatric admission             REVIEW OF SYSTEMS       Review of Systems   Constitutional: Negative for chills and fever. HENT: Negative for ear pain and sore throat. Eyes: Negative for discharge and redness. Respiratory: Negative for cough and shortness of breath. Cardiovascular: Negative for chest pain and palpitations. Gastrointestinal: Negative for abdominal pain, nausea and vomiting. Genitourinary: Negative for difficulty urinating and dysuria. Musculoskeletal: Negative for back pain and neck pain. Skin: Negative for rash and wound. Neurological: Negative for dizziness and syncope. Psychiatric/Behavioral: Positive for hallucinations, paranoia and suicidal ideas. Negative for confusion. The patient is nervous/anxious. All other systems reviewed and are negative. Except as noted above the remainder of the review of systems was reviewed and negative.        PAST MEDICAL HISTORY     Past Medical History:   Diagnosis Date    Anxiety 1/4/2012    Bipolar affective disorder ([facility] 11/25/2015    Chondromalacia of left knee     Chondromalacia of left knee     Chondromalacia of right knee     Chondromalacia of right knee     Chronic back pain 4/20/2017    Depression     Drug abuse and dependence ([facility] 1/4/2012    Fract[facility] of [facility] body [facility] 06/17/2018    ACUTE LEFT CONDYLAR NECK FRACTURE    Fracture of navicular bone of foot, closed 09/03/2012    lt    Fracture of temporal bone ([facility] 2015    History of pulmonary embolism     Schizophrenia [facility]     Seizures ([facility]     Talus fracture 9/2012    lt    TBI (traumatic brain injury) ([facility] 2015    Wound of left leg 9/14/2012         SURGICAL HISTORY       Past Surgical History:   Procedure Laterality Date    WRIST SURGERY  2009    pin in wrist left         CURRENT MEDICATIONS       Previous Medications    ARIPIPRAZOLE (ABILIFY) 10 MG TABLET    Take 1 tablet by mouth daily    BUPROPION (WELLBUTRIN XL) 150 MG EXTENDED RELEASE TABLET    Take 150 mg by mouth every morning    FAMOTIDINE (PEPCID) 20 MG TABLET    Take 1 tablet by mouth 2 times daily for 3 days    GABAPENTIN (NEURONTIN) 100 MG CAPSULE    Take 300 mg by mouth 3 times daily.      HYDROXYZINE (ATARAX) 10 MG TABLET    Take 10 mg by mouth 3 times daily as needed for Itching    NAPROXEN (NAPROSYN) 500 MG TABLET    Take 1 tablet by mouth 2 times daily (with meals)    OXCARBAZEPINE (TRILEPTAL) 150 MG TABLET    Take 1 tablet by mouth 2 times daily    QUETIAPINE (SEROQUEL XR) 50 MG EXTENDED RELEASE TABLET    Take 50 mg by mouth nightly    RISPERIDONE (RISPERDAL M-TABS) 1 MG DISINTEGRATING TABLET    Take 1 mg by mouth 2 times daily    VILAZODONE HCL (VIIBRYD) 10 MG TABS    Take 10 mg by mouth daily       ALLERGIES     Shellfish-derived products and Shrimp flavor    FAMILY HISTORY       Family History   Family history unknown: Yes          SOCIAL HISTORY       Social History     Socioeconomic History    Marital status: Single     Spouse name: None    Number of children: 0    Years of education: 15    Highest education level: None   Occupational History    None   Social Needs    Financial resource strain: None    Food insecurity     Worry: None     Inability: None    Transportation needs     Medical: None     Non-medical: None   Tobacco Use    Smoking status: Current Some Day Smoker     Packs/day: 1.00     Years: 1.00     Pack years: 1.00     Types: Cigarettes     Last attempt to quit: 2011     Years since quittin.0    Smokeless tobacco: Current User     Types: Chew   Substance and Sexual Activity    Alcohol use: Yes     Comment: drank 2 shots of vodka prior to arrival    Drug use: Yes     Frequency: 7.0 times per week     Types: Marijuana, Cocaine    Sexual activity: Not Currently     Partners: Female   Lifestyle    Physical activity     Days per week: None     Minutes per session: None    Stress: None   Relationships    Social connections     Talks on phone: None     Gets together: None     Attends Sabianist service: None     Active member of club or organization: None     Attends meetings of clubs or organizations: None     Relationship status: None    Intimate partner violence     Fear of current or ex partner: None     Emotionally abused: None     Physically abused: None     Forced sexual activity: None   Other Topics Concern    None   Social History Narrative    Lives w family         PHYSICAL EXAM       ED Triage Vitals [20 1728]   BP Temp Temp Source Pulse Resp SpO2 Height Weight   139/74 98.6 °F (37 °C) Oral 95 18 100 % 5' 7\" (1.702 m) 175 lb (79.4 kg)       Physical Exam  Vitals signs and nursing note reviewed. Constitutional:       Appearance: Normal appearance. HENT:      Head: Normocephalic and atraumatic. Right Ear: Tympanic membrane normal.      Left Ear: Tympanic membrane normal.      Nose: Nose normal.      Mouth/Throat:      Mouth: Mucous membranes are moist.      Pharynx: Oropharynx is clear. Eyes:      General: Lids are normal.      Extraocular Movements: Extraocular movements intact. Conjunctiva/sclera: Conjunctivae normal.      Pupils: Pupils are equal, round, and reactive to light. Neck:      Musculoskeletal: Full passive range of motion without pain, normal range of motion and neck supple. Cardiovascular:      Rate and Rhythm: Normal rate and regular rhythm. Pulses: Normal pulses. Heart sounds: Normal heart sounds. Pulmonary:      Effort: Pulmonary effort is normal.      Breath sounds: Normal breath sounds. Abdominal:      General: Abdomen is flat. Bowel sounds are normal.      Palpations: Abdomen is soft. Musculoskeletal: Normal range of motion. Skin:     General: Skin is warm. Capillary Refill: Capillary refill takes less than 2 seconds. Neurological:      General: No focal deficit present. Mental Status: He is alert and oriented to person, place, and time. Deep Tendon Reflexes: Reflexes are normal and symmetric. Psychiatric:         Attention and Perception: Attention and perception normal.         Mood and Affect: Mood normal.         Behavior: Behavior normal. Behavior is cooperative. Thought Content: Thought content is paranoid. Thought content includes homicidal and suicidal ideation. Thought content includes suicidal plan.            LABS:  Labs Reviewed   CBC WITH AUTO DIFFERENTIAL - Abnormal; Notable for the following components:       Result Value    RBC 4.12 (*)     Hemoglobin 13.2 (*)     Hematocrit 40.2 (*)     MCH 31.9 (*)     MCHC 32.7 (*)     Neutrophils Absolute 8.1 (*)     All other components within normal limits   URINE DRUG SCREEN, COMPREHENSIVE - Abnormal; Notable for the following components:    Cocaine Metabolite Screen, Urine POSITIVE (*)     Cannabinoid Scrn, Ur POSITIVE (*)     All other components within normal limits   URINALYSIS   CK   BASIC METABOLIC PANEL   SALICYLATE LEVEL   ACETAMINOPHEN LEVEL   LACTIC ACID, PLASMA   ETHANOL   TSH WITHOUT REFLEX   LIPID PANEL   COVID-19   COVID-19         MDM:   Vitals:    Vitals:    12/20/20 1728   BP: 139/74   Pulse: 95   Resp: 18   Temp: 98.6 °F (37 °C)   TempSrc: Oral SpO2: 100%   Weight: 175 lb (79.4 kg)   Height: 5' 7\" (1.702 m)       MDM  Number of Diagnoses or Management Options  Homicide  Suicide, initial encounter [facility])  Diagnosis management comments: Patient presents with homicidal, suicidal and paranoid thoughts. Labs, EKG ordered. I spoke with Dr. Bruno Izaguirre and [facility] who accepted the transfer. EKG Interpretation    Interpreted by emergency department physician    Rhythm: normal sinus   Rate: normal  Axis: normal  Ectopy: none  Conduction: normal  ST Segments: no acute change  T Waves: no acute change  Q Waves: none    Clinical Impression: no acute changes    ANIYA NOLASCO     The lab results, radiology and test results were reviewed with the patient and family. The patient will follow up in 2 days with their primary care doctor. If their symptoms change or get worse they will return to the ER. CRITICAL CARE TIME   Total CriticalCare time was 0 minutes, excluding separately reportable procedures. There was a high probability of clinically significant/life threatening deterioration in the patient's condition which required my urgent intervention. PROCEDURES:  Unlessotherwise noted below, none     Procedures      FINAL IMPRESSION      1. Suicide, initial encounter ([facility]    2.  Homicide          DISPOSITION/PLAN   DISPOSITION Decision To Transfer 12/20/2020 06:03:17 PM          ANIYA Kirkland DO (electronically signed)  Attending Emergency Physician          Biju Calderon DO  12/23/20 5731

## 2020-12-21 LAB
CHOLESTEROL, TOTAL: 103 MG/DL (ref 0–199)
HDLC SERPL-MCNC: 47 MG/DL (ref 40–59)
LDL CHOLESTEROL CALCULATED: 47 MG/DL (ref 0–129)
TRIGL SERPL-MCNC: 47 MG/DL (ref 0–150)
TSH SERPL DL<=0.05 MIU/L-ACNC: 0.49 UIU/ML (ref 0.44–3.86)

## 2020-12-21 PROCEDURE — 93010 ELECTROCARDIOGRAM REPORT: CPT | Performed by: INTERNAL MEDICINE

## 2020-12-21 NOTE — ED NOTES
Home medications updated per [facility] list from \[facility]\"" 12-     Judah Serrano RN  12/20/20 2024

## 2020-12-21 NOTE — ED NOTES
Spoke with Dr. Yaritza Pretty for patient disposition, patient to be referred to [facility] for placement related to dual Dx.      [facility]  12/20/20 8076

## 2020-12-21 NOTE — ED NOTES
Patient requesting something for anxiety. Agreeable to try Vistaril. Pipe CARREON updated.      Jer Martin RN  12/20/20 97

## 2020-12-21 NOTE — ED NOTES
Patient reports that he is paranoid, patient states \"I don't trust no one\". Patient denies SI/HI, patient reports I just need help I rehab or something. I need medication I am so stressed. Patient denies audio hallucinations, patient reports visual hallucinations of stars.      Jose Hough, [address] [place] [address]  12/20/20 9557

## 2020-12-21 NOTE — ED NOTES
Provisional Diagnosis:    Psychosis NOS and Polysubstance abuse    Psychosocial and Contextual Factors:    Patient was released from residential 2 days ago for breaking his protection order with his grandmother  Patient is unemployed  Patient is unable to report his living arangements    C-SSRS Summary:     Patient: C-SSRS Suicide Screening  1) Within the past month, have you wished you were dead or wished you could go to sleep and not wake up? : Yes  2) Have you actually had any thoughts of killing yourself? : Yes  3) Have you been thinking about how you might kill yourself? : Yes  4) Have you had these thoughts and had some intention of acting on them? : No  5) Have you started to work out or worked out the details of how to kill yourself? Do you intend to carry out this plan? : No  6) Have you ever done anything, started to do anything, or prepared to do anything to end your life?: Yes  Did this occur within the past 3 months? : No    Family: Patient has no family at the bedside    Agency: [facility]          Abuse Assessment  Physical Abuse: Denies  Verbal Abuse: Denies  Emotional abuse: Denies  Financial Abuse: Denies  Sexual abuse: Denies    Clinical Summary:    27year old male present to the ED after being transferred from [facility], [address] [place] [address],[address]. Patient presentation at the [facility] with SI/HI with no plan or intent. Upon arrival to the ED and patient triage patient states \"I'm not gone hurt no one I just need help I need to go to rehab\". At the time of triage patient denied SI/HI, patient denies audio hallucinations, and reports visual hallucinations of seeing stars. Upon patient psyche assessment patient reports SI, patient states \"I have been thinking of some things\", when asked if he was suicidal.  Patient would not deny or confirm HI. Patient reports that he doesn't hear anything but he is seeing stars.   Patient is unable to answer any questions, patient is just repeating the question back to writer. Patient is unable to make a complete thought, patient is presenting indecisive, and paranoid. Patient is presenting with increased anxiety and pacing behavior. Patient is incongruent to what he is reporting, patient is reporting depression with a smile on his face. Patient is presenting unorganized, tangential and paranoid. In reviewing patient chart, patient has had multiple different psyche admissions in the last month.     Level of Care Disposition:      Per Dr Italo Chahal RN  12/20/20 2036       Demetri Arizmendi RN  12/20/20 2115       Demetri Arizmendi [facility]  12/20/20 0487

## 2020-12-21 NOTE — ED NOTES
Call to MAC to place patient, MAC is currently on another call will call back.      Rajat Cuadra, [facility]  12/20/20 1052

## 2020-12-21 NOTE — ED PROVIDER NOTES
[address] [facility] ED  EMERGENCY DEPARTMENT ENCOUNTER      Pt Name: Ximena Rosario  MRN: [id number]  [place] 1990  Date of evaluation: 12/20/2020  Provider: Pema Pan PA-C    CHIEF COMPLAINT       Chief Complaint   Patient presents with    Other     wants rehab, hallucinating from drug use         HISTORY OF PRESENT ILLNESS   (Location/Symptom, Timing/Onset, Context/Setting, Quality, Duration, Modifying Factors, Severity)  Note limiting factors. Ximena Rosario is a 27 y.o. male who presents to the emergency department as a transfer from [facility] following patient being seen for suicidal and homicidal ideation and hallucinations secondary to drug use. Patient states \"I do not want to hurt nobody\". When asked why he is here patient states that he is here because he needs help with his drug abuse. HPI    Nursing Notes were reviewed. REVIEW OF SYSTEMS    (2-9 systems for level 4, 10 or more for level 5)     Review of Systems   Constitutional: Negative for diaphoresis and fever. HENT: Negative for hearing loss and trouble swallowing. Eyes: Negative for pain. Respiratory: Negative for apnea and shortness of breath. Cardiovascular: Negative for chest pain. Gastrointestinal: Negative for abdominal pain. Endocrine: Negative. Genitourinary: Negative for hematuria. Musculoskeletal: Negative for neck pain and neck stiffness. Skin: Negative for color change. Allergic/Immunologic: Negative. Neurological: Negative for dizziness and numbness. Hematological: Negative. Psychiatric/Behavioral: Positive for hallucinations. All other systems reviewed and are negative. Except as noted above the remainder of the review of systems was reviewed and negative.        PAST MEDICAL HISTORY     Past Medical History:   Diagnosis Date    Anxiety 1/4/2012    Bipolar affective disorder ([facility] 11/25/2015    Chondromalacia of left knee     Chondromalacia of left knee     Chondromalacia of right knee     Chondromalacia of right knee     Chronic back pain 4/20/2017    Depression     Drug abuse and dependence ([facility] 1/4/2012    Fracture of left side of mandibular body ([facility] 06/17/2018    ACUTE LEFT CONDYLAR NECK FRACTURE    Fracture of navicular bone of foot, closed 09/03/2012    lt    Fracture of temporal bone ([facility] 2015    History of pulmonary embolism     [facility]     Seizures ([facility]     Talus fracture 9/2012    lt    TBI (traumatic brain injury) ([facility] 2015    Wound of left leg 9/14/2012         SURGICAL HISTORY       Past Surgical History:   Procedure Laterality Date    WRIST SURGERY  2009    pin in wrist left         CURRENT MEDICATIONS       Discharge Medication List as of 12/21/2020  3:10 AM      CONTINUE these medications which have NOT CHANGED    Details   benztropine (COGENTIN) 0.5 MG tablet Take 0.5 mg by mouth 2 times dailyHistorical Med      risperiDONE (RISPERDAL) 2 MG tablet Take 2 mg by mouth 2 times dailyHistorical Med      paliperidone palmitate ER (INVEGA SUSTENNA) 156 MG/ML MARYANN IM injection Inject 156 mg into the muscle every 30 days Was due 12-, had starting dose of 234mg IM on 12-8-2020, Intramuscular, EVERY 30 DAYS, Historical Med      hydrOXYzine (ATARAX) 10 MG tablet Take 25 mg by mouth every 6 hours as needed for Anxiety Historical Med             ALLERGIES     Shellfish-derived products and Shrimp flavor    FAMILY HISTORY       Family History   Family history unknown: Yes          SOCIAL HISTORY       Social History     Socioeconomic History    Marital status: Single     Spouse name: Not on file    Number of children: 0    Years of education: 15    Highest education level: Not on file   Occupational History    Not on file   Social Needs    Financial resource strain: Not on file    Food insecurity     Worry: Not on file     Inability: Not on file    Transportation needs     Medical: Not on file     Non-medical: Not on file   Tobacco Use    Smoking status: Current Some Day Smoker     Packs/day: 1.00     Years: 1.00     Pack years: 1.00     Types: Cigarettes     Last attempt to quit: 2011     Years since quittin.0    Smokeless tobacco: Current User     Types: Chew   Substance and Sexual Activity    Alcohol use: Yes     Comment: drank 2 shots of vodka prior to arrival    Drug use: Yes     Frequency: 7.0 times per week     Types: Marijuana, Cocaine    Sexual activity: Not Currently     Partners: Female   Lifestyle    Physical activity     Days per week: Not on file     Minutes per session: Not on file    Stress: Not on file   Relationships    Social connections     Talks on phone: Not on file     Gets together: Not on file     Attends Jehovah's witness service: Not on file     Active member of club or organization: Not on file     Attends meetings of clubs or organizations: Not on file     Relationship status: Not on file    Intimate partner violence     Fear of current or ex partner: Not on file     Emotionally abused: Not on file     Physically abused: Not on file     Forced sexual activity: Not on file   Other Topics Concern    Not on file   Social History Narrative    Lives w family       SCREENINGS                        PHYSICAL EXAM    (up to 7 for level 4, 8 or more for level 5)     ED Triage Vitals [205]   BP Temp Temp Source Pulse Resp SpO2 Height Weight   134/80 98 °F (36.7 °C) Oral 84 20 96 % -- --       Physical Exam  Vitals signs and nursing note reviewed. Constitutional:       General: He is not in acute distress. Appearance: He is well-developed. He is not diaphoretic. HENT:      Head: Normocephalic and atraumatic. Mouth/Throat:      Pharynx: No oropharyngeal exudate. Eyes:      General: No scleral icterus. Conjunctiva/sclera: Conjunctivae normal.      Pupils: Pupils are equal, round, and reactive to light. Neck:      Musculoskeletal: Normal range of motion and neck supple. Trachea: No tracheal deviation. Cardiovascular:      Rate and Rhythm: Normal rate. Heart sounds: Normal heart sounds. Pulmonary:      Effort: Pulmonary effort is normal. No respiratory distress. Breath sounds: Normal breath sounds. Abdominal:      General: Bowel sounds are normal. There is no distension. Palpations: Abdomen is soft. Musculoskeletal: Normal range of motion. Skin:     General: Skin is warm and dry. Findings: No erythema or rash. Neurological:      Mental Status: He is alert and oriented to person, place, and time. Cranial Nerves: No cranial nerve deficit. Motor: No abnormal muscle tone. Psychiatric:         Behavior: Behavior normal.         Thought Content: Thought content normal.         Judgment: Judgment normal.         DIAGNOSTIC RESULTS     EKG: All EKG's are interpreted by the Emergency Department Physician who either signs or Co-signs this chart in the absence of a cardiologist.      RADIOLOGY:   Non-plain film images such as CT, Ultrasound and MRI are read by the radiologist. Plain radiographic images are visualized and preliminarily interpreted by the emergency physician with the below findings:      Interpretation per the Radiologist below, if available at the time of this note:    No orders to display         ED BEDSIDE ULTRASOUND:   Performed by ED Physician - none    LABS:  Labs Reviewed - No data to display    All other labs were within normal range or not returned as of this dictation. EMERGENCY DEPARTMENT COURSE and DIFFERENTIAL DIAGNOSIS/MDM:   Vitals:    Vitals:    12/20/20 1915   BP: 134/80   Pulse: 84   Resp: 20   Temp: 98 °F (36.7 °C)   TempSrc: Oral   SpO2: 96%         MDM      REASSESSMENT        Is medically cleared for psychiatric evaluation and care    CONSULTS:  None    PROCEDURES:  Unless otherwise noted below, none     Procedures        FINAL IMPRESSION      1.  Schizoaffective disorder, bipolar type ([facility] DISPOSITION/PLAN   DISPOSITION Decision To Transfer 12/21/2020 02:30:39 AM      PATIENT REFERRED TO:  No follow-up provider specified. DISCHARGE MEDICATIONS:  Discharge Medication List as of 12/21/2020  3:10 AM        Controlled Substances Monitoring:     No flowsheet data found.     (Please note that portions of this note were completed with a voice recognition program.  Efforts were made to edit the dictations but occasionally words are mis-transcribed.)    Bisi Hernandez PA-C (electronically signed)  Attending Emergency Physician            Bisi Hernandez PA-C  12/25/20 1920

## 2020-12-21 NOTE — ED NOTES
Patient updated he will be referred out for services. Informed he will be updated when placement is found.      Tejinder Currie RN  12/20/20 1402

## 2020-12-21 NOTE — ED NOTES
Patient to transfer  Clear [place] via [place], patient ambulated to the cot no S/S of distress noted. Patient calm and cooperative during transfer process.      [facility]  12/21/20 0559

## 2021-01-01 NOTE — ED PROVIDER NOTES
Clear             -Wheezes: No             -Rales: No    BACK: -Flank pain: No              -Pain on palpation: No    ABD: -Distended: No           -Bruits: No           -Bowel sounds: Normal.           -Deep palpation: Non-tender           -Organomegaly palpable: No           -Abnormal masses: No    EXT: Gross appearance and use of all four extremities: Normal     SKIN: -Good turgor warm and dry. -Apparent lesions or rashes: No    NEURO: -Patient: alert                -Oriented to: person, place and time. Appears disheveled, answers questions but in fairly negative way. He does have negative attitude, thoughts are only slightly disorganized      DIAGNOSTIC RESULTS     EKG: All EKG's are interpreted by the Emergency Department Physician who either signs or Co-signs this chart in the absence of a cardiologist.        RADIOLOGY:   Non-plain film images such as CT, Ultrasound and MRI are read by the radiologist. Plain radiographic images are visualized and preliminarily interpreted by the emergency physician with the below findings:      Patient is again medically cleared for psychiatric evaluation and treatment  Interpretation per the Radiologist below, if available at the time of this note:    No orders to display         ED BEDSIDE ULTRASOUND:   Performed by ED Physician - none    LABS:  Labs Reviewed   CBC WITH AUTO DIFFERENTIAL   URINALYSIS   TSH WITHOUT REFLEX   ETHANOL   URINE DRUG SCREEN   ACETAMINOPHEN LEVEL   SALICYLATE LEVEL   COMPREHENSIVE METABOLIC PANEL       All other labs were within normal range or not returned as of this dictation. EMERGENCY DEPARTMENT COURSE and DIFFERENTIAL DIAGNOSIS/MDM:   Vitals:    Vitals:    08/28/18 1338   BP: (!) 159/94   Pulse: 110   Resp: 16   SpO2: 99%   Weight: 202 lb (91.6 kg)   Height: 6' (1.829 m)           MDM     Patient is medically cleared for psychiatric evaluation and placement.     CRITICAL CARE TIME   Total Critical Care time was Statement Selected

## 2021-08-23 ENCOUNTER — HOSPITAL ENCOUNTER (EMERGENCY)
Age: 31
Discharge: PSYCHIATRIC OR SUBSTANCE USE FACILITY | End: 2021-08-25
Payer: MEDICARE

## 2021-08-23 DIAGNOSIS — F19.10 POLYSUBSTANCE ABUSE (HCC): Primary | ICD-10-CM

## 2021-08-23 LAB
ACETAMINOPHEN LEVEL: <5 UG/ML (ref 10–30)
ALBUMIN SERPL-MCNC: 5.6 G/DL (ref 3.5–4.6)
ALP BLD-CCNC: 141 U/L (ref 35–104)
ALT SERPL-CCNC: 21 U/L (ref 0–41)
AMPHETAMINE SCREEN, URINE: ABNORMAL
ANION GAP SERPL CALCULATED.3IONS-SCNC: 20 MEQ/L (ref 9–15)
AST SERPL-CCNC: 25 U/L (ref 0–40)
BARBITURATE SCREEN URINE: ABNORMAL
BASOPHILS ABSOLUTE: 0.1 K/UL (ref 0–0.2)
BASOPHILS RELATIVE PERCENT: 0.4 %
BENZODIAZEPINE SCREEN, URINE: ABNORMAL
BILIRUB SERPL-MCNC: 1.1 MG/DL (ref 0.2–0.7)
BILIRUBIN URINE: NEGATIVE
BLOOD, URINE: NEGATIVE
BUN BLDV-MCNC: 17 MG/DL (ref 6–20)
CALCIUM SERPL-MCNC: 10.9 MG/DL (ref 8.5–9.9)
CANNABINOID SCREEN URINE: POSITIVE
CHLORIDE BLD-SCNC: 98 MEQ/L (ref 95–107)
CHOLESTEROL, TOTAL: 130 MG/DL (ref 0–199)
CK MB: 3.3 NG/ML (ref 0–6.7)
CLARITY: CLEAR
CO2: 25 MEQ/L (ref 20–31)
COCAINE METABOLITE SCREEN URINE: POSITIVE
COLOR: YELLOW
CREAT SERPL-MCNC: 1.31 MG/DL (ref 0.7–1.2)
CREATINE KINASE-MB INDEX: 1.2 % (ref 0–3.5)
EOSINOPHILS ABSOLUTE: 0 K/UL (ref 0–0.7)
EOSINOPHILS RELATIVE PERCENT: 0.2 %
ETHANOL PERCENT: NORMAL G/DL
ETHANOL: <10 MG/DL (ref 0–0.08)
GFR AFRICAN AMERICAN: >60
GFR NON-AFRICAN AMERICAN: >60
GLOBULIN: 2.7 G/DL (ref 2.3–3.5)
GLUCOSE BLD-MCNC: 88 MG/DL (ref 70–99)
GLUCOSE URINE: NEGATIVE MG/DL
HCT VFR BLD CALC: 49.4 % (ref 42–52)
HDLC SERPL-MCNC: 50 MG/DL (ref 40–59)
HEMOGLOBIN: 16.8 G/DL (ref 14–18)
KETONES, URINE: ABNORMAL MG/DL
LDL CHOLESTEROL CALCULATED: 73 MG/DL (ref 0–129)
LEUKOCYTE ESTERASE, URINE: NEGATIVE
LYMPHOCYTES ABSOLUTE: 1.4 K/UL (ref 1–4.8)
LYMPHOCYTES RELATIVE PERCENT: 11 %
Lab: ABNORMAL
MCH RBC QN AUTO: 31.5 PG (ref 27–31.3)
MCHC RBC AUTO-ENTMCNC: 34 % (ref 33–37)
MCV RBC AUTO: 92.5 FL (ref 80–100)
METHADONE SCREEN, URINE: ABNORMAL
MONOCYTES ABSOLUTE: 0.9 K/UL (ref 0.2–0.8)
MONOCYTES RELATIVE PERCENT: 6.9 %
NEUTROPHILS ABSOLUTE: 10.5 K/UL (ref 1.4–6.5)
NEUTROPHILS RELATIVE PERCENT: 81.5 %
NITRITE, URINE: NEGATIVE
OPIATE SCREEN URINE: ABNORMAL
OXYCODONE URINE: ABNORMAL
PDW BLD-RTO: 13.5 % (ref 11.5–14.5)
PH UA: 5.5 (ref 5–9)
PHENCYCLIDINE SCREEN URINE: ABNORMAL
PLATELET # BLD: 310 K/UL (ref 130–400)
POTASSIUM SERPL-SCNC: 4.2 MEQ/L (ref 3.4–4.9)
PROPOXYPHENE SCREEN: ABNORMAL
PROTEIN UA: ABNORMAL MG/DL
RBC # BLD: 5.34 M/UL (ref 4.7–6.1)
SALICYLATE, SERUM: <0.3 MG/DL (ref 15–30)
SARS-COV-2, NAAT: NOT DETECTED
SODIUM BLD-SCNC: 143 MEQ/L (ref 135–144)
SPECIFIC GRAVITY UA: 1.02 (ref 1–1.03)
TOTAL CK: 271 U/L (ref 0–190)
TOTAL PROTEIN: 8.3 G/DL (ref 6.3–8)
TRIGL SERPL-MCNC: 37 MG/DL (ref 0–150)
TSH SERPL DL<=0.05 MIU/L-ACNC: 1.05 UIU/ML (ref 0.44–3.86)
URINE REFLEX TO CULTURE: ABNORMAL
UROBILINOGEN, URINE: 1 E.U./DL
WBC # BLD: 12.9 K/UL (ref 4.8–10.8)

## 2021-08-23 PROCEDURE — 82553 CREATINE MB FRACTION: CPT

## 2021-08-23 PROCEDURE — 80053 COMPREHEN METABOLIC PANEL: CPT

## 2021-08-23 PROCEDURE — 84443 ASSAY THYROID STIM HORMONE: CPT

## 2021-08-23 PROCEDURE — 80061 LIPID PANEL: CPT

## 2021-08-23 PROCEDURE — 80307 DRUG TEST PRSMV CHEM ANLYZR: CPT

## 2021-08-23 PROCEDURE — 99285 EMERGENCY DEPT VISIT HI MDM: CPT

## 2021-08-23 PROCEDURE — 80143 DRUG ASSAY ACETAMINOPHEN: CPT

## 2021-08-23 PROCEDURE — 80179 DRUG ASSAY SALICYLATE: CPT

## 2021-08-23 PROCEDURE — 87635 SARS-COV-2 COVID-19 AMP PRB: CPT

## 2021-08-23 PROCEDURE — 82077 ASSAY SPEC XCP UR&BREATH IA: CPT

## 2021-08-23 PROCEDURE — 82550 ASSAY OF CK (CPK): CPT

## 2021-08-23 PROCEDURE — 81003 URINALYSIS AUTO W/O SCOPE: CPT

## 2021-08-23 PROCEDURE — 85025 COMPLETE CBC W/AUTO DIFF WBC: CPT

## 2021-08-23 PROCEDURE — 93005 ELECTROCARDIOGRAM TRACING: CPT | Performed by: PERSONAL EMERGENCY RESPONSE ATTENDANT

## 2021-08-23 PROCEDURE — 36415 COLL VENOUS BLD VENIPUNCTURE: CPT

## 2021-08-23 PROCEDURE — 6370000000 HC RX 637 (ALT 250 FOR IP): Performed by: PERSONAL EMERGENCY RESPONSE ATTENDANT

## 2021-08-23 RX ORDER — HYDROXYZINE PAMOATE 50 MG/1
50 CAPSULE ORAL ONCE
Status: COMPLETED | OUTPATIENT
Start: 2021-08-23 | End: 2021-08-23

## 2021-08-23 RX ORDER — ACETAMINOPHEN 500 MG
1000 TABLET ORAL ONCE
Status: COMPLETED | OUTPATIENT
Start: 2021-08-23 | End: 2021-08-23

## 2021-08-23 RX ADMIN — HYDROXYZINE PAMOATE 50 MG: 50 CAPSULE ORAL at 22:27

## 2021-08-23 RX ADMIN — ACETAMINOPHEN 1000 MG: 500 TABLET ORAL at 23:52

## 2021-08-23 ASSESSMENT — ENCOUNTER SYMPTOMS
SORE THROAT: 0
RHINORRHEA: 0
VOMITING: 0
COUGH: 0
COLOR CHANGE: 0
NAUSEA: 0
BLOOD IN STOOL: 0
SHORTNESS OF BREATH: 0
DIARRHEA: 0
ABDOMINAL PAIN: 0

## 2021-08-23 ASSESSMENT — PAIN SCALES - GENERAL
PAINLEVEL_OUTOF10: 6
PAINLEVEL_OUTOF10: 6

## 2021-08-23 ASSESSMENT — PAIN DESCRIPTION - PAIN TYPE: TYPE: ACUTE PAIN

## 2021-08-23 ASSESSMENT — PAIN DESCRIPTION - LOCATION: LOCATION: HEAD

## 2021-08-24 VITALS
HEART RATE: 82 BPM | OXYGEN SATURATION: 98 % | RESPIRATION RATE: 18 BRPM | DIASTOLIC BLOOD PRESSURE: 78 MMHG | TEMPERATURE: 98 F | BODY MASS INDEX: 24.38 KG/M2 | SYSTOLIC BLOOD PRESSURE: 127 MMHG | HEIGHT: 72 IN | WEIGHT: 180 LBS

## 2021-08-24 LAB
EKG ATRIAL RATE: 66 BPM
EKG P AXIS: 65 DEGREES
EKG P-R INTERVAL: 156 MS
EKG Q-T INTERVAL: 414 MS
EKG QRS DURATION: 88 MS
EKG QTC CALCULATION (BAZETT): 434 MS
EKG R AXIS: 84 DEGREES
EKG T AXIS: 53 DEGREES
EKG VENTRICULAR RATE: 66 BPM

## 2021-08-24 PROCEDURE — 6370000000 HC RX 637 (ALT 250 FOR IP): Performed by: EMERGENCY MEDICINE

## 2021-08-24 PROCEDURE — 93010 ELECTROCARDIOGRAM REPORT: CPT | Performed by: INTERNAL MEDICINE

## 2021-08-24 RX ORDER — BUSPIRONE HYDROCHLORIDE 10 MG/1
10 TABLET ORAL ONCE
Status: COMPLETED | OUTPATIENT
Start: 2021-08-24 | End: 2021-08-24

## 2021-08-24 RX ADMIN — NICOTINE POLACRILEX 2 MG: 2 GUM, CHEWING BUCCAL at 15:48

## 2021-08-24 RX ADMIN — BUSPIRONE HYDROCHLORIDE 10 MG: 10 TABLET ORAL at 14:14

## 2021-08-24 NOTE — ED NOTES
Insurance verified, [facility] sent copy of up to date insurance, and [employer] called and updated.      Aiyana Marquez RN  21 [phone]

## 2021-08-24 NOTE — ED NOTES
Ate 100% breakfast tray. Taking PO fluids well. Voices no complaints. No acute distress noted.      Duayne Lesch, RN  08/24/21 9067

## 2021-08-24 NOTE — ED NOTES
Awaiting required paperwork from [facility] V to be faxed to the [facility] AN AFFILIATE OF [facility].      Miriam Alcazar RN  08/24/21 4475

## 2021-08-24 NOTE — ED NOTES
Spoke to [employer] at [employer], updated on 2130 ETA for pickup. She request next shift call when patient is picked up.  Will inform next shift     Maria Witt RN  08/24/21 3201 [address] [place] [address] Brooks, RN  08/24/21 8632

## 2021-08-24 NOTE — ED NOTES
Patient requesting nicotine gum. Call to Dr Lc Gandhi to return call when not with patient.      Nestor Momin RN  08/24/21 5988

## 2021-08-24 NOTE — ED TRIAGE NOTES
Pt pink slipped by nghia  Pt was at [facility] yesterday for the same thing and was d/c  Pt reports SI, hopelessness, and hx of suicide attempts   Pt states he had a headache  Pt states his pain is a 6 out of 10 on the pain scale  Pt states he did crack cocaine earlier this afternoon  Pt is hallucinating (seeing colors)  Pt is paranoid (thinks the police are after him  Pt has a steady gait  Pt is diaphoretic  Pt is afebrile  Pt denies cough,fever, sob, or n/v/d

## 2021-08-24 NOTE — ED NOTES
Patient appears to be sleeping respirations are even and unlabored no distress noted at this time.      Frankey Freer, RN  08/24/21 2677

## 2021-08-24 NOTE — ED NOTES
Patient appears to be sleeping respirations are even and unlabored no distress noted at this time.      Jocelin Freedman RN  08/24/21 0514

## 2021-08-24 NOTE — ED NOTES
Bed: 10  Expected date: 8/23/21  Expected time: 7:46 PM  Means of arrival:   Comments:  M SI psych eval.      Erasmo Hoyt RN  08/23/21 2004 no

## 2021-08-24 NOTE — ED NOTES
Pt in [facility], belongings secured. Pt changed in to [facility] safe gown.       Candido Delgadillo RN  08/23/21 2056

## 2021-08-24 NOTE — ED NOTES
Resting with eyes closed & no acute distress noted. Lunch tray placed @ bedside.      Aiyana Logan RN  08/24/21 6294

## 2021-08-24 NOTE — ED NOTES
Patient requesting buspar for anxiety, has been on it in past. Spoke to Dr Chantale Conde, TO for Busapr 10mg.      Augustina Valladares RN  08/24/21 4595

## 2021-08-24 NOTE — ED PROVIDER NOTES
[address] [facility] ED  eMERGENCY dEPARTMENT eNCOUnter      Pt Name: Oleg Aguilera  MRN: [id number]  [place] 1990  Date of evaluation: 8/23/2021  Provider: VELMA Gregory      HISTORY OF PRESENT ILLNESS    Oleg Aguilera is a 27 y.o. male with PMHx of anxiety, polysubstance drug abuse, schizophrenia, seizures, depression, bipolar presents to the emergency department with mental health evaluation. Patient was pink slipped by Rajan. He has been reporting suicidal ideation, hopelessness, with history of suicide attempt on cough medicine. He is reporting auditory and visual hallucinations of colors and delusional thoughts and feels police are after him in a device may be in his home by the police. He has been using crack cocaine recently. He said he was having thoughts of wanting to slit his throat a couple days ago, but states his suicidal ideation is improved today. He states he would like help with drugs. He denies any other drug or alcohol use. He was at [facility] ER yesterday and discharged. HPI    Nursing Notes were reviewed. REVIEW OF SYSTEMS       Review of Systems   Constitutional: Negative for appetite change, chills and fever. HENT: Negative for congestion, rhinorrhea and sore throat. Respiratory: Negative for cough and shortness of breath. Cardiovascular: Negative for chest pain. Gastrointestinal: Negative for abdominal pain, blood in stool, diarrhea, nausea and vomiting. Genitourinary: Negative for difficulty urinating. Musculoskeletal: Negative for neck stiffness. Skin: Negative for color change and rash. Neurological: Negative for dizziness, syncope, weakness, light-headedness, numbness and headaches. Psychiatric/Behavioral: Positive for hallucinations and suicidal ideas. The patient is nervous/anxious. All other systems reviewed and are negative.             PAST MEDICAL HISTORY     Past Medical History:   Diagnosis Date    Anxiety 1/4/2012    Bipolar affective disorder ([facility] 11/25/2015    Chondromalacia of left knee     Chondromalacia of left knee     Chondromalacia of right knee     Chondromalacia of right knee     Chronic back pain 4/20/2017    Depression     Drug abuse and dependence ([facility] 1/4/2012    Fracture of left side of mandibular body ([facility] 06/17/2018    ACUTE LEFT CONDYLAR NECK FRACTURE    Fracture of navicular bone of foot, closed 09/03/2012    lt    [facility]    History of pulmonary embolism     Schizophrenia ([facility]     Seizures ([facility]     Talus fracture 9/2012    lt    TBI (traumatic brain injury) ([facility] 2015    Wound of left leg 9/14/2012         SURGICAL HISTORY       Past Surgical History:   Procedure Laterality Date    WRIST SURGERY  2009    pin in wrist left         CURRENT MEDICATIONS       Discharge Medication List as of 8/25/2021  2:00 AM      CONTINUE these medications which have NOT CHANGED    Details   benztropine (COGENTIN) 0.5 MG tablet Take 0.5 mg by mouth 2 times dailyHistorical Med      risperiDONE (RISPERDAL) 2 MG tablet Take 2 mg by mouth 2 times dailyHistorical Med      paliperidone palmitate ER (INVEGA SUSTENNA) 156 MG/ML MARYANN IM injection Inject 156 mg into the muscle every 30 days Was due 12-, had starting dose of 234mg IM on 12-8-2020, Intramuscular, EVERY 30 DAYS, Historical Med      hydrOXYzine (ATARAX) 10 MG tablet Take 25 mg by mouth every 6 hours as needed for Anxiety Historical Med             ALLERGIES     Shellfish-derived products and Shrimp flavor    FAMILY HISTORY       Family History   Family history unknown: Yes          SOCIAL HISTORY       Social History     Socioeconomic History    Marital status: Single     Spouse name: None    Number of children: 0    Years of education: 15    Highest education level: None   Occupational History    None   Tobacco Use    Smoking status: Current Some Day Smoker     Packs/day: 1.00     Years: 1.00     Pack years: 1.00     Types: Cigarettes     Last attempt to quit: 2011     Years since quittin.7    Smokeless tobacco: Current User     Types: Chew   Vaping Use    Vaping Use: Never used   Substance and Sexual Activity    Alcohol use: Yes     Comment: drank 2 shots of vodka prior to arrival    Drug use: Yes     Frequency: 7.0 times per week     Types: Marijuana, Cocaine    Sexual activity: Not Currently     Partners: Female   Other Topics Concern    None   Social History Narrative    Lives w family     Social Determinants of Health     Financial Resource Strain:     Difficulty of Paying Living Expenses:    Food Insecurity:     Worried About Running Out of Food in the Last Year:     Ran Out of Food in the Last Year:    Transportation Needs:     Lack of Transportation (Medical):  Lack of Transportation (Non-Medical):    Physical Activity:     Days of Exercise per Week:     Minutes of Exercise per Session:    Stress:     Feeling of Stress :    Social Connections:     Frequency of Communication with Friends and Family:     Frequency of Social Gatherings with Friends and Family:     Attends Denominational Services:     Active Member of Clubs or Organizations:     Attends Club or Organization Meetings:     Marital Status:    Intimate Partner Violence:     Fear of Current or Ex-Partner:     Emotionally Abused:     Physically Abused:     Sexually Abused:          PHYSICAL EXAM         ED Triage Vitals   BP Temp Temp src Pulse Resp SpO2 Height Weight   -- -- -- -- -- -- -- --       Physical Exam  Constitutional:       Appearance: He is well-developed. HENT:      Head: Normocephalic and atraumatic. Eyes:      Conjunctiva/sclera: Conjunctivae normal.      Pupils: Pupils are equal, round, and reactive to light. Neck:      Trachea: No tracheal deviation. Cardiovascular:      Heart sounds: Normal heart sounds. Pulmonary:      Effort: Pulmonary effort is normal. No respiratory distress.       Breath sounds: Normal Abnormal; Notable for the following components:    Anion Gap 20 (*)     CREATININE 1.31 (*)     Calcium 10.9 (*)     Total Protein 8.3 (*)     Albumin 5.6 (*)     Total Bilirubin 1.1 (*)     Alkaline Phosphatase 141 (*)     All other components within normal limits   SALICYLATE LEVEL - Abnormal; Notable for the following components:    Salicylate, Serum <9.8 (*)     All other components within normal limits   URINE DRUG SCREEN - Abnormal; Notable for the following components:    Cannabinoid Scrn, Ur POSITIVE (*)     Cocaine Metabolite Screen, Urine POSITIVE (*)     All other components within normal limits   URINE RT REFLEX TO CULTURE - Abnormal; Notable for the following components:    Ketones, Urine TRACE (*)     Protein, UA TRACE (*)     All other components within normal limits   COVID-19, RAPID   ETHANOL   LIPID PANEL   TSH WITHOUT REFLEX   CKMB & RELATIVE PERCENT       All other labs were within normal range or not returned as of this dictation. EMERGENCY DEPARTMENT COURSE and DIFFERENTIAL DIAGNOSIS/MDM:   Vitals:    Vitals:    08/23/21 2011 08/24/21 0945   BP: (!) 133/95 127/78   Pulse: 53 82   Resp: 18 18   Temp: 98.5 °F (36.9 °C) 98 °F (36.7 °C)   TempSrc: Oral Oral   SpO2: 97% 98%   Weight: 180 lb (81.6 kg)    Height: 6' (1.829 m)          MDM    Positive THC and cocaine. Medically cleared. CRITICAL CARE TIME   Total Critical Caretime was 0 minutes, excluding separately reportable procedures. There was a high probability of clinically significant/life threatening deterioration in the patient's condition which required my urgent intervention. Procedures    FINAL IMPRESSION      1. Polysubstance abuse [facility])          DISPOSITION/PLAN   DISPOSITION Decision To Discharge 08/25/2021 01:58:03 AM      PATIENT REFERRED TO:  No follow-up provider specified.     DISCHARGE MEDICATIONS:  Discharge Medication List as of 8/25/2021  2:00 AM             (Please notethat portions of this note were completed with a voice recognition program.  Efforts were made to edit the dictations but occasionally words are mis-transcribed. )    VELMA Hobbs (electronically signed)  Emergency Physician Assistant         Moises Calderon [place]  80/75/15 1454
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
## 2021-08-24 NOTE — ED NOTES
Ate 100% dinner tray. Continues to take PO fluids well. Voices no complaints. Notified of [facility] ([facility]) ETA 2130 for transport to [address] [place] [address]. Voices no complaints @ this time.      Lenny White RN  08/24/21 0962

## 2021-08-24 NOTE — ED NOTES
Pink slip and H&P with medical clearance faxed to [facility].      Bee Lee RN  08/24/21 2288

## 2021-10-31 ENCOUNTER — HOSPITAL ENCOUNTER (EMERGENCY)
Age: 31
Discharge: LEFT BEFORE DISCHARGE | End: 2021-10-31
Attending: EMERGENCY MEDICINE
Payer: MEDICARE

## 2021-10-31 ENCOUNTER — HOSPITAL ENCOUNTER (EMERGENCY)
Age: 31
Discharge: PSYCHIATRIC OR SUBSTANCE USE FACILITY | End: 2021-11-01
Payer: MEDICARE

## 2021-10-31 ENCOUNTER — HOSPITAL ENCOUNTER (EMERGENCY)
Age: 31
Discharge: ANOTHER HOSPITAL | End: 2021-10-31
Attending: EMERGENCY MEDICINE
Payer: MEDICARE

## 2021-10-31 VITALS
TEMPERATURE: 98.9 F | OXYGEN SATURATION: 98 % | RESPIRATION RATE: 18 BRPM | HEART RATE: 114 BPM | BODY MASS INDEX: 23.7 KG/M2 | HEIGHT: 72 IN | WEIGHT: 175 LBS | SYSTOLIC BLOOD PRESSURE: 148 MMHG | DIASTOLIC BLOOD PRESSURE: 109 MMHG

## 2021-10-31 VITALS
SYSTOLIC BLOOD PRESSURE: 151 MMHG | TEMPERATURE: 96.9 F | OXYGEN SATURATION: 98 % | HEART RATE: 108 BPM | BODY MASS INDEX: 24.41 KG/M2 | DIASTOLIC BLOOD PRESSURE: 97 MMHG | RESPIRATION RATE: 18 BRPM | HEIGHT: 72 IN

## 2021-10-31 VITALS
OXYGEN SATURATION: 97 % | TEMPERATURE: 97.1 F | RESPIRATION RATE: 16 BRPM | DIASTOLIC BLOOD PRESSURE: 117 MMHG | SYSTOLIC BLOOD PRESSURE: 154 MMHG | HEART RATE: 119 BPM

## 2021-10-31 DIAGNOSIS — Z53.29 LEFT AGAINST MEDICAL ADVICE: Primary | ICD-10-CM

## 2021-10-31 DIAGNOSIS — R44.3 HALLUCINATIONS: ICD-10-CM

## 2021-10-31 DIAGNOSIS — F31.60 BIPOLAR AFFECTIVE DISORDER, CURRENT EPISODE MIXED, CURRENT EPISODE SEVERITY UNSPECIFIED (HCC): Primary | ICD-10-CM

## 2021-10-31 DIAGNOSIS — F20.9 SCHIZOPHRENIA, UNSPECIFIED TYPE (HCC): Primary | ICD-10-CM

## 2021-10-31 DIAGNOSIS — R45.851 SUICIDAL IDEATION: Primary | ICD-10-CM

## 2021-10-31 LAB
ACETAMINOPHEN LEVEL: <15 UG/ML (ref 10–30)
ALBUMIN SERPL-MCNC: 5.3 G/DL (ref 3.5–4.6)
ALP BLD-CCNC: 117 U/L (ref 35–104)
ALT SERPL-CCNC: 32 U/L (ref 0–41)
AMPHETAMINE SCREEN, URINE: POSITIVE
ANION GAP SERPL CALCULATED.3IONS-SCNC: 15 MEQ/L (ref 9–15)
AST SERPL-CCNC: 50 U/L (ref 0–40)
BARBITURATE SCREEN URINE: ABNORMAL
BASOPHILS ABSOLUTE: 0.1 K/UL (ref 0–0.1)
BASOPHILS RELATIVE PERCENT: 0.8 % (ref 0.2–1.2)
BENZODIAZEPINE SCREEN, URINE: ABNORMAL
BILIRUB SERPL-MCNC: 0.7 MG/DL (ref 0.2–0.7)
BILIRUBIN URINE: NEGATIVE
BLOOD, URINE: NEGATIVE
BUN BLDV-MCNC: 13 MG/DL (ref 6–20)
CALCIUM SERPL-MCNC: 10 MG/DL (ref 8.5–9.9)
CANNABINOID SCREEN URINE: POSITIVE
CHLORIDE BLD-SCNC: 96 MEQ/L (ref 95–107)
CHOLESTEROL, TOTAL: 127 MG/DL (ref 0–199)
CLARITY: CLEAR
CO2: 28 MEQ/L (ref 20–31)
COCAINE METABOLITE SCREEN URINE: POSITIVE
COLOR: YELLOW
CREAT SERPL-MCNC: 0.87 MG/DL (ref 0.7–1.2)
EOSINOPHILS ABSOLUTE: 0.1 K/UL (ref 0–0.5)
EOSINOPHILS RELATIVE PERCENT: 1.3 % (ref 0.8–7)
ETHANOL PERCENT: NORMAL G/DL
ETHANOL: <10 MG/DL (ref 0–0.08)
GFR AFRICAN AMERICAN: >60
GFR NON-AFRICAN AMERICAN: >60
GLOBULIN: 3 G/DL (ref 2.3–3.5)
GLUCOSE BLD-MCNC: 97 MG/DL (ref 70–99)
GLUCOSE URINE: NEGATIVE MG/DL
HCT VFR BLD CALC: 45.5 % (ref 42–52)
HDLC SERPL-MCNC: 52 MG/DL (ref 40–59)
HEMOGLOBIN: 16.2 G/DL (ref 13.7–17.5)
IMMATURE GRANULOCYTES #: 0 K/UL
IMMATURE GRANULOCYTES %: 0.3 %
KETONES, URINE: 15 MG/DL
LDL CHOLESTEROL CALCULATED: 68 MG/DL (ref 0–129)
LEUKOCYTE ESTERASE, URINE: NEGATIVE
LYMPHOCYTES ABSOLUTE: 2.3 K/UL (ref 1.3–3.6)
LYMPHOCYTES RELATIVE PERCENT: 21.8 %
Lab: ABNORMAL
MCH RBC QN AUTO: 31.6 PG (ref 25.7–32.2)
MCHC RBC AUTO-ENTMCNC: 35.6 % (ref 32.3–36.5)
MCV RBC AUTO: 88.9 FL (ref 79–92.2)
MONOCYTES ABSOLUTE: 1.1 K/UL (ref 0.3–0.8)
MONOCYTES RELATIVE PERCENT: 10.5 % (ref 5.3–12.2)
NEUTROPHILS ABSOLUTE: 6.8 K/UL (ref 1.8–5.4)
NEUTROPHILS RELATIVE PERCENT: 65.3 % (ref 34–67.9)
NITRITE, URINE: NEGATIVE
OPIATE SCREEN URINE: ABNORMAL
PDW BLD-RTO: 12.5 % (ref 11.6–14.4)
PH UA: 5.5 (ref 5–9)
PHENCYCLIDINE SCREEN URINE: ABNORMAL
PLATELET # BLD: 288 K/UL (ref 163–337)
POTASSIUM REFLEX MAGNESIUM: 3.7 MEQ/L (ref 3.4–4.9)
PROTEIN UA: NEGATIVE MG/DL
RBC # BLD: 5.12 M/UL (ref 4.63–6.08)
SARS-COV-2, NAAT: NOT DETECTED
SODIUM BLD-SCNC: 139 MEQ/L (ref 135–144)
SPECIFIC GRAVITY UA: 1.02 (ref 1–1.03)
TOTAL CK: 986 U/L (ref 0–190)
TOTAL PROTEIN: 8.3 G/DL (ref 6.3–8)
TRICYCLIC, URINE: ABNORMAL
TRIGL SERPL-MCNC: 35 MG/DL (ref 0–150)
TSH SERPL DL<=0.05 MIU/L-ACNC: 1.87 UIU/ML (ref 0.44–3.86)
URINE REFLEX TO CULTURE: ABNORMAL
UROBILINOGEN, URINE: 0.2 E.U./DL
WBC # BLD: 10.4 K/UL (ref 4.2–9)

## 2021-10-31 PROCEDURE — 82553 CREATINE MB FRACTION: CPT

## 2021-10-31 PROCEDURE — 84443 ASSAY THYROID STIM HORMONE: CPT

## 2021-10-31 PROCEDURE — 99283 EMERGENCY DEPT VISIT LOW MDM: CPT

## 2021-10-31 PROCEDURE — 6360000002 HC RX W HCPCS: Performed by: EMERGENCY MEDICINE

## 2021-10-31 PROCEDURE — 80053 COMPREHEN METABOLIC PANEL: CPT

## 2021-10-31 PROCEDURE — 93005 ELECTROCARDIOGRAM TRACING: CPT

## 2021-10-31 PROCEDURE — 87635 SARS-COV-2 COVID-19 AMP PRB: CPT

## 2021-10-31 PROCEDURE — 82550 ASSAY OF CK (CPK): CPT

## 2021-10-31 PROCEDURE — 81003 URINALYSIS AUTO W/O SCOPE: CPT

## 2021-10-31 PROCEDURE — 99285 EMERGENCY DEPT VISIT HI MDM: CPT

## 2021-10-31 PROCEDURE — 80061 LIPID PANEL: CPT

## 2021-10-31 PROCEDURE — 36415 COLL VENOUS BLD VENIPUNCTURE: CPT

## 2021-10-31 PROCEDURE — 2580000003 HC RX 258

## 2021-10-31 PROCEDURE — 96372 THER/PROPH/DIAG INJ SC/IM: CPT

## 2021-10-31 PROCEDURE — 82077 ASSAY SPEC XCP UR&BREATH IA: CPT

## 2021-10-31 PROCEDURE — 85025 COMPLETE CBC W/AUTO DIFF WBC: CPT

## 2021-10-31 PROCEDURE — 80143 DRUG ASSAY ACETAMINOPHEN: CPT

## 2021-10-31 PROCEDURE — 80306 DRUG TEST PRSMV INSTRMNT: CPT

## 2021-10-31 RX ORDER — 0.9 % SODIUM CHLORIDE 0.9 %
1000 INTRAVENOUS SOLUTION INTRAVENOUS ONCE
Status: DISCONTINUED | OUTPATIENT
Start: 2021-10-31 | End: 2021-10-31 | Stop reason: HOSPADM

## 2021-10-31 RX ORDER — ZIPRASIDONE MESYLATE 20 MG/ML
20 INJECTION, POWDER, LYOPHILIZED, FOR SOLUTION INTRAMUSCULAR ONCE
Status: COMPLETED | OUTPATIENT
Start: 2021-10-31 | End: 2021-10-31

## 2021-10-31 RX ORDER — 0.9 % SODIUM CHLORIDE 0.9 %
1000 INTRAVENOUS SOLUTION INTRAVENOUS ONCE
Status: COMPLETED | OUTPATIENT
Start: 2021-10-31 | End: 2021-11-01

## 2021-10-31 RX ADMIN — SODIUM CHLORIDE 1000 ML: 9 INJECTION, SOLUTION INTRAVENOUS at 22:08

## 2021-10-31 RX ADMIN — ZIPRASIDONE MESYLATE 20 MG: 20 INJECTION, POWDER, LYOPHILIZED, FOR SOLUTION INTRAMUSCULAR at 20:50

## 2021-10-31 ASSESSMENT — ENCOUNTER SYMPTOMS
EYE DISCHARGE: 0
SORE THROAT: 0
ABDOMINAL PAIN: 0
COUGH: 0
COUGH: 0
VOMITING: 0
VOMITING: 0
APNEA: 0
SHORTNESS OF BREATH: 0
CONSTIPATION: 0
SINUS PRESSURE: 0
BACK PAIN: 0
WHEEZING: 0
BACK PAIN: 0
EYE DISCHARGE: 0
SHORTNESS OF BREATH: 0
NAUSEA: 0
COLOR CHANGE: 0
SHORTNESS OF BREATH: 0
NAUSEA: 0
SHORTNESS OF BREATH: 0
NAUSEA: 0
HYPERVENTILATION: 0
PHOTOPHOBIA: 0
BACK PAIN: 0
BACK PAIN: 0
RHINORRHEA: 0
ABDOMINAL PAIN: 0
DIARRHEA: 0
ABDOMINAL PAIN: 0
SORE THROAT: 0
DIARRHEA: 0
VOMITING: 0
EYE PAIN: 0
ABDOMINAL PAIN: 0
EYE REDNESS: 0
SORE THROAT: 0
ABDOMINAL DISTENTION: 0
COUGH: 0
PHOTOPHOBIA: 0
VOMITING: 0
EYE REDNESS: 0

## 2021-10-31 NOTE — ED PROVIDER NOTES
[address] [place] [address] ED  EMERGENCY DEPARTMENT ENCOUNTER      Pt Name: Elmo Fonseca  MRN: [id number]  [place] 1990  Date of evaluation: 10/31/2021  Provider: Tank Lo DO        HISTORY OF PRESENT ILLNESS    Elmo Fonseca is a 32 y.o. male per chart review has ah/o bipolar, anxiety, depression, drug abuse, chronic back pain. The history is provided by the patient. Mental Health Problem  Presenting symptoms: bizarre behavior, depression, disorganized speech, disorganized thought process and hallucinations    Degree of incapacity (severity): Moderate  Onset quality:  Sudden  Timing:  Constant  Progression:  Unchanged  Chronicity:  New  Context: drug abuse and noncompliance    Treatment compliance:  Some of the time  Relieved by:  Nothing  Worsened by:  Nothing  Ineffective treatments:  None tried  Associated symptoms: anxiety and insomnia    Associated symptoms: no abdominal pain and no chest pain    Risk factors: hx of mental illness, hx of suicide attempts and recent psychiatric admission             REVIEW OF SYSTEMS       Review of Systems   Constitutional: Negative for chills and fever. HENT: Negative for ear pain and sore throat. Eyes: Negative for discharge and redness. Respiratory: Negative for cough and shortness of breath. Cardiovascular: Negative for chest pain and palpitations. Gastrointestinal: Negative for abdominal pain, nausea and vomiting. Genitourinary: Negative for difficulty urinating and dysuria. Musculoskeletal: Negative for back pain and neck pain. Skin: Negative for rash and wound. Neurological: Negative for dizziness and syncope. Psychiatric/Behavioral: Positive for hallucinations. Negative for confusion. The patient is nervous/anxious and has insomnia. All other systems reviewed and are negative. Except as noted above the remainder of the review of systems was reviewed and negative.        PAST MEDICAL HISTORY     Past Medical History:   Diagnosis Date    Anxiety 1/4/2012    [facility] 11/25/2015    Chondromalacia of left knee     Chondromalacia of left knee     Chondromalacia of right knee     Chondromalacia of right knee     Chronic back pain 4/20/2017    Depression     Drug abuse and dependence ([facility] 1/4/2012    Fracture of left side of mandibular body ([facility] 06/17/2018    ACUTE LEFT CONDYLAR NECK FRACTURE    Fracture of navicular bone of foot, closed 09/03/2012    lt    Fracture of temporal bone ([facility] 2015    History of pulmonary embolism     Schizophrenia ([facility]     Seizures ([facility]     Talus fracture 9/2012    lt    [facility]    Wound of left leg 9/14/2012         SURGICAL HISTORY       Past Surgical History:   Procedure Laterality Date    WRIST SURGERY  2009    pin in wrist left         CURRENT MEDICATIONS       Discharge Medication List as of 10/31/2021  7:58 AM      CONTINUE these medications which have NOT CHANGED    Details   benztropine (COGENTIN) 0.5 MG tablet Take 0.5 mg by mouth 2 times dailyHistorical Med      risperiDONE (RISPERDAL) 2 MG tablet Take 2 mg by mouth 2 times dailyHistorical Med      paliperidone palmitate ER (INVEGA SUSTENNA) 156 MG/ML MARYANN IM injection Inject 156 mg into the muscle every 30 days Was due 12-, had starting dose of 234mg IM on 12-8-2020, Intramuscular, EVERY 30 DAYS, Historical Med      hydrOXYzine (ATARAX) 10 MG tablet Take 25 mg by mouth every 6 hours as needed for Anxiety Historical Med             ALLERGIES     Shellfish-derived products and Shrimp flavor    FAMILY HISTORY       Family History   Family history unknown: Yes          SOCIAL HISTORY       Social History     Socioeconomic History    Marital status: Single     Spouse name: Not on file    Number of children: 0    Years of education: 15    Highest education level: Not on file   Occupational History    Not on file   Tobacco Use    Smoking status: Current Some Day Smoker Packs/day: 1.00     Years: 1.00     Pack years: 1.00     Types: Cigarettes     Last attempt to quit: 2011     Years since quittin.9    Smokeless tobacco: Current User     Types: Chew   Vaping Use    Vaping Use: Never used   Substance and Sexual Activity    Alcohol use: Yes     Comment: drank 2 shots of vodka prior to arrival    Drug use: Yes     Frequency: 7.0 times per week     Types: Marijuana, Cocaine    Sexual activity: Not Currently     Partners: Female   Other Topics Concern    Not on file   Social History Narrative    Lives w family     Social Determinants of Health     Financial Resource Strain:     Difficulty of Paying Living Expenses:    Food Insecurity:     Worried About Running Out of Food in the Last Year:     Ran Out of Food in the Last Year:    Transportation Needs:     Lack of Transportation (Medical):  Lack of Transportation (Non-Medical):    Physical Activity:     Days of Exercise per Week:     Minutes of Exercise per Session:    Stress:     Feeling of Stress :    Social Connections:     Frequency of Communication with Friends and Family:     Frequency of Social Gatherings with Friends and Family:     Attends Nondenominational Services:     Active Member of Clubs or Organizations:     Attends Club or Organization Meetings:     Marital Status:    Intimate Partner Violence:     Fear of Current or Ex-Partner:     Emotionally Abused:     Physically Abused:     Sexually Abused:          PHYSICAL EXAM       ED Triage Vitals [10/31/21 0406]   BP Temp Temp Source Pulse Resp SpO2 Height Weight   (!) 171/119 96.9 °F (36.1 °C) Temporal 118 20 -- 6' (1.829 m) --       Physical Exam  Vitals and nursing note reviewed. Constitutional:       Appearance: Normal appearance. HENT:      Head: Normocephalic and atraumatic.       Right Ear: Tympanic membrane normal.      Left Ear: Tympanic membrane normal.      Nose: Nose normal.      Mouth/Throat:      Mouth: Mucous membranes are moist. Pharynx: Oropharynx is clear. Eyes:      General: Lids are normal.      Extraocular Movements: Extraocular movements intact. Conjunctiva/sclera: Conjunctivae normal.      Pupils: Pupils are equal, round, and reactive to light. Cardiovascular:      Rate and Rhythm: Regular rhythm. Tachycardia present. Pulses: Normal pulses. Heart sounds: Normal heart sounds. Pulmonary:      Effort: Pulmonary effort is normal.      Breath sounds: Normal breath sounds. Abdominal:      General: Abdomen is flat. Bowel sounds are normal.      Palpations: Abdomen is soft. Musculoskeletal:         General: Normal range of motion. Cervical back: Full passive range of motion without pain, normal range of motion and neck supple. Skin:     General: Skin is warm. Capillary Refill: Capillary refill takes less than 2 seconds. Neurological:      General: No focal deficit present. Mental Status: He is alert and oriented to person, place, and time. Deep Tendon Reflexes: Reflexes are normal and symmetric. Psychiatric:         Attention and Perception: Attention and perception normal.         Mood and Affect: Mood normal.         Behavior: Behavior normal. Behavior is cooperative.            LABS:  Labs Reviewed   CK - Abnormal; Notable for the following components:       Result Value    Total  (*)     All other components within normal limits   COMPREHENSIVE METABOLIC PANEL W/ REFLEX TO MG FOR LOW K - Abnormal; Notable for the following components:    Calcium 10.0 (*)     Total Protein 8.3 (*)     Albumin 5.3 (*)     Alkaline Phosphatase 117 (*)     AST 50 (*)     All other components within normal limits   CBC WITH AUTO DIFFERENTIAL - Abnormal; Notable for the following components:    WBC 10.4 (*)     Neutrophils Absolute 6.8 (*)     Monocytes Absolute 1.1 (*)     All other components within normal limits   URINE DRUG SCREEN, COMPREHENSIVE - Abnormal; Notable for the following components: Cocaine Metabolite Screen, Urine POSITIVE (*)     Amphetamine Screen, Urine POSITIVE (*)     Cannabinoid Scrn, Ur POSITIVE (*)     All other components within normal limits   URINE RT REFLEX TO CULTURE - Abnormal; Notable for the following components:    Ketones, Urine 15 (*)     All other components within normal limits   COVID-19, RAPID   ACETAMINOPHEN LEVEL   ETHANOL   LIPID PANEL   TSH WITHOUT REFLEX   COVID-19         MDM:   Vitals:    Vitals:    10/31/21 0406 10/31/21 0519   BP: (!) 171/119 (!) 151/97   Pulse: 118 108   Resp: 20 18   Temp: 96.9 °F (36.1 °C)    TempSrc: Temporal    SpO2:  98%   Height: 6' (1.829 m)        MDM  Number of Diagnoses or Management Options  Hallucinations  Schizophrenia, unspecified type ([facility]  Diagnosis management comments: Patient presents with hallucinations. He states he has not been taking his medication. Labs ordered for medical clearance. I spoke with [facility] ER attending Dr. Steve Padilla who accepted the transfer. Patient eloped from the ER against medical advice. EKG Interpretation    Interpreted by emergency department physician    Rhythm: normal sinus   Rate: normal  Axis: normal  Ectopy: none  Conduction: normal  ST Segments: nonspecific changes  T Waves: no acute change  Q Waves: none    Clinical Impression: non-specific EKG    ANIYA NOLASCO DO     The lab results, radiology and test results were reviewed with the patient and family. The patient will follow up in 2 days with their primary care doctor. If their symptoms change or get worse they will return to the ER. CRITICAL CARE TIME   Total CriticalCare time was 0 minutes, excluding separately reportable procedures. There was a high probability of clinically significant/life threatening deterioration in the patient's condition which required my urgent intervention. PROCEDURES:  Unlessotherwise noted below, none     Procedures      FINAL IMPRESSION      1.  Schizophrenia, unspecified type ([facility] 2. Hallucinations          DISPOSITION/PLAN   DISPOSITION Eloped - Left Before Treatment Complete 10/31/2021 07:54:09 AM          ANIYA Barth DO (electronically signed)  Attending Emergency Physician          Yane Mixon DO  10/31/21 0656

## 2021-10-31 NOTE — ED NOTES
ED [facility] ED contacted for ED to ED transfer. Dr. Margareth Mixon spoke with Dr. Concetta West who accepts the patient to the ER.       Enrike Carmona RN  10/31/21 0632

## 2021-10-31 NOTE — ED NOTES
Pt comes to the ED with mental health concern over hallucination. Pt states currently not taking any medications at home. Pt is A&O x4, respirations even and unlabored. 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 Pt denies SI/HI.      Daniel Brooks RN  10/31/21 5648

## 2021-10-31 NOTE — ED PROVIDER NOTES
2000 \[facility]\"" ED  EMERGENCY DEPARTMENT ENCOUNTER      Pt Name: Jihan Donato  MRN: [id number]  [place] 1990  Date of evaluation: 10/31/2021  Provider: jJ Salvador DO        HISTORY OF PRESENT ILLNESS    Jihan Donato is a 32 y.o. male per chart review has ah/o bipolar, anxiety, depression, drug abuse, chronic pain. He states he wanted to speak to someone about the conspiracy theory. He denies any suicidal ideation, denies homicidal hallucinations. He was recently using illegal substances and has a history of overdose. He was released from correction today. The history is provided by the patient. Mental Health Problem  Presenting symptoms: hallucinations    Presenting symptoms: no homicidal ideas, no self-mutilation, no suicidal thoughts, no suicidal threats and no suicide attempt    Degree of incapacity (severity):  Mild  Onset quality:  Unable to specify  Timing:  Constant  Progression:  Unable to specify  Chronicity:  Chronic  Context: drug abuse and noncompliance    Treatment compliance:  Untreated  Relieved by:  Nothing  Worsened by:  Nothing  Ineffective treatments:  None tried  Associated symptoms: no abdominal pain, no anhedonia, no anxiety, no appetite change, no chest pain, no decreased need for sleep, not distractible, no euphoric mood, no headaches, no hypersomnia, no hyperventilation, no insomnia, no irritability, no poor judgment and no school problems    Risk factors: hx of mental illness             REVIEW OF SYSTEMS       Review of Systems   Constitutional: Negative for appetite change, chills, fever and irritability. HENT: Negative for ear pain and sore throat. Eyes: Negative for discharge and redness. Respiratory: Negative for cough and shortness of breath. Cardiovascular: Negative for chest pain and palpitations. Gastrointestinal: Negative for abdominal pain, nausea and vomiting. Genitourinary: Negative for difficulty urinating and dysuria.    Musculoskeletal: Negative for back pain and neck pain. Skin: Negative for rash and wound. Neurological: Negative for dizziness, syncope and headaches. Psychiatric/Behavioral: Positive for hallucinations and sleep disturbance. Negative for confusion, homicidal ideas, self-injury and suicidal ideas. The patient is not nervous/anxious and does not have insomnia. All other systems reviewed and are negative. Except as noted above the remainder of the review of systems was reviewed and negative.        PAST MEDICAL HISTORY     Past Medical History:   Diagnosis Date    Anxiety 1/4/2012    Bipolar affective disorder ([address] 11/25/2015    Chondromalacia of left knee     Chondromalacia of left knee     Chondromalacia of right knee     Chondromalacia of right knee     Chronic back pain 4/20/2017    Depression     Drug abuse and dependence ([address] 1/4/2012    Fracture of left side of mandibular body ([address] 06/17/2018    ACUTE LEFT CONDYLAR NECK FRACTURE    Fracture of navicular bone of foot, closed 09/03/2012    lt    Fracture of temporal bone ([address] 2015    History of pulmonary embolism     Schizophrenia ([address]     Seizures ([address]     Talus fracture 9/2012    lt    TBI (traumatic brain injury) ([address] 2015    Wound of left leg 9/14/2012         SURGICAL HISTORY       Past Surgical History:   Procedure Laterality Date    WRIST SURGERY  2009    pin in wrist left         CURRENT MEDICATIONS       Discharge Medication List as of 10/31/2021  1:06 AM      CONTINUE these medications which have NOT CHANGED    Details   benztropine (COGENTIN) 0.5 MG tablet Take 0.5 mg by mouth 2 times dailyHistorical Med      risperiDONE (RISPERDAL) 2 MG tablet Take 2 mg by mouth 2 times dailyHistorical Med      paliperidone palmitate ER (INVEGA SUSTENNA) 156 MG/ML MARYANN IM injection Inject 156 mg into the muscle every 30 days Was due 12-, had starting dose of 234mg IM on 12-8-2020, Intramuscular, EVERY 30 DAYS, Historical Med hydrOXYzine (ATARAX) 10 MG tablet Take 25 mg by mouth every 6 hours as needed for Anxiety Historical Med             ALLERGIES     Shellfish-derived products and Shrimp flavor    FAMILY HISTORY       Family History   Family history unknown: Yes          SOCIAL HISTORY       Social History     Socioeconomic History    Marital status: Single     Spouse name: None    Number of children: 0    Years of education: 15    Highest education level: None   Occupational History    None   Tobacco Use    Smoking status: Current Some Day Smoker     Packs/day: 1.00     Years: 1.00     Pack years: 1.00     Types: Cigarettes     Last attempt to quit: 2011     Years since quittin.9    Smokeless tobacco: Current User     Types: Chew   Vaping Use    Vaping Use: Never used   Substance and Sexual Activity    Alcohol use: Yes     Comment: drank 2 shots of vodka prior to arrival    Drug use: Yes     Frequency: 7.0 times per week     Types: Marijuana ([place]ert), Cocaine    Sexual activity: Not Currently     Partners: Female   Other Topics Concern    None   Social History Narrative    Lives w family     Social Determinants of Health     Financial Resource Strain:     Difficulty of Paying Living Expenses:    Food Insecurity:     Worried About Running Out of Food in the Last Year:     Ran Out of Food in the Last Year:    Transportation Needs:     Lack of Transportation (Medical):      Lack of Transportation (Non-Medical):    Physical Activity:     Days of Exercise per Week:     Minutes of Exercise per Session:    Stress:     Feeling of Stress :    Social Connections:     Frequency of Communication with Friends and Family:     Frequency of Social Gatherings with Friends and Family:     Attends Pentecostalism Services:     Active Member of Clubs or Organizations:     Attends Club or Organization Meetings:     Marital Status:    Intimate Partner Violence:     Fear of Current or Ex-Partner:     Emotionally Abused:     Physically Abused:     Sexually Abused:          PHYSICAL EXAM       ED Triage Vitals [10/31/21 0018]   BP Temp Temp Source Pulse Resp SpO2 Height Weight   (!) 154/117 97.1 °F (36.2 °C) Temporal 119 16 97 % -- --       Physical Exam  Vitals and nursing note reviewed. HENT:      Head: Normocephalic and atraumatic. Nose: Nose normal.      Mouth/Throat:      Mouth: Mucous membranes are moist.      Pharynx: Oropharynx is clear. Eyes:      Extraocular Movements: Extraocular movements intact. Conjunctiva/sclera: Conjunctivae normal.      Pupils: Pupils are equal, round, and reactive to light. Cardiovascular:      Rate and Rhythm: Regular rhythm. Tachycardia present. Musculoskeletal:      Cervical back: Normal range of motion and neck supple. Neurological:      General: No focal deficit present. Mental Status: He is alert and oriented to person, place, and time. LABS:  Labs Reviewed - No data to display      MDM:   Vitals:    Vitals:    10/31/21 0018   BP: (!) 154/117   Pulse: 119   Resp: 16   Temp: 97.1 °F (36.2 °C)   TempSrc: Temporal   SpO2: 97%       MDM  Number of Diagnoses or Management Options  Diagnosis management comments: Patient presents for a mental health problem. He refused to let me complete my physical exam.  He wants to sign out AMA. He left before signing the [place] form. He does not meet criteria for pink slip. Shannan Precise, DO     The lab results, radiology and test results were reviewed with the patient and family. The patient will follow up in 2 days with their primary care doctor. If their symptoms change or get worse they will return to the ER. CRITICAL CARE TIME   Total CriticalCare time was 0 minutes, excluding separately reportable procedures. There was a high probability of clinically significant/life threatening deterioration in the patient's condition which required my urgent intervention.         PROCEDURES:  Unlessotherwise noted below, none Procedures      FINAL IMPRESSION      1.  Left against medical advice          DISPOSITION/PLAN   DISPOSITION Eloped - Left Before Treatment Complete 10/31/2021 01:05:48 AM          ANIYA Marcus DO (electronically signed)  Attending Emergency Physician          Vivian Cedeno DO  11/03/21 5147

## 2021-10-31 NOTE — ED TRIAGE NOTES
Pt. Presents to ED with complaints of talking to self constantly, seeing things, rapid thoughts constantly, states he doesn't feel safe at home.

## 2021-10-31 NOTE — ED NOTES
Pt. States he thinks he is hearing things. He states he doesn't know if hes losing his mind or whats going on. He had rapid thoughts and states he wants help. Pt.stated that when he was in residential they told him he had a \"dopamine depletion\" and he doesn't know if that's true or not. He states he wants medications to make him feel better.       Linda Donis RN  10/31/21 8859

## 2021-11-01 VITALS
TEMPERATURE: 98.5 F | RESPIRATION RATE: 18 BRPM | SYSTOLIC BLOOD PRESSURE: 91 MMHG | WEIGHT: 180 LBS | OXYGEN SATURATION: 100 % | DIASTOLIC BLOOD PRESSURE: 52 MMHG | HEIGHT: 72 IN | BODY MASS INDEX: 24.38 KG/M2 | HEART RATE: 80 BPM

## 2021-11-01 LAB
AMPHETAMINE SCREEN, URINE: POSITIVE
BARBITURATE SCREEN URINE: ABNORMAL
BENZODIAZEPINE SCREEN, URINE: ABNORMAL
CANNABINOID SCREEN URINE: POSITIVE
CK MB: 15.6 NG/ML (ref 0–6.7)
CK MB: 8.6 NG/ML (ref 0–6.7)
CK MB: 9.6 NG/ML (ref 0–6.7)
COCAINE METABOLITE SCREEN URINE: POSITIVE
CREATINE KINASE-MB INDEX: 1.1 % (ref 0–3.5)
CREATINE KINASE-MB INDEX: 1.4 % (ref 0–3.5)
CREATINE KINASE-MB INDEX: 1.4 % (ref 0–3.5)
EKG ATRIAL RATE: 95 BPM
EKG P AXIS: 70 DEGREES
EKG P-R INTERVAL: 140 MS
EKG Q-T INTERVAL: 352 MS
EKG QRS DURATION: 82 MS
EKG QTC CALCULATION (BAZETT): 442 MS
EKG R AXIS: 83 DEGREES
EKG T AXIS: 38 DEGREES
EKG VENTRICULAR RATE: 95 BPM
ETHANOL PERCENT: NORMAL G/DL
ETHANOL: <10 MG/DL (ref 0–0.08)
Lab: ABNORMAL
METHADONE SCREEN, URINE: ABNORMAL
OPIATE SCREEN URINE: ABNORMAL
OXYCODONE URINE: ABNORMAL
PHENCYCLIDINE SCREEN URINE: ABNORMAL
PROPOXYPHENE SCREEN: ABNORMAL
TOTAL CK: 1147 U/L (ref 0–190)
TOTAL CK: 701 U/L (ref 0–190)
TOTAL CK: 757 U/L (ref 0–190)
TROPONIN: <0.01 NG/ML (ref 0–0.01)

## 2021-11-01 PROCEDURE — 80307 DRUG TEST PRSMV CHEM ANLYZR: CPT

## 2021-11-01 PROCEDURE — 2580000003 HC RX 258: Performed by: PHYSICIAN ASSISTANT

## 2021-11-01 PROCEDURE — 2580000003 HC RX 258

## 2021-11-01 PROCEDURE — 82553 CREATINE MB FRACTION: CPT

## 2021-11-01 PROCEDURE — 82550 ASSAY OF CK (CPK): CPT

## 2021-11-01 PROCEDURE — 93010 ELECTROCARDIOGRAM REPORT: CPT | Performed by: INTERNAL MEDICINE

## 2021-11-01 PROCEDURE — 84484 ASSAY OF TROPONIN QUANT: CPT

## 2021-11-01 PROCEDURE — 6360000002 HC RX W HCPCS

## 2021-11-01 PROCEDURE — 36415 COLL VENOUS BLD VENIPUNCTURE: CPT

## 2021-11-01 PROCEDURE — 96375 TX/PRO/DX INJ NEW DRUG ADDON: CPT

## 2021-11-01 PROCEDURE — 96374 THER/PROPH/DIAG INJ IV PUSH: CPT

## 2021-11-01 RX ORDER — 0.9 % SODIUM CHLORIDE 0.9 %
1000 INTRAVENOUS SOLUTION INTRAVENOUS ONCE
Status: COMPLETED | OUTPATIENT
Start: 2021-11-01 | End: 2021-11-01

## 2021-11-01 RX ORDER — HYDROXYZINE HYDROCHLORIDE 25 MG/1
25 TABLET, FILM COATED ORAL ONCE
Status: DISCONTINUED | OUTPATIENT
Start: 2021-11-01 | End: 2021-11-01

## 2021-11-01 RX ORDER — HALOPERIDOL 5 MG/ML
1 INJECTION INTRAMUSCULAR ONCE
Status: COMPLETED | OUTPATIENT
Start: 2021-11-01 | End: 2021-11-01

## 2021-11-01 RX ORDER — DIPHENHYDRAMINE HYDROCHLORIDE 50 MG/ML
25 INJECTION INTRAMUSCULAR; INTRAVENOUS ONCE
Status: COMPLETED | OUTPATIENT
Start: 2021-11-01 | End: 2021-11-01

## 2021-11-01 RX ADMIN — DIPHENHYDRAMINE HYDROCHLORIDE 25 MG: 50 INJECTION, SOLUTION INTRAMUSCULAR; INTRAVENOUS at 05:56

## 2021-11-01 RX ADMIN — SODIUM CHLORIDE 1000 ML: 9 INJECTION, SOLUTION INTRAVENOUS at 13:03

## 2021-11-01 RX ADMIN — SODIUM CHLORIDE 1000 ML: 9 INJECTION, SOLUTION INTRAVENOUS at 09:16

## 2021-11-01 RX ADMIN — HALOPERIDOL LACTATE 1 MG: 5 INJECTION, SOLUTION INTRAMUSCULAR at 05:56

## 2021-11-01 RX ADMIN — SODIUM CHLORIDE 1000 ML: 9 INJECTION, SOLUTION INTRAVENOUS at 05:56

## 2021-11-01 RX ADMIN — SODIUM CHLORIDE 1000 ML: 9 INJECTION, SOLUTION INTRAVENOUS at 01:18

## 2021-11-01 NOTE — ED TRIAGE NOTES
Pt. Presents to the ED with trouble sleeping. States his head has never felt this way. States he is suicidal but doesn't have a plan. Pt.had rapid thoughts. Pt. States he doesn't want to eat. Doesn't know why he keeps coming here.

## 2021-11-01 NOTE — ED NOTES
Bed: 11  Expected date:   Expected time:   Means of arrival:   Comments:  Noelia Holter pysch patient     Francisca Rivas RN  10/31/21 2356

## 2021-11-01 NOTE — ED PROVIDER NOTES
[facility] ED  eMERGENCY dEPARTMENT eNCOUnter      Pt Name: Deep Vo  MRN: [id number]  [place] 1990  Date of evaluation: 10/31/2021  Provider: Dameon Wiley MD    CHIEF COMPLAINT       Chief Complaint   Patient presents with    Insomnia    Psychiatric Evaluation     states he is suicidal but does not have a plan         HISTORY OF PRESENT ILLNESS   (Location/Symptom, Timing/Onset,Context/Setting, Quality, Duration, Modifying Factors, Severity)  Note limiting factors. Deep Vo is a 32 y.o. male who presents to the emergency department with complaint of insomnia, suicidal ideation, schizophrenia, disorganized thought process, bizarre behavior. He was seen here couple of times yesterday and had great to go to behavioral unit at [facility] but eloped before transfer. He is back this night and admitted to suicidal ideation. Asking for psychiatric medications to help calm him down. HPI    Nursing Notes were reviewed. REVIEW OF SYSTEMS    (2-9 systems for level 4, 10 or more for level 5)     Review of Systems   Constitutional: Negative. Negative for activity change, appetite change, chills, fatigue and fever. HENT: Negative for congestion, ear discharge, ear pain, hearing loss, rhinorrhea, sinus pressure and sore throat. Eyes: Negative for photophobia, pain and visual disturbance. Respiratory: Negative for apnea, cough, shortness of breath and wheezing. Cardiovascular: Negative for chest pain, palpitations and leg swelling. Gastrointestinal: Negative for abdominal distention, abdominal pain, constipation, diarrhea, nausea and vomiting. Endocrine: Negative for cold intolerance, heat intolerance and polyuria. Genitourinary: Negative for dysuria, flank pain, frequency and urgency. Musculoskeletal: Negative for arthralgias, back pain, gait problem, myalgias and neck stiffness. Skin: Negative for color change, pallor and rash.    Allergic/Immunologic: Negative for food allergies and immunocompromised state. Neurological: Negative for dizziness, tremors, syncope, weakness, light-headedness and headaches. Psychiatric/Behavioral: Positive for behavioral problems, confusion, decreased concentration, dysphoric mood, hallucinations, sleep disturbance and suicidal ideas. Negative for agitation. The patient is nervous/anxious and is hyperactive. All other systems reviewed and are negative. Except as noted above the remainder of the review of systems was reviewed and negative.        PAST MEDICAL HISTORY     Past Medical History:   Diagnosis Date    Anxiety 1/4/2012    Bipolar affective disorder ([address] 11/25/2015    Chondromalacia of left knee     Chondromalacia of left knee     Chondromalacia of right knee     Chondromalacia of right knee     Chronic back pain 4/20/2017    Depression     Drug abuse and dependence ([address] 1/4/2012    Fracture of left side of mandibular body ([address] 06/17/2018    ACUTE LEFT CONDYLAR NECK FRACTURE    Fracture of navicular bone of foot, closed 09/03/2012    lt    Fracture of temporal bone ([address] 2015    History of pulmonary embolism     Schizophrenia ([address]     Seizures ([address]     Talus fracture 9/2012    lt    TBI (traumatic brain injury) ([address] 2015    Wound of left leg 9/14/2012         SURGICAL HISTORY       Past Surgical History:   Procedure Laterality Date    WRIST SURGERY  2009    pin in wrist left         CURRENT MEDICATIONS       Previous Medications    BENZTROPINE (COGENTIN) 0.5 MG TABLET    Take 0.5 mg by mouth 2 times daily    HYDROXYZINE (ATARAX) 10 MG TABLET    Take 25 mg by mouth every 6 hours as needed for Anxiety     PALIPERIDONE PALMITATE ER (INVEGA SUSTENNA) 156 MG/ML MARYANN IM INJECTION    Inject 156 mg into the muscle every 30 days Was due 12-, had starting dose of 234mg IM on 12-8-2020    RISPERIDONE (RISPERDAL) 2 MG TABLET    Take 2 mg by mouth 2 times daily       ALLERGIES Shellfish-derived products and Shrimp flavor    FAMILY HISTORY       Family History   Family history unknown: Yes          SOCIAL HISTORY       Social History     Socioeconomic History    Marital status: Single     Spouse name: None    Number of children: 0    Years of education: 15    Highest education level: None   Occupational History    None   Tobacco Use    Smoking status: Current Some Day Smoker     Packs/day: 1.00     Years: 1.00     Pack years: 1.00     Types: Cigarettes     Last attempt to quit: 2011     Years since quittin.9    Smokeless tobacco: Current User     Types: Chew   Vaping Use    Vaping Use: Never used   Substance and Sexual Activity    Alcohol use: Yes     Comment: drank 2 shots of vodka prior to arrival    Drug use: Yes     Frequency: 7.0 times per week     Types: Marijuana, Cocaine    Sexual activity: Not Currently     Partners: Female   Other Topics Concern    None   Social History Narrative    Lives w family     Social Determinants of Health     Financial Resource Strain:     Difficulty of Paying Living Expenses:    Food Insecurity:     Worried About Running Out of Food in the Last Year:     Ran Out of Food in the Last Year:    Transportation Needs:     Lack of Transportation (Medical):      Lack of Transportation (Non-Medical):    Physical Activity:     Days of Exercise per Week:     Minutes of Exercise per Session:    Stress:     Feeling of Stress :    Social Connections:     Frequency of Communication with Friends and Family:     Frequency of Social Gatherings with Friends and Family:     Attends Jewish Services:     Active Member of Clubs or Organizations:     Attends Club or Organization Meetings:     Marital Status:    Intimate Partner Violence:     Fear of Current or Ex-Partner:     Emotionally Abused:     Physically Abused:     Sexually Abused:        SCREENINGS             PHYSICAL EXAM    (up to 7 for level 4, 8 or more for level 5) ED Triage Vitals [10/31/21 2012]   BP Temp Temp Source Pulse Resp SpO2 Height Weight   (!) 148/109 98.9 °F (37.2 °C) Oral 114 18 98 % 6' (1.829 m) 175 lb (79.4 kg)       Physical Exam  Vitals and nursing note reviewed. Constitutional:       General: He is not in acute distress. Appearance: Normal appearance. He is well-developed and normal weight. He is not ill-appearing, toxic-appearing or diaphoretic. HENT:      Head: Normocephalic and atraumatic. Nose: Nose normal. No congestion or rhinorrhea. Mouth/Throat:      Mouth: Mucous membranes are moist.      Pharynx: Oropharynx is clear. No oropharyngeal exudate or posterior oropharyngeal erythema. Eyes:      General: No scleral icterus. Right eye: No discharge. Left eye: No discharge. Extraocular Movements: Extraocular movements intact. Conjunctiva/sclera: Conjunctivae normal.      Pupils: Pupils are equal, round, and reactive to light. Neck:      Thyroid: No thyromegaly. Vascular: No carotid bruit or JVD. Trachea: No tracheal deviation. Cardiovascular:      Rate and Rhythm: Normal rate and regular rhythm. Pulses: Normal pulses. Heart sounds: Normal heart sounds. No murmur heard. No friction rub. No gallop. Pulmonary:      Effort: Pulmonary effort is normal. No respiratory distress. Breath sounds: Normal breath sounds. No stridor. No wheezing, rhonchi or rales. Chest:      Chest wall: No tenderness. Abdominal:      General: Abdomen is flat. Bowel sounds are normal. There is no distension. Palpations: Abdomen is soft. There is no mass. Tenderness: There is no abdominal tenderness. There is no right CVA tenderness, left CVA tenderness, guarding or rebound. Hernia: No hernia is present. Musculoskeletal:         General: No swelling, tenderness, deformity or signs of injury. Normal range of motion. Cervical back: Normal range of motion and neck supple.  No rigidity or tenderness. Right lower leg: No edema. Left lower leg: No edema. Lymphadenopathy:      Cervical: No cervical adenopathy. Skin:     General: Skin is warm and dry. Capillary Refill: Capillary refill takes less than 2 seconds. Coloration: Skin is not jaundiced or pale. Findings: No bruising, erythema, lesion or rash. Neurological:      General: No focal deficit present. Mental Status: He is alert and oriented to person, place, and time. Cranial Nerves: No cranial nerve deficit. Sensory: No sensory deficit. Motor: No weakness or abnormal muscle tone. Coordination: Coordination normal.      Gait: Gait normal.      Deep Tendon Reflexes: Reflexes are normal and symmetric. Reflexes normal.   Psychiatric:      Comments: Abnormal behavior. Incoherent speech. Abnormal thought process and content. Euphoric and disorganized mood. DIAGNOSTIC RESULTS     EKG: All EKG's are interpreted by the Emergency Department Physician who either signs or Co-signs this chart in the absence of a cardiologist.        RADIOLOGY:   Non-plain film images such as CT, Ultrasound and MRI are read by the radiologist. Raul Tello radiographicimages are visualized and preliminarily interpreted by the emergency physician with the below findings:        Interpretation per the Radiologist below, if available at the time of this note:    No orders to display         ED BEDSIDE ULTRASOUND:   Performed by ED Physician - none    LABS:  Labs Reviewed - No data to display    All other labs were within normal range or not returned as of this dictation.     EMERGENCY DEPARTMENT COURSE and DIFFERENTIALDIAGNOSIS/MDM:   Vitals:    Vitals:    10/31/21 2012   BP: (!) 148/109   Pulse: 114   Resp: 18   Temp: 98.9 °F (37.2 °C)   TempSrc: Oral   SpO2: 98%   Weight: 175 lb (79.4 kg)   Height: 6' (1.829 m)           MDM  Number of Diagnoses or Management Options     Amount and/or Complexity of Data Reviewed  Clinical lab tests: reviewed    Risk of Complications, Morbidity, and/or Mortality  Presenting problems: moderate  Diagnostic procedures: moderate  Management options: moderate    Patient Progress  Patient progress: other (comment)      CRITICAL CARE TIME   Total Critical Care time was  minutes, excluding separately reportable procedures. There was a high probability of clinically significant/life threatening deterioration in the patient's condition which required my urgentintervention. CONSULTS:  None    PROCEDURES:  Unless otherwise noted below, none     Procedures    FINAL IMPRESSION      1. Suicidal ideation          DISPOSITION/PLAN   DISPOSITION Decision To Transfer 10/31/2021 08:37:33 PM      PATIENT REFERRED TO:  No follow-up provider specified.     DISCHARGE MEDICATIONS:  New Prescriptions    No medications on file          (Please note that portions of this note were completed with a voice recognitionprogram.  Efforts were made to edit the dictations but occasionally words are mis-transcribed.)    Loy Doe MD (electronically signed)  Attending Emergency Physician          Loy Doe MD  10/31/21 4729

## 2021-11-01 NOTE — ED NOTES
Pt up to restroom at this time. Pt is alert and oriented times 4 at this time.  Pt is cooperative and no attempt at Elopement at this time     Katheryn Gaston RN  11/01/21 0516

## 2021-11-01 NOTE — ED NOTES
Spoke to [facility], per Dr Ede Dong patient is to be sent out for care. CK will need to be at or under 500. [facility] staff waiting for CK to be low enough for medical clearance prior to opening case with Raman Lorenzo.      Becky Grimaldo, RN  11/01/21 979 [facility], RN  11/01/21 6638

## 2021-11-01 NOTE — ED NOTES
Packet faxed to [facility] for review pending CK. Spoke to [facility] intake, willing to take packet for review, requesting troponin added on due to substance abuse. Spoke to [facility], ok to add troponin. Called lab about addon.      Demi Biggs RN  11/01/21 1775

## 2021-11-01 NOTE — ED NOTES
Pt anxious, but cooperative. He admits to visual and auditory hallucinations. He is redirectable and acknowledges need for evaluation and medication change. Plan of care of transfer to [facility] by ambulance explained. Pt is agreeable.      Lucila Garcia RN  10/31/21 4922

## 2021-11-01 NOTE — ED NOTES
EMS at bedside. Report given. Pt redirectable, but still anxious. He reluctantly agrees to go with EMS for transport.      Will Hernandez RN  10/31/21 2127

## 2021-11-01 NOTE — ED NOTES
Patient is getting increasingly agitated and insisting that he is leaving the facility     Agustin Vanegas, [address] [facility]  11/01/21 9024

## 2021-11-01 NOTE — ED NOTES
Provisional Diagnosis:    Schizoaffective disorder, bipolar type, Polydrug abuse, SI    Psychosocial and Contextual Factors:    Patient reports he lives alone in an apartment in [place]. Unemployed. Multiple Psych admits. Closed with [facility]. Polydrug abuse history. Not med compliant. C-SSRS Summary:     Patient: C-SSRS Suicide Screening  1) Within the past month, have you wished you were dead or wished you could go to sleep and not wake up? : Yes  2) Have you actually had any thoughts of killing yourself? : No  6) Have you ever done anything, started to do anything, or prepared to do anything to end your life?: Yes  Did this occur within the past 3 months? : No  Risk of Suicide: Moderate Risk    Family: None at bedside    Agency: Closed with [facility]          Abuse Assessment  Physical Abuse: Denies  Verbal Abuse: Denies  Emotional abuse: Denies  Financial Abuse: Denies  Sexual abuse: Denies  Elder abuse: No    Clinical Summary:     32 y.o. male transferred from [facility] via ambulance for complaint of being pink slipped with SI w/o intent or plan. Patient has been evaluated at [facility] emergency departments several times within the past day for suicidal ideation and continues to [employer] [facility] before transfer. On initial assessment of patient he reports to me he is still having suicidal thoughts to the provider, but denies on psych assessment to nurse. Patient states \"I'm not going to be tricked into talking to you. \" Patient denies HI, AVH and reports \"ok\" sleeping and eating. However, minutes later reports not sleeping or eating in 2 days and reports AH telling him to \"get out of the city\" and [facility] [place]. Also when asked why he originally went to [facility] patient now states it was due to trying to get rid of the \"stars and light and voices\". Patient becomes agitated on assessment. Patient refuses to make to look in direction of nurse. \"This is insane.   I don't know what's going on,

## 2021-11-01 NOTE — ED TRIAGE NOTES
Patient present to the ED via EMS from [facility] with [facility]. Patient denies at this time. Patient report audio hallucinations, however would not express what he is hearing. Patient presentation is irritable.   Patient is A/O X3

## 2021-11-01 NOTE — ED NOTES
Patient sat up in bed revitalized him and charted hi vitals. He ate lunch and got up to the restroom. He asked if he could leave today spoke to the nurse and waiting for him to be medically cleared.       Mirtha Lepe [facility]  11/01/21 1257

## 2021-11-01 NOTE — ED PROVIDER NOTES
[address] [facility] ED  eMERGENCY dEPARTMENT eNCOUnter      Pt Name: Gregroio Kirby  MRN: [id number]  [place] 1990  Date of evaluation: 10/31/2021  Provider: Joy Young PA-C        HISTORY OF PRESENT ILLNESS    Gregorio Kirby is a 32 y.o. male per chart review has ah/o schizoaffective disorder, pulmonary embolism, bipolar disorder, drug abuse. Patient presents today from [facility] being pink slipped for suicidal ideation. Patient has been evaluated at [address] [place] [address] emergency departments several times within the past day for suicidal ideation and continues to leave. On initial assessment of patient he reports to me he is still having suicidal thoughts. Denies a plan. Denies VAH, anxiety, homicidal ideation but also states he cannot answer any more questions until he gets some sleep. REVIEW OF SYSTEMS       Review of Systems   Constitutional: Negative for chills and fever. HENT: Negative for congestion. Eyes: Negative for photophobia. Respiratory: Negative for shortness of breath. Cardiovascular: Negative for chest pain. Gastrointestinal: Negative for abdominal pain, diarrhea and vomiting. Genitourinary: Negative for difficulty urinating. Musculoskeletal: Negative for back pain and myalgias. Neurological: Negative for headaches. Psychiatric/Behavioral: Positive for agitation, sleep disturbance and suicidal ideas. Negative for confusion, hallucinations and self-injury. The patient is nervous/anxious. The patient is not hyperactive. Except as noted above the remainder of the review of systems was reviewed and negative.        PAST MEDICAL HISTORY     Past Medical History:   Diagnosis Date    Anxiety 1/4/2012    Bipolar affective disorder ([facility] 11/25/2015    Chondromalacia of left knee     Chondromalacia of left knee     Chondromalacia of right knee     Chondromalacia of right knee     Chronic back pain 4/20/2017    Depression     Drug abuse and dependence ([facility] 2012    Fracture of left side of mandibular body ([facility] 2018    ACUTE LEFT CONDYLAR NECK FRACTURE    Fracture of navicular bone of foot, closed 2012    lt    Fracture of temporal bone ([facility]     History of pulmonary embolism     Schizophrenia ([facility]     Seizures ([facility]     Talus fracture 2012    lt    TBI (traumatic brain injury) ([facility] 2015    Wound of left leg 2012         SURGICAL HISTORY       Past Surgical History:   Procedure Laterality Date    WRIST SURGERY  2009    pin in wrist left         CURRENT MEDICATIONS       Previous Medications    BENZTROPINE (COGENTIN) 0.5 MG TABLET    Take 0.5 mg by mouth 2 times daily    HYDROXYZINE (ATARAX) 10 MG TABLET    Take 25 mg by mouth every 6 hours as needed for Anxiety     PALIPERIDONE PALMITATE ER (INVEGA SUSTENNA) 156 MG/ML MARYANN IM INJECTION    Inject 156 mg into the muscle every 30 days Was due 12-, had starting dose of 234mg IM on 2020    RISPERIDONE (RISPERDAL) 2 MG TABLET    Take 2 mg by mouth 2 times daily       ALLERGIES     Shellfish-derived products and Shrimp flavor    FAMILY HISTORY       Family History   Family history unknown: Yes          SOCIAL HISTORY       Social History     Socioeconomic History    Marital status: Single     Spouse name: Not on file    Number of children: 0    Years of education: 15    Highest education level: Not on file   Occupational History    Not on file   Tobacco Use    Smoking status: Current Some Day Smoker     Packs/day: 1.00     Years: 1.00     Pack years: 1.00     Types: Cigarettes     Last attempt to quit: 2011     Years since quittin.9    Smokeless tobacco: Current User     Types: Chew   Vaping Use    Vaping Use: Never used   Substance and Sexual Activity    Alcohol use: Yes     Comment: drank 2 shots of vodka prior to arrival    Drug use: Yes     Frequency: 7.0 times per week     Types: Marijuana (Weed), Cocaine    Sexual activity: Not Currently Partners: Female   Other Topics Concern    Not on file   Social History Narrative    Lives w family     Social Determinants of Health     Financial Resource Strain:     Difficulty of Paying Living Expenses:    Food Insecurity:     Worried About Running Out of Food in the Last Year:     [address] in the Last Year:    Transportation Needs:     Lack of Transportation (Medical):  Lack of Transportation (Non-Medical):    Physical Activity:     Days of Exercise per Week:     Minutes of Exercise per Session:    Stress:     Feeling of Stress :    Social Connections:     Frequency of Communication with Friends and Family:     Frequency of Social Gatherings with Friends and Family:     Attends Mosque Services:     Active Member of Clubs or Organizations:     Attends Club or Organization Meetings:     Marital Status:    Intimate Partner Violence:     Fear of Current or Ex-Partner:     Emotionally Abused:     Physically Abused:     Sexually Abused:          PHYSICAL EXAM        ED Triage Vitals   BP Temp Temp src Pulse Resp SpO2 Height Weight   -- -- -- -- -- -- -- --       Physical Exam  Constitutional:       General: He is not in acute distress. HENT:      Head: Normocephalic and atraumatic. Right Ear: External ear normal.      Left Ear: External ear normal.      Nose: Nose normal.      Mouth/Throat:      Mouth: Mucous membranes are moist.      Pharynx: Oropharynx is clear. Eyes:      Extraocular Movements: Extraocular movements intact. Cardiovascular:      Rate and Rhythm: Normal rate and regular rhythm. Pulmonary:      Effort: Pulmonary effort is normal.      Breath sounds: Normal breath sounds. Abdominal:      General: Bowel sounds are normal.      Palpations: Abdomen is soft. Tenderness: There is no abdominal tenderness. Musculoskeletal:         General: Normal range of motion. Cervical back: Normal range of motion. Skin:     General: Skin is warm.    Neurological: suicidal ideation and continues to leave. On initial assessment of patient he reports to me he is still having suicidal thoughts. Denies a plan. Denies VAH, anxiety, homicidal ideation but also states he cannot answer any more questions until he gets some sleep. Afebrile hemodynamically stable. Behavioral health labs completed at [facility] around 1600, 5 hours prior to arrival at [address] [place] [address]. Labs remarkable for . UDS positive for amphetamine, cocaine, cannabinoid. Patient given IV NS bolus. Patient is denying any medical complaints at this time including muscle aches or pain. Repeat CK is 757. Given another IV NS bolus. Repeat . Given IV NS bolus. Given PO hydroxyzine for agitation. Pt is medically cleared for psych evaluation. Will monitor ck but is being treated with IV fluids. Pt transferred to [place] by ems    CRITICAL CARE TIME   Total CriticalCare time was 0 minutes, excluding separately reportable procedures. There was a high probability of clinically significant/life threatening deterioration in the patient's condition which required my urgent intervention. PROCEDURES:  Unlessotherwise noted below, none      Procedures      FINAL IMPRESSION      1.  Bipolar affective disorder, current episode mixed, current episode severity unspecified ([facility]          DISPOSITION/PLAN   DISPOSITION            Raad Trotter (electronically signed)  Attending Emergency Physician          Jeremias Montenegro PA-C  11/01/21 8443

## 2021-11-01 NOTE — ED NOTES
Pt lying in bed at this time. Pt states that \"can I leave now? \".  Pt has one on one in place at this time     Katheryn Gaston RN  11/01/21 0809

## 2021-11-01 NOTE — ED NOTES
Dispo given to [facility]. [place] [facility]) unable to pickup today, [facility] called for transport. [facility] 2000. Kumar Condon called, they will return call if they can get a crew together.      Scottie Montoya RN  11/01/21 CINDA Johnson  11/01/21 7007

## 2021-11-01 NOTE — ED NOTES
Pt ate breakfast and went to restroom. Pt given a gown to change and applied to current clothing. Pt is alert and oriented times 4 at this time. Pt states to this nurse that he \"took all my pills\" when asked if he was taking prescribed medication. Pt repeats \"I took ALL of them. I told them that\". Clarified with pt that he took all the pills he had in a bottles as an attempt at suicide. Pt states \"I just don't want to do this\" when asked why he wanted to harm self.       Michael Hubbard, CINDA  11/01/21 3925

## 2021-11-23 ENCOUNTER — HOSPITAL ENCOUNTER (EMERGENCY)
Age: 31
Discharge: PSYCHIATRIC OR SUBSTANCE USE FACILITY | End: 2021-11-24
Payer: MEDICARE

## 2021-11-23 DIAGNOSIS — F25.0 SCHIZOAFFECTIVE DISORDER, BIPOLAR TYPE (HCC): Primary | ICD-10-CM

## 2021-11-23 LAB
ACETAMINOPHEN LEVEL: <5 UG/ML (ref 10–30)
ALBUMIN SERPL-MCNC: 4.8 G/DL (ref 3.5–4.6)
ALP BLD-CCNC: 94 U/L (ref 35–104)
ALT SERPL-CCNC: 30 U/L (ref 0–41)
AMPHETAMINE SCREEN, URINE: ABNORMAL
ANION GAP SERPL CALCULATED.3IONS-SCNC: 10 MEQ/L (ref 9–15)
AST SERPL-CCNC: 26 U/L (ref 0–40)
BARBITURATE SCREEN URINE: ABNORMAL
BASOPHILS ABSOLUTE: 0 K/UL (ref 0–0.2)
BASOPHILS RELATIVE PERCENT: 0.3 %
BENZODIAZEPINE SCREEN, URINE: ABNORMAL
BILIRUB SERPL-MCNC: 0.5 MG/DL (ref 0.2–0.7)
BILIRUBIN URINE: NEGATIVE
BLOOD, URINE: NEGATIVE
BUN BLDV-MCNC: 15 MG/DL (ref 6–20)
CALCIUM SERPL-MCNC: 9.5 MG/DL (ref 8.5–9.9)
CANNABINOID SCREEN URINE: POSITIVE
CHLORIDE BLD-SCNC: 100 MEQ/L (ref 95–107)
CHOLESTEROL, TOTAL: 125 MG/DL (ref 0–199)
CK MB: 2.3 NG/ML (ref 0–6.7)
CLARITY: CLEAR
CO2: 26 MEQ/L (ref 20–31)
COCAINE METABOLITE SCREEN URINE: POSITIVE
COLOR: YELLOW
CREAT SERPL-MCNC: 0.78 MG/DL (ref 0.7–1.2)
CREATINE KINASE-MB INDEX: 1.2 % (ref 0–3.5)
EOSINOPHILS ABSOLUTE: 0 K/UL (ref 0–0.7)
EOSINOPHILS RELATIVE PERCENT: 0.1 %
ETHANOL PERCENT: NORMAL G/DL
ETHANOL: <10 MG/DL (ref 0–0.08)
GFR AFRICAN AMERICAN: >60
GFR NON-AFRICAN AMERICAN: >60
GLOBULIN: 2.5 G/DL (ref 2.3–3.5)
GLUCOSE BLD-MCNC: 102 MG/DL (ref 70–99)
GLUCOSE URINE: NEGATIVE MG/DL
HCT VFR BLD CALC: 40.3 % (ref 42–52)
HDLC SERPL-MCNC: 49 MG/DL (ref 40–59)
HEMOGLOBIN: 13.7 G/DL (ref 14–18)
KETONES, URINE: ABNORMAL MG/DL
LDL CHOLESTEROL CALCULATED: 70 MG/DL (ref 0–129)
LEUKOCYTE ESTERASE, URINE: NEGATIVE
LYMPHOCYTES ABSOLUTE: 1.1 K/UL (ref 1–4.8)
LYMPHOCYTES RELATIVE PERCENT: 11.5 %
Lab: ABNORMAL
MCH RBC QN AUTO: 31.6 PG (ref 27–31.3)
MCHC RBC AUTO-ENTMCNC: 33.9 % (ref 33–37)
MCV RBC AUTO: 93.3 FL (ref 80–100)
METHADONE SCREEN, URINE: ABNORMAL
MONOCYTES ABSOLUTE: 0.4 K/UL (ref 0.2–0.8)
MONOCYTES RELATIVE PERCENT: 4.3 %
NEUTROPHILS ABSOLUTE: 7.8 K/UL (ref 1.4–6.5)
NEUTROPHILS RELATIVE PERCENT: 83.8 %
NITRITE, URINE: NEGATIVE
OPIATE SCREEN URINE: ABNORMAL
OXYCODONE URINE: ABNORMAL
PDW BLD-RTO: 13.5 % (ref 11.5–14.5)
PH UA: 7 (ref 5–9)
PHENCYCLIDINE SCREEN URINE: ABNORMAL
PLATELET # BLD: 230 K/UL (ref 130–400)
POTASSIUM SERPL-SCNC: 3.9 MEQ/L (ref 3.4–4.9)
PROPOXYPHENE SCREEN: ABNORMAL
PROTEIN UA: NEGATIVE MG/DL
RBC # BLD: 4.32 M/UL (ref 4.7–6.1)
SALICYLATE, SERUM: <0.3 MG/DL (ref 15–30)
SARS-COV-2, NAAT: NOT DETECTED
SODIUM BLD-SCNC: 136 MEQ/L (ref 135–144)
SPECIFIC GRAVITY UA: 1.02 (ref 1–1.03)
TOTAL CK: 199 U/L (ref 0–190)
TOTAL PROTEIN: 7.3 G/DL (ref 6.3–8)
TRIGL SERPL-MCNC: 31 MG/DL (ref 0–150)
TSH SERPL DL<=0.05 MIU/L-ACNC: 0.45 UIU/ML (ref 0.44–3.86)
URINE REFLEX TO CULTURE: ABNORMAL
UROBILINOGEN, URINE: 1 E.U./DL
WBC # BLD: 9.3 K/UL (ref 4.8–10.8)

## 2021-11-23 PROCEDURE — 81003 URINALYSIS AUTO W/O SCOPE: CPT

## 2021-11-23 PROCEDURE — 6360000002 HC RX W HCPCS: Performed by: PHYSICIAN ASSISTANT

## 2021-11-23 PROCEDURE — 99285 EMERGENCY DEPT VISIT HI MDM: CPT

## 2021-11-23 PROCEDURE — 85025 COMPLETE CBC W/AUTO DIFF WBC: CPT

## 2021-11-23 PROCEDURE — 96372 THER/PROPH/DIAG INJ SC/IM: CPT

## 2021-11-23 PROCEDURE — 87635 SARS-COV-2 COVID-19 AMP PRB: CPT

## 2021-11-23 PROCEDURE — 80061 LIPID PANEL: CPT

## 2021-11-23 PROCEDURE — 82077 ASSAY SPEC XCP UR&BREATH IA: CPT

## 2021-11-23 PROCEDURE — 80053 COMPREHEN METABOLIC PANEL: CPT

## 2021-11-23 PROCEDURE — 80179 DRUG ASSAY SALICYLATE: CPT

## 2021-11-23 PROCEDURE — 6370000000 HC RX 637 (ALT 250 FOR IP): Performed by: PHYSICIAN ASSISTANT

## 2021-11-23 PROCEDURE — 80307 DRUG TEST PRSMV CHEM ANLYZR: CPT

## 2021-11-23 PROCEDURE — 84443 ASSAY THYROID STIM HORMONE: CPT

## 2021-11-23 PROCEDURE — 82550 ASSAY OF CK (CPK): CPT

## 2021-11-23 PROCEDURE — 80143 DRUG ASSAY ACETAMINOPHEN: CPT

## 2021-11-23 PROCEDURE — 82553 CREATINE MB FRACTION: CPT

## 2021-11-23 PROCEDURE — 36415 COLL VENOUS BLD VENIPUNCTURE: CPT

## 2021-11-23 RX ORDER — HYDROXYZINE PAMOATE 50 MG/1
50 CAPSULE ORAL ONCE
Status: COMPLETED | OUTPATIENT
Start: 2021-11-23 | End: 2021-11-23

## 2021-11-23 RX ORDER — ZIPRASIDONE MESYLATE 20 MG/ML
20 INJECTION, POWDER, LYOPHILIZED, FOR SOLUTION INTRAMUSCULAR ONCE
Status: COMPLETED | OUTPATIENT
Start: 2021-11-23 | End: 2021-11-23

## 2021-11-23 RX ORDER — LORAZEPAM 2 MG/ML
2 INJECTION INTRAMUSCULAR ONCE
Status: COMPLETED | OUTPATIENT
Start: 2021-11-23 | End: 2021-11-23

## 2021-11-23 RX ADMIN — HYDROXYZINE PAMOATE 50 MG: 50 CAPSULE ORAL at 20:44

## 2021-11-23 RX ADMIN — ZIPRASIDONE MESYLATE 20 MG: 20 INJECTION, POWDER, LYOPHILIZED, FOR SOLUTION INTRAMUSCULAR at 21:48

## 2021-11-23 RX ADMIN — LORAZEPAM 2 MG: 2 INJECTION INTRAMUSCULAR; INTRAVENOUS at 21:48

## 2021-11-23 RX ADMIN — NICOTINE POLACRILEX 4 MG: 4 GUM, CHEWING BUCCAL at 20:44

## 2021-11-23 ASSESSMENT — ENCOUNTER SYMPTOMS
COUGH: 0
VOMITING: 0
EYE DISCHARGE: 0
VOICE CHANGE: 0
SHORTNESS OF BREATH: 0
ANAL BLEEDING: 0
NAUSEA: 0
ABDOMINAL DISTENTION: 0
PHOTOPHOBIA: 0
APNEA: 0

## 2021-11-23 ASSESSMENT — PATIENT HEALTH QUESTIONNAIRE - PHQ9: SUM OF ALL RESPONSES TO PHQ QUESTIONS 1-9: 17

## 2021-11-24 VITALS
DIASTOLIC BLOOD PRESSURE: 76 MMHG | SYSTOLIC BLOOD PRESSURE: 116 MMHG | HEART RATE: 80 BPM | HEIGHT: 72 IN | WEIGHT: 170 LBS | OXYGEN SATURATION: 98 % | TEMPERATURE: 97.7 F | BODY MASS INDEX: 23.03 KG/M2 | RESPIRATION RATE: 16 BRPM

## 2021-11-24 PROCEDURE — 6370000000 HC RX 637 (ALT 250 FOR IP): Performed by: EMERGENCY MEDICINE

## 2021-11-24 RX ORDER — LORAZEPAM 1 MG/1
2 TABLET ORAL ONCE
Status: COMPLETED | OUTPATIENT
Start: 2021-11-24 | End: 2021-11-24

## 2021-11-24 RX ADMIN — NICOTINE POLACRILEX 4 MG: 4 GUM, CHEWING BUCCAL at 10:03

## 2021-11-24 RX ADMIN — LORAZEPAM 2 MG: 1 TABLET ORAL at 10:14

## 2021-11-24 NOTE — ED NOTES
Received a return call from [facility]. [facility] scheduled for 1100.       Bisi See, CINDA  11/24/21 2187

## 2021-11-24 NOTE — ED NOTES
Attempted to call report to [facility]- voice mail left with request for return call     Aicha Hernandez RN  11/24/21 4579

## 2021-11-24 NOTE — ED PROVIDER NOTES
[address] [facility] ED  eMERGENCY dEPARTMENT eNCOUnter      Pt Name: Bridgette Tyson  MRN: [id number]  [place] 1990  Date of evaluation: 11/23/2021  Provider: Deb Walden PA-C    CHIEF COMPLAINT       Chief Complaint   Patient presents with    Psychiatric Evaluation         HISTORY OF PRESENT ILLNESS   (Location/Symptom, Timing/Onset,Context/Setting, Quality, Duration, Modifying Factors, Severity)  Note limiting factors. Bridgette Tyson is a 32 y.o. male who presents to the emergency department presents with request for behavioral health evaluation notes he has been having issues for several years not taking his medication this time he states it will work. Patient denies fever chills nausea vomiting denies injuries including cuts bruises denies any overdose including over-the-counter medications. Symptoms moderate severity nothing improves or worsen symptoms    HPI    NursingNotes were reviewed. REVIEW OF SYSTEMS    (2-9 systems for level 4, 10 or more for level 5)     Review of Systems   Constitutional: Negative for activity change, appetite change and unexpected weight change. HENT: Negative for ear discharge, nosebleeds and voice change. Eyes: Negative for photophobia and discharge. Respiratory: Negative for apnea, cough and shortness of breath. Cardiovascular: Negative for chest pain. Gastrointestinal: Negative for abdominal distention, anal bleeding, nausea and vomiting. Endocrine: Negative for cold intolerance, heat intolerance and polyphagia. Genitourinary: Negative for genital sores. Musculoskeletal: Negative for joint swelling. Skin: Negative for pallor. Allergic/Immunologic: Negative for immunocompromised state. Neurological: Negative for seizures and facial asymmetry. Hematological: Does not bruise/bleed easily. Psychiatric/Behavioral: Positive for hallucinations. Negative for behavioral problems, self-injury and sleep disturbance.  The patient is nervous/anxious. All other systems reviewed and are negative. Except as noted above the remainder of the review of systems was reviewed and negative.        PAST MEDICAL HISTORY     Past Medical History:   Diagnosis Date    Anxiety 1/4/2012    Bipolar affective disorder ([facility] 11/25/2015    Chondromalacia of left knee     Chondromalacia of left knee     Chondromalacia of right knee     Chondromalacia of right knee     Chronic back pain 4/20/2017    Depression     Drug abuse and dependence ([facility] 1/4/2012    Fracture of left side of mandibular body ([facility] 06/17/2018    ACUTE LEFT CONDYLAR NECK FRACTURE    Fracture of navicular bone of foot, closed 09/03/2012    lt    Fracture of temporal bone ([facility] 2015    History of pulmonary embolism     Schizophrenia ([facility]     Seizures ([facility]     Talus fracture 9/2012    lt    TBI (traumatic brain injury) ([facility] 2015    Wound of left leg 9/14/2012         SURGICALHISTORY       Past Surgical History:   Procedure Laterality Date    WRIST SURGERY  2009    pin in wrist left         CURRENT MEDICATIONS       Previous Medications    BENZTROPINE (COGENTIN) 0.5 MG TABLET    Take 0.5 mg by mouth 2 times daily    HYDROXYZINE (ATARAX) 10 MG TABLET    Take 25 mg by mouth every 6 hours as needed for Anxiety     PALIPERIDONE PALMITATE ER (INVEGA SUSTENNA) 156 MG/ML MARYANN IM INJECTION    Inject 156 mg into the muscle every 30 days Was due 12-, had starting dose of 234mg IM on 12-8-2020    RISPERIDONE (RISPERDAL) 2 MG TABLET    Take 2 mg by mouth 2 times daily            Shellfish-derived products and Shrimp flavor    FAMILY HISTORY       Family History   Family history unknown: Yes          SOCIAL HISTORY       Social History     Socioeconomic History    Marital status: Single     Spouse name: None    Number of children: 0    Years of education: 15    Highest education level: None   Occupational History    None   Tobacco Use    Smoking status: Current Some Day Smoker     Packs/day: 1.00     Years: 1.00     Pack years: 1.00     Types: Cigarettes     Last attempt to quit: 2011     Years since quittin.9    Smokeless tobacco: Current User     Types: Chew   Vaping Use    Vaping Use: Never used   Substance and Sexual Activity    Alcohol use: Yes     Comment: drank 2 shots of vodka prior to arrival    Drug use: Yes     Frequency: 7.0 times per week     Types: Marijuana (Jillene Kings), Cocaine    Sexual activity: Not Currently     Partners: Female   Other Topics Concern    None   Social History Narrative    Lives w family     Social Determinants of Health     Financial Resource Strain:     Difficulty of Paying Living Expenses: Not on file   Food Insecurity:     Worried About Running Out of Food in the Last Year: Not on file    Kevin of Food in the Last Year: Not on file   Transportation Needs:     Lack of Transportation (Medical): Not on file    Lack of Transportation (Non-Medical):  Not on file   Physical Activity:     Days of Exercise per Week: Not on file    Minutes of Exercise per Session: Not on file   Stress:     Feeling of Stress : Not on file   Social Connections:     Frequency of Communication with Friends and Family: Not on file    Frequency of Social Gatherings with Friends and Family: Not on file    Attends Confucianist Services: Not on file    Active Member of [address] [place] [address] or Organizations: Not on file    Attends Club or Organization Meetings: Not on file    Marital Status: Not on file   Intimate Partner Violence:     Fear of Current or Ex-Partner: Not on file    Emotionally Abused: Not on file    Physically Abused: Not on file    Sexually Abused: Not on file   Housing Stability:     Unable to Pay for Housing in the Last Year: Not on file    Number of [place] in the Last Year: Not on file    Unstable Housing in the Last Year: Not on file       SCREENINGS   Ebola Virus Disease (EVD) Screening   Temp: 97.3 °F (36.3 °C)  CIWA Assessment  BP: (!) 157/81  Pulse: 110    PHYSICAL EXAM    (up to 7 for level 4, 8 or more for level 5)     ED Triage Vitals [11/23/21 1908]   BP Temp Temp Source Pulse Resp SpO2 Height Weight   (!) 157/81 97.3 °F (36.3 °C) Temporal 110 18 98 % 6' (1.829 m) 170 lb (77.1 kg)       Physical Exam  Vitals and nursing note reviewed. Constitutional:       General: He is not in acute distress. Appearance: He is well-developed. HENT:      Head: Normocephalic and atraumatic. Right Ear: External ear normal.      Left Ear: External ear normal.      Nose: Nose normal.      Mouth/Throat:      Mouth: Mucous membranes are moist.   Eyes:      General:         Right eye: No discharge. Left eye: No discharge. Pupils: Pupils are equal, round, and reactive to light. Cardiovascular:      Rate and Rhythm: Normal rate and regular rhythm. Heart sounds: Normal heart sounds. Pulmonary:      Effort: Pulmonary effort is normal. No respiratory distress. Breath sounds: Normal breath sounds. No stridor. Abdominal:      General: Bowel sounds are normal. There is no distension. Palpations: Abdomen is soft. Tenderness: There is no abdominal tenderness. Musculoskeletal:         General: Normal range of motion. Cervical back: Normal range of motion and neck supple. Skin:     General: Skin is warm. Findings: No bruising or erythema. Neurological:      Mental Status: He is alert and oriented to person, place, and time.          RESULTS     EKG: All EKG's are interpreted by the Emergency Department Physician who either signs or Co-signsthis chart in the absence of a cardiologist.         RADIOLOGY:   Non-plain filmimages such as CT, Ultrasound and MRI are read by the radiologist. Plain radiographic images are visualized and preliminarily interpreted by the emergency physician with the below findings:         Interpretation per the Radiologist below, if available at the time ofthis note:    No orders to display         ED BEDSIDE ULTRASOUND:   Performed by ED Physician - none    LABS:  Labs Reviewed   ACETAMINOPHEN LEVEL - Abnormal; Notable for the following components:       Result Value    Acetaminophen Level <5 (*)     All other components within normal limits   CBC WITH AUTO DIFFERENTIAL - Abnormal; Notable for the following components:    RBC 4.32 (*)     Hemoglobin 13.7 (*)     Hematocrit 40.3 (*)     MCH 31.6 (*)     Neutrophils Absolute 7.8 (*)     All other components within normal limits   CK - Abnormal; Notable for the following components: Total  (*)     All other components within normal limits   COMPREHENSIVE METABOLIC PANEL - Abnormal; Notable for the following components:    Glucose 102 (*)     Albumin 4.8 (*)     All other components within normal limits   SALICYLATE LEVEL - Abnormal; Notable for the following components:    Salicylate, Serum <0.6 (*)     All other components within normal limits   URINE DRUG SCREEN - Abnormal; Notable for the following components:    Cannabinoid Scrn, Ur POSITIVE (*)     Cocaine Metabolite Screen, Urine POSITIVE (*)     All other components within normal limits   URINE RT REFLEX TO CULTURE - Abnormal; Notable for the following components:    Ketones, Urine TRACE (*)     All other components within normal limits   COVID-19, RAPID   ETHANOL   LIPID PANEL   TSH WITHOUT REFLEX   CKMB & RELATIVE PERCENT       All other labs were within normal range or not returned as of this dictation.     EMERGENCY DEPARTMENT COURSE and DIFFERENTIAL DIAGNOSIS/MDM:   Vitals:    Vitals:    11/23/21 1908   BP: (!) 157/81   Pulse: 110   Resp: 18   Temp: 97.3 °F (36.3 °C)   TempSrc: Temporal   SpO2: 98%   Weight: 170 lb (77.1 kg)   Height: 6' (1.829 m)            MDM  Number of Diagnoses or Management Options  Diagnosis management comments: Medically stable       Amount and/or Complexity of Data Reviewed  Clinical lab tests: reviewed and ordered        CRITICAL CARE TIME CONSULTS:  None    PROCEDURES:  Unless otherwise noted below, none     Procedures    FINAL IMPRESSION      1. Schizoaffective disorder, bipolar type ([facility]          DISPOSITION/PLAN   DISPOSITION        PATIENT REFERRED TO:  No follow-up provider specified.     DISCHARGE MEDICATIONS:  New Prescriptions    No medications on file          (Please note that portions of this note were completed with a voice recognition program.  Efforts were made to edit the dictations but occasionally words are mis-transcribed.)    Wayne Huynh PA-C (electronically signed)  Attending Emergency Physician       Wayne Huynh PA-C  11/23/21 3470

## 2021-11-24 NOTE — ED NOTES
Patient placed with [facility] [address]. Spoke with Miriam Snell RN.       Pedro Pablo Bowers RN  11/24/21 0004

## 2021-11-24 NOTE — ED NOTES
Patient resting quietly respirations are even and unlabored no distress noted at this time.        Mirna Paula RN  11/24/21 0572

## 2021-11-24 NOTE — ED NOTES
Belongings dropped off at  for patient/ security will keep til he is transferred to [address]  11/24/21 4686

## 2021-11-24 NOTE — ED NOTES
medicated per request for anxiety and slight agitation  [facility]  11/24/21 1016

## 2021-11-24 NOTE — ED NOTES
Pt medicated with 20mg of geodon and 2mg of ativan IM for anxiety and agitation.      Fadi Lazo, CHERRI  48/23/34 2623

## 2021-11-24 NOTE — ED NOTES
Call placed to lab and states urine toxicology should result in 10 minutes.       Josiah Davidson RN  11/23/21 7420

## 2021-11-24 NOTE — ED NOTES
Message for return call for report (nurse to nurse at this time). Patient resting on bed. No s/s of distress>     Nito Hint.  [facility]  11/24/21 2078

## 2021-11-24 NOTE — ED NOTES
Patient admitted to [facility] bed 5. Changed into psych safe clothing and oriented to unit. All belongings secured. Call placed to lab to notify of new orders.       Alex Cespedes RN  11/23/21 6393

## 2021-11-24 NOTE — ED NOTES
Patient accepted to [facility] by Dr. Ballesteros Shoulders. Nurse to nurse number [phone]. [facility] to schedule transport.       Igor Guerra RN  11/24/21 2630

## 2021-11-24 NOTE — ED NOTES
Report received. Pt resting on bed. No s/s of distress present. Aaliyah Calvo  [facility]  11/24/21 1974

## 2021-11-24 NOTE — ED NOTES
Provisional Diagnosis:      Schizoaffective, Bipolar Type  Polysubstance Abuse    Psychosocial and Contextual Factors:      Patient currently lives in his own apartment in [place]. Does not work and is currently on probation. States he contacted his  yesterday and he had not appeared so unsure if he has a current warrant. States he is supposed to contact his P.O. tomorrow to follow up. Denies any new charges. Patient has a long psychiatric history with multiple admits. Last admit to [facility] 11/1/21. Long history of noncompliance and polysubstance use with detox and sober living. Patient admits to marijuana use since discharge and states \"that's the only thing that calms me down. \"     C-SSRS Summary:     Patient: Anabel Romero Screening  1) Within the past month, have you wished you were dead or wished you could go to sleep and not wake up? : Yes  2) Have you actually had any thoughts of killing yourself? : Yes  3) Have you been thinking about how you might kill yourself? : Yes  4) Have you had these thoughts and had some intention of acting on them? : Yes  5) Have you started to work out or worked out the details of how to kill yourself? Do you intend to carry out this plan? : Yes  6) Have you ever done anything, started to do anything, or prepared to do anything to end your life?: No  Risk of Suicide: High Risk    Family: None present    Agency: Katey Otto, patient missed his scheduled intake appointment today. Abuse Assessment  Physical Abuse: Denies  Verbal Abuse: Denies  Emotional abuse: Denies  Financial Abuse: Denies  Sexual abuse: Denies  Elder abuse: No    Clinical Summary:      Patient presents to the ED for a psychiatric evaluations after being assessed by the [facility] team and pink slipped today. Patient was assessed at the request of several concerned community members who contacted the crisis line.  Patient states he does not know why the police and Katey Otto showed up at his door. Patient was assessed by Preeti Nelson for increased paranoia, A/V hallucinations and reported suicidal thoughts. He reports he is seeing colors, stars and reports the voices are worse than what they were prior to his recent admit to [facility] on 11/2/21 and started on new medication. He is guarded, paranoid, suspicious and visibly shaking. Preeti Nelson received a call from a family member stating that he stated he was going to kill himself with a razor by cutting his wrists. Patient denies upon assessment. Family also reports that he believes UFO lights are following him. His landlord contacted Preeti Nelson reporting he broke his patio doors. Patient admits to broke his cell phone today and unable to state why. Patient presents to the ED anxious and shaking. States \"I'm having really bad thoughts, I feel stressed the fuck out and out of control. \" States he feels he is stressing himself out over nothing and these feelings have come from no where. Describes hearing male voices and while answering assessment questions asks writer if they can hear the  yelling out. Cooperative with assessment, guarded and paranoid. Denies current SI, HI. Denies any self harm before admission. Fearful with rapid, pressured speech. Reports he has been sleeping 8-10 hours nightly and has a decreased appetite the last few days.      Level of Care Disposition:      Per Dr Malcolm Witt, RN  11/23/21 5744

## 2021-11-24 NOTE — ED NOTES
Jyoti Godinez made aware that pt is to go to [facility]  Due to construction on the [facility] unit . Report to [address] [place] [address] RN at [facility] unit of 3 Jamilah Boswell  11/24/21 4671

## 2022-05-29 ENCOUNTER — HOSPITAL ENCOUNTER (EMERGENCY)
Age: 32
Discharge: ANOTHER HOSPITAL | End: 2022-05-29
Attending: EMERGENCY MEDICINE
Payer: COMMERCIAL

## 2022-05-29 ENCOUNTER — HOSPITAL ENCOUNTER (EMERGENCY)
Age: 32
Discharge: PSYCHIATRIC OR SUBSTANCE USE FACILITY | End: 2022-05-30
Payer: COMMERCIAL

## 2022-05-29 ENCOUNTER — APPOINTMENT (OUTPATIENT)
Dept: GENERAL RADIOLOGY | Age: 32
End: 2022-05-29
Payer: COMMERCIAL

## 2022-05-29 VITALS
WEIGHT: 175 LBS | TEMPERATURE: 98.5 F | BODY MASS INDEX: 23.19 KG/M2 | HEIGHT: 73 IN | OXYGEN SATURATION: 98 % | SYSTOLIC BLOOD PRESSURE: 147 MMHG | RESPIRATION RATE: 16 BRPM | DIASTOLIC BLOOD PRESSURE: 107 MMHG | HEART RATE: 95 BPM

## 2022-05-29 DIAGNOSIS — F29 PSYCHOSIS, UNSPECIFIED PSYCHOSIS TYPE (HCC): Primary | ICD-10-CM

## 2022-05-29 DIAGNOSIS — E86.0 DEHYDRATION: ICD-10-CM

## 2022-05-29 DIAGNOSIS — R45.851 SUICIDAL IDEATION: Primary | ICD-10-CM

## 2022-05-29 DIAGNOSIS — R74.8 ELEVATED CK: ICD-10-CM

## 2022-05-29 LAB
ACETAMINOPHEN LEVEL: <15 UG/ML (ref 10–30)
ALBUMIN SERPL-MCNC: 5.9 G/DL (ref 3.5–4.6)
ALP BLD-CCNC: 102 U/L (ref 35–104)
ALT SERPL-CCNC: 20 U/L (ref 0–41)
AMORPHOUS: NORMAL
AMPHETAMINE SCREEN, URINE: NORMAL
ANION GAP SERPL CALCULATED.3IONS-SCNC: 24 MEQ/L (ref 9–15)
AST SERPL-CCNC: 28 U/L (ref 0–40)
BACTERIA: NEGATIVE /HPF
BARBITURATE SCREEN URINE: NORMAL
BASOPHILS ABSOLUTE: 0 K/UL (ref 0–0.1)
BASOPHILS RELATIVE PERCENT: 0.2 % (ref 0.2–1.2)
BENZODIAZEPINE SCREEN, URINE: NORMAL
BILIRUB SERPL-MCNC: 0.7 MG/DL (ref 0.2–0.7)
BILIRUBIN URINE: ABNORMAL
BLOOD, URINE: NEGATIVE
BUN BLDV-MCNC: 23 MG/DL (ref 6–20)
CALCIUM SERPL-MCNC: 10.6 MG/DL (ref 8.5–9.9)
CANNABINOID SCREEN URINE: NORMAL
CHLORIDE BLD-SCNC: 93 MEQ/L (ref 95–107)
CLARITY: CLEAR
CO2: 23 MEQ/L (ref 20–31)
COCAINE METABOLITE SCREEN URINE: NORMAL
COLOR: ABNORMAL
CREAT SERPL-MCNC: 2.1 MG/DL (ref 0.7–1.2)
EOSINOPHILS ABSOLUTE: 0 K/UL (ref 0–0.5)
EOSINOPHILS RELATIVE PERCENT: 0 % (ref 0.8–7)
EPITHELIAL CELLS, UA: NORMAL /HPF
ETHANOL PERCENT: NORMAL G/DL
ETHANOL: <10 MG/DL (ref 0–0.08)
GFR AFRICAN AMERICAN: 44.6
GFR NON-AFRICAN AMERICAN: 36.9
GLOBULIN: 3 G/DL (ref 2.3–3.5)
GLUCOSE BLD-MCNC: 132 MG/DL (ref 70–99)
GLUCOSE URINE: NEGATIVE MG/DL
HCT VFR BLD CALC: 46 % (ref 42–52)
HEMOGLOBIN: 16.4 G/DL (ref 13.7–17.5)
IMMATURE GRANULOCYTES #: 0.1 K/UL
IMMATURE GRANULOCYTES %: 0.4 %
KETONES, URINE: ABNORMAL MG/DL
LACTIC ACID: 2.4 MMOL/L (ref 0.5–2.2)
LEUKOCYTE ESTERASE, URINE: NEGATIVE
LYMPHOCYTES ABSOLUTE: 0.7 K/UL (ref 1.3–3.6)
LYMPHOCYTES RELATIVE PERCENT: 3.2 %
Lab: NORMAL
MCH RBC QN AUTO: 31.2 PG (ref 25.7–32.2)
MCHC RBC AUTO-ENTMCNC: 35.7 % (ref 32.3–36.5)
MCV RBC AUTO: 87.5 FL (ref 79–92.2)
MONOCYTES ABSOLUTE: 1 K/UL (ref 0.3–0.8)
MONOCYTES RELATIVE PERCENT: 4.7 % (ref 5.3–12.2)
MUCUS: PRESENT /LPF
NEUTROPHILS ABSOLUTE: 18.6 K/UL (ref 1.8–5.4)
NEUTROPHILS RELATIVE PERCENT: 91.5 % (ref 34–67.9)
NITRITE, URINE: NEGATIVE
OPIATE SCREEN URINE: NORMAL
PDW BLD-RTO: 11.7 % (ref 11.6–14.4)
PH UA: 5.5 (ref 5–9)
PHENCYCLIDINE SCREEN URINE: NORMAL
PLATELET # BLD: 303 K/UL (ref 163–337)
POTASSIUM REFLEX MAGNESIUM: 4.2 MEQ/L (ref 3.4–4.9)
PROTEIN UA: 30 MG/DL
RBC # BLD: 5.26 M/UL (ref 4.63–6.08)
RBC UA: NORMAL /HPF (ref 0–2)
SALICYLATE, SERUM: <0.3 MG/DL (ref 15–30)
SARS-COV-2, NAAT: NOT DETECTED
SODIUM BLD-SCNC: 140 MEQ/L (ref 135–144)
SPECIFIC GRAVITY UA: >=1.03 (ref 1–1.03)
TOTAL CK: 429 U/L (ref 0–190)
TOTAL PROTEIN: 8.9 G/DL (ref 6.3–8)
URINE REFLEX TO CULTURE: ABNORMAL
UROBILINOGEN, URINE: 0.2 E.U./DL
WBC # BLD: 20.3 K/UL (ref 4.2–9)
WBC UA: NORMAL /HPF (ref 0–5)

## 2022-05-29 PROCEDURE — 82550 ASSAY OF CK (CPK): CPT

## 2022-05-29 PROCEDURE — 36415 COLL VENOUS BLD VENIPUNCTURE: CPT

## 2022-05-29 PROCEDURE — 96372 THER/PROPH/DIAG INJ SC/IM: CPT

## 2022-05-29 PROCEDURE — 2500000003 HC RX 250 WO HCPCS: Performed by: EMERGENCY MEDICINE

## 2022-05-29 PROCEDURE — 80061 LIPID PANEL: CPT

## 2022-05-29 PROCEDURE — 71045 X-RAY EXAM CHEST 1 VIEW: CPT

## 2022-05-29 PROCEDURE — 84145 PROCALCITONIN (PCT): CPT

## 2022-05-29 PROCEDURE — 80179 DRUG ASSAY SALICYLATE: CPT

## 2022-05-29 PROCEDURE — 87635 SARS-COV-2 COVID-19 AMP PRB: CPT

## 2022-05-29 PROCEDURE — 80143 DRUG ASSAY ACETAMINOPHEN: CPT

## 2022-05-29 PROCEDURE — 87040 BLOOD CULTURE FOR BACTERIA: CPT

## 2022-05-29 PROCEDURE — 82077 ASSAY SPEC XCP UR&BREATH IA: CPT

## 2022-05-29 PROCEDURE — 83605 ASSAY OF LACTIC ACID: CPT

## 2022-05-29 PROCEDURE — 2580000003 HC RX 258: Performed by: EMERGENCY MEDICINE

## 2022-05-29 PROCEDURE — 99285 EMERGENCY DEPT VISIT HI MDM: CPT

## 2022-05-29 PROCEDURE — 6370000000 HC RX 637 (ALT 250 FOR IP): Performed by: EMERGENCY MEDICINE

## 2022-05-29 PROCEDURE — 85025 COMPLETE CBC W/AUTO DIFF WBC: CPT

## 2022-05-29 PROCEDURE — 81001 URINALYSIS AUTO W/SCOPE: CPT

## 2022-05-29 PROCEDURE — 93005 ELECTROCARDIOGRAM TRACING: CPT

## 2022-05-29 PROCEDURE — 84443 ASSAY THYROID STIM HORMONE: CPT

## 2022-05-29 PROCEDURE — 80048 BASIC METABOLIC PNL TOTAL CA: CPT

## 2022-05-29 PROCEDURE — 80053 COMPREHEN METABOLIC PANEL: CPT

## 2022-05-29 PROCEDURE — 80306 DRUG TEST PRSMV INSTRMNT: CPT

## 2022-05-29 RX ORDER — 0.9 % SODIUM CHLORIDE 0.9 %
1000 INTRAVENOUS SOLUTION INTRAVENOUS ONCE
Status: COMPLETED | OUTPATIENT
Start: 2022-05-29 | End: 2022-05-29

## 2022-05-29 RX ORDER — 0.9 % SODIUM CHLORIDE 0.9 %
2000 INTRAVENOUS SOLUTION INTRAVENOUS ONCE
Status: COMPLETED | OUTPATIENT
Start: 2022-05-29 | End: 2022-05-30

## 2022-05-29 RX ORDER — OLANZAPINE 10 MG/1
10 INJECTION, POWDER, LYOPHILIZED, FOR SOLUTION INTRAMUSCULAR ONCE
Status: COMPLETED | OUTPATIENT
Start: 2022-05-29 | End: 2022-05-29

## 2022-05-29 RX ORDER — HALOPERIDOL 5 MG
5 TABLET ORAL ONCE
Status: COMPLETED | OUTPATIENT
Start: 2022-05-29 | End: 2022-05-29

## 2022-05-29 RX ORDER — LORAZEPAM 1 MG/1
2 TABLET ORAL ONCE
Status: COMPLETED | OUTPATIENT
Start: 2022-05-29 | End: 2022-05-29

## 2022-05-29 RX ADMIN — HALOPERIDOL 5 MG: 5 TABLET ORAL at 23:50

## 2022-05-29 RX ADMIN — SODIUM CHLORIDE 2000 ML: 9 INJECTION, SOLUTION INTRAVENOUS at 23:16

## 2022-05-29 RX ADMIN — SODIUM CHLORIDE 1000 ML: 9 INJECTION, SOLUTION INTRAVENOUS at 21:57

## 2022-05-29 RX ADMIN — LORAZEPAM 2 MG: 1 TABLET ORAL at 23:50

## 2022-05-29 RX ADMIN — OLANZAPINE 10 MG: 10 INJECTION, POWDER, LYOPHILIZED, FOR SOLUTION INTRAMUSCULAR at 21:27

## 2022-05-29 ASSESSMENT — ENCOUNTER SYMPTOMS
DIARRHEA: 0
COUGH: 0
VOMITING: 0
SORE THROAT: 0
RHINORRHEA: 0
SHORTNESS OF BREATH: 0
BLOOD IN STOOL: 0
NAUSEA: 0
COLOR CHANGE: 0
ABDOMINAL PAIN: 0

## 2022-05-29 ASSESSMENT — PATIENT HEALTH QUESTIONNAIRE - PHQ9: SUM OF ALL RESPONSES TO PHQ QUESTIONS 1-9: 20

## 2022-05-29 ASSESSMENT — LIFESTYLE VARIABLES: HOW OFTEN DO YOU HAVE A DRINK CONTAINING ALCOHOL: NEVER

## 2022-05-30 VITALS
DIASTOLIC BLOOD PRESSURE: 58 MMHG | HEIGHT: 73 IN | OXYGEN SATURATION: 96 % | SYSTOLIC BLOOD PRESSURE: 100 MMHG | HEART RATE: 80 BPM | WEIGHT: 175 LBS | BODY MASS INDEX: 23.19 KG/M2 | TEMPERATURE: 97.9 F | RESPIRATION RATE: 16 BRPM

## 2022-05-30 LAB
ACETAMINOPHEN LEVEL: <5 UG/ML (ref 10–30)
ANION GAP SERPL CALCULATED.3IONS-SCNC: 17 MEQ/L (ref 9–15)
BASOPHILS ABSOLUTE: 0 K/UL (ref 0–0.2)
BASOPHILS ABSOLUTE: 0.1 K/UL (ref 0–0.2)
BASOPHILS RELATIVE PERCENT: 0.3 %
BASOPHILS RELATIVE PERCENT: 0.7 %
BUN BLDV-MCNC: 22 MG/DL (ref 6–20)
CALCIUM SERPL-MCNC: 9.2 MG/DL (ref 8.5–9.9)
CHLORIDE BLD-SCNC: 96 MEQ/L (ref 95–107)
CHOLESTEROL, TOTAL: 141 MG/DL (ref 0–199)
CO2: 21 MEQ/L (ref 20–31)
CREAT SERPL-MCNC: 1.44 MG/DL (ref 0.7–1.2)
EKG ATRIAL RATE: 98 BPM
EKG P AXIS: 66 DEGREES
EKG P-R INTERVAL: 144 MS
EKG Q-T INTERVAL: 324 MS
EKG QRS DURATION: 84 MS
EKG QTC CALCULATION (BAZETT): 413 MS
EKG R AXIS: 72 DEGREES
EKG T AXIS: 49 DEGREES
EKG VENTRICULAR RATE: 98 BPM
EOSINOPHILS ABSOLUTE: 0 K/UL (ref 0–0.7)
EOSINOPHILS ABSOLUTE: 0.2 K/UL (ref 0–0.7)
EOSINOPHILS RELATIVE PERCENT: 0 %
EOSINOPHILS RELATIVE PERCENT: 1.9 %
GFR AFRICAN AMERICAN: >60
GFR NON-AFRICAN AMERICAN: 57
GLUCOSE BLD-MCNC: 107 MG/DL (ref 70–99)
HCT VFR BLD CALC: 39.1 % (ref 42–52)
HCT VFR BLD CALC: 42.8 % (ref 42–52)
HDLC SERPL-MCNC: 40 MG/DL (ref 40–59)
HEMOGLOBIN: 13.4 G/DL (ref 14–18)
HEMOGLOBIN: 14.6 G/DL (ref 14–18)
LACTIC ACID: 1.7 MMOL/L (ref 0.5–2.2)
LDL CHOLESTEROL CALCULATED: 87 MG/DL (ref 0–129)
LYMPHOCYTES ABSOLUTE: 1.1 K/UL (ref 1–4.8)
LYMPHOCYTES ABSOLUTE: 2.4 K/UL (ref 1–4.8)
LYMPHOCYTES RELATIVE PERCENT: 29.7 %
LYMPHOCYTES RELATIVE PERCENT: 7.4 %
MCH RBC QN AUTO: 30.4 PG (ref 27–31.3)
MCH RBC QN AUTO: 30.7 PG (ref 27–31.3)
MCHC RBC AUTO-ENTMCNC: 34.2 % (ref 33–37)
MCHC RBC AUTO-ENTMCNC: 34.2 % (ref 33–37)
MCV RBC AUTO: 89.1 FL (ref 80–100)
MCV RBC AUTO: 89.8 FL (ref 80–100)
MONOCYTES ABSOLUTE: 1 K/UL (ref 0.2–0.8)
MONOCYTES ABSOLUTE: 1.2 K/UL (ref 0.2–0.8)
MONOCYTES RELATIVE PERCENT: 12 %
MONOCYTES RELATIVE PERCENT: 7.5 %
NEUTROPHILS ABSOLUTE: 13 K/UL (ref 1.4–6.5)
NEUTROPHILS ABSOLUTE: 4.5 K/UL (ref 1.4–6.5)
NEUTROPHILS RELATIVE PERCENT: 55.7 %
NEUTROPHILS RELATIVE PERCENT: 84.8 %
PDW BLD-RTO: 12.2 % (ref 11.5–14.5)
PDW BLD-RTO: 12.6 % (ref 11.5–14.5)
PLATELET # BLD: 199 K/UL (ref 130–400)
PLATELET # BLD: 233 K/UL (ref 130–400)
POTASSIUM SERPL-SCNC: 5 MEQ/L (ref 3.4–4.9)
PROCALCITONIN: 0.42 NG/ML (ref 0–0.15)
PROCALCITONIN: 0.48 NG/ML (ref 0–0.15)
RBC # BLD: 4.35 M/UL (ref 4.7–6.1)
RBC # BLD: 4.8 M/UL (ref 4.7–6.1)
SARS-COV-2, NAAT: NOT DETECTED
SODIUM BLD-SCNC: 134 MEQ/L (ref 135–144)
TRIGL SERPL-MCNC: 69 MG/DL (ref 0–150)
TSH SERPL DL<=0.05 MIU/L-ACNC: 2.31 UIU/ML (ref 0.44–3.86)
WBC # BLD: 15.4 K/UL (ref 4.8–10.8)
WBC # BLD: 8.1 K/UL (ref 4.8–10.8)

## 2022-05-30 PROCEDURE — 6370000000 HC RX 637 (ALT 250 FOR IP): Performed by: STUDENT IN AN ORGANIZED HEALTH CARE EDUCATION/TRAINING PROGRAM

## 2022-05-30 PROCEDURE — 85025 COMPLETE CBC W/AUTO DIFF WBC: CPT

## 2022-05-30 PROCEDURE — 83605 ASSAY OF LACTIC ACID: CPT

## 2022-05-30 PROCEDURE — 87635 SARS-COV-2 COVID-19 AMP PRB: CPT

## 2022-05-30 PROCEDURE — 84145 PROCALCITONIN (PCT): CPT

## 2022-05-30 PROCEDURE — 36415 COLL VENOUS BLD VENIPUNCTURE: CPT

## 2022-05-30 RX ORDER — NICOTINE 21 MG/24HR
1 PATCH, TRANSDERMAL 24 HOURS TRANSDERMAL DAILY
Status: CANCELLED | OUTPATIENT
Start: 2022-05-30

## 2022-05-30 RX ORDER — MAGNESIUM HYDROXIDE/ALUMINUM HYDROXICE/SIMETHICONE 120; 1200; 1200 MG/30ML; MG/30ML; MG/30ML
30 SUSPENSION ORAL EVERY 6 HOURS PRN
Status: CANCELLED | OUTPATIENT
Start: 2022-05-30

## 2022-05-30 RX ORDER — HYDROXYZINE HYDROCHLORIDE 50 MG/ML
50 INJECTION, SOLUTION INTRAMUSCULAR EVERY 6 HOURS PRN
Status: CANCELLED | OUTPATIENT
Start: 2022-05-30

## 2022-05-30 RX ORDER — HALOPERIDOL 5 MG
5 TABLET ORAL EVERY 6 HOURS PRN
Status: CANCELLED | OUTPATIENT
Start: 2022-05-30

## 2022-05-30 RX ORDER — POLYETHYLENE GLYCOL 3350 17 G/17G
17 POWDER, FOR SOLUTION ORAL DAILY PRN
Status: CANCELLED | OUTPATIENT
Start: 2022-05-30

## 2022-05-30 RX ORDER — LORAZEPAM 1 MG/1
1 TABLET ORAL ONCE
Status: COMPLETED | OUTPATIENT
Start: 2022-05-30 | End: 2022-05-30

## 2022-05-30 RX ORDER — HYDROXYZINE PAMOATE 50 MG/1
50 CAPSULE ORAL EVERY 6 HOURS PRN
Status: CANCELLED | OUTPATIENT
Start: 2022-05-30

## 2022-05-30 RX ORDER — HALOPERIDOL 5 MG/ML
5 INJECTION INTRAMUSCULAR EVERY 6 HOURS PRN
Status: CANCELLED | OUTPATIENT
Start: 2022-05-30

## 2022-05-30 RX ORDER — ACETAMINOPHEN 325 MG/1
650 TABLET ORAL EVERY 4 HOURS PRN
Status: CANCELLED | OUTPATIENT
Start: 2022-05-30

## 2022-05-30 RX ORDER — HALOPERIDOL 5 MG
5 TABLET ORAL ONCE
Status: COMPLETED | OUTPATIENT
Start: 2022-05-30 | End: 2022-05-30

## 2022-05-30 RX ORDER — TRAZODONE HYDROCHLORIDE 50 MG/1
50 TABLET ORAL NIGHTLY PRN
Status: CANCELLED | OUTPATIENT
Start: 2022-05-30

## 2022-05-30 RX ADMIN — NICOTINE POLACRILEX 2 MG: 2 GUM, CHEWING BUCCAL at 14:23

## 2022-05-30 RX ADMIN — HALOPERIDOL 5 MG: 5 TABLET ORAL at 14:23

## 2022-05-30 RX ADMIN — LORAZEPAM 1 MG: 1 TABLET ORAL at 14:23

## 2022-05-30 ASSESSMENT — PAIN - FUNCTIONAL ASSESSMENT
PAIN_FUNCTIONAL_ASSESSMENT: NONE - DENIES PAIN
PAIN_FUNCTIONAL_ASSESSMENT: NONE - DENIES PAIN
PAIN_FUNCTIONAL_ASSESSMENT: FACE, LEGS, ACTIVITY, CRY, AND CONSOLABILITY (FLACC)
PAIN_FUNCTIONAL_ASSESSMENT: NONE - DENIES PAIN

## 2022-05-30 NOTE — ED NOTES
Pt left the ER with Dillon Flores ([facility]) staff  A report on the pt was given to Dillon Flores ([facility]) staff, they signed the  Form for his transfer.   Removed pt's IN access on his right lower arm which he was cooperative with  All of the pt's belongings were returned from security and given to [employer] was given their paper work and the chart for the [facility] left cooperative on a pink sheet      Rima Galeas RN  05/30/22 7487

## 2022-05-30 NOTE — ED TRIAGE NOTES
Pt sent by EMS from [facility]. Per EMS, pt told physician that he was suicidal. Pt was pink-slipped and labs were out of normal range. Pt sent to [facility] EMERGENCY [facility] AT [place] for IVF, psych eval. Pt is calm and cooperative but refusing to answer most questions. Pt refusing to let RN take temperature during triage. Pt skin red, from sun. No sob or acute distress noted.

## 2022-05-30 NOTE — ED NOTES
Patient receiving IV fluids near RN station for close observation     Regina Harris [facility]  05/29/22 9931

## 2022-05-30 NOTE — ED NOTES
Lab is here to draw the pt's blood work ordered for a Procalctonin and CBC. Pt is cooperative with having his blood work drawn.   - staff are present while his blood work is being drawn            Angel Frost RN  05/30/22 [phone]

## 2022-05-30 NOTE — ED NOTES
Fluid bolus complete, I.V. flushed. Lab called for lactic acid redraw.       Poornima Adkins RN  05/30/22 0116

## 2022-05-30 NOTE — ED NOTES
Dr. Devon Boas in room talking to patient. Pt. Started to get upset and was being argumentative to EMS about transferring patient. Explained to patient that he needed to go there due to medical reasons and that he was pink slipped. Pt. Agreed to go.        Peg Reid RN  05/29/22 8576

## 2022-05-30 NOTE — ED NOTES
Received a call from Beata at [facility]. [facility] requesting pink slip to be faxed.       Candie Espinal RN  05/30/22 2031
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
## 2022-05-30 NOTE — ED NOTES
Patient provided ginger ale per request. Pleasant and cooperative at this time. Patient oriented and recognizes writer from past admits.       Renae Goldman RN  05/30/22 4907

## 2022-05-30 NOTE — ED NOTES
Pt requested and was given a snack and a drink, he was cooperative with even respirations no problems and no C/O any kind expressed     Rajeev Gutierrez RN  05/30/22 7426

## 2022-05-30 NOTE — ED NOTES
ED Zone 1 called to notify provider of procalcitonin and ask for further evaluation. Dr. Luis Donaldson aware. No further orders at this time.       Elvia Salgado, RN  05/30/22 2458

## 2022-05-30 NOTE — ED NOTES
Pt remains in bed area quiet and cooperative with no problems and no C/O any kind expressed.      Jose Rodriguez RN  05/30/22 3628

## 2022-05-30 NOTE — ED NOTES
Pt remains asleep with even respirations no problems and no C/O any kind expressed.      Shady Gautam RN  05/30/22 0227

## 2022-05-30 NOTE — ED NOTES
Pt is eating his lunch at the bedside, awake, quiet and cooperative.      Audrey Escobedo RN  05/30/22 1630

## 2022-05-30 NOTE — ED NOTES
Pt was cooperative with completing his vital signs with him. He stated the medications given to him helped him, he is able to relax and rest per pt.      Jose Rodriguez RN  05/30/22 1640

## 2022-05-30 NOTE — ED NOTES
Pt remains asleep with even respirations no problems and no C/O any kind expressed.      Keturah Vallejo RN  05/30/22 8052

## 2022-05-30 NOTE — ED NOTES
Pt is in bed area quiet and cooperative with no problems and no C/O any kind expressed     Poornima Mederos, CINDA  05/30/22 1828

## 2022-05-30 NOTE — ED PROVIDER NOTES
eMERGENCY dEPARTMENT eNCOUnter      [facility]    Chief Complaint   Patient presents with    Psychiatric Evaluation     c/o psychiatric issues, having sexual thoughts, continues to say that medications are not helping. Just got out of senior care today, after 6 months and lost apartment.         NICK Grant is a 32 y.o. male with hx of bipolar , Drug abuse, dependence, depression, TBI, Seizures  who presentsto ED from home   By private car   With complaint of Psychiatric med's not working which he threw away and now he is suicidal with a plan \" To Do Heroin and Not Wake up \"  He denies homicidal ideations , but  is also seeing things       PAST MEDICAL HISTORY    Past Medical History:   Diagnosis Date    Anxiety 1/4/2012    Bipolar affective disorder ([address] 11/25/2015    Chondromalacia of left knee     Chondromalacia of left knee     Chondromalacia of right knee     Chondromalacia of right knee     Chronic back pain 4/20/2017    Depression     Drug abuse and dependence ([address] 1/4/2012    Fracture of left side of mandibular body ([address] 06/17/2018    ACUTE LEFT CONDYLAR NECK FRACTURE    Fracture of navicular bone of foot, closed 09/03/2012    lt    Fracture of temporal bone ([address] 2015    History of pulmonary embolism     Schizophrenia ([address]     Seizures ([address]     Talus fracture 9/2012    lt    TBI (traumatic brain injury) ([address] 2015    Wound of left leg 9/14/2012       SURGICAL HISTORY    Past Surgical History:   Procedure Laterality Date    WRIST SURGERY  2009    pin in wrist left       CURRENT MEDICATIONS    Current Outpatient Rx   Medication Sig Dispense Refill    benztropine (COGENTIN) 0.5 MG tablet Take 0.5 mg by mouth 2 times daily      risperiDONE (RISPERDAL) 2 MG tablet Take 2 mg by mouth 2 times daily      paliperidone palmitate ER (INVEGA SUSTENNA) 156 MG/ML MARYANN IM injection Inject 156 mg into the muscle every 30 days Was due 12-, had starting dose of 234mg IM on 12-8-2020  hydrOXYzine (ATARAX) 10 MG tablet Take 25 mg by mouth every 6 hours as needed for Anxiety          ALLERGIES    Allergies   Allergen Reactions    Shellfish-Derived Products Itching    Shrimp Flavor Itching       FAMILY HISTORY    Family History   Family history unknown: Yes       SOCIAL HISTORY    Social History     Socioeconomic History    Marital status: Single     Spouse name: None    Number of children: 0    Years of education: 15    Highest education level: None   Occupational History    None   Tobacco Use    Smoking status: Current Some Day Smoker     Packs/day: 1.00     Years: 1.00     Pack years: 1.00     Types: Cigarettes     Last attempt to quit: 11/28/2011     Years since quitting: 10.5    Smokeless tobacco: Current User     Types: Chew   Vaping Use    Vaping Use: Never used   Substance and Sexual Activity    Alcohol use: Yes     Comment: drank 2 shots of vodka prior to arrival    Drug use: Yes     Frequency: 7.0 times per week     Types: Marijuana (Og Lights), Cocaine    Sexual activity: Not Currently     Partners: Female   Other Topics Concern    None   Social History Narrative    Lives w family     Social Determinants of Health     Financial Resource Strain:     Difficulty of Paying Living Expenses: Not on file   Food Insecurity:     Worried About Running Out of Food in the Last Year: Not on file    Kevin of Food in the Last Year: Not on file   Transportation Needs:     Lack of Transportation (Medical): Not on file    Lack of Transportation (Non-Medical):  Not on file   Physical Activity:     Days of Exercise per Week: Not on file    Minutes of Exercise per Session: Not on file   Stress:     Feeling of Stress : Not on file   Social Connections:     Frequency of Communication with Friends and Family: Not on file    Frequency of Social Gatherings with Friends and Family: Not on file    Attends [employer] Services: Not on file    Active Member of Clubs or Organizations: Not on file    Attends Club or Organization Meetings: Not on file    Marital Status: Not on file   Intimate Partner Violence:     Fear of Current or Ex-Partner: Not on file    Emotionally Abused: Not on file    Physically Abused: Not on file    Sexually Abused: Not on file   Housing Stability:     Unable to Pay for Housing in the Last Year: Not on file    Number of Emma in the Last Year: Not on file    Unstable Housing in the Last Year: Not on file       REVIEW OF SYSTEMS    Constitutional:  Denies fever, chills, weight loss or weakness   Eyes:  Denies photophobia or discharge   HENT:  Denies sore throat or ear pain   Respiratory:  Denies cough or shortness of breath   Cardiovascular:  Denies chest pain, palpitations or swelling   GI:  Denies abdominal pain, nausea, vomiting, or diarrhea   Musculoskeletal:  Denies back pain   Skin:  Denies rash   Neurologic:  Denies headache, focal weakness or sensory changes   Endocrine:  Denies polyuria or polydypsia   Lymphatic:  Denies swollen glands   Psychiatric: c/o depression, suicidal ideation but denies  homicidal ideation   All systems negative except as marked. PHYSICAL EXAM    VITAL SIGNS: BP (!) 119/92   Pulse 95   Temp 98.5 °F (36.9 °C) (Oral)   Resp 16   Ht 6' 1\" (1.854 m)   Wt 175 lb (79.4 kg)   SpO2 98%   BMI 23.09 kg/m²    Constitutional:  Well developed, Well nourished, No acute distress, Non-toxic appearance. HENT:  Normocephalic, Atraumatic, Bilateral external ears normal, Oropharynx moist, No oral exudates, Nose normal. Neck- Normal range of motion, No tenderness, Supple, No stridor. Eyes:  PERRL, EOMI, Conjunctiva normal, No discharge. Respiratory:  Normal breath sounds, No respiratory distress, No wheezing, No chest tenderness. Cardiovascular:Tachycardic, Normal rhythm, No murmurs, No rubs, No gallops. GI:  Bowel sounds normal, Soft, No tenderness, No masses, No pulsatile masses. :  CVA tenderness.    Musculoskeletal: Intact distal pulses, No edema, No tenderness, No cyanosis, No clubbing. Good range of motion in all major joints. No tenderness to palpation or major deformities noted. Back- No tenderness. Integument:  Warm, Dry, No erythema, No rash. Lymphatic:  No lymphadenopathy noted. Neurologic:  Alert & oriented x 3, Normal motor function, Normal sensory function, No focal deficits noted. Psychiatric:  Affect anxious , Judgment poor, Mood depressed. EKG    NSR, HR 98 , Normal Axis, No ST- T wave changes, QTc 413    RADIOLOGY    XR CHEST PORTABLE    (Results Pending)       REEVALUATION   Spoke with ED attending Dr Sandra Steve accepted patient transfer     Pink slip filled out     Labs  Labs Reviewed   [address] - Abnormal; Notable for the following components:       Result Value    WBC 20.3 (*)     Neutrophils % 91.5 (*)     Monocytes % 4.7 (*)     Eosinophils % 0.0 (*)     Neutrophils Absolute 18.6 (*)     Lymphocytes Absolute 0.7 (*)     Monocytes Absolute 1.0 (*)     All other components within normal limits   COMPREHENSIVE METABOLIC PANEL W/ REFLEX TO MG FOR LOW K - Abnormal; Notable for the following components:    Chloride 93 (*)     Anion Gap 24 (*)     Glucose 132 (*)     BUN 23 (*)     CREATININE 2.10 (*)     GFR Non- 36.9 (*)     GFR  44.6 (*)     Calcium 10.6 (*)     Total Protein 8.9 (*)     Albumin 5.9 (*)     All other components within normal limits   CK - Abnormal; Notable for the following components:     Total  (*)     All other components within normal limits   SALICYLATE LEVEL - Abnormal; Notable for the following components:    Salicylate, Serum <6.3 (*)     All other components within normal limits   URINALYSIS WITH REFLEX TO CULTURE - Abnormal; Notable for the following components:    Protein, UA 30 (*)     All other components within normal limits   COVID-19, RAPID   CULTURE, BLOOD 1   CULTURE, BLOOD 2   ETHANOL   ACETAMINOPHEN LEVEL MICROSCOPIC URINALYSIS   URINE DRUG SCREEN, COMPREHENSIVE   TSH   LIPID PANEL             Summation      Patient Course:     ED Medications administered this visit:    Medications   0.9 % sodium chloride bolus (1,000 mLs IntraVENous New Bag 5/29/22 2157)   OLANZapine (ZYPREXA) injection 10 mg (10 mg IntraMUSCular Given 5/29/22 2127)       New Prescriptions from this visit:    New Prescriptions    No medications on file       Follow-up:  No follow-up provider specified. Final Impression:   1. Suicidal ideation    2. Dehydration    3.  Elevated CK               (Please note that portions of this note were completed with a voice recognition program.  Efforts were made to edit the dictations but occasionally words are mis-transcribed.)          Kris Wall MD  05/29/22 2136       Kris Wall MD  05/29/22 0412

## 2022-05-30 NOTE — ED NOTES
Received a return call from [facility] from [place]. Transport scheduled with [facility] ([facility]) with and ETA of 1730.      Cele Burdick RN  05/30/22 1000 Renard David RN  05/30/22 1746

## 2022-05-30 NOTE — ED PROVIDER NOTES
[address] [facility] ED  eMERGENCY dEPARTMENT eNCOUnter      Pt Name: Kadie Boateng  MRN: [id number]  [place] 1990  Date of evaluation: 5/29/2022  Provider: VELMA Garcia      HISTORY OF PRESENT ILLNESS    Kadie Boateng is a 32 y.o. male with PMHx of anxiety, drug abuse, depression, PE, schizophrenia, seizures, TBI presents to the emergency department with mental health evaluation. Pt sent from [facility]. He was seen there after saying his psych meds were not working so he threw them away and now he is suicidal with a plan to do heroin. He was pink slipped. Given 1 L IV fluids and Zyprexa. Patient arrives to [facility] emergency room and states he wants to go home. He states that he is homeless and that's why he went to [facility]. He has been walking around outside with minimal oral intake. Says he has been taking tylenol to help him sleep, denies it being tylenol PM or having any pain. Pt denies SI, HI or hallucinations. Denies fevers, chills, cough, CP, SOB, abdominal pain, nausea, vomiting, diarrhea. HPI    Nursing Notes were reviewed. REVIEW OF SYSTEMS       Review of Systems   Constitutional: Negative for appetite change, chills and fever. HENT: Negative for congestion, rhinorrhea and sore throat. Respiratory: Negative for cough and shortness of breath. Cardiovascular: Negative for chest pain. Gastrointestinal: Negative for abdominal pain, blood in stool, diarrhea, nausea and vomiting. Genitourinary: Negative for difficulty urinating. Musculoskeletal: Negative for neck stiffness. Skin: Negative for color change and rash. Neurological: Negative for dizziness, syncope, weakness, light-headedness, numbness and headaches. Psychiatric/Behavioral: Positive for suicidal ideas. All other systems reviewed and are negative.             PAST MEDICAL HISTORY     Past Medical History:   Diagnosis Date    Anxiety 1/4/2012    Bipolar affective disorder ([facility] 11/25/2015    Chondromalacia of left knee     Chondromalacia of left knee     Chondromalacia of right knee     Chondromalacia of right knee     Chronic back pain 4/20/2017    Depression     Drug abuse and dependence ([facility] 1/4/2012    Fracture of left side of mandibular body ([facility] 06/17/2018    ACUTE LEFT CONDYLAR NECK FRACTURE    Fracture of navicular bone of foot, closed 09/03/2012    lt    Fracture of temporal bone ([facility] 2015    History of pulmonary embolism     Schizophrenia ([facility]     Seizures ([facility]     Talus fracture 9/2012    lt    TBI (traumatic brain injury) ([facility] 2015    Wound of left leg 9/14/2012         SURGICAL HISTORY       Past Surgical History:   Procedure Laterality Date    WRIST SURGERY  2009    pin in wrist left         CURRENT MEDICATIONS       Previous Medications    No medications on file       ALLERGIES     Shellfish-derived products and Shrimp flavor    FAMILY HISTORY       Family History   Family history unknown: Yes          SOCIAL HISTORY       Social History     Socioeconomic History    Marital status: Single     Spouse name: None    Number of children: 0    Years of education: 15    Highest education level: None   Occupational History    None   Tobacco Use    Smoking status: Current Some Day Smoker     Packs/day: 1.00     Years: 1.00     Pack years: 1.00     Types: Cigarettes     Last attempt to quit: 11/28/2011     Years since quitting: 10.5    Smokeless tobacco: Current User     Types: Chew   Vaping Use    Vaping Use: Never used   Substance and Sexual Activity    Alcohol use: Yes     Comment: drank 2 shots of vodka prior to arrival    Drug use: Yes     Frequency: 7.0 times per week     Types: Marijuana (Cloretta Vannessa), Cocaine    Sexual activity: Not Currently     Partners: Female   Other Topics Concern    None   Social History Narrative    Lives w family     Social Determinants of Health     Financial Resource Strain:     Difficulty of Paying Living Expenses: Not on file   Food Insecurity:     Worried About Running Out of Food in the Last Year: Not on file    Kevin of Food in the Last Year: Not on file   Transportation Needs:     Lack of Transportation (Medical): Not on file    Lack of Transportation (Non-Medical): Not on file   Physical Activity:     Days of Exercise per Week: Not on file    Minutes of Exercise per Session: Not on file   Stress:     Feeling of Stress : Not on file   Social Connections:     Frequency of Communication with Friends and Family: Not on file    Frequency of Social Gatherings with Friends and Family: Not on file    Attends [employer] Services: Not on file    Active Member of [address] [place] [address] [employer] or Organizations: Not on file    Attends Club or Organization Meetings: Not on file    Marital Status: Not on file   Intimate Partner Violence:     Fear of Current or Ex-Partner: Not on file    Emotionally Abused: Not on file    Physically Abused: Not on file    Sexually Abused: Not on file   Housing Stability:     Unable to Pay for Housing in the Last Year: Not on file    Number of [place] in the Last Year: Not on file    Unstable Housing in the Last Year: Not on file         PHYSICAL EXAM         ED Triage Vitals [05/29/22 2310]   BP Temp Temp src Heart Rate Resp SpO2 Height Weight   (!) 122/97 -- -- 83 18 100 % 6' 1\" (1.854 m) 175 lb (79.4 kg)       Physical Exam  Constitutional:       Appearance: He is well-developed. HENT:      Head: Normocephalic and atraumatic. Eyes:      Conjunctiva/sclera: Conjunctivae normal.      Pupils: Pupils are equal, round, and reactive to light. Neck:      Trachea: No tracheal deviation. Cardiovascular:      Heart sounds: Normal heart sounds. Pulmonary:      Effort: Pulmonary effort is normal. No respiratory distress. Breath sounds: Normal breath sounds. No stridor. Abdominal:      General: Bowel sounds are normal. There is no distension. Palpations: Abdomen is soft. There is no mass.       Tenderness: There is no abdominal tenderness. There is no guarding or rebound. Musculoskeletal:         General: Normal range of motion. Cervical back: Normal range of motion and neck supple. Skin:     General: Skin is warm and dry. Capillary Refill: Capillary refill takes less than 2 seconds. Findings: No rash. Neurological:      Mental Status: He is alert and oriented to person, place, and time. Deep Tendon Reflexes: Reflexes are normal and symmetric. Psychiatric:         Behavior: Behavior normal.         Thought Content:  Thought content normal.         Judgment: Judgment normal.         DIAGNOSTIC RESULTS     EKG:All EKG's are interpreted by the Emergency Department Physician who either signs or Co-signs this chart in the absence of a cardiologist.        RADIOLOGY:   Non-plain film images such as CT, Ultrasound and MRI are read by theradiologist. Plain radiographic images are visualized and preliminarily interpreted by the emergency physician with the below findings:    Interpretation per theRadiologist below, if available at the time of this note:    No orders to display           LABS:  Labs Reviewed   CBC WITH AUTO DIFFERENTIAL - Abnormal; Notable for the following components:       Result Value    WBC 15.4 (*)     Neutrophils Absolute 13.0 (*)     Monocytes Absolute 1.2 (*)     All other components within normal limits   LACTIC ACID - Abnormal; Notable for the following components:    Lactic Acid 2.4 (*)     All other components within normal limits   PROCALCITONIN - Abnormal; Notable for the following components:    Procalcitonin 0.42 (*)     All other components within normal limits   BASIC METABOLIC PANEL - Abnormal; Notable for the following components:    Sodium 134 (*)     Potassium 5.0 (*)     Anion Gap 17 (*)     Glucose 107 (*)     BUN 22 (*)     CREATININE 1.44 (*)     GFR Non- 57.0 (*)     All other components within normal limits   ACETAMINOPHEN LEVEL - Abnormal; Notable for the following components:    Acetaminophen Level <5 (*)     All other components within normal limits   COVID-19, RAPID   LACTIC ACID       All other labs were within normal range or not returned as of this dictation. EMERGENCY DEPARTMENT COURSE and DIFFERENTIAL DIAGNOSIS/MDM:   Vitals:    Vitals:    05/29/22 2310 05/30/22 0125   BP: (!) 122/97 111/74   Pulse: 83 86   Resp: 18 16   Temp:  98.6 °F (37 °C)   TempSrc:  Oral   SpO2: 100% 99%   Weight: 175 lb (79.4 kg)    Height: 6' 1\" (1.854 m)          MDM    Abnormal lab work repeated showing after 1L IVF at [facility] shows improvement of WBC from 20-15.4, tylenol levels negative, lactic acid WNL, Creatine improved. Medically cleared. Patient is afebrile. Patient is medically cleared. He will go to [facility]. Patient was given p.o. Haldol, p.o. Ativan. Patient also was given nicotine gum per his request.          Procedures    FINAL IMPRESSION      1. Psychosis, unspecified psychosis type ([facility]          DISPOSITION/PLAN   DISPOSITION        PATIENT REFERRED TO:  No follow-up provider specified. DISCHARGE MEDICATIONS:  New Prescriptions    No medications on file          (Please notethat portions of this note were completed with a voice recognition program.  Efforts were made to edit the dictations but occasionally words are mis-transcribed. )    VELMA Salazar (electronically signed)  Emergency Physician [place],   05/30/22 [address] [facility], DO  05/30/22 6051

## 2022-05-30 NOTE — ED NOTES
Report given to Gregory Horton RN at [facility] behavioral [facility] ED.        Matt Randhawa RN  05/29/22 2129

## 2022-05-30 NOTE — ED NOTES
Call placed to lab and notified Cher Bowers of new orders.       Osvaldo Christopher RN  05/30/22 7859

## 2022-05-30 NOTE — ED NOTES
Patient up to bathroom and ate 100% of breakfast. No complaints voiced.       Tian Irby RN  05/30/22 9739

## 2022-05-30 NOTE — ED NOTES
Breakfast was given to the pt at the bedside, pt is quiet and cooperative with no problems and no C/O any kind expressed.      Ori Kirkland RN  05/30/22 8126

## 2022-05-30 NOTE — ED NOTES
Kandis Granda called from [facility] and Marily is asking for his EKG, urine, and his drug screen sent to [facility] at [phone]. Faxed to [facility] information they had requested.      Chetan Cotter RN  05/30/22 0086

## 2022-05-30 NOTE — ED NOTES
Patient resting quietly respirations are even and unlabored no distress noted at this time.      Fatimah Crowe RN  05/30/22 9530

## 2022-05-30 NOTE — ED NOTES
Patient complaint of auditory hallucinations and requesting medication to help with his symptoms. Behavior calm and controlled at this time. Updated provider.       Cm Lin RN  05/30/22 3490

## 2022-05-30 NOTE — ED NOTES
Patient is hallucinating, requesting medication for intrusive voices.      Scar Angulo RN  05/29/22 9107

## 2022-05-30 NOTE — ED NOTES
Patient resting quietly respirations are even and unlabored.       Poornima Adkins RN  05/30/22 3994

## 2022-05-30 NOTE — ED NOTES
[place] slip faxed to [facility].       Pravin Ng RN  05/30/22 6671

## 2022-05-30 NOTE — ED NOTES
Talked with Kerri Meraz at [facility] who took a report on the pt.   Answered Edwin's questions     Rima Galeas RN  05/30/22 6840

## 2022-05-30 NOTE — ED NOTES
Dr. Edelmira Sinha at bedside examining patient. Pt. Stated to him that he wants to go to sleep and never wake up. Will start psych work up and transfer.       Jean Carreon RN  05/29/22 6544

## 2022-05-30 NOTE — ED NOTES
Patient reports feeling like the medication is working stating that he feels calmer and that the voices are quieter.       Tremaine Aguilar RN  05/30/22 1343

## 2022-06-04 LAB
BLOOD CULTURE, ROUTINE: NORMAL
CULTURE, BLOOD 2: NORMAL

## 2022-06-13 ENCOUNTER — HOSPITAL ENCOUNTER (EMERGENCY)
Age: 32
Discharge: PSYCHIATRIC OR SUBSTANCE USE FACILITY | End: 2022-06-14
Payer: COMMERCIAL

## 2022-06-13 DIAGNOSIS — F29 PSYCHOSIS, UNSPECIFIED PSYCHOSIS TYPE (HCC): ICD-10-CM

## 2022-06-13 DIAGNOSIS — F14.10 COCAINE ABUSE (HCC): Primary | ICD-10-CM

## 2022-06-13 LAB
ACETAMINOPHEN LEVEL: <5 UG/ML (ref 10–30)
ALBUMIN SERPL-MCNC: 4.6 G/DL (ref 3.5–4.6)
ALP BLD-CCNC: 79 U/L (ref 35–104)
ALT SERPL-CCNC: 22 U/L (ref 0–41)
AMPHETAMINE SCREEN, URINE: ABNORMAL
ANION GAP SERPL CALCULATED.3IONS-SCNC: 11 MEQ/L (ref 9–15)
AST SERPL-CCNC: 23 U/L (ref 0–40)
BARBITURATE SCREEN URINE: ABNORMAL
BASOPHILS ABSOLUTE: 0 K/UL (ref 0–0.2)
BASOPHILS RELATIVE PERCENT: 0.7 %
BENZODIAZEPINE SCREEN, URINE: ABNORMAL
BILIRUB SERPL-MCNC: 0.6 MG/DL (ref 0.2–0.7)
BILIRUBIN URINE: NEGATIVE
BLOOD, URINE: NEGATIVE
BUN BLDV-MCNC: 14 MG/DL (ref 6–20)
CALCIUM SERPL-MCNC: 9.2 MG/DL (ref 8.5–9.9)
CANNABINOID SCREEN URINE: POSITIVE
CHLORIDE BLD-SCNC: 101 MEQ/L (ref 95–107)
CHOLESTEROL, TOTAL: 107 MG/DL (ref 0–199)
CLARITY: CLEAR
CO2: 29 MEQ/L (ref 20–31)
COCAINE METABOLITE SCREEN URINE: POSITIVE
COLOR: YELLOW
CREAT SERPL-MCNC: 0.84 MG/DL (ref 0.7–1.2)
EOSINOPHILS ABSOLUTE: 0.1 K/UL (ref 0–0.7)
EOSINOPHILS RELATIVE PERCENT: 1.2 %
ETHANOL PERCENT: NORMAL G/DL
ETHANOL: <10 MG/DL (ref 0–0.08)
GFR AFRICAN AMERICAN: >60
GFR NON-AFRICAN AMERICAN: >60
GLOBULIN: 2.1 G/DL (ref 2.3–3.5)
GLUCOSE BLD-MCNC: 114 MG/DL (ref 70–99)
GLUCOSE URINE: NEGATIVE MG/DL
HCT VFR BLD CALC: 40.1 % (ref 42–52)
HDLC SERPL-MCNC: 43 MG/DL (ref 40–59)
HEMOGLOBIN: 13.6 G/DL (ref 14–18)
KETONES, URINE: 15 MG/DL
LDL CHOLESTEROL CALCULATED: 54 MG/DL (ref 0–129)
LEUKOCYTE ESTERASE, URINE: NEGATIVE
LYMPHOCYTES ABSOLUTE: 1.4 K/UL (ref 1–4.8)
LYMPHOCYTES RELATIVE PERCENT: 22.4 %
Lab: ABNORMAL
MCH RBC QN AUTO: 30.8 PG (ref 27–31.3)
MCHC RBC AUTO-ENTMCNC: 33.9 % (ref 33–37)
MCV RBC AUTO: 90.9 FL (ref 80–100)
METHADONE SCREEN, URINE: ABNORMAL
MONOCYTES ABSOLUTE: 0.6 K/UL (ref 0.2–0.8)
MONOCYTES RELATIVE PERCENT: 9.1 %
NEUTROPHILS ABSOLUTE: 4.1 K/UL (ref 1.4–6.5)
NEUTROPHILS RELATIVE PERCENT: 66.6 %
NITRITE, URINE: NEGATIVE
OPIATE SCREEN URINE: ABNORMAL
OXYCODONE URINE: ABNORMAL
PDW BLD-RTO: 13 % (ref 11.5–14.5)
PH UA: 5 (ref 5–9)
PHENCYCLIDINE SCREEN URINE: ABNORMAL
PLATELET # BLD: 248 K/UL (ref 130–400)
POTASSIUM SERPL-SCNC: 3.7 MEQ/L (ref 3.4–4.9)
PROPOXYPHENE SCREEN: ABNORMAL
PROTEIN UA: NEGATIVE MG/DL
RBC # BLD: 4.42 M/UL (ref 4.7–6.1)
SALICYLATE, SERUM: <0.3 MG/DL (ref 15–30)
SARS-COV-2, NAAT: NOT DETECTED
SODIUM BLD-SCNC: 141 MEQ/L (ref 135–144)
SPECIFIC GRAVITY UA: 1.02 (ref 1–1.03)
TOTAL CK: 327 U/L (ref 0–190)
TOTAL PROTEIN: 6.7 G/DL (ref 6.3–8)
TRIGL SERPL-MCNC: 49 MG/DL (ref 0–150)
TSH SERPL DL<=0.05 MIU/L-ACNC: 0.84 UIU/ML (ref 0.44–3.86)
URINE REFLEX TO CULTURE: ABNORMAL
UROBILINOGEN, URINE: 0.2 E.U./DL
VALPROIC ACID LEVEL: <2.8 UG/ML (ref 50–100)
WBC # BLD: 6.2 K/UL (ref 4.8–10.8)

## 2022-06-13 PROCEDURE — 85025 COMPLETE CBC W/AUTO DIFF WBC: CPT

## 2022-06-13 PROCEDURE — 80164 ASSAY DIPROPYLACETIC ACD TOT: CPT

## 2022-06-13 PROCEDURE — 80307 DRUG TEST PRSMV CHEM ANLYZR: CPT

## 2022-06-13 PROCEDURE — 99285 EMERGENCY DEPT VISIT HI MDM: CPT

## 2022-06-13 PROCEDURE — 87635 SARS-COV-2 COVID-19 AMP PRB: CPT

## 2022-06-13 PROCEDURE — 36415 COLL VENOUS BLD VENIPUNCTURE: CPT

## 2022-06-13 PROCEDURE — 80053 COMPREHEN METABOLIC PANEL: CPT

## 2022-06-13 PROCEDURE — 80061 LIPID PANEL: CPT

## 2022-06-13 PROCEDURE — 84443 ASSAY THYROID STIM HORMONE: CPT

## 2022-06-13 PROCEDURE — 6370000000 HC RX 637 (ALT 250 FOR IP): Performed by: PHYSICIAN ASSISTANT

## 2022-06-13 PROCEDURE — 80179 DRUG ASSAY SALICYLATE: CPT

## 2022-06-13 PROCEDURE — 81003 URINALYSIS AUTO W/O SCOPE: CPT

## 2022-06-13 PROCEDURE — 82550 ASSAY OF CK (CPK): CPT

## 2022-06-13 PROCEDURE — 80143 DRUG ASSAY ACETAMINOPHEN: CPT

## 2022-06-13 PROCEDURE — 82077 ASSAY SPEC XCP UR&BREATH IA: CPT

## 2022-06-13 RX ORDER — HALOPERIDOL 5 MG
5 TABLET ORAL ONCE
Status: COMPLETED | OUTPATIENT
Start: 2022-06-13 | End: 2022-06-13

## 2022-06-13 RX ADMIN — HALOPERIDOL 5 MG: 5 TABLET ORAL at 19:31

## 2022-06-13 ASSESSMENT — PAIN - FUNCTIONAL ASSESSMENT: PAIN_FUNCTIONAL_ASSESSMENT: NONE - DENIES PAIN

## 2022-06-13 NOTE — ED NOTES
Patient complaint of auditory hallucinations. VELMA Mo to order medication.       Alexi Teixeira RN  06/13/22 1954

## 2022-06-13 NOTE — ED TRIAGE NOTES
Patient arrived from [address] via ambulance for evaluation visual hallucinations

## 2022-06-13 NOTE — ED NOTES
Police notified of belongings to be picked up. VELMA Nelson on the unit for medical assessment.       Jessy Up RN  06/13/22 8883

## 2022-06-13 NOTE — ED NOTES
Lab called. Chart to zone 2.      Mehdi Dhillon, RN  06/13/22 9744 [address] [place], RN  06/13/22 1660

## 2022-06-14 VITALS
WEIGHT: 174 LBS | SYSTOLIC BLOOD PRESSURE: 115 MMHG | TEMPERATURE: 98.9 F | HEIGHT: 72 IN | RESPIRATION RATE: 16 BRPM | BODY MASS INDEX: 23.57 KG/M2 | HEART RATE: 83 BPM | DIASTOLIC BLOOD PRESSURE: 84 MMHG | OXYGEN SATURATION: 99 %

## 2022-06-14 PROCEDURE — 93005 ELECTROCARDIOGRAM TRACING: CPT | Performed by: PERSONAL EMERGENCY RESPONSE ATTENDANT

## 2022-06-14 PROCEDURE — 6370000000 HC RX 637 (ALT 250 FOR IP): Performed by: EMERGENCY MEDICINE

## 2022-06-14 PROCEDURE — 6370000000 HC RX 637 (ALT 250 FOR IP): Performed by: PHYSICIAN ASSISTANT

## 2022-06-14 RX ADMIN — NICOTINE POLACRILEX 2 MG: 2 GUM, CHEWING BUCCAL at 21:24

## 2022-06-14 RX ADMIN — NICOTINE POLACRILEX 4 MG: 4 GUM, CHEWING BUCCAL at 17:57

## 2022-06-14 NOTE — ED NOTES
Ate 100% lunch tray. Continues to take PO fluids well. Continues to await [facility] [place] for transfer to [facility].      Christina Biswas RN  06/14/22 9035

## 2022-06-14 NOTE — ED NOTES
Sitting on side of bed eating lunch. Updated on treatment plan for transfer to [facility]. Verbalizes understanding.       Ese Graham RN  06/14/22 1536

## 2022-06-14 NOTE — ED NOTES
Patient reports that medication did not help with experienced hallucinations   Patient resting in bed at this time   Remains close to RN station for observation     Rashida Peter RN  06/13/22 212

## 2022-06-14 NOTE — ED NOTES
Patient requesting medication for hallucinations. Provider aware and new order acknowledged.      Valdemar Fan RN  06/13/22 0812

## 2022-06-14 NOTE — ED NOTES
Provisional Diagnosis:    Schizoaffective Disorder    Psychosocial and Contextual Factors:    Homeless  Left sober living 2 days ago  Purchased cocaine/marijuana  Chronic medication non-compliance    C-SSRS Summary:     Patient: C-SSRS Suicide Screening  1) Within the past month, have you wished you were dead or wished you could go to sleep and not wake up? : No  2) Have you actually had any thoughts of killing yourself? : No  6) Have you ever done anything, started to do anything, or prepared to do anything to end your life?: No  Risk of Suicide: No Risk    Family: none at bedside    Agency: unknown - patient states tina and Manoj then said [facility] then changed it to [employer]. Abuse Assessment  Physical Abuse: Denies  Verbal Abuse: Denies  Emotional abuse: Denies  Financial Abuse: Denies  Sexual abuse: Denies    Clinical Summary:    Patient came in from the the shelter via ambulance stating that he was having hallucinations. He has been complaint with care. He says that he came in because the police were following him and he didn't know what to do. Patient states that the police were in the room with us while we were talking, yet him and this nurse were the only 2 in the room. Patient states that he takes his medication, cant identify the medication or when he last took any medications. He has loose associations and isn't able to stay on topic. His speech is disorganized. He is able to maintain eye contact, but is guarded and suspicious. He cannot identify why he left the sober living or why he purchased the cocaine. He wants medications to stop the hallucinations and was given a PO dose of 5MG of Haldol per the PA's order. Patient did not have any relief of the hallucinations from this dose. Patient denies SI and HI.      Level of Care Disposition:      Per Dr Anastacio Portillo, send out do to acuity, history of violence and chronic non-compliance     Brian Chase RN  06/13/22 2122       Irais Olmedo

## 2022-06-14 NOTE — ED NOTES
Breakfast tray given. Sitting on side of bed eating breakfast with no acute distress noted. Voices no complaints.      Shira Rizo, CINDA  06/14/22 3858
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
## 2022-06-14 NOTE — ED NOTES
[address] [facility]) to find placement for inpatient psychiatric evaluation per report from [facility]. Resting with eyes closed & no acute distress noted.      Derick Feliz RN  06/14/22 0370

## 2022-06-15 NOTE — ED NOTES
[facility] on site to  patient to take to [facility]. Patient discharged no issues. Police called to have belongings put onto [employer]. Police having a hard time finding belongings. According to property sheet belongings were in locker ED-1. Police checked again and no belongings were there.       Patricia Lewis RN  06/14/22 3967

## 2022-06-15 NOTE — ED NOTES
Vital signs obtained. [place], pretzels, crackers and gingerale given. No distress or complaints at this time.       Carrie Segundo RN  06/14/22 2009

## 2022-06-16 LAB
EKG ATRIAL RATE: 65 BPM
EKG P AXIS: 68 DEGREES
EKG P-R INTERVAL: 150 MS
EKG Q-T INTERVAL: 438 MS
EKG QRS DURATION: 92 MS
EKG QTC CALCULATION (BAZETT): 455 MS
EKG R AXIS: 85 DEGREES
EKG T AXIS: 64 DEGREES
EKG VENTRICULAR RATE: 65 BPM

## 2022-06-22 ASSESSMENT — ENCOUNTER SYMPTOMS
SHORTNESS OF BREATH: 0
NAUSEA: 0
ABDOMINAL DISTENTION: 0
BACK PAIN: 0
EYE DISCHARGE: 0
VOICE CHANGE: 0
VOMITING: 0
COUGH: 0
ANAL BLEEDING: 0

## 2022-06-29 ENCOUNTER — HOSPITAL ENCOUNTER (OUTPATIENT)
Dept: NON INVASIVE DIAGNOSTICS | Age: 32
Discharge: HOME | End: 2022-06-29

## 2022-07-03 LAB
EKG ATRIAL RATE: 68 BPM
EKG P AXIS: 71 DEGREES
EKG P-R INTERVAL: 152 MS
EKG Q-T INTERVAL: 396 MS
EKG QRS DURATION: 92 MS
EKG QTC CALCULATION (BAZETT): 421 MS
EKG R AXIS: 87 DEGREES
EKG T AXIS: 62 DEGREES
EKG VENTRICULAR RATE: 68 BPM

## 2022-07-06 ENCOUNTER — HOSPITAL ENCOUNTER (EMERGENCY)
Age: 32
Discharge: ANOTHER HOSPITAL | End: 2022-07-07
Payer: COMMERCIAL

## 2022-07-06 DIAGNOSIS — F31.60 BIPOLAR AFFECTIVE DISORDER, CURRENT EPISODE MIXED, CURRENT EPISODE SEVERITY UNSPECIFIED (HCC): Primary | ICD-10-CM

## 2022-07-06 LAB
ACETAMINOPHEN LEVEL: <5 UG/ML (ref 10–30)
ALBUMIN SERPL-MCNC: 5.3 G/DL (ref 3.5–4.6)
ALP BLD-CCNC: 84 U/L (ref 35–104)
ALT SERPL-CCNC: 21 U/L (ref 0–41)
AMPHETAMINE SCREEN, URINE: NORMAL
ANION GAP SERPL CALCULATED.3IONS-SCNC: 12 MEQ/L (ref 9–15)
AST SERPL-CCNC: 19 U/L (ref 0–40)
BARBITURATE SCREEN URINE: NORMAL
BASOPHILS ABSOLUTE: 0 K/UL (ref 0–0.2)
BASOPHILS RELATIVE PERCENT: 0.4 %
BENZODIAZEPINE SCREEN, URINE: NORMAL
BILIRUB SERPL-MCNC: 0.4 MG/DL (ref 0.2–0.7)
BILIRUBIN URINE: NEGATIVE
BLOOD, URINE: NEGATIVE
BUN BLDV-MCNC: 12 MG/DL (ref 6–20)
CALCIUM SERPL-MCNC: 9.7 MG/DL (ref 8.5–9.9)
CANNABINOID SCREEN URINE: NORMAL
CHLORIDE BLD-SCNC: 103 MEQ/L (ref 95–107)
CHOLESTEROL, TOTAL: 139 MG/DL (ref 0–199)
CLARITY: CLEAR
CO2: 26 MEQ/L (ref 20–31)
COCAINE METABOLITE SCREEN URINE: NORMAL
COLOR: YELLOW
CREAT SERPL-MCNC: 0.77 MG/DL (ref 0.7–1.2)
EOSINOPHILS ABSOLUTE: 0 K/UL (ref 0–0.7)
EOSINOPHILS RELATIVE PERCENT: 0.6 %
ETHANOL PERCENT: NORMAL G/DL
ETHANOL: <10 MG/DL (ref 0–0.08)
FENTANYL SCREEN, URINE: NORMAL
GFR AFRICAN AMERICAN: >60
GFR NON-AFRICAN AMERICAN: >60
GLOBULIN: 2.2 G/DL (ref 2.3–3.5)
GLUCOSE BLD-MCNC: 105 MG/DL (ref 70–99)
GLUCOSE URINE: NEGATIVE MG/DL
HCT VFR BLD CALC: 42.4 % (ref 42–52)
HDLC SERPL-MCNC: 48 MG/DL (ref 40–59)
HEMOGLOBIN: 14.7 G/DL (ref 14–18)
KETONES, URINE: NEGATIVE MG/DL
LDL CHOLESTEROL CALCULATED: 85 MG/DL (ref 0–129)
LEUKOCYTE ESTERASE, URINE: NEGATIVE
LYMPHOCYTES ABSOLUTE: 1.5 K/UL (ref 1–4.8)
LYMPHOCYTES RELATIVE PERCENT: 23.7 %
Lab: NORMAL
MCH RBC QN AUTO: 31.5 PG (ref 27–31.3)
MCHC RBC AUTO-ENTMCNC: 34.6 % (ref 33–37)
MCV RBC AUTO: 90.9 FL (ref 80–100)
METHADONE SCREEN, URINE: NORMAL
MONOCYTES ABSOLUTE: 0.5 K/UL (ref 0.2–0.8)
MONOCYTES RELATIVE PERCENT: 7.1 %
NEUTROPHILS ABSOLUTE: 4.3 K/UL (ref 1.4–6.5)
NEUTROPHILS RELATIVE PERCENT: 68.2 %
NITRITE, URINE: NEGATIVE
OPIATE SCREEN URINE: NORMAL
OXYCODONE URINE: NORMAL
PDW BLD-RTO: 13.1 % (ref 11.5–14.5)
PH UA: 7.5 (ref 5–9)
PHENCYCLIDINE SCREEN URINE: NORMAL
PLATELET # BLD: 217 K/UL (ref 130–400)
POTASSIUM SERPL-SCNC: 4 MEQ/L (ref 3.4–4.9)
PROPOXYPHENE SCREEN: NORMAL
PROTEIN UA: NEGATIVE MG/DL
RBC # BLD: 4.67 M/UL (ref 4.7–6.1)
SALICYLATE, SERUM: <0.3 MG/DL (ref 15–30)
SARS-COV-2, NAAT: NOT DETECTED
SODIUM BLD-SCNC: 141 MEQ/L (ref 135–144)
SPECIFIC GRAVITY UA: 1.01 (ref 1–1.03)
TOTAL CK: 151 U/L (ref 0–190)
TOTAL PROTEIN: 7.5 G/DL (ref 6.3–8)
TRIGL SERPL-MCNC: 31 MG/DL (ref 0–150)
TSH SERPL DL<=0.05 MIU/L-ACNC: 1.34 UIU/ML (ref 0.44–3.86)
URINE REFLEX TO CULTURE: NORMAL
UROBILINOGEN, URINE: 0.2 E.U./DL
VALPROIC ACID LEVEL: 37.8 UG/ML (ref 50–100)
WBC # BLD: 6.4 K/UL (ref 4.8–10.8)

## 2022-07-06 PROCEDURE — 84443 ASSAY THYROID STIM HORMONE: CPT

## 2022-07-06 PROCEDURE — 80143 DRUG ASSAY ACETAMINOPHEN: CPT

## 2022-07-06 PROCEDURE — 80164 ASSAY DIPROPYLACETIC ACD TOT: CPT

## 2022-07-06 PROCEDURE — 87635 SARS-COV-2 COVID-19 AMP PRB: CPT

## 2022-07-06 PROCEDURE — 99285 EMERGENCY DEPT VISIT HI MDM: CPT

## 2022-07-06 PROCEDURE — 80307 DRUG TEST PRSMV CHEM ANLYZR: CPT

## 2022-07-06 PROCEDURE — 36415 COLL VENOUS BLD VENIPUNCTURE: CPT

## 2022-07-06 PROCEDURE — 85025 COMPLETE CBC W/AUTO DIFF WBC: CPT

## 2022-07-06 PROCEDURE — 80179 DRUG ASSAY SALICYLATE: CPT

## 2022-07-06 PROCEDURE — 82077 ASSAY SPEC XCP UR&BREATH IA: CPT

## 2022-07-06 PROCEDURE — 80061 LIPID PANEL: CPT

## 2022-07-06 PROCEDURE — 82550 ASSAY OF CK (CPK): CPT

## 2022-07-06 PROCEDURE — 80053 COMPREHEN METABOLIC PANEL: CPT

## 2022-07-06 PROCEDURE — 81003 URINALYSIS AUTO W/O SCOPE: CPT

## 2022-07-06 RX ORDER — DIVALPROEX SODIUM 250 MG/1
250 TABLET, DELAYED RELEASE ORAL 2 TIMES DAILY
COMMUNITY

## 2022-07-06 RX ORDER — PALIPERIDONE 3 MG/1
3 TABLET, EXTENDED RELEASE ORAL EVERY MORNING
COMMUNITY
End: 2022-07-17

## 2022-07-06 RX ORDER — LANOLIN ALCOHOL/MO/W.PET/CERES
6 CREAM (GRAM) TOPICAL NIGHTLY PRN
COMMUNITY

## 2022-07-06 ASSESSMENT — PAIN - FUNCTIONAL ASSESSMENT: PAIN_FUNCTIONAL_ASSESSMENT: NONE - DENIES PAIN

## 2022-07-06 ASSESSMENT — ENCOUNTER SYMPTOMS
EYE DISCHARGE: 0
ANAL BLEEDING: 0
ABDOMINAL DISTENTION: 0
COUGH: 0
VOICE CHANGE: 0

## 2022-07-06 NOTE — ED TRIAGE NOTES
Pt has been hearing voices that are telling him to go see his manager and other flower things. Pt states the voices are real and are controlling him. Pt states he planned to take all his medications to kill himself. Pt's  is with the pt and has him feeling better. Pt states he attempted suicide about 6 months ago. Pt is calm and polite at this time, denies any medical problems.

## 2022-07-06 NOTE — ED PROVIDER NOTES
[address] [facility] ED  eMERGENCY dEPARTMENT eNCOUnter      Pt Name: Gene Julien  MRN: [id number]  [place] 1990  Date of evaluation: 7/6/2022  Provider: Royal Andersen PA-C    CHIEF COMPLAINT       Chief Complaint   Patient presents with    Suicidal         HISTORY OF PRESENT ILLNESS   (Location/Symptom, Timing/Onset,Context/Setting, Quality, Duration, Modifying Factors, Severity)  Note limiting factors. Gene Julien is a 32 y.o. male who presents to the emergency department and hearing voices found to go see his   brought him to ER states the voices are controlling suicidal wants to take all his medications to kill himself. He denies any fever chills nausea vomiting cuts bruises denies any injury denies overdose including prescription medications denies any recreational substances. Symptoms are moderate severity nothing proves worsen symptoms    HPI    NursingNotes were reviewed. REVIEW OF SYSTEMS    (2-9 systems for level 4, 10 or more for level 5)     Review of Systems   Constitutional: Negative for activity change, appetite change, fever and unexpected weight change. HENT: Negative for ear discharge, nosebleeds and voice change. Eyes: Negative for discharge. Respiratory: Negative for cough. Cardiovascular: Negative for chest pain. Gastrointestinal: Negative for abdominal distention and anal bleeding. Genitourinary: Negative for dysuria and hematuria. Musculoskeletal: Negative for joint swelling. Skin: Negative for pallor. Neurological: Negative for weakness. Hematological: Does not bruise/bleed easily. Psychiatric/Behavioral: Positive for hallucinations and suicidal ideas. Negative for self-injury. All other systems reviewed and are negative. Except as noted above the remainder of the review of systems was reviewed and negative.        PAST MEDICAL HISTORY     Past Medical History:   Diagnosis Date    Anxiety 1/4/2012    Bipolar affective disorder ([facility] 11/25/2015    Chondromalacia of left knee     Chondromalacia of left knee     Chondromalacia of right knee     Chondromalacia of right knee     Chronic back pain 4/20/2017    Depression     Drug abuse and dependence ([facility] 1/4/2012    Fracture of left side of mandibular body ([facility] 06/17/2018    ACUTE LEFT CONDYLAR NECK FRACTURE    Fracture of navicular bone of foot, closed 09/03/2012    lt    Fracture of temporal bone ([facility] 2015    History of pulmonary embolism     Schizophrenia ([facility]     Seizures ([facility]     Talus fracture 9/2012    lt    TBI (traumatic brain injury) ([facility] 2015    Wound of left leg 9/14/2012         SURGICALHISTORY       Past Surgical History:   Procedure Laterality Date    WRIST SURGERY  2009    pin in wrist left         CURRENT MEDICATIONS       Previous Medications    DIVALPROEX (DEPAKOTE) 250 MG DR TABLET    Take 250 mg by mouth in the morning and at bedtime    MELATONIN 3 MG TABS TABLET    Take 6 mg by mouth nightly as needed    PALIPERIDONE (INVEGA) 3 MG EXTENDED RELEASE TABLET    Take 3 mg by mouth every morning            Shellfish-derived products and Shrimp flavor    FAMILY HISTORY       Family History   Family history unknown: Yes          SOCIAL HISTORY       Social History     Socioeconomic History    Marital status: Single     Spouse name: None    Number of children: 0    Years of education: 15    Highest education level: None   Occupational History    None   Tobacco Use    Smoking status: Current Some Day Smoker     Packs/day: 1.00     Years: 1.00     Pack years: 1.00     Types: Cigarettes     Last attempt to quit: 11/28/2011     Years since quitting: 10.6    Smokeless tobacco: Current User     Types: Chew   Vaping Use    Vaping Use: Never used   Substance and Sexual Activity    Alcohol use: Yes     Comment: drank 2 shots of vodka prior to arrival    Drug use: Yes     Frequency: 7.0 times per week     Types: Marijuana (Weed), Cocaine  Sexual activity: Not Currently     Partners: Female   Other Topics Concern    None   Social History Narrative    Lives w family     Social Determinants of Health     Financial Resource Strain:     Difficulty of Paying Living Expenses: Not on file   Food Insecurity:     Worried About Running Out of Food in the Last Year: Not on file    Kevin of Food in the Last Year: Not on file   Transportation Needs:     Lack of Transportation (Medical): Not on file    Lack of Transportation (Non-Medical):  Not on file   Physical Activity:     Days of Exercise per Week: Not on file    Minutes of Exercise per Session: Not on file   Stress:     Feeling of Stress : Not on file   Social Connections:     Frequency of Communication with Friends and Family: Not on file    Frequency of Social Gatherings with Friends and Family: Not on file    Attends Evangelical Services: Not on file    Active Member of [address] [place] [address] [employer] or Organizations: Not on file    Attends Club or Organization Meetings: Not on file    Marital Status: Not on file   Intimate Partner Violence:     Fear of Current or Ex-Partner: Not on file    Emotionally Abused: Not on file    Physically Abused: Not on file    Sexually Abused: Not on file   Housing Stability:     Unable to Pay for Housing in the Last Year: Not on file    Number of [place] in the Last Year: Not on file    Unstable Housing in the Last Year: Not on file       SCREENINGS   [place] Coma Scale  Eye Opening: Spontaneous  Best Verbal Response: Oriented  Best Motor Response: Obeys commands  [place] Coma Scale Score: 15  Ebola Virus Disease (EVD) Screening   Temp: 98.1 °F (36.7 °C)  CIWA Assessment  BP: 132/88  Heart Rate: 80    PHYSICAL EXAM    (up to 7 for level 4, 8 or more for level 5)     ED Triage Vitals [07/06/22 1535]   BP Temp Temp Source Heart Rate Resp SpO2 Height Weight   (!) 140/94 98.6 °F (37 °C) Oral 79 18 99 % 6' 1\" (1.854 m) 175 lb (79.4 kg)       Physical Exam  Vitals and nursing note reviewed. Constitutional:       General: He is not in acute distress. Appearance: He is well-developed. HENT:      Head: Normocephalic and atraumatic. Right Ear: External ear normal.      Left Ear: External ear normal.      Nose: Nose normal.      Mouth/Throat:      Mouth: Mucous membranes are moist.   Eyes:      General:         Right eye: No discharge. Left eye: No discharge. Pupils: Pupils are equal, round, and reactive to light. Cardiovascular:      Rate and Rhythm: Normal rate and regular rhythm. Pulses: Normal pulses. Heart sounds: Normal heart sounds. Pulmonary:      Effort: Pulmonary effort is normal. No respiratory distress. Breath sounds: Normal breath sounds. No stridor. Abdominal:      General: Bowel sounds are normal. There is no distension. Palpations: Abdomen is soft. Musculoskeletal:         General: Normal range of motion. Cervical back: Normal range of motion and neck supple. Skin:     General: Skin is warm. Findings: No bruising or erythema. Neurological:      Mental Status: He is alert and oriented to person, place, and time.    Psychiatric:         Mood and Affect: Mood normal.         RESULTS     EKG: All EKG's are interpreted by the Emergency Department Physician who either signs or Co-signsthis chart in the absence of a cardiologist.         RADIOLOGY:   Elizabeth Pontiff such as CT, Ultrasound and MRI are read by the radiologist. Plain radiographic images are visualized and preliminarily interpreted by the emergency physician with the below findings:         Interpretation per the Radiologist below, if available at the time ofthis note:    No orders to display         ED BEDSIDE ULTRASOUND:   Performed by ED Physician - none    LABS:  Labs Reviewed   ACETAMINOPHEN LEVEL - Abnormal; Notable for the following components:       Result Value    Acetaminophen Level <5 (*)     All other components within normal limits   CBC WITH AUTO DIFFERENTIAL - Abnormal; Notable for the following components:    RBC 4.67 (*)     MCH 31.5 (*)     All other components within normal limits   COMPREHENSIVE METABOLIC PANEL - Abnormal; Notable for the following components:    Glucose 105 (*)     Albumin 5.3 (*)     Globulin 2.2 (*)     All other components within normal limits   SALICYLATE LEVEL - Abnormal; Notable for the following components:    Salicylate, Serum <6.1 (*)     All other components within normal limits   VALPROIC ACID LEVEL, TOTAL - Abnormal; Notable for the following components:    Valproic Acid Lvl 37.8 (*)     All other components within normal limits   COVID-19, RAPID   CK   ETHANOL   LIPID PANEL   TSH   URINE DRUG SCREEN   URINALYSIS WITH REFLEX TO CULTURE       All other labs were within normal range or not returned as of this dictation. EMERGENCY DEPARTMENT COURSE and DIFFERENTIAL DIAGNOSIS/MDM:   Vitals:    Vitals:    07/06/22 1535 07/06/22 1859 07/07/22 0240 07/07/22 0700   BP: (!) 140/94 (!) 124/93 (!) 128/91 132/88   Pulse: 79 94 91 80   Resp: 18 16 20 16   Temp: 98.6 °F (37 °C) 98.6 °F (37 °C) 97.7 °F (36.5 °C) 98.1 °F (36.7 °C)   TempSrc: Oral   Oral   SpO2: 99% 98% 98% 98%   Weight: 175 lb (79.4 kg)      Height: 6' 1\" (1.854 m)               Medically cleared by chip pa    [facility] CRITICAL CARE TIME         CONSULTS:  None    PROCEDURES:  Unless otherwise noted below, none     Procedures    FINAL IMPRESSION      1. Bipolar affective disorder, current episode mixed, current episode severity unspecified ([facility]          DISPOSITION/PLAN   DISPOSITION        PATIENT REFERRED TO:  No follow-up provider specified.     DISCHARGE MEDICATIONS:  New Prescriptions    No medications on file          (Please note that portions of this note were completed with a voice recognition program.  Efforts were made to edit the dictations but occasionally words are mis-transcribed.)    Hope B Giancarlo Anderson (electronically signed)  Attending Emergency Physician       Lisbet Camara PA-C  07/07/22 8060

## 2022-07-06 NOTE — ED NOTES
Call placed to lab and spoke with Lacho Aguilar to notify of add on Depakote.       Bisi See, CINDA  07/06/22 3093

## 2022-07-06 NOTE — ED NOTES
Patient reviewed with Dr. Lina Strickland. Discussed events leading to admit, current assessment and previous history. Received order to refer to [place] [id number] for dual diagnosis.       Soni King RN  07/06/22 2689
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
## 2022-07-07 VITALS
DIASTOLIC BLOOD PRESSURE: 88 MMHG | RESPIRATION RATE: 16 BRPM | HEART RATE: 80 BPM | HEIGHT: 73 IN | TEMPERATURE: 98.1 F | BODY MASS INDEX: 23.19 KG/M2 | WEIGHT: 175 LBS | OXYGEN SATURATION: 98 % | SYSTOLIC BLOOD PRESSURE: 132 MMHG

## 2022-07-07 NOTE — ED NOTES
Patient woke up briefly to use the restroom and returned to bed. Resting now with no distress. Respirations even and non-labored.       Emeli Cristobal RN  07/07/22 1406

## 2022-07-07 NOTE — ED NOTES
Responding heavily to internal stimuli,. But currently calm and directable. John Christie  [facility]  07/07/22 1012

## 2022-07-07 NOTE — ED NOTES
[facility] calling to notify that [employer] is reviewing case, but requires provider note to specify \"medically cleared\". Note currently states \"medically stable\". Provider notified.      Kiko Alaniz RN  07/07/22 2137

## 2022-07-17 ENCOUNTER — HOSPITAL ENCOUNTER (EMERGENCY)
Age: 32
Discharge: ANOTHER HOSPITAL | End: 2022-07-18
Payer: COMMERCIAL

## 2022-07-17 DIAGNOSIS — F32.0 CURRENT MILD EPISODE OF MAJOR DEPRESSIVE DISORDER, UNSPECIFIED WHETHER RECURRENT (HCC): Primary | ICD-10-CM

## 2022-07-17 LAB
ACETAMINOPHEN LEVEL: <5 UG/ML (ref 10–30)
ALBUMIN SERPL-MCNC: 5.2 G/DL (ref 3.5–4.6)
ALP BLD-CCNC: 92 U/L (ref 35–104)
ALT SERPL-CCNC: 32 U/L (ref 0–41)
AMPHETAMINE SCREEN, URINE: ABNORMAL
ANION GAP SERPL CALCULATED.3IONS-SCNC: 10 MEQ/L (ref 9–15)
AST SERPL-CCNC: 24 U/L (ref 0–40)
BACTERIA: NEGATIVE /HPF
BARBITURATE SCREEN URINE: ABNORMAL
BASOPHILS ABSOLUTE: 0.1 K/UL (ref 0–0.2)
BASOPHILS RELATIVE PERCENT: 0.7 %
BENZODIAZEPINE SCREEN, URINE: POSITIVE
BILIRUB SERPL-MCNC: 0.8 MG/DL (ref 0.2–0.7)
BILIRUBIN URINE: NEGATIVE
BLOOD, URINE: NEGATIVE
BUN BLDV-MCNC: 11 MG/DL (ref 6–20)
CALCIUM SERPL-MCNC: 10 MG/DL (ref 8.5–9.9)
CANNABINOID SCREEN URINE: ABNORMAL
CHLORIDE BLD-SCNC: 100 MEQ/L (ref 95–107)
CHOLESTEROL, TOTAL: 117 MG/DL (ref 0–199)
CLARITY: CLEAR
CO2: 28 MEQ/L (ref 20–31)
COCAINE METABOLITE SCREEN URINE: ABNORMAL
COLOR: YELLOW
CREAT SERPL-MCNC: 1.01 MG/DL (ref 0.7–1.2)
EOSINOPHILS ABSOLUTE: 0.1 K/UL (ref 0–0.7)
EOSINOPHILS RELATIVE PERCENT: 0.5 %
EPITHELIAL CELLS, UA: ABNORMAL /HPF (ref 0–5)
ETHANOL PERCENT: NORMAL G/DL
ETHANOL: <10 MG/DL (ref 0–0.08)
GFR AFRICAN AMERICAN: >60
GFR NON-AFRICAN AMERICAN: >60
GLOBULIN: 2.7 G/DL (ref 2.3–3.5)
GLUCOSE BLD-MCNC: 108 MG/DL (ref 70–99)
GLUCOSE URINE: NEGATIVE MG/DL
HCT VFR BLD CALC: 44.7 % (ref 42–52)
HDLC SERPL-MCNC: 43 MG/DL (ref 40–59)
HEMOGLOBIN: 15.3 G/DL (ref 14–18)
HYALINE CASTS: ABNORMAL /HPF (ref 0–5)
KETONES, URINE: ABNORMAL MG/DL
LDL CHOLESTEROL CALCULATED: 68 MG/DL (ref 0–129)
LEUKOCYTE ESTERASE, URINE: ABNORMAL
LYMPHOCYTES ABSOLUTE: 2.1 K/UL (ref 1–4.8)
LYMPHOCYTES RELATIVE PERCENT: 20.2 %
Lab: ABNORMAL
MCH RBC QN AUTO: 31.1 PG (ref 27–31.3)
MCHC RBC AUTO-ENTMCNC: 34.1 % (ref 33–37)
MCV RBC AUTO: 91.2 FL (ref 80–100)
METHADONE SCREEN, URINE: ABNORMAL
MONOCYTES ABSOLUTE: 1.1 K/UL (ref 0.2–0.8)
MONOCYTES RELATIVE PERCENT: 10.4 %
NEUTROPHILS ABSOLUTE: 6.9 K/UL (ref 1.4–6.5)
NEUTROPHILS RELATIVE PERCENT: 68.2 %
NITRITE, URINE: NEGATIVE
OPIATE SCREEN URINE: ABNORMAL
PDW BLD-RTO: 13 % (ref 11.5–14.5)
PH UA: 5 (ref 5–9)
PHENCYCLIDINE SCREEN URINE: ABNORMAL
PLATELET # BLD: 240 K/UL (ref 130–400)
POTASSIUM SERPL-SCNC: 3.9 MEQ/L (ref 3.4–4.9)
PROPOXYPHENE SCREEN, URINE: ABNORMAL
PROTEIN UA: NEGATIVE MG/DL
RBC # BLD: 4.91 M/UL (ref 4.7–6.1)
RBC UA: ABNORMAL /HPF (ref 0–5)
SALICYLATE, SERUM: <0.3 MG/DL (ref 15–30)
SARS-COV-2, NAAT: NOT DETECTED
SODIUM BLD-SCNC: 138 MEQ/L (ref 135–144)
SPECIFIC GRAVITY UA: 1.01 (ref 1–1.03)
TOTAL CK: 331 U/L (ref 0–190)
TOTAL PROTEIN: 7.9 G/DL (ref 6.3–8)
TRIGL SERPL-MCNC: 28 MG/DL (ref 0–150)
TSH SERPL DL<=0.05 MIU/L-ACNC: 2.18 UIU/ML (ref 0.44–3.86)
UR OXYCODONE RAPID SCREEN: ABNORMAL
URINE REFLEX TO CULTURE: ABNORMAL
UROBILINOGEN, URINE: 0.2 E.U./DL
VALPROIC ACID LEVEL: <2.8 UG/ML (ref 50–100)
WBC # BLD: 10.2 K/UL (ref 4.8–10.8)
WBC UA: ABNORMAL /HPF (ref 0–5)

## 2022-07-17 PROCEDURE — 80061 LIPID PANEL: CPT

## 2022-07-17 PROCEDURE — 80179 DRUG ASSAY SALICYLATE: CPT

## 2022-07-17 PROCEDURE — 81001 URINALYSIS AUTO W/SCOPE: CPT

## 2022-07-17 PROCEDURE — 87635 SARS-COV-2 COVID-19 AMP PRB: CPT

## 2022-07-17 PROCEDURE — 80143 DRUG ASSAY ACETAMINOPHEN: CPT

## 2022-07-17 PROCEDURE — 80306 DRUG TEST PRSMV INSTRMNT: CPT

## 2022-07-17 PROCEDURE — 82550 ASSAY OF CK (CPK): CPT

## 2022-07-17 PROCEDURE — 99285 EMERGENCY DEPT VISIT HI MDM: CPT

## 2022-07-17 PROCEDURE — 80053 COMPREHEN METABOLIC PANEL: CPT

## 2022-07-17 PROCEDURE — 82077 ASSAY SPEC XCP UR&BREATH IA: CPT

## 2022-07-17 PROCEDURE — 36415 COLL VENOUS BLD VENIPUNCTURE: CPT

## 2022-07-17 PROCEDURE — 84443 ASSAY THYROID STIM HORMONE: CPT

## 2022-07-17 PROCEDURE — 80164 ASSAY DIPROPYLACETIC ACD TOT: CPT

## 2022-07-17 PROCEDURE — 85025 COMPLETE CBC W/AUTO DIFF WBC: CPT

## 2022-07-17 ASSESSMENT — ENCOUNTER SYMPTOMS
SORE THROAT: 0
BACK PAIN: 0
CHEST TIGHTNESS: 0
SHORTNESS OF BREATH: 0
EYE PAIN: 0
DIARRHEA: 0
NAUSEA: 0

## 2022-07-17 ASSESSMENT — LIFESTYLE VARIABLES: HOW OFTEN DO YOU HAVE A DRINK CONTAINING ALCOHOL: NEVER

## 2022-07-17 ASSESSMENT — PAIN - FUNCTIONAL ASSESSMENT: PAIN_FUNCTIONAL_ASSESSMENT: NONE - DENIES PAIN

## 2022-07-17 NOTE — ED PROVIDER NOTES
[address] [facility] ED  eMERGENCYdEPARTMENT eNCOUnter      Pt Name: Jem Pastrana  MRN: [id number]  [place] 1990  Date of evaluation: 7/17/2022  Provider:Moe So PA-C    CHIEF COMPLAINT           HPI  Jem Pastrana is a 32 y.o. male per chart review has a h/o anxiety, bipolar, schizophrenia presenting with suicidal ideations. Patient states he is here for medication changes but when asked about suicidal ideation states\" that is the only way to go I want myself\". Patient denies homicidal ideations. ROS  Review of Systems   Constitutional:  Negative for chills, fatigue and fever. HENT:  Negative for ear pain, hearing loss and sore throat. Eyes:  Negative for pain and visual disturbance. Respiratory:  Negative for chest tightness and shortness of breath. Cardiovascular:  Negative for chest pain. Gastrointestinal:  Negative for diarrhea and nausea. Endocrine: Negative for cold intolerance. Genitourinary:  Negative for hematuria. Musculoskeletal:  Negative for back pain. Skin:  Negative for rash and wound. Neurological:  Negative for dizziness and headaches. Psychiatric/Behavioral:  Positive for behavioral problems, self-injury and suicidal ideas. Negative for confusion. The patient is nervous/anxious. Except as noted above the remainder of the review of systems was reviewed and negative.        PAST MEDICAL HISTORY     Past Medical History:   Diagnosis Date    Anxiety 1/4/2012    Bipolar affective disorder ([address] 11/25/2015    Chondromalacia of left knee     Chondromalacia of left knee     Chondromalacia of right knee     Chondromalacia of right knee     Chronic back pain 4/20/2017    Depression     Drug abuse and dependence ([address] 1/4/2012    Fracture of left side of mandibular body ([address] 06/17/2018    ACUTE LEFT CONDYLAR NECK FRACTURE    Fracture of navicular bone of foot, closed 09/03/2012    lt    Fracture of temporal bone ([address] 2015    History of pulmonary embolism     Schizophrenia ([facility]     Seizures ([facility]     Talus fracture 9/2012    lt    TBI (traumatic brain injury) ([facility] 2015    Wound of left leg 9/14/2012         SURGICAL HISTORY       Past Surgical History:   Procedure Laterality Date    WRIST SURGERY  2009    pin in wrist left         CURRENTMEDICATIONS       Discharge Medication List as of 7/18/2022 11:20 AM        CONTINUE these medications which have NOT CHANGED    Details   divalproex (DEPAKOTE) 250 MG DR tablet Take 250 mg by mouth in the morning and at bedtimeHistorical Med      melatonin 3 MG TABS tablet Take 6 mg by mouth nightly as neededHistorical Med             ALLERGIES     Shellfish-derived products and Shrimp flavor    FAMILY HISTORY       Family History   Family history unknown: Yes          SOCIAL HISTORY       Social History     Socioeconomic History    Marital status: Single    Number of children: 0    Years of education: 13   Tobacco Use    Smoking status: Some Days     Packs/day: 1.00     Years: 1.00     Pack years: 1.00     Types: Cigarettes     Last attempt to quit: 11/28/2011     Years since quitting: 10.6    Smokeless tobacco: Current     Types: Chew   Vaping Use    Vaping Use: Never used   Substance and Sexual Activity    Alcohol use: Yes     Comment: drank 2 shots of vodka prior to arrival    Drug use: Yes     Frequency: 7.0 times per week     Types: Marijuana Delona Members), Cocaine    Sexual activity: Not Currently     Partners: Female   Social History Narrative    Lives w family         PHYSICAL EXAM       ED Triage Vitals [07/17/22 0349]   BP Temp Temp Source Heart Rate Resp SpO2 Height Weight   104/70 98.3 °F (36.8 °C) Oral (!) 130 18 99 % 6' (1.829 m) 175 lb (79.4 kg)       Physical Exam  Constitutional:       Appearance: Normal appearance. HENT:      Head: Normocephalic and atraumatic.       Nose: Nose normal.      Mouth/Throat:      Mouth: Mucous membranes are moist.      Pharynx: No oropharyngeal exudate or posterior oropharyngeal erythema. Eyes:      Extraocular Movements: Extraocular movements intact. Conjunctiva/sclera: Conjunctivae normal.      Pupils: Pupils are equal, round, and reactive to light. Cardiovascular:      Rate and Rhythm: Normal rate and regular rhythm. Heart sounds: Normal heart sounds. Pulmonary:      Effort: Pulmonary effort is normal.      Breath sounds: Normal breath sounds. No wheezing or rhonchi. Abdominal:      General: Bowel sounds are normal.      Palpations: Abdomen is soft. Tenderness: There is no abdominal tenderness. There is no guarding. Musculoskeletal:         General: No deformity. Normal range of motion. Cervical back: Normal range of motion and neck supple. Skin:     General: Skin is warm and dry. Coloration: Skin is not jaundiced. Neurological:      General: No focal deficit present. Mental Status: He is alert and oriented to person, place, and time. Psychiatric:         Mood and Affect: Mood normal.         Behavior: Behavior normal.         Thought Content: Thought content includes suicidal ideation. Thought content includes suicidal plan. MDM  Patient medically cleared          FINAL IMPRESSION      1.  Current mild episode of major depressive disorder, unspecified whether recurrent [facility])          DISPOSITION/PLAN   DISPOSITION Decision To Transfer 07/18/2022 11:19:49 AM        DISCHARGE MEDICATIONS:  [unfilled]         Navi Nelson PA-C(electronically signed)  Attending Emergency Physician           Navi Nelson PA-C  07/20/22 7785
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
## 2022-07-18 VITALS
HEIGHT: 72 IN | SYSTOLIC BLOOD PRESSURE: 112 MMHG | DIASTOLIC BLOOD PRESSURE: 99 MMHG | HEART RATE: 73 BPM | TEMPERATURE: 97.9 F | BODY MASS INDEX: 23.7 KG/M2 | RESPIRATION RATE: 18 BRPM | WEIGHT: 175 LBS | OXYGEN SATURATION: 100 %

## 2022-07-18 LAB
EKG ATRIAL RATE: 66 BPM
EKG P AXIS: 69 DEGREES
EKG P-R INTERVAL: 144 MS
EKG Q-T INTERVAL: 416 MS
EKG QRS DURATION: 88 MS
EKG QTC CALCULATION (BAZETT): 436 MS
EKG R AXIS: 86 DEGREES
EKG T AXIS: 63 DEGREES
EKG VENTRICULAR RATE: 66 BPM

## 2022-07-18 PROCEDURE — 6370000000 HC RX 637 (ALT 250 FOR IP): Performed by: STUDENT IN AN ORGANIZED HEALTH CARE EDUCATION/TRAINING PROGRAM

## 2022-07-18 PROCEDURE — 93010 ELECTROCARDIOGRAM REPORT: CPT | Performed by: INTERNAL MEDICINE

## 2022-07-18 PROCEDURE — 93005 ELECTROCARDIOGRAM TRACING: CPT | Performed by: STUDENT IN AN ORGANIZED HEALTH CARE EDUCATION/TRAINING PROGRAM

## 2022-07-18 RX ORDER — NICOTINE 21 MG/24HR
1 PATCH, TRANSDERMAL 24 HOURS TRANSDERMAL ONCE
Status: DISCONTINUED | OUTPATIENT
Start: 2022-07-18 | End: 2022-07-18 | Stop reason: HOSPADM

## 2022-07-18 NOTE — ED NOTES
Ate 100% breakfast tray. Taking PO fluids well. Sitting on chair next to bed. Voices no complaints.  Continues to await [facility] for transport to Jamilah Winn [address], RN  07/18/22 8347

## 2022-07-18 NOTE — ED NOTES
Rusty Mcfarland allowed staff to obtain EKG. No complaints at this time. Patient now resting again in bed.       Kiko Alaniz RN  07/18/22 9599

## 2022-07-18 NOTE — ED NOTES
[facility] called back with accepting    Nurse to nurse Monica Urbina [address], [address] [facility]  07/18/22 2934

## 2022-07-18 NOTE — ED NOTES
Patient appears asleep in bed. No distress noted. Respirations non-labored.       Carroll Salas RN  07/17/22 2032

## 2022-07-18 NOTE — ED NOTES
[facility] calling requesting an EKG be sent to [facility] for placement.       Marika Mendoza RN  07/18/22 1968

## 2022-07-18 NOTE — ED NOTES
Awaiting transfer to [facility] with Pita Arellano [address] @ 1100. Sitting on side of bed with no complaints voiced & no acute distress noted.      Tommy Bruno RN  07/18/22 4701

## 2022-07-18 NOTE — ED NOTES
Patient resting quietly respirations are even and unlabored no distress noted at this time.      Chad Crawford RN  07/17/22 3489

## 2022-07-18 NOTE — ED NOTES
Kristina called from [facility] and is accepted pending [facility] [place] gets faxed.       Devorah , RN  07/18/22 8719

## 2022-07-18 NOTE — ED NOTES
Beauty Adriana woke up briefly. No voiced concerns. Appears resting now.       June Veliz RN  07/18/22 3714

## 2022-07-19 LAB
EKG ATRIAL RATE: 90 BPM
EKG P AXIS: 79 DEGREES
EKG P-R INTERVAL: 136 MS
EKG Q-T INTERVAL: 358 MS
EKG QRS DURATION: 86 MS
EKG QTC CALCULATION (BAZETT): 437 MS
EKG R AXIS: 88 DEGREES
EKG T AXIS: 59 DEGREES
EKG VENTRICULAR RATE: 90 BPM

## 2022-08-26 PROBLEM — R45.851 SUICIDAL IDEATION: Status: ACTIVE | Noted: 2022-08-26

## 2022-09-30 ENCOUNTER — HOSPITAL ENCOUNTER (EMERGENCY)
Age: 32
Discharge: PSYCHIATRIC OR SUBSTANCE USE FACILITY | End: 2022-10-02
Payer: COMMERCIAL

## 2022-09-30 DIAGNOSIS — F19.10 POLYSUBSTANCE ABUSE (HCC): ICD-10-CM

## 2022-09-30 DIAGNOSIS — F25.9 SCHIZOAFFECTIVE DISORDER, UNSPECIFIED TYPE (HCC): Primary | ICD-10-CM

## 2022-09-30 LAB
ACETAMINOPHEN LEVEL: <5 UG/ML (ref 10–30)
ALBUMIN SERPL-MCNC: 5.6 G/DL (ref 3.5–4.6)
ALP BLD-CCNC: 101 U/L (ref 35–104)
ALT SERPL-CCNC: 25 U/L (ref 0–41)
ANION GAP SERPL CALCULATED.3IONS-SCNC: 20 MEQ/L (ref 9–15)
AST SERPL-CCNC: 27 U/L (ref 0–40)
BASOPHILS ABSOLUTE: 0 K/UL (ref 0–0.2)
BASOPHILS RELATIVE PERCENT: 0.4 %
BILIRUB SERPL-MCNC: 1.1 MG/DL (ref 0.2–0.7)
BUN BLDV-MCNC: 29 MG/DL (ref 6–20)
CALCIUM SERPL-MCNC: 10.3 MG/DL (ref 8.5–9.9)
CHLORIDE BLD-SCNC: 97 MEQ/L (ref 95–107)
CHOLESTEROL, TOTAL: 150 MG/DL (ref 0–199)
CO2: 23 MEQ/L (ref 20–31)
CREAT SERPL-MCNC: 1.16 MG/DL (ref 0.7–1.2)
EOSINOPHILS ABSOLUTE: 0 K/UL (ref 0–0.7)
EOSINOPHILS RELATIVE PERCENT: 0 %
ETHANOL PERCENT: NORMAL G/DL
ETHANOL: <10 MG/DL (ref 0–0.08)
GFR AFRICAN AMERICAN: >60
GFR NON-AFRICAN AMERICAN: >60
GLOBULIN: 3.2 G/DL (ref 2.3–3.5)
GLUCOSE BLD-MCNC: 99 MG/DL (ref 70–99)
HCT VFR BLD CALC: 46.3 % (ref 42–52)
HDLC SERPL-MCNC: 56 MG/DL (ref 40–59)
HEMOGLOBIN: 16 G/DL (ref 14–18)
LDL CHOLESTEROL CALCULATED: 86 MG/DL (ref 0–129)
LYMPHOCYTES ABSOLUTE: 0.9 K/UL (ref 1–4.8)
LYMPHOCYTES RELATIVE PERCENT: 10.7 %
MCH RBC QN AUTO: 31.8 PG (ref 27–31.3)
MCHC RBC AUTO-ENTMCNC: 34.5 % (ref 33–37)
MCV RBC AUTO: 92.2 FL (ref 80–100)
MONOCYTES ABSOLUTE: 0.9 K/UL (ref 0.2–0.8)
MONOCYTES RELATIVE PERCENT: 10.2 %
NEUTROPHILS ABSOLUTE: 6.8 K/UL (ref 1.4–6.5)
NEUTROPHILS RELATIVE PERCENT: 78.7 %
PDW BLD-RTO: 13.9 % (ref 11.5–14.5)
PLATELET # BLD: 337 K/UL (ref 130–400)
POTASSIUM SERPL-SCNC: 3.6 MEQ/L (ref 3.4–4.9)
RBC # BLD: 5.02 M/UL (ref 4.7–6.1)
SALICYLATE, SERUM: <0.3 MG/DL (ref 15–30)
SARS-COV-2, NAAT: NOT DETECTED
SODIUM BLD-SCNC: 140 MEQ/L (ref 135–144)
TOTAL CK: 428 U/L (ref 0–190)
TOTAL PROTEIN: 8.8 G/DL (ref 6.3–8)
TRIGL SERPL-MCNC: 39 MG/DL (ref 0–150)
TSH SERPL DL<=0.05 MIU/L-ACNC: 0.96 UIU/ML (ref 0.44–3.86)
WBC # BLD: 8.6 K/UL (ref 4.8–10.8)

## 2022-09-30 PROCEDURE — 84443 ASSAY THYROID STIM HORMONE: CPT

## 2022-09-30 PROCEDURE — 82550 ASSAY OF CK (CPK): CPT

## 2022-09-30 PROCEDURE — 99285 EMERGENCY DEPT VISIT HI MDM: CPT

## 2022-09-30 PROCEDURE — 80143 DRUG ASSAY ACETAMINOPHEN: CPT

## 2022-09-30 PROCEDURE — 85025 COMPLETE CBC W/AUTO DIFF WBC: CPT

## 2022-09-30 PROCEDURE — 96372 THER/PROPH/DIAG INJ SC/IM: CPT

## 2022-09-30 PROCEDURE — 80179 DRUG ASSAY SALICYLATE: CPT

## 2022-09-30 PROCEDURE — 36415 COLL VENOUS BLD VENIPUNCTURE: CPT

## 2022-09-30 PROCEDURE — 81003 URINALYSIS AUTO W/O SCOPE: CPT

## 2022-09-30 PROCEDURE — 80053 COMPREHEN METABOLIC PANEL: CPT

## 2022-09-30 PROCEDURE — 80061 LIPID PANEL: CPT

## 2022-09-30 PROCEDURE — 87635 SARS-COV-2 COVID-19 AMP PRB: CPT

## 2022-09-30 PROCEDURE — 82077 ASSAY SPEC XCP UR&BREATH IA: CPT

## 2022-09-30 PROCEDURE — 80307 DRUG TEST PRSMV CHEM ANLYZR: CPT

## 2022-09-30 RX ORDER — LORAZEPAM 2 MG/ML
2 INJECTION INTRAMUSCULAR EVERY 6 HOURS PRN
Status: DISCONTINUED | OUTPATIENT
Start: 2022-09-30 | End: 2022-10-02 | Stop reason: HOSPADM

## 2022-09-30 RX ORDER — DIPHENHYDRAMINE HYDROCHLORIDE 50 MG/ML
50 INJECTION INTRAMUSCULAR; INTRAVENOUS ONCE
Status: COMPLETED | OUTPATIENT
Start: 2022-09-30 | End: 2022-10-01

## 2022-09-30 NOTE — ED NOTES
unable to complete triage at this time. Patient is overtly under the influence of one or more substances.      Silas Meyer RN  09/30/22 0573

## 2022-09-30 NOTE — ED NOTES
Tried to speak with patient because he was kneeling over the toilet for about two minutes. Patient is not able to complete a thought at this time. This nurse asked if he was nauseous but he was unable to say if he was. He stated that he was staying there with his grandma when asked to let the nurse take his vital signs.        Merlyn Ivey RN  09/30/22 9801

## 2022-09-30 NOTE — ED NOTES
Changed into [facility] clothing with much encouragement. Patient appears to be under the influence of substances. Patient disheveled. Covid obtained & tolerated procedure fair. Labs drawn. Tolerated lab draw fair. Denies need to void. PO fluids given.      Shala Jacobson RN  09/30/22 5385

## 2022-09-30 NOTE — ED NOTES
Dinner tray given. Continue to deny need to void. PO fluids encouraged.      Holly Jauregui RN  09/30/22 7850

## 2022-09-30 NOTE — ED NOTES
Patient refused to let vital signs be obtained. Patient took the cuff off before machine could be started.       Fiona Montero RN  09/30/22 1950

## 2022-09-30 NOTE — ED TRIAGE NOTES
Patient presents via EMS, [facility] called them for an individual who was feeling depressed, patient denies SI/HI. Patient not cooperative in answering some questions in triage, but will answer most yes or no questions. Patient cooperative, but appears bothered with nursing care and questioning.

## 2022-09-30 NOTE — ED NOTES
Pacing in room. Essentially nonverbal. Appears fearful. Refuses or is unable to answers questions D/T difficulty processing. Continues to present as under the influences of substances.      Esme Hernandez RN  09/30/22 2166

## 2022-09-30 NOTE — Clinical Note
Per Dr. Castaneda Bryant order transfer the patient to another facility due to [facility] and patient's acuity.   DX:

## 2022-09-30 NOTE — ED PROVIDER NOTES
[address] [facility] ED  EMERGENCY DEPARTMENT ENCOUNTER      Pt Name: Cortney Brooks  MRN: [id number]  Pilogfbishnu 1990  Date of evaluation: 9/30/2022  Provider: Madison Ramos PA-C    CHIEF COMPLAINT       Chief Complaint   Patient presents with    Psychiatric Evaluation     Sent by [facility] for erratic behavior, psych history, admits to using drugs         HISTORY OF PRESENT ILLNESS   (Location/Symptom, Timing/Onset, Context/Setting, Quality, Duration, Modifying Factors, Severity)  Note limiting factors. Cortney Brooks is a 28 y.o. male who presents to the emergency department with complaints of anxiety and depression with suicidal ideation without plan, ongoing for the past 2 to 3 days. Patient denies any homicidal ideation, auditory or visual hallucinations. Patient denies any recent illness or injuries. Patient answering yes and no questions only and unable to provide any further insight into the nature of his illness today    HPI    Nursing Notes were reviewed. REVIEW OF SYSTEMS    (2-9 systems for level 4, 10 or more for level 5)     Review of Systems   Unable to perform ROS: Psychiatric disorder     Except as noted above the remainder of the review of systems was reviewed and negative.        PAST MEDICAL HISTORY     Past Medical History:   Diagnosis Date    Anxiety 1/4/2012    Anxiety     Asthma     Bipolar affective disorder ([address] 11/25/2015    Chondromalacia of left knee     Chondromalacia of left knee     Chondromalacia of right knee     Chondromalacia of right knee     Chronic back pain 4/20/2017    Depression     Drug abuse and dependence ([address] 1/4/2012    Fracture of left side of mandibular body ([address] 06/17/2018    ACUTE LEFT CONDYLAR NECK FRACTURE    Fracture of navicular bone of foot, closed 09/03/2012    lt    Fracture of temporal bone ([address] 2015    Hallucinations     Heroin abuse ([address]     History of pulmonary embolism     Polysubstance abuse ([address]     Pulmonary embolism ([address] Schizoaffective disorder ([facility]     Schizophrenia ([facility]     Seizures ([facility]     Talus fracture 9/2012    lt    TBI (traumatic brain injury) ([facility] 2015    TBI (traumatic brain injury) ([facility]     2015    Wound of left leg 9/14/2012         SURGICAL HISTORY       Past Surgical History:   Procedure Laterality Date    WRIST SURGERY  2009    pin in wrist left         CURRENT MEDICATIONS       Discharge Medication List as of 10/2/2022  1:25 PM        CONTINUE these medications which have NOT CHANGED    Details   paliperidone (INVEGA) 3 MG extended release tablet Take 1 tablet by mouth daily, Disp-30 tablet, R-0Normal      !! divalproex (DEPAKOTE) 500 MG DR tablet Take 1 tablet by mouth at bedtime, Disp-30 tablet, R-0Normal      !! divalproex (DEPAKOTE) 250 MG DR tablet Take 250 mg by mouth in the morning and at bedtimeHistorical Med      melatonin 3 MG TABS tablet Take 6 mg by mouth nightly as neededHistorical Med       !! - Potential duplicate medications found. Please discuss with provider.           ALLERGIES     Shellfish-derived products and Shrimp flavor    FAMILY HISTORY       Family History   Family history unknown: Yes          SOCIAL HISTORY       Social History     Socioeconomic History    Marital status: Single     Spouse name: none    Number of children: 0    Years of education: unsure   Tobacco Use    Smoking status: Former    Smokeless tobacco: Never   Vaping Use    Vaping Use: Never used   Substance and Sexual Activity    Alcohol use: Not Currently     Comment: drank 2 shots of vodka prior to arrival    Drug use: Not Currently     Frequency: 7.0 times per week     Types: Marijuana ([employer]), Cocaine     Comment: 8/26/22 \"crack and weed,\" last use 3-4 weeks ago    Sexual activity: Not Currently     Partners: Female   Social History Narrative    ** Merged History Encounter **         Lives w family         SCREENINGS                        PHYSICAL EXAM    (up to 7 for level 4, 8 or more for level 5)     ED Triage by the emergency physician with the below findings:         Interpretation per the Radiologist below, if available at the time of this note:    No orders to display         ED BEDSIDE ULTRASOUND:   Performed by ED Physician - none    LABS:  Labs Reviewed   ACETAMINOPHEN LEVEL - Abnormal; Notable for the following components:       Result Value    Acetaminophen Level <5 (*)     All other components within normal limits   CBC WITH AUTO DIFFERENTIAL - Abnormal; Notable for the following components:    MCH 31.8 (*)     Neutrophils Absolute 6.8 (*)     Lymphocytes Absolute 0.9 (*)     Monocytes Absolute 0.9 (*)     All other components within normal limits   CK - Abnormal; Notable for the following components: Total  (*)     All other components within normal limits   COMPREHENSIVE METABOLIC PANEL - Abnormal; Notable for the following components:    Anion Gap 20 (*)     BUN 29 (*)     Calcium 10.3 (*)     Total Protein 8.8 (*)     Albumin 5.6 (*)     Total Bilirubin 1.1 (*)     All other components within normal limits   SALICYLATE LEVEL - Abnormal; Notable for the following components:    Salicylate, Serum <3.6 (*)     All other components within normal limits   URINE DRUG SCREEN - Abnormal; Notable for the following components:    Cannabinoid Scrn, Ur POSITIVE (*)     Cocaine Metabolite Screen, Urine POSITIVE (*)     All other components within normal limits   URINALYSIS WITH REFLEX TO CULTURE - Abnormal; Notable for the following components:    Color, UA ORANGE (*)     Ketones, Urine TRACE (*)     Protein, UA TRACE (*)     All other components within normal limits   COVID-19, RAPID   ETHANOL   LIPID PANEL   TSH       All other labs were within normal range or not returned as of this dictation.     EMERGENCY DEPARTMENT COURSE and DIFFERENTIAL DIAGNOSIS/MDM:   Vitals:    Vitals:    09/30/22 2030 10/01/22 0643 10/02/22 0616 10/02/22 1237   BP:  (!) 120/94 (!) 124/99 128/84   Pulse:  85 100 83   Resp: 16 16 16 16 Temp:    98.4 °F (36.9 °C)   TempSrc:       SpO2:  99% 97% 96%            MDM  Number of Diagnoses or Management Options  Polysubstance abuse (HCC)  Schizoaffective disorder, unspecified type ([facility]  Diagnosis management comments: Medically stable       Amount and/or Complexity of Data Reviewed  Clinical lab tests: reviewed and ordered          REASSESSMENT     ED Course as of 10/05/22 0209   [facility] Oct 02, 2022   0557 EKG 12 Lead - Chest Pain  Normal sinus rhythm, rate of 91 bpm.  Right axis deviation. Normal intervals. Nonspecific ST-T wave abnormalities. [NA]      ED Course User Index  [NA] Kenyatta Lyn MD         CRITICAL CARE TIME   T    CONSULTS:  None    PROCEDURES:  Unless otherwise noted below, none     Procedures        FINAL IMPRESSION      1. Schizoaffective disorder, unspecified type ([facility]    2. Polysubstance abuse [facility])          DISPOSITION/PLAN   DISPOSITION Decision To Transfer 10/02/2022 11:36:39 AM      PATIENT REFERRED TO:  No follow-up provider specified. DISCHARGE MEDICATIONS:  Discharge Medication List as of 10/2/2022  1:25 PM        Controlled Substances Monitoring:     No flowsheet data found.     (Please note that portions of this note were completed with a voice recognition program.  Efforts were made to edit the dictations but occasionally words are mis-transcribed.)    Yvon Damon PA-C (electronically signed)  Attending Emergency Physician            Yvon Damon PA-C  10/05/22 9022

## 2022-09-30 NOTE — ED NOTES
Medication at this time not. Patient was able to matinain control on his own.      Marina Mullins, RN  09/30/22 2722

## 2022-09-30 NOTE — ED NOTES
Appetite fair. Taking PO fluids fair. Resting with eyes closed @ this time with no acute distress noted.      Esme Hernandez RN  09/30/22 4359

## 2022-10-01 LAB
AMPHETAMINE SCREEN, URINE: ABNORMAL
BARBITURATE SCREEN URINE: ABNORMAL
BENZODIAZEPINE SCREEN, URINE: ABNORMAL
BILIRUBIN URINE: NEGATIVE
BLOOD, URINE: NEGATIVE
CANNABINOID SCREEN URINE: POSITIVE
CLARITY: CLEAR
COCAINE METABOLITE SCREEN URINE: POSITIVE
COLOR: ABNORMAL
FENTANYL SCREEN, URINE: ABNORMAL
GLUCOSE URINE: NEGATIVE MG/DL
KETONES, URINE: ABNORMAL MG/DL
LEUKOCYTE ESTERASE, URINE: NEGATIVE
Lab: ABNORMAL
METHADONE SCREEN, URINE: ABNORMAL
NITRITE, URINE: NEGATIVE
OPIATE SCREEN URINE: ABNORMAL
OXYCODONE URINE: ABNORMAL
PH UA: 6 (ref 5–9)
PHENCYCLIDINE SCREEN URINE: ABNORMAL
PROPOXYPHENE SCREEN: ABNORMAL
PROTEIN UA: ABNORMAL MG/DL
SPECIFIC GRAVITY UA: 1.03 (ref 1–1.03)
URINE REFLEX TO CULTURE: ABNORMAL
UROBILINOGEN, URINE: 0.2 E.U./DL

## 2022-10-01 PROCEDURE — 2580000003 HC RX 258: Performed by: PHYSICIAN ASSISTANT

## 2022-10-01 PROCEDURE — 6360000002 HC RX W HCPCS: Performed by: PHYSICIAN ASSISTANT

## 2022-10-01 PROCEDURE — 6360000002 HC RX W HCPCS: Performed by: STUDENT IN AN ORGANIZED HEALTH CARE EDUCATION/TRAINING PROGRAM

## 2022-10-01 PROCEDURE — 96372 THER/PROPH/DIAG INJ SC/IM: CPT

## 2022-10-01 RX ORDER — LORAZEPAM 2 MG/ML
2 INJECTION INTRAMUSCULAR ONCE
Status: COMPLETED | OUTPATIENT
Start: 2022-10-01 | End: 2022-10-01

## 2022-10-01 RX ORDER — HALOPERIDOL 5 MG/ML
10 INJECTION INTRAMUSCULAR ONCE
Status: COMPLETED | OUTPATIENT
Start: 2022-10-01 | End: 2022-10-01

## 2022-10-01 RX ORDER — DIPHENHYDRAMINE HYDROCHLORIDE 50 MG/ML
50 INJECTION INTRAMUSCULAR; INTRAVENOUS ONCE
Status: COMPLETED | OUTPATIENT
Start: 2022-10-01 | End: 2022-10-01

## 2022-10-01 RX ADMIN — HALOPERIDOL LACTATE 10 MG: 5 INJECTION, SOLUTION INTRAMUSCULAR at 08:26

## 2022-10-01 RX ADMIN — LORAZEPAM 2 MG: 2 INJECTION INTRAMUSCULAR; INTRAVENOUS at 08:26

## 2022-10-01 RX ADMIN — ZIPRASIDONE MESYLATE 20 MG: 20 INJECTION, POWDER, LYOPHILIZED, FOR SOLUTION INTRAMUSCULAR at 01:36

## 2022-10-01 RX ADMIN — LORAZEPAM 2 MG: 2 INJECTION INTRAMUSCULAR; INTRAVENOUS at 01:36

## 2022-10-01 RX ADMIN — HALOPERIDOL LACTATE 10 MG: 5 INJECTION, SOLUTION INTRAMUSCULAR at 15:11

## 2022-10-01 RX ADMIN — DIPHENHYDRAMINE HYDROCHLORIDE 50 MG: 50 INJECTION, SOLUTION INTRAMUSCULAR; INTRAVENOUS at 15:10

## 2022-10-01 RX ADMIN — DIPHENHYDRAMINE HYDROCHLORIDE 50 MG: 50 INJECTION, SOLUTION INTRAMUSCULAR; INTRAVENOUS at 01:36

## 2022-10-01 RX ADMIN — LORAZEPAM 2 MG: 2 INJECTION INTRAMUSCULAR; INTRAVENOUS at 15:11
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
## 2022-10-01 NOTE — ED NOTES
Per Dr. Kelly Ivan, once patient gives urine sample and medically cleared, place with ELIZA Oliver RN  10/01/22 0701

## 2022-10-01 NOTE — ED NOTES
Medication considered effective since patient is able to rest at this time. Even non-labored respirations noted at this time. No distress noted.       Thaddeus Oliver RN  10/01/22 2968

## 2022-10-01 NOTE — ED NOTES
While assessing another peer, this pt comes over and asked to see what I am typing, advised this pt that I am talking with this peer to complete his assessments with him that I am not in his chart or writing about him. He has asked he feels unsafe here, advised pt he is safe and that no one here is going to hurt him that he is safe. Asked if pt wanted to talk with security and he stated no. Pt came out of his room and approached this nurse several times interrupting the assessments with another peer. Pt accepts the information he is safe but then continues to ask if he is safe. Asked pt if he needed medications which he stated \"No\" to. I just got medicine I don't need medicine. \"  Pt is back in his room for the third time then back to peers area.   Pt accepted the information given to him, turned the TV on in his room to station he wanted, he is in bed area more cooperative at this time     Terra Portillo RN  10/01/22 7533

## 2022-10-01 NOTE — ED NOTES
Pt wanted to know where he was going to be transferred to. Advised pt that until he gave us urine to send to the lab that we could not find a transfer place for him. Pt walked away and lay down in his bed. Pt would not let me take his lunch, he states he wants to still eat from his tray. Advised I could get him a sandwich or a drink if he wanted or a snack. Pt accepted a drink from this nurse.           Tonya Maxwell RN  10/01/22 8535

## 2022-10-01 NOTE — ED NOTES
This nurse went to obtain vital signs. Patient held out arm for the blood pressure cuff to be put on. He lowered his arm and before the nurse could start to obtain the blood pressure, the patient took the cuff off. Patient would not let the cuff be reapplied.        Nelly Mcmullen RN  10/01/22 8210

## 2022-10-01 NOTE — ED NOTES
Remains delusional and paranoid. Preoccupied with the [employer] and [employer]. Asks Madi Gama is the  Roxana Powell come back with the gun and shoot me? \" Offered support and reassurance. Patient states \"can I trust you? \"      Chong Flowers RN  10/01/22 6448

## 2022-10-01 NOTE — ED NOTES
Call placed to zone 1 and provider updated on patient behavior.       Nancy Guardado RN  10/01/22 9284

## 2022-10-01 NOTE — ED NOTES
Pt is asleep with even respirations no further acting out or loud behaviors noted. Medication has helped the pt to relax and rest.  Will continue to monitor pt for effects of med's given to the pt. Pt was given a cup to void with and has not given urine. Need the urine to be resulted so MAC can find placement for the pt.      Derek Joya RN  10/01/22 9526

## 2022-10-01 NOTE — ED NOTES
Pt is in bed area quiet with even respirations no problems and no C/O any kind expressed.      Cydney Cifuentes RN  10/01/22 1091

## 2022-10-01 NOTE — ED NOTES
Patient accepting of medications as ordered. Remains agitated in room with [facility] at bedside. Continues screaming with nonsensical speech.       Denise Oliva RN  10/01/22 4585
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
## 2022-10-01 NOTE — ED NOTES
Pt remains bizarre and slow to respond/thought blocking. Pt frequently stands at nurses station glass window from room and just stares at staff. Selectively mute. Minimal to no eye contact, and if so just staring glaze, flat/masklike facial features. Pt has taken urine cup to bathroom multiple times and filled and re filled it with water from sink then just carries it around. Will sit on bathroom floor and just stare to the point where he had to be asked to return to assigned room #1 because other peers needed to use it. When asked if pt is hearing voices as he appears internally stimulated, pt shook head real quick no. Refused to answer all other questions when attempted. Pt currently in room , resting with eyes opened, rr even and unlabored. HS snack and beverage given and on bedside table. Pt does not appear interested at this time. Safety and precautions maintained.         Lazarus Frames, RN  09/30/22 7435

## 2022-10-01 NOTE — ED NOTES
Needs to give urine sample for medical clearance. Will encourage once he is awake.       Roxy Desouza RN  10/01/22 0365

## 2022-10-01 NOTE — ED NOTES
Patient knocked on the window; however, patient still not talking to staff. Staff asked if he would like to write what he needs to say down. He shook his head yes. Paper and pencil given to patient.        Keshawn Welsh RN  10/01/22 0111

## 2022-10-01 NOTE — ED NOTES
Patient accepting of medication as ordered. Patient continues to believe that [facility] are on their way to the unit to shoot and kill the patient and requires frequent reassurance. Thoughts remain disorganized with thought blocking noted.       Yulissa Bond RN  10/01/22 7880

## 2022-10-01 NOTE — ED NOTES
Pt has a urinal and a cup to void with and has been asked for urine multiple times this shift and still need urine. Pt is aware for the need for urine. Medication given to the pt has helped him to be less paranoid and delusional, less talking to himself noted.      Alanna Tomas RN  10/01/22 2631

## 2022-10-01 NOTE — ED NOTES
Pt is in bed asleep with even respirations no problems and no C/O any kind expressed.      Meggan Victoria RN  10/01/22 1052

## 2022-10-01 NOTE — ED NOTES
Patient staring into the nurses station. When asked what we could do for him, he punched the window. VELMA Bowden called and stated to use the medications that were on his MAR from earlier that had not been given yet. Medications given per order with police presence.        Champ Loya RN  10/01/22 4047

## 2022-10-01 NOTE — ED NOTES
Security is with the police at his bedside talking with him  He is sitting on his bed quietly talking to Cande Mccullough RN  10/01/22 0667

## 2022-10-01 NOTE — ED NOTES
Pt continues to be loud pounding on window and disorganized, confusion pt did accept medications      Prabhu Smith RN  10/01/22 9677

## 2022-10-01 NOTE — ED NOTES
Pt was given a cup and was advised the need for urine gave the pt a cup to void with  Pt held the door closed to his room when leaving for a few seconds then laye down in his bed, he was quiet non verbal.     Prabhu Smith RN  10/01/22 3538

## 2022-10-01 NOTE — ED NOTES
Pt still has not voided and has a cup for urine. Pt is asleep with even respirations no problems and no C/O any kind expressed.      Rivas Angel RN  10/01/22 1143

## 2022-10-01 NOTE — ED NOTES
Patient remained covered but did put out his arm to allow staff to obtain vital signs.       Keshawn Welsh RN  10/01/22 8704

## 2022-10-01 NOTE — ED NOTES
Pt awake quieter less anxiety, disorganized but no loud outburst expressed     Rolf Bradley RN  10/01/22 1663

## 2022-10-01 NOTE — ED NOTES
Pt remains asleep even respirations, medications given the pt have helped him to be able to relax and rest.  Pt has no problems and no C/O any kind expressed. Pt has a cup in his room to void with and has been advised several times this AM the need for urine to be sent to the lab.      Walt Hurt RN  10/01/22 8944

## 2022-10-01 NOTE — ED NOTES
Patient resting quietly in bed. Respirations even and unlabored. No complaints voiced.       Dwain Wood RN  10/01/22 1876

## 2022-10-01 NOTE — ED NOTES
Pt remains asleep with even respirations more relaxed no further loud outbursts.      Jamarcus Jackman RN  10/01/22 0203

## 2022-10-01 NOTE — ED NOTES
Patient provided a urinal at bedside to facilitate providing a urine specimen for medical clearance.       Agustina Cantu RN  10/01/22 0964

## 2022-10-01 NOTE — ED NOTES
Pt is loud banging on the window in his room  The doctor was called and med's were ordered for the pt. He is loud, talking to himself delusional believes people are trying to hurt him  He remains in his room loud, irritable, and angry.        Jimmy Rodas RN  10/01/22 9639

## 2022-10-01 NOTE — ED NOTES
Pt refused to give urine and did not want a snack but did accept a drink from this nurse. Pt is back up in his bed area loud talking to himself. Pt asked if he could talk with security. Called security and they are coming to talk to the pt.      Terra Portillo RN  10/01/22 8947

## 2022-10-02 VITALS
TEMPERATURE: 98.4 F | OXYGEN SATURATION: 96 % | DIASTOLIC BLOOD PRESSURE: 84 MMHG | RESPIRATION RATE: 16 BRPM | SYSTOLIC BLOOD PRESSURE: 128 MMHG | HEART RATE: 83 BPM

## 2022-10-02 PROCEDURE — 6370000000 HC RX 637 (ALT 250 FOR IP): Performed by: FAMILY MEDICINE

## 2022-10-02 PROCEDURE — 93005 ELECTROCARDIOGRAM TRACING: CPT | Performed by: PHYSICIAN ASSISTANT

## 2022-10-02 RX ORDER — HALOPERIDOL 5 MG
5 TABLET ORAL ONCE
Status: COMPLETED | OUTPATIENT
Start: 2022-10-02 | End: 2022-10-02

## 2022-10-02 RX ORDER — LORAZEPAM 1 MG/1
2 TABLET ORAL ONCE
Status: COMPLETED | OUTPATIENT
Start: 2022-10-02 | End: 2022-10-02

## 2022-10-02 RX ADMIN — HALOPERIDOL 5 MG: 5 TABLET ORAL at 12:31

## 2022-10-02 RX ADMIN — LORAZEPAM 2 MG: 1 TABLET ORAL at 12:31

## 2022-10-02 ASSESSMENT — PAIN - FUNCTIONAL ASSESSMENT: PAIN_FUNCTIONAL_ASSESSMENT: NONE - DENIES PAIN

## 2022-10-02 NOTE — ED NOTES
Patient complaint of increased agitation and requesting medication to help him maintain control. Call placed to zone 2.       Bharathi Can RN  10/02/22 7269

## 2022-10-02 NOTE — ED NOTES
Finished pt's paper work for his transfer and will discuss with Dr. Hollice Hodgkin his transfer     Cydney Cifuentes RN  10/02/22 4342

## 2022-10-02 NOTE — ED NOTES
Pt is awake in bed area using the bathroom walking around  He accepted direction from [address] [place] [address] staff.   Pt is back in bed area resting     Mary Grace Crenshaw RN  10/02/22 4881

## 2022-10-02 NOTE — ED NOTES
Per Shona Clark at [facility] her supervisor was Garrett Benoit and the pt will need declines from places before they could assess or ambika him a bed at [place], [address] [place] [address]  10/02/22 1026
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
## 2022-10-02 NOTE — ED NOTES
[facility] reviewed pt with [facility]. Asked if pt could be granted a bed at [facility] and advised would fax places that have been attempted for him.   On hold while the supervisor is contacted     Tess Young RN  10/02/22 1916

## 2022-10-02 NOTE — ED NOTES
Pt has been cooperative with staff. . pt states he is \"scared\" and needing constant reassurance. Paces the unit and watches out the door. Pt requested and given more fluids, pt up to bathroom at this time.       Yuliya Dias RN  10/02/22 2356

## 2022-10-02 NOTE — ED NOTES
Call placed to [facility] intake. Chart is currently under review.       Arabella Larson RN  10/02/22 9817

## 2022-10-02 NOTE — ED NOTES
Patient appears to be  sleeping respirations are even and unlabored no distress noted at this time.        Cherise Engle RN  10/01/22 2005

## 2022-10-02 NOTE — ED NOTES
Pt is awake and aware of his admit to [facility] and transfer at 1330 today.   He is using the phone to call his family  Pt is cooperative with the [facility] staff     Alanna Tomas RN  10/02/22 1881

## 2022-10-02 NOTE — ED NOTES
Dr. Kim Erickson signed the pt's transfer forms for his admit to [address] [place] [address], RN  10/02/22 4010

## 2022-10-02 NOTE — ED NOTES
Pt up to the bathroom and returned to bed area.   Pt was given his breakfast at the bedside, he is quiet with no problems expressed or noted at this time     Jimmy Rodas RN  10/02/22 4032

## 2022-10-02 NOTE — ED NOTES
Pt continues to wear his glasses's upside down no irritability noted.        Shaw Paz RN  10/02/22 1153

## 2022-10-02 NOTE — ED NOTES
Pt is in bed area resting with no problems and no C/O any kind expressed.      Tess Young RN  10/02/22 1000

## 2022-10-02 NOTE — ED NOTES
Left a VM to Eduin Barrow regarding [facility] possibly Monday to look at placement if none is found     Rolf Bradley RN  10/02/22 6838

## 2022-10-02 NOTE — ED NOTES
Talked with Rick Taylor at [facility] they wanted additional notes on the pt and his behaviors. [facility] [place] him so will no longer need to send notes on the pt to [facility]. Rick Taylor accepted the information given to him.      Bk Zhang RN  10/02/22 1551

## 2022-10-02 NOTE — ED NOTES
Patient in the restroom trying to urinate. Patient was able to put some urine in the cup.        Chris Guerra RN  10/01/22 5249

## 2022-10-02 NOTE — ED NOTES
[facility]: EKG, Covid screening, and pink slip. All faxed to [facility].       Thaddeus Oliver RN  10/02/22 3235

## 2022-10-02 NOTE — ED NOTES
Patient is on the wait list for [facility] because of bed availability at this time.       Tita Grissom RN  10/02/22 1993

## 2022-10-02 NOTE — ED NOTES
Edith called from [facility] and stated that Dr Gregory Camarena MD is accepting doctor and will be admitted to unit 16  Nurse to nurse can be completed     Emily CINDA Hemphill  10/02/22 7918

## 2022-10-02 NOTE — ED NOTES
Patient has been awake off and on most of the night. Currently, he is awake and behavior has been in control thus far this shift.              Hanny Griffin RN  10/02/22 7540

## 2022-10-02 NOTE — ED NOTES
Received a call from Royce Hilliard at [facility]. [facility] intake requesting a copy of the pink slip and verification of insurance. They state their system is currently down. Pink slip and verification faxed at this time.       Bharathi Can RN  10/02/22 5873

## 2022-10-02 NOTE — ED NOTES
Pt awake, appears scared/confused. Reminded pt we needed his urine, pt reports he hasn't been able to pee all day nor has he drank anything, willing to drink water and try to pee. Pt reported he \"dont want to leave\" here.       Lyndon Lennon RN  10/01/22 8247

## 2022-10-02 NOTE — ED NOTES
[facility] here to pick the pt up for [facility], report was given to the staff. Security called for the pt's belongings. Pt is aware of admit/transfer and is on a pink sheet.  Paper work for the pt and [facility] was given to the Leonor At [address] [place] [address], RN  10/02/22 8256

## 2022-10-02 NOTE — ED NOTES
Pt left cooperative with the [facility] on a gurney and security was here with the [facility] staff for his transfer to [facility], RN  10/02/22 3031

## 2022-10-02 NOTE — ED NOTES
Farhat Looney called back from [facility] and [address] is picking up the pt in two hours or less     Rivas Angel RN  10/02/22 8922

## 2022-10-02 NOTE — ED NOTES
Talked with [facility] staff Edith and wanted to know about his seizure activity answered Edith questions regarding his seizure activity. Pt has had no seizure activity while he has been in the -ER.       Latanya Giles RN  10/02/22 2213 Tetracycline Pregnancy And Lactation Text: This medication is Pregnancy Category D and not consider safe during pregnancy. It is also excreted in breast milk.

## 2022-10-02 NOTE — ED NOTES
Pt is eating his lunch at bedside, drinking fluids with no problems and no C/O any kind expressed.      Yennifer Abernathy RN  10/02/22 9920

## 2022-10-02 NOTE — ED NOTES
Patient remains delusional and paranoid. Thought process disorganized with pressured speech. Patient states Huntley Reddish we ok? \" Patient asks Gaurav Mendez if there is only one wire how will he get the internet then states there is only one [place]. Pushed into the nurses station and attempting to engage with staff. Patient redirected to his own room.       Nola Carlos, CINDA  10/02/22 3961 SRIDEVI Martinez, CINDA  10/02/22 8868

## 2022-10-02 NOTE — ED NOTES
Pt was up gave him his lunch, the pt thought when the phone rang it was for him but was for another peer     Jimmy Rodas RN  10/02/22 0484 31 29 02

## 2022-10-02 NOTE — ED NOTES
Called to give a report on the pt called the [address] unit talked with Ryan Flowers  gave her a report on the pt, answered Ryan Flowers questions questions.       Meggan Victoria RN  10/02/22 8829

## 2022-10-02 NOTE — ED NOTES
Call placed to [facility] intake and spoke with Rozena Letters. Chart faxed again for review.       Nola Carlos RN  10/02/22 1204 JAYLEN Sapp RN  10/02/22 1002

## 2022-10-02 NOTE — ED NOTES
[facility], covid and ekg faxed to them.       Molina Niño RN  10/02/22 6020

## 2022-10-03 LAB
EKG ATRIAL RATE: 91 BPM
EKG P AXIS: 82 DEGREES
EKG P-R INTERVAL: 130 MS
EKG Q-T INTERVAL: 388 MS
EKG QRS DURATION: 90 MS
EKG QTC CALCULATION (BAZETT): 477 MS
EKG R AXIS: 93 DEGREES
EKG T AXIS: 66 DEGREES
EKG VENTRICULAR RATE: 91 BPM

## 2022-10-03 PROCEDURE — 93010 ELECTROCARDIOGRAM REPORT: CPT | Performed by: INTERNAL MEDICINE

## 2023-01-17 NOTE — ED PROVIDER NOTES
[address] [facility] ED  eMERGENCY dEPARTMENT eNCOUnter      Pt Name: Parviz Burroughs  MRN: [id number]  [place] 1990  Date of evaluation: 6/13/2022  Provider: Corinna Ervin PA-C    CHIEF COMPLAINT       Chief Complaint   Patient presents with    Psychiatric Evaluation     patient with visual halucinations          HISTORY OF PRESENT ILLNESS   (Location/Symptom, Timing/Onset,Context/Setting, Quality, Duration, Modifying Factors, Severity)  Note limiting factors. Parviz Burroughs is a 32 y.o. male who presents to the emergency department patient arrives for evaluation of hallucinations. Patient is unsure why he is here hallucinations versus delusions he denies any injuries including cuts or bruises denies fever chills nausea vomiting chest pain denies taking any medications prescription at this time he does take ibuprofen most recently 2 days ago. Symptoms mild to moderate severity nothing improves or worsen symptoms. HPI    NursingNotes were reviewed. REVIEW OF SYSTEMS    (2-9 systems for level 4, 10 or more for level 5)     Review of Systems   Constitutional: Negative for activity change, appetite change and unexpected weight change. HENT: Negative for ear discharge, nosebleeds and voice change. Eyes: Negative for discharge. Respiratory: Negative for cough and shortness of breath. Cardiovascular: Negative for chest pain. Gastrointestinal: Negative for abdominal distention, anal bleeding, nausea and vomiting. Genitourinary: Negative for dysuria and hematuria. Musculoskeletal: Negative for back pain and neck pain. Skin: Negative for pallor and wound. Neurological: Negative for seizures and facial asymmetry. Psychiatric/Behavioral: Positive for hallucinations. Negative for behavioral problems, self-injury and suicidal ideas. All other systems reviewed and are negative. Except as noted above the remainder of the review of systems was reviewed and negative.        PAST MEDICAL HISTORY     Past Medical History:   Diagnosis Date    Anxiety 1/4/2012    Bipolar affective disorder ([facility] 11/25/2015    Chondromalacia of left knee     Chondromalacia of left knee     Chondromalacia of right knee     Chondromalacia of right knee     Chronic back pain 4/20/2017    Depression     Drug abuse and dependence ([facility] 1/4/2012    Fracture of left side of mandibular body ([facility] 06/17/2018    ACUTE LEFT CONDYLAR NECK FRACTURE    Fracture of navicular bone of foot, closed 09/03/2012    lt    Fracture of temporal bone ([facility] 2015    History of pulmonary embolism     [facility]     Seizures ([facility]     Talus fracture 9/2012    lt    TBI (traumatic brain injury) ([facility] 2015    Wound of left leg 9/14/2012         SURGICALHISTORY       Past Surgical History:   Procedure Laterality Date    WRIST SURGERY  2009    pin in wrist left         CURRENT MEDICATIONS       Previous Medications    No medications on file            Shellfish-derived products and Shrimp flavor    FAMILY HISTORY       Family History   Family history unknown: Yes          SOCIAL HISTORY       Social History     Socioeconomic History    Marital status: Single     Spouse name: Not on file    Number of children: 0    Years of education: 15    Highest education level: Not on file   Occupational History    Not on file   Tobacco Use    Smoking status: Current Some Day Smoker     Packs/day: 1.00     Years: 1.00     Pack years: 1.00     Types: Cigarettes     Last attempt to quit: 11/28/2011     Years since quitting: 10.5    Smokeless tobacco: Current User     Types: Chew   Vaping Use    Vaping Use: Never used   Substance and Sexual Activity    Alcohol use: Yes     Comment: drank 2 shots of vodka prior to arrival    Drug use: Yes     Frequency: 7.0 times per week     Types: Marijuana (Cloretta Vannessa), Cocaine    Sexual activity: Not Currently     Partners: Female   Other Topics Concern    Not on file   Social History Narrative    Lives w family     Social Determinants of Health     Financial Resource Strain:     Difficulty of Paying Living Expenses: Not on file   Food Insecurity:     Worried About Running Out of Food in the Last Year: Not on file    Kevin of Food in the Last Year: Not on file   Transportation Needs:     Lack of Transportation (Medical): Not on file    Lack of Transportation (Non-Medical): Not on file   Physical Activity:     Days of Exercise per Week: Not on file    Minutes of Exercise per Session: Not on file   Stress:     Feeling of Stress : Not on file   Social Connections:     Frequency of Communication with Friends and Family: Not on file    Frequency of Social Gatherings with Friends and Family: Not on file    Attends Latter-day Services: Not on file    Active Member of [address] [place] [address] [employer] or Organizations: Not on file    Attends Club or Organization Meetings: Not on file    Marital Status: Not on file   Intimate Partner Violence:     Fear of Current or Ex-Partner: Not on file    Emotionally Abused: Not on file    Physically Abused: Not on file    Sexually Abused: Not on file   Housing Stability:     Unable to Pay for Housing in the Last Year: Not on file    Number of [place] in the Last Year: Not on file    Unstable Housing in the Last Year: Not on file       SCREENINGS   [place] Coma Scale  Eye Opening: Spontaneous  Best Verbal Response: Oriented  Best Motor Response: Obeys commands  [place] Coma Scale Score: 15  Ebola Virus Disease (EVD) Screening   Temp: 98.9 °F (37.2 °C)  CIWA Assessment  BP: 129/74  Heart Rate: 84    PHYSICAL EXAM    (up to 7 for level 4, 8 or more for level 5)     ED Triage Vitals [06/13/22 1850]   BP Temp Temp src Heart Rate Resp SpO2 Height Weight   129/80 97.6 °F (36.4 °C) -- 75 16 98 % 6' (1.829 m) 174 lb (78.9 kg)       Physical Exam  Vitals and nursing note reviewed. Constitutional:       General: He is not in acute distress.      Appearance: He is well-developed. HENT:      Head: Normocephalic and atraumatic. Right Ear: External ear normal.      Left Ear: External ear normal.      Nose: Nose normal.      Mouth/Throat:      Mouth: Mucous membranes are moist.   Eyes:      General:         Right eye: No discharge. Left eye: No discharge. Pupils: Pupils are equal, round, and reactive to light. Cardiovascular:      Rate and Rhythm: Normal rate and regular rhythm. Pulses: Normal pulses. Heart sounds: Normal heart sounds. Pulmonary:      Effort: Pulmonary effort is normal. No respiratory distress. Breath sounds: Normal breath sounds. No stridor. Abdominal:      General: Bowel sounds are normal. There is no distension. Palpations: Abdomen is soft. Musculoskeletal:         General: Normal range of motion. Cervical back: Normal range of motion and neck supple. Skin:     General: Skin is warm. Capillary Refill: Capillary refill takes less than 2 seconds. Findings: No bruising, erythema or lesion. Neurological:      Mental Status: He is alert and oriented to person, place, and time. Motor: No weakness.       Gait: Gait normal.   Psychiatric:         Mood and Affect: Mood normal.         RESULTS     EKG: All EKG's are interpreted by the Emergency Department Physician who either signs or Co-signsthis chart in the absence of a cardiologist.    Normal sinus rhythm, rate 65, normal intervals, normal axis, no ST segment changes    RADIOLOGY:   Non-plain filmimages such as CT, Ultrasound and MRI are read by the radiologist. Plain radiographic images are visualized and preliminarily interpreted by the emergency physician with the below findings:         Interpretation per the Radiologist below, if available at the time ofthis note:    No orders to display         ED BEDSIDE ULTRASOUND:   Performed by ED Physician - none    LABS:  Labs Reviewed   ACETAMINOPHEN LEVEL - Abnormal; Notable for the following components:       Result Value    Acetaminophen Level <5 (*)     All other components within normal limits   CBC WITH AUTO DIFFERENTIAL - Abnormal; Notable for the following components:    RBC 4.42 (*)     Hemoglobin 13.6 (*)     Hematocrit 40.1 (*)     All other components within normal limits   VALPROIC ACID LEVEL, TOTAL - Abnormal; Notable for the following components:    Valproic Acid Lvl <2.8 (*)     All other components within normal limits   CK - Abnormal; Notable for the following components: Total  (*)     All other components within normal limits   COMPREHENSIVE METABOLIC PANEL - Abnormal; Notable for the following components:    Glucose 114 (*)     Globulin 2.1 (*)     All other components within normal limits   SALICYLATE LEVEL - Abnormal; Notable for the following components:    Salicylate, Serum <1.7 (*)     All other components within normal limits   URINE DRUG SCREEN - Abnormal; Notable for the following components:    Cannabinoid Scrn, Ur POSITIVE (*)     Cocaine Metabolite Screen, Urine POSITIVE (*)     All other components within normal limits   URINALYSIS WITH REFLEX TO CULTURE - Abnormal; Notable for the following components:    Ketones, Urine 15 (*)     All other components within normal limits   COVID-19, RAPID   ETHANOL   LIPID PANEL   TSH       All other labs were within normal range or not returned as of this dictation. EMERGENCY DEPARTMENT COURSE and DIFFERENTIAL DIAGNOSIS/MDM:   Vitals:    Vitals:    06/13/22 1850 06/14/22 0655 06/14/22 1305   BP: 129/80 132/86 129/74   Pulse: 75 82 84   Resp: 16 16 16   Temp: 97.6 °F (36.4 °C) 97.9 °F (36.6 °C) 98.9 °F (37.2 °C)   TempSrc:  Oral Oral   SpO2: 98% 98% 97%   Weight: 174 lb (78.9 kg)     Height: 6' (1.829 m)          32 yr old male who will be transferred to [address] [place] [address] Rd per Dr. Dr. Qing Coates. Patient is stable at discharge. Labs are unremarkable.        MDM  Number of Diagnoses or Management Options  Diagnosis management Itraconazole Pregnancy And Lactation Text: This medication is Pregnancy Category C and it isn't know if it is safe during pregnancy. It is also excreted in breast milk.

## 2023-04-06 NOTE — ED NOTES
Pt was at the phone talking with pt and both peers were loud with each other. Security was called and pt returned to his bed area and talking with security. Pt had been medicated earlier with injections x 2.   He is in bed area with even respirations     Meghna Mcdonald RN  10/01/22 5446 Detail Level: Zone Otc Regimen: SPF 30+ UVA UVB Broad spectrum recommended to face and other sun exposed areas on the body Q1-2 hours or sooner PRN

## 2024-04-26 NOTE — ED NOTES
2nd voicemail left for nurse to nurse to have return call for report. John Christie  S[place], [address] [place] [address]  11/24/21 6194 no

## 2024-06-17 ENCOUNTER — HOSPITAL ENCOUNTER (INPATIENT)
Age: 34
LOS: 8 days | Discharge: OTHER INSTITUTION | DRG: 885 | End: 2024-06-25
Attending: STUDENT IN AN ORGANIZED HEALTH CARE EDUCATION/TRAINING PROGRAM | Admitting: PSYCHIATRY & NEUROLOGY
Payer: COMMERCIAL

## 2024-06-17 DIAGNOSIS — F12.10 MARIJUANA ABUSE: ICD-10-CM

## 2024-06-17 DIAGNOSIS — R45.851 SUICIDAL IDEATION: Primary | ICD-10-CM

## 2024-06-17 DIAGNOSIS — F11.10 OPIATE ABUSE, CONTINUOUS (HCC): ICD-10-CM

## 2024-06-17 DIAGNOSIS — F25.9 SCHIZOAFFECTIVE DISORDER, UNSPECIFIED TYPE (HCC): ICD-10-CM

## 2024-06-17 DIAGNOSIS — F14.10 COCAINE ABUSE (HCC): ICD-10-CM

## 2024-06-17 DIAGNOSIS — F19.10 POLYSUBSTANCE ABUSE (HCC): ICD-10-CM

## 2024-06-17 LAB
ALBUMIN SERPL-MCNC: 4.5 G/DL (ref 3.5–4.6)
ALP SERPL-CCNC: 124 U/L (ref 35–104)
ALT SERPL-CCNC: 61 U/L (ref 0–41)
AMPHET UR QL SCN: ABNORMAL
ANION GAP SERPL CALCULATED.3IONS-SCNC: 14 MEQ/L (ref 9–15)
APAP SERPL-MCNC: <5 UG/ML (ref 10–30)
AST SERPL-CCNC: 44 U/L (ref 0–40)
BARBITURATES UR QL SCN: ABNORMAL
BASOPHILS # BLD: 0.1 K/UL (ref 0–0.2)
BASOPHILS NFR BLD: 0.6 %
BENZODIAZ UR QL SCN: ABNORMAL
BILIRUB SERPL-MCNC: 1.5 MG/DL (ref 0.2–0.7)
BILIRUB UR QL STRIP: NEGATIVE
BUN SERPL-MCNC: 27 MG/DL (ref 6–20)
CALCIUM SERPL-MCNC: 9 MG/DL (ref 8.5–9.9)
CANNABINOIDS UR QL SCN: POSITIVE
CHLORIDE SERPL-SCNC: 98 MEQ/L (ref 95–107)
CHOLEST SERPL-MCNC: 151 MG/DL (ref 0–199)
CK SERPL-CCNC: 1172 U/L (ref 0–190)
CLARITY UR: ABNORMAL
CO2 SERPL-SCNC: 27 MEQ/L (ref 20–31)
COCAINE UR QL SCN: POSITIVE
COLOR UR: YELLOW
CREAT SERPL-MCNC: 0.99 MG/DL (ref 0.7–1.2)
DRUG SCREEN COMMENT UR-IMP: ABNORMAL
EOSINOPHIL # BLD: 0.1 K/UL (ref 0–0.7)
EOSINOPHIL NFR BLD: 0.9 %
ERYTHROCYTE [DISTWIDTH] IN BLOOD BY AUTOMATED COUNT: 13 % (ref 11.5–14.5)
ETHANOL PERCENT: NORMAL G/DL
ETHANOLAMINE SERPL-MCNC: <10 MG/DL (ref 0–0.08)
FENTANYL SCREEN, URINE: ABNORMAL
GLOBULIN SER CALC-MCNC: 2.8 G/DL (ref 2.3–3.5)
GLUCOSE SERPL-MCNC: 99 MG/DL (ref 70–99)
GLUCOSE UR STRIP-MCNC: NEGATIVE MG/DL
HCT VFR BLD AUTO: 42.1 % (ref 42–52)
HDLC SERPL-MCNC: 39 MG/DL (ref 40–59)
HGB BLD-MCNC: 14.9 G/DL (ref 14–18)
HGB UR QL STRIP: NEGATIVE
KETONES UR STRIP-MCNC: 15 MG/DL
LDLC SERPL CALC-MCNC: 101 MG/DL (ref 0–129)
LEUKOCYTE ESTERASE UR QL STRIP: NEGATIVE
LYMPHOCYTES # BLD: 1.8 K/UL (ref 1–4.8)
LYMPHOCYTES NFR BLD: 16.2 %
MCH RBC QN AUTO: 30.7 PG (ref 27–31.3)
MCHC RBC AUTO-ENTMCNC: 35.4 % (ref 33–37)
MCV RBC AUTO: 86.6 FL (ref 79–92.2)
METHADONE UR QL SCN: ABNORMAL
MONOCYTES # BLD: 1.1 K/UL (ref 0.2–0.8)
MONOCYTES NFR BLD: 10.5 %
NEUTROPHILS # BLD: 7.8 K/UL (ref 1.4–6.5)
NEUTS SEG NFR BLD: 71.5 %
NITRITE UR QL STRIP: NEGATIVE
OPIATES UR QL SCN: POSITIVE
OXYCODONE UR QL SCN: ABNORMAL
PCP UR QL SCN: ABNORMAL
PH UR STRIP: 5.5 [PH] (ref 5–9)
PLATELET # BLD AUTO: 270 K/UL (ref 130–400)
POTASSIUM SERPL-SCNC: 4 MEQ/L (ref 3.4–4.9)
PROPOXYPH UR QL SCN: ABNORMAL
PROT SERPL-MCNC: 7.3 G/DL (ref 6.3–8)
PROT UR STRIP-MCNC: ABNORMAL MG/DL
RBC # BLD AUTO: 4.86 M/UL (ref 4.7–6.1)
SALICYLATES SERPL-MCNC: <0.3 MG/DL (ref 15–30)
SODIUM SERPL-SCNC: 139 MEQ/L (ref 135–144)
SP GR UR STRIP: 1.03 (ref 1–1.03)
TRIGL SERPL-MCNC: 53 MG/DL (ref 0–150)
TSH SERPL-MCNC: 1.73 UIU/ML (ref 0.44–3.86)
URINE REFLEX TO CULTURE: ABNORMAL
UROBILINOGEN UR STRIP-ACNC: 1 E.U./DL
WBC # BLD AUTO: 10.9 K/UL (ref 4.8–10.8)

## 2024-06-17 PROCEDURE — 80307 DRUG TEST PRSMV CHEM ANLYZR: CPT

## 2024-06-17 PROCEDURE — 36415 COLL VENOUS BLD VENIPUNCTURE: CPT

## 2024-06-17 PROCEDURE — 85025 COMPLETE CBC W/AUTO DIFF WBC: CPT

## 2024-06-17 PROCEDURE — 82077 ASSAY SPEC XCP UR&BREATH IA: CPT

## 2024-06-17 PROCEDURE — 84443 ASSAY THYROID STIM HORMONE: CPT

## 2024-06-17 PROCEDURE — 80143 DRUG ASSAY ACETAMINOPHEN: CPT

## 2024-06-17 PROCEDURE — 80053 COMPREHEN METABOLIC PANEL: CPT

## 2024-06-17 PROCEDURE — 81003 URINALYSIS AUTO W/O SCOPE: CPT

## 2024-06-17 PROCEDURE — 1240000000 HC EMOTIONAL WELLNESS R&B

## 2024-06-17 PROCEDURE — 82550 ASSAY OF CK (CPK): CPT

## 2024-06-17 PROCEDURE — 83036 HEMOGLOBIN GLYCOSYLATED A1C: CPT

## 2024-06-17 PROCEDURE — 99285 EMERGENCY DEPT VISIT HI MDM: CPT

## 2024-06-17 PROCEDURE — 80061 LIPID PANEL: CPT

## 2024-06-17 PROCEDURE — 80179 DRUG ASSAY SALICYLATE: CPT

## 2024-06-17 RX ORDER — TRAZODONE HYDROCHLORIDE 50 MG/1
50 TABLET ORAL NIGHTLY PRN
Status: DISCONTINUED | OUTPATIENT
Start: 2024-06-17 | End: 2024-06-25 | Stop reason: HOSPADM

## 2024-06-17 RX ORDER — HYDROXYZINE PAMOATE 50 MG/1
50 CAPSULE ORAL EVERY 6 HOURS PRN
Status: DISCONTINUED | OUTPATIENT
Start: 2024-06-17 | End: 2024-06-25 | Stop reason: HOSPADM

## 2024-06-17 RX ORDER — BENZTROPINE MESYLATE 1 MG/ML
2 INJECTION INTRAMUSCULAR; INTRAVENOUS 2 TIMES DAILY PRN
Status: DISCONTINUED | OUTPATIENT
Start: 2024-06-17 | End: 2024-06-25 | Stop reason: HOSPADM

## 2024-06-17 RX ORDER — HALOPERIDOL 5 MG/ML
5 INJECTION INTRAMUSCULAR EVERY 6 HOURS PRN
Status: DISCONTINUED | OUTPATIENT
Start: 2024-06-17 | End: 2024-06-25 | Stop reason: HOSPADM

## 2024-06-17 RX ORDER — ACETAMINOPHEN 325 MG/1
650 TABLET ORAL EVERY 4 HOURS PRN
Status: DISCONTINUED | OUTPATIENT
Start: 2024-06-17 | End: 2024-06-25 | Stop reason: HOSPADM

## 2024-06-17 RX ORDER — NICOTINE 21 MG/24HR
1 PATCH, TRANSDERMAL 24 HOURS TRANSDERMAL DAILY
Status: DISCONTINUED | OUTPATIENT
Start: 2024-06-17 | End: 2024-06-20

## 2024-06-17 RX ORDER — HYDROXYZINE HYDROCHLORIDE 50 MG/ML
50 INJECTION, SOLUTION INTRAMUSCULAR EVERY 6 HOURS PRN
Status: DISCONTINUED | OUTPATIENT
Start: 2024-06-17 | End: 2024-06-25 | Stop reason: HOSPADM

## 2024-06-17 RX ORDER — MAGNESIUM HYDROXIDE/ALUMINUM HYDROXICE/SIMETHICONE 120; 1200; 1200 MG/30ML; MG/30ML; MG/30ML
30 SUSPENSION ORAL PRN
Status: DISCONTINUED | OUTPATIENT
Start: 2024-06-17 | End: 2024-06-25 | Stop reason: HOSPADM

## 2024-06-17 RX ORDER — HALOPERIDOL 5 MG/1
5 TABLET ORAL EVERY 6 HOURS PRN
Status: DISCONTINUED | OUTPATIENT
Start: 2024-06-17 | End: 2024-06-25 | Stop reason: HOSPADM

## 2024-06-17 ASSESSMENT — PAIN - FUNCTIONAL ASSESSMENT
PAIN_FUNCTIONAL_ASSESSMENT: ACTIVITIES ARE NOT PREVENTED
PAIN_FUNCTIONAL_ASSESSMENT: 0-10

## 2024-06-17 ASSESSMENT — PAIN DESCRIPTION - PAIN TYPE: TYPE: ACUTE PAIN

## 2024-06-17 ASSESSMENT — PAIN DESCRIPTION - FREQUENCY: FREQUENCY: CONTINUOUS

## 2024-06-17 ASSESSMENT — PAIN DESCRIPTION - LOCATION: LOCATION: FOOT

## 2024-06-17 ASSESSMENT — LIFESTYLE VARIABLES
HOW MANY STANDARD DRINKS CONTAINING ALCOHOL DO YOU HAVE ON A TYPICAL DAY: PATIENT DOES NOT DRINK
HOW OFTEN DO YOU HAVE A DRINK CONTAINING ALCOHOL: NEVER

## 2024-06-17 ASSESSMENT — PAIN DESCRIPTION - DESCRIPTORS: DESCRIPTORS: DISCOMFORT;SORE

## 2024-06-17 ASSESSMENT — PAIN SCALES - GENERAL: PAINLEVEL_OUTOF10: 10

## 2024-06-17 NOTE — ED NOTES
Ate 100% dinner tray. Taking PO fluids fair. [facility] Police notified of need for 3 WT escort.

## 2024-06-17 NOTE — ED NOTES
Changed into  hospital clothing with blisters noted on feet bilaterally. No contraband found @ this time. Oriented to REYNA. Verbalizes understanding. Urine specimen obtained & sent to lab.

## 2024-06-17 NOTE — ED NOTES
Phlebotomist yanique labs. Tolerated lab draw well. [place], pretzels, & PO fluids given as requested.

## 2024-06-17 NOTE — ED TRIAGE NOTES
Assessed & pink sheet by [facility] for medical clearance for inpatient psychiatric evaluation D/T reporting suicidal ideation with plan

## 2024-06-17 NOTE — ED NOTES
Provisional Diagnosis:   Substance Induced Mood Disorder / Schizoaffective Disorder by history    Psychosocial and Contextual Factors:   Currently homeless. Released on Friday 06/14/24 from [facility] for substance & mental health treatment. Reports this his third time in rehab in the past few months. Reports relapsing on substances, last use crack cocaine this morning. Reports graduated in 2009 from [employer] & does not work, but receives social security. Reports currently on probation for violating a TPO. Reports he is now his own person, that his grandmother is no longer his legal guardian.     C-SSRS Summary:      Patient: Reports current suicidal ideation with plan    Family: No family and/or friends in the ED. Patient reports he has no support system.    Agency: No current mental health providers. Patient went to [facility] for Intake appointment to start services today.   History of multiple inpatient psychiatric evaluations    Substance Abuse: Long history of polysubstance abuse. Urine Toxicology: positive THC, positive cocaine, & positive opiates. Negative alcohol. Patient reports he does not drink alcohol.    Present Suicidal Behavior:  Yes    Verbal:  Reports current suicidal ideation with plan to run in front of a car    Attempt: Denies    Past Suicidal Behavior: Yes    Verbal: Reports history of suicidal thoughts    Attempt: Reports overdose on prescribed medications in 2021      Self-Injurious/Self-Mutilation: Denies      Violence Current or Past: Denies. History of verbal and physical aggression with his grandmother      Trauma Identified:  Denies all    Protective Factors:    Sought treatment / Requesting to get back on psychotropic medication & start outpatient treatment      Risk Factors:    Polysubstance abuse / No support system / No insight into his substance abuse / Poor judgement      Clinical Summary:  Presented to ED via [facility] from [facility] on a

## 2024-06-17 NOTE — ED PROVIDER NOTES
Screen Comment: see below    Urinalysis with Reflex to Culture    Specimen: Urine   Result Value Ref Range    Color, UA Yellow Straw/Yellow    Clarity, UA CLOUDY (A) Clear    Glucose, Ur Negative Negative mg/dL    Bilirubin, Urine Negative Negative    Ketones, Urine 15 (A) Negative mg/dL    Specific Gravity, UA 1.032 1.005 - 1.030    Blood, Urine Negative Negative    pH, Urine 5.5 5.0 - 9.0    Protein, UA TRACE (A) Negative mg/dL    Urobilinogen, Urine 1.0 <2.0 E.U./dL    Nitrite, Urine Negative Negative    Leukocyte Esterase, Urine Negative Negative    Urine Reflex to Culture Not Indicated          IMPRESSION/MDM/ED COURSE:  33 y.o. male presented with acute suicidal ideation  VS: WNL    Impression/Plan: Labs my interpretation: CK elevated at 1172, normal renal function, minimally elevated liver enzymes, ethanol level negative, TSH normal, urine does not show infection, urine drug screen positive for cannabinoids cocaine and opiates    Psychiatry consulted, Dr. Laine Canada and accepts patient for admission to [facility]. Next    Patient medically cleared for psych admission         RADIOLOGY:  No orders to display         EKG  See MDM for my interpretation     All EKG's are interpreted by the Emergency Department Physician who either signs or Co-signs this chart in the absence of a cardiologist.      PROCEDURES:  None    CONSULTS:  None    Critical Care Time:  none    FINAL IMPRESSION      1. Suicidal ideation    2. Polysubstance abuse (HCC)    3. Opiate abuse, continuous (HCC)    4. Cocaine abuse (HCC)    5. Marijuana abuse    6. Schizoaffective disorder, unspecified type (HCC)          DISPOSITION / PLAN     DISPOSITION Decision To Admit 06/17/2024 04:55:32 PM      PATIENT REFERREDTO:  No follow-up provider specified.    DISCHARGE MEDICATIONS:  New Prescriptions    No medications on file       Drew Hewitt DO  Emergency Medicine Physician  06/17/24 4:55 PM        (Please note that portions of this note were completed

## 2024-06-18 LAB
ESTIMATED AVERAGE GLUCOSE: 88 MG/DL
HBA1C MFR BLD: 4.7 % (ref 4–6)

## 2024-06-18 PROCEDURE — 1240000000 HC EMOTIONAL WELLNESS R&B

## 2024-06-18 PROCEDURE — 99223 1ST HOSP IP/OBS HIGH 75: CPT | Performed by: PSYCHIATRY & NEUROLOGY

## 2024-06-18 NOTE — GROUP NOTE
Patient did not attend group.    Date: 6/18/2024    Group Start Time: 1205  Group End Time: 1305  Group Topic: Music Therapy    [facility] Laine Teresa    Active Music Making, Live Music Listening, and Music Reminiscence  Patients will be given a songbook and offered the opportunity to select (a) song(s) of their choice for live music listening on [employer]. Patients will be encouraged to sing along, move along, and listen to the music.     Focus: Self-Expression, Processing, Self-Esteem, Coping Skills, and Building Positive Experiences    Goals: Improve Mood, Decrease Isolation, Increase Sense of Community/Socialization, Improve Self-Esteem, Increase Self-Expression, Provide Sense of Autonomy, Develop Coping Skills    Documentation completed by KENDELL Robles, PsychoEd Spec

## 2024-06-18 NOTE — GROUP NOTE
Group Therapy Note    Date: 6/18/2024    Group Start Time: 1700  Group End Time: 1735  Group Topic: Recreational    [facility] Jose Newman        Group Therapy Note    Attendees: 3/14     Notes:  Pt did not attend.    Modes of Intervention: Socialization and Movement      Discipline Responsible: Behavorial Health Tech      Signature:  Jose Hernandez

## 2024-06-18 NOTE — GROUP NOTE
Patient did not attend group.     Date: 6/18/2024    Group Start Time: 0930  Group End Time: 1025  Group Topic: Music Therapy    [facility] 3[facility] Laine Teresa    What do you need today, and how can music help?  Music Interventions: Music Reminiscence, Receptive Music Listening, and Playlist Building    Patients will identify different songs that fall under various prompts (inspirational, motivational, makes you smile/laugh, helps you express difficult emotions, and your favorite song). Patients will be invited to select a song that aligns with something they need today. Patients will be encouraged to explain their connection to the song and the experience of listening to it in this setting.    Focus: Coping Skills (Emotional Regulation), Validation/Support, Self-Esteem, & Self-Expression    Goals: Improve Mood, Improve Insight/Self-Awareness, Increase Socialization/Community Building, Increase Self-Expression, Improve Attention to Task, Improve Coping Skills/Develop Coping Skills, Improve Emotional Regulation Skills, Promote Reality Orientation     Documentation completed by Laine Boo, MT-BC, PsychoEd Spec

## 2024-06-18 NOTE — H&P
Department of Psychiatry  History and Physical - Adult     CHIEF COMPLAINT:  AH, depression, cocaine abuse     History obtained from:  patient    Patient was seen after discussing with the treatment team and reviewing the chart        HISTORY OF PRESENT ILLNESS:      The patient is a 33 y.o. male with significant past history of Schizophrenia and addiction    ER report:   Presented to ED via [facility] from [facility] on a pink sheet after reporting suicidal ideation with plan to run out in front of an oncoming car while @ an [facility] Int[facility] appointment. History of one suicidal gesture in 2021 of an overdose. Reports increased depression R/T no housing, no support, & limited income. Denies homocidal ideation. Denies auditory hallucinations. Reports \"seeing holograms last night\". Patient does not appear internally preoccupied @ time of assessment. No delusional thoughts voiced. Reports disturbed sleep & appetite varies.     During interview:  Pt refused to come to interview room  Report that he has been homeless and not taking medication  Guarded and suspicious  Pt was suicidal with plan to run in front of the traffic  [facility] pink slipped  Pt denies AH although he seem to be internally stimulated  Admit to feeling paranoid but would not elaborate on his paranoid thoughts  Pt does not have any contact with his family  Grandmother used to be his legal guardian but not anymore    The patient is not currently receiving care for the above psychiatric illness.    Medications Prior to Admission:   No medications prior to admission.    Compliance:no    Psychiatric Review of Systems       Depression: yes     Layne or Hypomania:  no     Panic Attacks:  no     Phobias:  no     Obsessions and Compulsions:  no     PTSD : no     Hallucinations:  yes      Delusions:  yes     Substance Abuse History:  ETOH: no   Marijuana: yes  Opiates: yes  Other Drugs: cocaine use  Unable to elicit details and severity about drug abuse    Past

## 2024-06-18 NOTE — CARE COORDINATION
6/18/24 @ 4:25    Approached patient in his room to complete BH assessment. Patient was sleeping. As a result, his assessment has been deferred.

## 2024-06-18 NOTE — CARE COORDINATION
Leisure Assessment  June 18, 2024 /  1050 am    Appearance: Alert, Appears younger than stated age, Distracted, and Withdrawn  Current Mental Status: Cooperative  Affect/Mood: Blunted/ Quiet  Thought Content/Processes: Vague and Thought blocking  Insight/Judgement: Poor insight and Poor judgment  Speech: poverty of speech  Delusions/Hallucinations: appears internally preoccupied   Admit Status:  Involuntary    Patient lying in bed and agreeable upon approach. Patient was not forthcoming with information, and often replied with \"no\" or \"I don't know\" to questions. Patient denied having leisure interests, challenges/strengths, and stressors, and denied knowing the reason for hospitalization. Patient reported that he prefers to spend time by himself, and that he has support from friends and family, but did not elaborate. Patient shared that he keeps his feelings in rather than sharing them, and kerwin with that stress through \"getting high.\" Patient appeared internally preoccupied throughout AEB demonstrating thought blocking (delayed responses, 1-2 word answers), socially withdrawing, and his eyes were wide-eyed darting around the room.    Recommendations: Encourage Group Attendance, Increase Socialization, Improve/Maintain Coping Skills Development, Improve/Maintain Emotion Regulation Skills/Mood, Improve/Maintain Expressive Communication/Self-Expression, Improve/Maintain Participation in Healthy Leisure Interests, and Promote Reality Orientation    Documentation completed by KENDELL Robles, PsychoEd Spec

## 2024-06-18 NOTE — GROUP NOTE
Group Therapy Note    Date: 6/17/2024    Group Start Time: 2000  Group End Time: 2030  Group Topic: Wrap-Up    [facility] 3W Jose Newman's wrap up group consisted of a group activity \"Our [place] of Control\". Patients collaborated as a group to define what the difference was between things in and out of our control. As a group patients were able to fill the white board of things that were agreed on in or out of our control. This group exercise was designed to help decipher wether or not some things are worth stressing over / the level of worry we should put on certain situations because ultimately we might not have a say in them either way.     Ex. Things I can't control   - other people's actions  - the weather   - illnesses/health   -family  -the past     Ex. Things I can Control   - my actions  - how I treat people   - who my friends are   - my goals   -how I spend my time     Group Therapy Note    Attendees: 6/16      Notes:  Pt did not attend.    Modes of Intervention: Activity      Discipline Responsible: [employer]hav[employer] [place] Tech      Signature:  Jose Hernandez

## 2024-06-18 NOTE — CONSULTS
HISTORY AND PHYSICAL             Date: 6/18/2024        Patient Name: Demetris Madison     YOB: 1990      Age:  33 y.o.    Chief Complaint     Chief Complaint   Patient presents with    Psychiatric Evaluation     SI pink slipped by [facility] for SI had plan to jump in front of a car,           History Obtained From   patient, electronic medical record    History of Present Illness   Patient is a 33-year-old male with a PMHx of schizoaffective disorder , Asthma, sleep disorder, seizures, and PE who was pink slipped from [facility] for thoughts of suicide ideation with a plan to jump in front of a car.  Per EMR patient reported feeling suicidal for a while he reported being normally compliant with his medication been off them for at least 3 days he admitted to using crack cocaine..  Patient is calm and cooperative with assessment although not forthcoming with information.  Patient denies any recent history of seizure activity reports last seizure activity 5 years ago denies any recent use of medication for seizures.  Patient also had a history of previous PE denies being on any blood thinners and sleep disorders.  Patient denies chest pain, palpitations, lightheadedness, headache, dizziness, shortness of breath, cough, N/V/D, and changes in appetite.  Patient also denies and alcohol use.  Denies self-inflicted injuries or wounds.   Hospitalist consulted for evaluation and recommendations for acute and chronic disease management while receiving inpatient psych services.        Past Medical History     Past Medical History:   Diagnosis Date    Anxiety 1/4/2012    Anxiety     Asthma     Bipolar affective disorder ([facility]) 11/25/2015    Chondromalacia of left knee     Chondromalacia of left knee     Chondromalacia of right knee     Chondromalacia of right knee     Chronic back pain 4/20/2017    Depression     Drug abuse and dependence ([facility]) 1/4/2012    Fracture of left side of mandibular body ([facility]) 06/17/2018    ACUTE

## 2024-06-19 LAB
ALBUMIN SERPL-MCNC: 3.8 G/DL (ref 3.5–4.6)
ALP SERPL-CCNC: 113 U/L (ref 35–104)
ALT SERPL-CCNC: 35 U/L (ref 0–41)
AST SERPL-CCNC: 22 U/L (ref 0–40)
BILIRUB DIRECT SERPL-MCNC: <0.2 MG/DL (ref 0–0.4)
BILIRUB INDIRECT SERPL-MCNC: ABNORMAL MG/DL (ref 0–0.6)
BILIRUB SERPL-MCNC: 0.9 MG/DL (ref 0.2–0.7)
CK SERPL-CCNC: 279 U/L (ref 0–190)
ESTIMATED AVERAGE GLUCOSE: 82 MG/DL
HBA1C MFR BLD: 4.5 % (ref 4–6)
PROT SERPL-MCNC: 6.3 G/DL (ref 6.3–8)

## 2024-06-19 PROCEDURE — 80076 HEPATIC FUNCTION PANEL: CPT

## 2024-06-19 PROCEDURE — 6370000000 HC RX 637 (ALT 250 FOR IP): Performed by: PSYCHIATRY & NEUROLOGY

## 2024-06-19 PROCEDURE — 36415 COLL VENOUS BLD VENIPUNCTURE: CPT

## 2024-06-19 PROCEDURE — 83036 HEMOGLOBIN GLYCOSYLATED A1C: CPT

## 2024-06-19 PROCEDURE — 1240000000 HC EMOTIONAL WELLNESS R&B

## 2024-06-19 PROCEDURE — 93005 ELECTROCARDIOGRAM TRACING: CPT | Performed by: PSYCHIATRY & NEUROLOGY

## 2024-06-19 PROCEDURE — 82550 ASSAY OF CK (CPK): CPT

## 2024-06-19 PROCEDURE — 99232 SBSQ HOSP IP/OBS MODERATE 35: CPT | Performed by: PSYCHIATRY & NEUROLOGY

## 2024-06-19 RX ORDER — FLUPHENAZINE HYDROCHLORIDE 5 MG/1
5 TABLET ORAL EVERY 12 HOURS SCHEDULED
Status: DISCONTINUED | OUTPATIENT
Start: 2024-06-19 | End: 2024-06-25 | Stop reason: HOSPADM

## 2024-06-19 RX ADMIN — ACETAMINOPHEN 325MG 650 MG: 325 TABLET ORAL at 10:16

## 2024-06-19 RX ADMIN — FLUPHENAZINE HYDROCHLORIDE 5 MG: 5 TABLET ORAL at 21:27

## 2024-06-19 RX ADMIN — HYDROXYZINE PAMOATE 50 MG: 50 CAPSULE ORAL at 20:20

## 2024-06-19 RX ADMIN — FLUPHENAZINE HYDROCHLORIDE 5 MG: 5 TABLET ORAL at 10:17

## 2024-06-19 RX ADMIN — HYDROXYZINE PAMOATE 50 MG: 50 CAPSULE ORAL at 10:16

## 2024-06-19 ASSESSMENT — PATIENT HEALTH QUESTIONNAIRE - PHQ9
SUM OF ALL RESPONSES TO PHQ QUESTIONS 1-9: 0
SUM OF ALL RESPONSES TO PHQ QUESTIONS 1-9: 0
SUM OF ALL RESPONSES TO PHQ9 QUESTIONS 1 & 2: 0
SUM OF ALL RESPONSES TO PHQ QUESTIONS 1-9: 0
1. LITTLE INTEREST OR PLEASURE IN DOING THINGS: NOT AT ALL
SUM OF ALL RESPONSES TO PHQ QUESTIONS 1-9: 0
2. FEELING DOWN, DEPRESSED OR HOPELESS: NOT AT ALL

## 2024-06-19 ASSESSMENT — PAIN DESCRIPTION - DESCRIPTORS: DESCRIPTORS: TINGLING

## 2024-06-19 ASSESSMENT — PAIN DESCRIPTION - ORIENTATION: ORIENTATION: RIGHT;LEFT

## 2024-06-19 ASSESSMENT — PAIN SCALES - GENERAL
PAINLEVEL_OUTOF10: 7
PAINLEVEL_OUTOF10: 0

## 2024-06-19 ASSESSMENT — SLEEP AND FATIGUE QUESTIONNAIRES
DO YOU USE A SLEEP AID: NO
DO YOU HAVE DIFFICULTY SLEEPING: YES

## 2024-06-19 ASSESSMENT — PAIN DESCRIPTION - LOCATION: LOCATION: FOOT

## 2024-06-19 NOTE — GROUP NOTE
Patient did not attend group.     Date: 6/19/2024    Group Start Time: 1330  Group End Time: 1420  Group Topic: Psychoeducation    [facility] 3W Laine Teresa    Establishing & Enforcing Boundaries in Interpersonal Relationships  Modes of Intervention: Education, Exploration, Clarifying, and Problem-solving    Patients will learn about and discuss setting healthy and reasonable boundaries. Patients will review boundary styles (porous, healthy, and rigid), types of boundaries (physical, emotional, intellectual, sexual, material, and time), and tips on how to set boundaries. Patients will engage in setting boundaries through practicing common scenarios in which boundary setting would be applicable. Patients will reflect on their past boundary setting experience(s), review how to reinforce boundaries when experiencing resistance, and share how they might use these skills in their interpersonal relationships going forward.     Focus: Interpersonal Communication Skills, Personal Values, and Radical Acceptance    Goals: Improve/Maintain Self-Expression/Communication, Improve/Maintain Cognitive Skills, Increase Socialization/Community Building, Improve/Maintain Self-Esteem/Confidence, Decrease Impulsivity, Improve/Maintain Sustained Attention to Task, Improve Self-Awareness/Insight     Documentation completed by Laine Boo, MT-BC, PsychoEd Spec
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
## 2024-06-19 NOTE — CARE COORDINATION
Brief Intervention and Referral to Treatment Summary    Patient was provided PHQ-9, AUDIT-C and DAST Screening:      PHQ-9 Score: 0  AUDIT-C Score:  0  DAST Score:  13    Patient’s substance use is considered     Low Risk/Healthy  Moderate Risk  Harmful  Dependent x    Patient’s depression is considered:     Minimal  Mild   Moderate x  Moderately Severe  Severe    Brief Education Was Provided    Patient was receptive x  Patient was not receptive      Brief Intervention Is Provided (Only for AUDIT-C or DAST)     Patient reports readiness to decrease and/or stop use and a plan was discussed   Patient denies readiness to decrease and/or stop use and a plan was not discussed x      Recommendations/Referrals for Brief and/or Specialized Treatment Provided to Patient    Referral to [facility] for [facility] treatment.

## 2024-06-19 NOTE — GROUP NOTE
Group Therapy Note    Date: 6/19/2024    Group Start Time: 1010  Group End Time: 1100  Group Topic: Art Therapy     [facility] 3W Melvina Martinez, CHRIS        Group Therapy Note    Attendees: 9       Patient's Goal:  To participate in morning group art therapy.     Notes:  Patient did not attend.     Modes of Intervention: Activity      Discipline Responsible: Psychoeducational Specialist      Signature:  Melvina QUIROZ

## 2024-06-19 NOTE — GROUP NOTE
Patient did not attend group.     Date: 6/19/2024    Group Start Time: 0920  Group End Time: 0950  Group Topic: Community Meeting    [facility] 3W Laine Teresa    Letter of Support: \"Hey [place]inas\" Song Re-Write  Music Interventions: Songwriting, Composition, and Anisa Analysis & Song Discussion    Patients will listen to \"Hey [place]\" by the Beatles and discuss lyrics, themes, and interpretations derived from the song. Patients will complete a song re-write with the idea of creating a letter of support for themselves. Patients will have the option to share their work with the group and/or have MT perform their re-write live with guitar accompaniment.     Focus: Building Happiness, Motivation and Self-Validation/Support    Goals: Improve Expressive Communication/Self-Expression, Improve Confidence/Self-Esteem, Improve Mood, Increase Socialization/Sense of Community, Develop Coping Skills, Decrease Negative Self-Talk, Improve Self-Awareness/Insight     Documentation completed by Laine Boo MT-BC, PsychoEd Spec

## 2024-06-20 LAB
BASOPHILS # BLD: 0.1 K/UL (ref 0–0.2)
BASOPHILS NFR BLD: 0.7 %
EKG ATRIAL RATE: 79 BPM
EKG P AXIS: 64 DEGREES
EKG P-R INTERVAL: 148 MS
EKG Q-T INTERVAL: 396 MS
EKG QRS DURATION: 90 MS
EKG QTC CALCULATION (BAZETT): 454 MS
EKG R AXIS: 77 DEGREES
EKG T AXIS: 42 DEGREES
EKG VENTRICULAR RATE: 79 BPM
EOSINOPHIL # BLD: 0.2 K/UL (ref 0–0.7)
EOSINOPHIL NFR BLD: 2.5 %
ERYTHROCYTE [DISTWIDTH] IN BLOOD BY AUTOMATED COUNT: 12.6 % (ref 11.5–14.5)
HCT VFR BLD AUTO: 45.1 % (ref 42–52)
HGB BLD-MCNC: 15.7 G/DL (ref 14–18)
LYMPHOCYTES # BLD: 1.9 K/UL (ref 1–4.8)
LYMPHOCYTES NFR BLD: 23.1 %
MCH RBC QN AUTO: 30.8 PG (ref 27–31.3)
MCHC RBC AUTO-ENTMCNC: 34.8 % (ref 33–37)
MCV RBC AUTO: 88.4 FL (ref 79–92.2)
MONOCYTES # BLD: 0.8 K/UL (ref 0.2–0.8)
MONOCYTES NFR BLD: 9.1 %
NEUTROPHILS # BLD: 5.4 K/UL (ref 1.4–6.5)
NEUTS SEG NFR BLD: 64.4 %
PLATELET # BLD AUTO: 298 K/UL (ref 130–400)
RBC # BLD AUTO: 5.1 M/UL (ref 4.7–6.1)
WBC # BLD AUTO: 8.4 K/UL (ref 4.8–10.8)

## 2024-06-20 PROCEDURE — 6370000000 HC RX 637 (ALT 250 FOR IP): Performed by: REGISTERED NURSE

## 2024-06-20 PROCEDURE — 99232 SBSQ HOSP IP/OBS MODERATE 35: CPT | Performed by: PSYCHIATRY & NEUROLOGY

## 2024-06-20 PROCEDURE — 6370000000 HC RX 637 (ALT 250 FOR IP): Performed by: PSYCHIATRY & NEUROLOGY

## 2024-06-20 PROCEDURE — 85025 COMPLETE CBC W/AUTO DIFF WBC: CPT

## 2024-06-20 PROCEDURE — 36415 COLL VENOUS BLD VENIPUNCTURE: CPT

## 2024-06-20 PROCEDURE — 1240000000 HC EMOTIONAL WELLNESS R&B

## 2024-06-20 RX ORDER — POLYETHYLENE GLYCOL 3350 17 G
2 POWDER IN PACKET (EA) ORAL
Status: DISCONTINUED | OUTPATIENT
Start: 2024-06-20 | End: 2024-06-25 | Stop reason: HOSPADM

## 2024-06-20 RX ADMIN — ACETAMINOPHEN 325MG 650 MG: 325 TABLET ORAL at 21:03

## 2024-06-20 RX ADMIN — TRAZODONE HYDROCHLORIDE 50 MG: 50 TABLET ORAL at 21:03

## 2024-06-20 RX ADMIN — BACITRACIN, NEOMYCIN, POLYMYXIN B: 400; 3.5; 5 OINTMENT TOPICAL at 21:04

## 2024-06-20 RX ADMIN — NICOTINE POLACRILEX 2 MG: 2 LOZENGE ORAL at 19:28

## 2024-06-20 RX ADMIN — HYDROXYZINE PAMOATE 50 MG: 50 CAPSULE ORAL at 21:03

## 2024-06-20 RX ADMIN — NICOTINE POLACRILEX 2 MG: 2 LOZENGE ORAL at 12:36

## 2024-06-20 RX ADMIN — BACITRACIN, NEOMYCIN, POLYMYXIN B: 400; 3.5; 5 OINTMENT TOPICAL at 14:15

## 2024-06-20 RX ADMIN — FLUPHENAZINE HYDROCHLORIDE 5 MG: 5 TABLET ORAL at 09:41

## 2024-06-20 RX ADMIN — MICONAZOLE NITRATE: 20 CREAM TOPICAL at 21:14

## 2024-06-20 RX ADMIN — FLUPHENAZINE HYDROCHLORIDE 5 MG: 5 TABLET ORAL at 21:03

## 2024-06-20 RX ADMIN — MICONAZOLE NITRATE: 20 CREAM TOPICAL at 12:35

## 2024-06-20 RX ADMIN — ACETAMINOPHEN 325MG 650 MG: 325 TABLET ORAL at 09:40

## 2024-06-20 ASSESSMENT — PAIN DESCRIPTION - LOCATION: LOCATION: PERINEUM

## 2024-06-20 ASSESSMENT — PAIN SCALES - GENERAL
PAINLEVEL_OUTOF10: 2
PAINLEVEL_OUTOF10: 2
PAINLEVEL_OUTOF10: 5
PAINLEVEL_OUTOF10: 0

## 2024-06-20 ASSESSMENT — PAIN DESCRIPTION - DESCRIPTORS: DESCRIPTORS: DISCOMFORT

## 2024-06-20 NOTE — GROUP NOTE
Patient did not attend group.    Date: 6/20/2024    Group Start Time: 0935  Group End Time: 1015  Group Topic: Psychoeducation    [facility] 3W Laine Teresa    Anger and Stress Management through a Musical Lens  Interventions: Education, Exploration, Processing, Support    Patients will introduced to the concept of the anger cycle, types of anger (retaliatory, aggressive, assertive, passive-aggressive, & internalized/passive), and the anger iceberg. Patients will discuss what type of anger they typically respond with, and brainstorm about situations in their lives where they might've benefited from using a different approach. Patients will be asked to listen to specific songs and identify what type of anger they think it might represent. Patients will discuss the short and long-term effects of chronic stress, and participate in a music-guided relaxation technique. Patients will reflect on the complexity of anger as an emotion, and how to manage their anger in a healthy way moving forward.     Focus: Anger Management, Healthy Emotional Expression, Interpersonal Communication    Goals: Improve Insight/Self-Awareness, Improve Interpersonal Communication, Increase Community Building/Socialization, Improve Mood, Increase Future Thinking, Improve Coping Skills, Increase Relaxation, Improve Attention to Task, Improve Listening Skills    Songs used:  Retaliatory: \"Bust Your Windows by Autumn Gonsalves  Aggressive: \"[place] Backflip\" by Sarah  Passive-Aggressive: \"You're So Vain\" by Ely Jimenez  Passive: \"Turn the Page\" by Jose E Lamas  Assertive: \"Closure\" by Delaney Martinez     Documentation completed by Laine Boo, MT-BC, PsychoEd Spec

## 2024-06-20 NOTE — GROUP NOTE
Patient did not attend group.     Date: 6/20/2024    Group Start Time: 1320  Group End Time: 1405  Group Topic: Music Therapy    [facility] 3W Laine Teresa    Active Music Making, Live Music Listening, and Music Reminiscence  Patients will be given a songbook and offered the opportunity to select (a) song(s) of their choice for live music listening on [employer]. Patients will be encouraged to sing along, move along, and listen to the music.     Focus: Self-Expression, Processing, Self-Esteem, Coping Skills, and Building Positive Experiences    Goals: Improve Mood, Decrease Isolation, Increase Sense of Community/Socialization, Improve Self-Esteem, Increase Self-Expression, Provide Sense of Autonomy, Develop Coping Skills     Documentation completed by KENDELL Robles, PsychoEd Spec

## 2024-06-20 NOTE — GROUP NOTE
Group Therapy Note    Date: 6/19/2024    Group Start Time: 2000  Group End Time: 2035  Group Topic: Wrap-Up    [facility] 3W Jose Newman's wrap up group consisted of patients introducing themselves with a soccer ball prompt. When patients caught the ball, whatever prompt their left thumb landed on was the question they answered when introducing themselves to the group. Patients then each chose two \"How are you coping?\" cards to answer and initiate group discussion. Patients collaborated and supported one another throughout group while also offering a safe space for peers to express their feelings.     Group Therapy Note    Attendees: 9/17     Notes:  Pt did not attend.    Modes of Intervention: Support and Socialization      Discipline Responsible: Behavorial Health Tech      Signature:  Jose Hernandez

## 2024-06-20 NOTE — FLOWSHEET NOTE
The patient requested Vistaril due to anxiety rated #10/10. Administered per mar with Vistaril 50 mg po PRN.

## 2024-06-21 PROCEDURE — 99232 SBSQ HOSP IP/OBS MODERATE 35: CPT | Performed by: PSYCHIATRY & NEUROLOGY

## 2024-06-21 PROCEDURE — 1240000000 HC EMOTIONAL WELLNESS R&B

## 2024-06-21 PROCEDURE — 6370000000 HC RX 637 (ALT 250 FOR IP): Performed by: REGISTERED NURSE

## 2024-06-21 PROCEDURE — 90833 PSYTX W PT W E/M 30 MIN: CPT | Performed by: PSYCHIATRY & NEUROLOGY

## 2024-06-21 PROCEDURE — 6370000000 HC RX 637 (ALT 250 FOR IP): Performed by: PSYCHIATRY & NEUROLOGY

## 2024-06-21 RX ADMIN — FLUPHENAZINE HYDROCHLORIDE 5 MG: 5 TABLET ORAL at 08:53

## 2024-06-21 RX ADMIN — FLUPHENAZINE HYDROCHLORIDE 5 MG: 5 TABLET ORAL at 21:51

## 2024-06-21 RX ADMIN — NICOTINE POLACRILEX 2 MG: 2 LOZENGE ORAL at 21:51

## 2024-06-21 RX ADMIN — NICOTINE POLACRILEX 2 MG: 2 LOZENGE ORAL at 08:53

## 2024-06-21 RX ADMIN — NICOTINE POLACRILEX 2 MG: 2 LOZENGE ORAL at 19:43

## 2024-06-21 RX ADMIN — BACITRACIN, NEOMYCIN, POLYMYXIN B: 400; 3.5; 5 OINTMENT TOPICAL at 21:53

## 2024-06-21 RX ADMIN — BACITRACIN, NEOMYCIN, POLYMYXIN B: 400; 3.5; 5 OINTMENT TOPICAL at 08:53

## 2024-06-21 RX ADMIN — MICONAZOLE NITRATE: 20 CREAM TOPICAL at 08:54

## 2024-06-21 RX ADMIN — ACETAMINOPHEN 325MG 650 MG: 325 TABLET ORAL at 08:56

## 2024-06-21 ASSESSMENT — PAIN SCALES - GENERAL
PAINLEVEL_OUTOF10: 0
PAINLEVEL_OUTOF10: 2

## 2024-06-21 ASSESSMENT — PAIN DESCRIPTION - LOCATION: LOCATION: TOE (COMMENT WHICH ONE)

## 2024-06-21 ASSESSMENT — PAIN DESCRIPTION - DESCRIPTORS: DESCRIPTORS: ACHING

## 2024-06-21 NOTE — GROUP NOTE
Patient did not attend group.    Date: 6/21/2024    Group Start Time: 1335  Group End Time: 1415  Group Topic: Music Therapy    [facility] 3W Laine Teresa    The Planets by Yusef López and Mindfulness  Receptive Music Listening, Art Reflection    Patients will be asked to listen to a series of movements from the suite [employer] and create an art reflection based on what they hear. Patients will be challenged to stay mindful on the music and/or art while listening, and take note of how their emotions might change. Patients will discuss the challenges of staying mindful, how their emotions changed/didn't change, and their overall thoughts about the experience.     Focus: Mindfulness, Creativity, Coping Skills, Relaxation    Goals: Improve Attention to Task, Develop/Maintain Coping Skills, Improve Mood, Improve/Maintain Expressive Communication, Improve/Maintain Self-Expression, Decrease Impulsive Behaviors, Promote Relaxation/Decrease Feelings of Distress     Documentation completed by KENDELL Robles, PsychoEd Spec

## 2024-06-21 NOTE — GROUP NOTE
Group Therapy Note    Date: 6/20/2024    Group Start Time: 1930  Group End Time: 2030  Group Topic: Wrap-Up    [facility] 3W Estefany Cordova        Group Therapy Note    Attendees: 1016       Patient's Goal:  Pt goal is to go to treatment        Status After Intervention:  Unchanged    Participation Level: Interactive    Participation Quality: Attentive      Speech:  normal      Thought Process/Content: Logical      Affective Functioning: Congruent      Mood: euthymic      Level of consciousness:  Attentive      Response to Learning: Able to verbalize current knowledge/experience      Endings: None Reported    Modes of Intervention: Support      Discipline Responsible: PCA      Signature:  Estefany Argueta

## 2024-06-21 NOTE — GROUP NOTE
Patient did not attend group.     Date: 6/21/2024    Group Start Time: 0930  Group End Time: 1015  Group Topic: Music Therapy    [facility] 3W Laine Teresa    Music Therapy and Coping Skills  Anisa Analysis, Composition/Songwriting, and Discussion  Patients will listen to \"3 Things\" by Gerson Greene and discuss themes, lyrics, and interpretations derived from the song. Patients will engage in discussions related to coping skills and how to support yourself through stress.      Patients will listen to \"I Can See Clearly Now\" by Isai Mathur and complete a anisa substitution. Patients will be asked to personalize the lyrics in context of their mental health. Patients will be encouraged to share their lyrics with the group, and will be given the opportunity to hear it recreated live.     Focus: Building Happiness/Positive Experiences,Validation/Support, Coping Skills    Goals: Improve Mood, Improve Insight/Self-Awareness, Improve Attention to Task, Improve Coping Skills/Develop Coping Skills, Improve Expressive Communication, Improve Self-Esteem, Develop a Sense of Community/Socialization, Decrease Negative Self-Talk    Documentation completed by Laine Boo, MT-BC, PsychoEd Spec

## 2024-06-22 PROCEDURE — 6370000000 HC RX 637 (ALT 250 FOR IP): Performed by: PSYCHIATRY & NEUROLOGY

## 2024-06-22 PROCEDURE — 1240000000 HC EMOTIONAL WELLNESS R&B

## 2024-06-22 RX ADMIN — FLUPHENAZINE HYDROCHLORIDE 5 MG: 5 TABLET ORAL at 21:27

## 2024-06-22 RX ADMIN — BACITRACIN, NEOMYCIN, POLYMYXIN B: 400; 3.5; 5 OINTMENT TOPICAL at 08:56

## 2024-06-22 RX ADMIN — MICONAZOLE NITRATE: 20 CREAM TOPICAL at 10:11

## 2024-06-22 RX ADMIN — TRAZODONE HYDROCHLORIDE 50 MG: 50 TABLET ORAL at 21:28

## 2024-06-22 RX ADMIN — BACITRACIN, NEOMYCIN, POLYMYXIN B: 400; 3.5; 5 OINTMENT TOPICAL at 21:28

## 2024-06-22 RX ADMIN — FLUPHENAZINE HYDROCHLORIDE 5 MG: 5 TABLET ORAL at 08:56

## 2024-06-22 RX ADMIN — MICONAZOLE NITRATE: 20 CREAM TOPICAL at 21:29

## 2024-06-22 NOTE — GROUP NOTE
Group Therapy Note    Date: 6/21/2024    Group Start Time: 2000  Group End Time: 2035  Group Topic: Wrap-Up    [facility] 3W Jose Newman        Group Therapy Note    Attendees: 9/19     Notes:  Pt did not attend.     Modes of Intervention: Education and Activity      Discipline Responsible: Behavorial Health Tech      Signature:  Jose Hernandez

## 2024-06-23 PROCEDURE — 1240000000 HC EMOTIONAL WELLNESS R&B

## 2024-06-23 PROCEDURE — 6370000000 HC RX 637 (ALT 250 FOR IP): Performed by: PSYCHIATRY & NEUROLOGY

## 2024-06-23 RX ADMIN — FLUPHENAZINE HYDROCHLORIDE 5 MG: 5 TABLET ORAL at 22:03

## 2024-06-23 RX ADMIN — FLUPHENAZINE HYDROCHLORIDE 5 MG: 5 TABLET ORAL at 09:00

## 2024-06-23 RX ADMIN — HYDROXYZINE PAMOATE 50 MG: 50 CAPSULE ORAL at 17:22

## 2024-06-23 RX ADMIN — TRAZODONE HYDROCHLORIDE 50 MG: 50 TABLET ORAL at 22:03

## 2024-06-23 NOTE — GROUP NOTE
Group Therapy Note    Date: 6/23/2024    Group Start Time: 1000  Group End Time: 1100  Group Topic: Art Therapy     [facility] 3W Melvina Martinez, CHRIS        Group Therapy Note    Attendees: 7       Patient's Goal:  To participate in morning group art therapy.    Notes:  Patient did not attend.    Modes of Intervention: Activity      Discipline Responsible: Psychoeducational Specialist      Signature:  Melvina QUIROZ

## 2024-06-23 NOTE — CARE COORDINATION
Group Therapy Note    Date: 6/23/2024  Start Time: 0900  End Time:  0920  Number of Participants: 9    Type of Group: Community Meeting    Patient's Goal:  To attend morning meeting.    Notes: Patient declined to attend psychoeducation group at 0900 despite encouragement by staff.     Discipline Responsible: Psychoeducational Specialist    CHRIS Holliday

## 2024-06-23 NOTE — GROUP NOTE
Group Therapy Note    Date: 2024    Group Start Time: 1530  Group End Time: 1605  Group Topic: Wrap-Up    [facility] 3W Estefany Cordova        Group Therapy Note    Attendees:          Patient's Goal:  ***    Notes:  ***    Status After Intervention:  {Status After Intervention:512539678}    Participation Level: {Participation Level:603007752}    Participation Quality: {[facility] PARTICIPATION QUALITY:010655520}      Speech:  {ED  CD_SPEECH:15060}      Thought Process/Content: {Thought Process/Content:757840682}      Affective Functioning: {Affective Functionin}      Mood: {Mood:345138741}      Level of consciousness:  {Level of consciousness:230147979}      Response to Learning: {[facility] Responses to Learnin}      Endings: {[facility] Endings:28982}    Modes of Intervention: {[facility] BHI Modes of Intervention:311899922}      Discipline Responsible: {[facility] Multidisciplinary:449429024}      Signature:  Estefany Argueta

## 2024-06-24 PROCEDURE — 6370000000 HC RX 637 (ALT 250 FOR IP): Performed by: PSYCHIATRY & NEUROLOGY

## 2024-06-24 PROCEDURE — 1240000000 HC EMOTIONAL WELLNESS R&B

## 2024-06-24 PROCEDURE — 99232 SBSQ HOSP IP/OBS MODERATE 35: CPT | Performed by: PSYCHIATRY & NEUROLOGY

## 2024-06-24 RX ADMIN — FLUPHENAZINE HYDROCHLORIDE 5 MG: 5 TABLET ORAL at 10:22

## 2024-06-24 RX ADMIN — NICOTINE POLACRILEX 2 MG: 2 LOZENGE ORAL at 10:37

## 2024-06-24 RX ADMIN — NICOTINE POLACRILEX 2 MG: 2 LOZENGE ORAL at 19:51

## 2024-06-24 RX ADMIN — HYDROXYZINE PAMOATE 50 MG: 50 CAPSULE ORAL at 21:13

## 2024-06-24 RX ADMIN — FLUPHENAZINE HYDROCHLORIDE 5 MG: 5 TABLET ORAL at 21:09

## 2024-06-24 RX ADMIN — TRAZODONE HYDROCHLORIDE 50 MG: 50 TABLET ORAL at 21:13

## 2024-06-24 NOTE — GROUP NOTE
Patient did not attend group.     Date: 6/24/2024    Group Start Time: 1445  Group End Time: 1540  Group Topic: Music Therapy    [facility] 3W Laine Teresa    Active Music Making, Live Music Listening, and Music Reminiscence  Patients will be given a songbook and offered the opportunity to select (a) song(s) of their choice for live music listening on [employer]. Patients will be encouraged to sing along, move along, and listen to the music.     Focus: Self-Expression, Processing, Self-Esteem, Coping Skills, and Building Positive Experiences    Goals: Improve Mood, Decrease Isolation, Increase Sense of Community/Socialization, Improve Self-Esteem, Increase Self-Expression, Provide Sense of Autonomy, Develop Coping Skills     Documentation completed by KENDELL Robles, PsychoEd Spec

## 2024-06-24 NOTE — GROUP NOTE
Patient did not attend group.     Date: 6/24/2024    Group Start Time: 0920  Group End Time: 0950  Group Topic: Community Meeting    [facility] Laine Teresa    \"Drive\" by Incubus   Anisa Analysis/Discussion & Receptive Music Listening    Patients will listen to \"Drive\" by Incubus and discuss lyrics/themes/interpretations derived from the song. Patients will identify things that are in their control and out of their control. Patients will be encouraged to share with the group and discuss how to have more control over their future.    Focus: Creativity, Coping Skills, and Insight / Self-Awareness    Goals: Improve Expressive Communication/Self-Expression, Improve Self-Esteem, Decrease Negative Self-Talk/Cognitive Distortions, Improve Mood, Increase Group Cohesion/Socialization, Develop Coping Skills, Improve Self-Awareness/Insight, Increase Attention to Task, Decrease Impulsive Behaviors     Documentation completed by Laine Boo MT-BC, PsychoEd Spec
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
## 2024-06-24 NOTE — GROUP NOTE
Group Therapy Note    Date: 6/24/2024    Group Start Time: 1000  Group End Time: 1100  Group Topic: Art Therapy     TER[facility] 3W Melvina Martinez, CHRIS        Group Therapy Note    Attendees: 9       Patient's Goal:  To participate in morning group art therapy.     Notes:  Patient did not attend.     Modes of Intervention: Activity      Discipline Responsible: Psychoeducational Specialist      Signature:  Melvina QUIROZ

## 2024-06-25 VITALS
WEIGHT: 250 LBS | BODY MASS INDEX: 33.86 KG/M2 | RESPIRATION RATE: 18 BRPM | OXYGEN SATURATION: 97 % | HEART RATE: 73 BPM | HEIGHT: 72 IN | TEMPERATURE: 97.9 F | SYSTOLIC BLOOD PRESSURE: 118 MMHG | DIASTOLIC BLOOD PRESSURE: 79 MMHG

## 2024-06-25 PROCEDURE — 99239 HOSP IP/OBS DSCHRG MGMT >30: CPT | Performed by: PSYCHIATRY & NEUROLOGY

## 2024-06-25 PROCEDURE — 6370000000 HC RX 637 (ALT 250 FOR IP): Performed by: PSYCHIATRY & NEUROLOGY

## 2024-06-25 RX ORDER — FLUPHENAZINE HYDROCHLORIDE 5 MG/1
5 TABLET ORAL EVERY 12 HOURS SCHEDULED
Qty: 30 TABLET | Refills: 2 | Status: SHIPPED | OUTPATIENT
Start: 2024-06-25

## 2024-06-25 RX ORDER — TRAZODONE HYDROCHLORIDE 50 MG/1
50 TABLET ORAL NIGHTLY
Qty: 15 TABLET | Refills: 3 | Status: SHIPPED | OUTPATIENT
Start: 2024-06-25

## 2024-06-25 RX ADMIN — FLUPHENAZINE HYDROCHLORIDE 5 MG: 5 TABLET ORAL at 09:43

## 2024-06-25 RX ADMIN — NICOTINE POLACRILEX 2 MG: 2 LOZENGE ORAL at 09:43

## 2024-06-25 NOTE — DISCHARGE SUMMARY
DISCHARGE SUMMARY      Patient ID:  Demetris Madison  [id number]  33 y.o.  1990      Admit date: 6/17/2024    Discharge date and time: 6/25/2024    Admitting Physician: Andrew Mascorro MD     Discharge Physician: Dr Ludwin SNOW    Admission Diagnoses: Suicidal ideation [R45.851]  Marijuana abuse [F12.10]  Cocaine abuse (HCC) [F14.10]  Polysubstance abuse (HCC) [F19.10]  Opiate abuse, continuous (HCC) [F11.10]  Schizoaffective disorder, unspecified type (HCC) [F25.9]  Schizoaffective disorder (HCC) [F25.9]    Admission Condition: poor    Discharged Condition: stable    Admission Circumstance:     The patient is a 33 y.o. male with significant past history of Schizophrenia and addiction     ER report:   Presented to ED via [facility] from [facility] on a pink sheet after reporting suicidal ideation with plan to run out in front of an oncoming car while @ an [facility] appointment. History of one suicidal gesture in 2021 of an overdose. Reports increased depression R/T no housing, no support, & limited income. Denies homocidal ideation. Denies auditory hallucinations. Reports \"seeing holograms last night\". Patient does not appear internally preoccupied @ time of assessment. No delusional thoughts voiced. Reports disturbed sleep & appetite varies.      During interview:  Pt refused to come to interview room  Report that he has been homeless and not taking medication  Guarded and suspicious  Pt was suicidal with plan to run in front of the traffic  [facility] pink slipped  Pt denies AH although he seem to be internally stimulated  Admit to feeling paranoid but would not elaborate on his paranoid thoughts  Pt does not have any contact with his family  Grandmother used to be his legal guardian but not anymore     The patient is not currently receiving care for the above psychiatric illness.     Medications Prior to Admission:   Prescriptions Prior to Admission   No medications prior to admission.        Compliance:no

## 2024-06-25 NOTE — DISCHARGE INSTR - DIET

## 2024-06-25 NOTE — DISCHARGE INSTRUCTIONS
Due to the Covid-19 Pandemic, [facility] is not currently available. For assistance with quitting smoking please go to https://smokefree.gov. A prescription for an FDA-approved tobacco cessation medication was offered at discharge and the patient refused.    Someone from [facility] will be calling you tomorrow to follow up on your care. If you don't hear from us, give us a call! [phone].    Keep all follow up appointments, take medications as ordered, utilize positive supports, abstain from use of alcohol and drugs. If symptoms return or you feel at risk to yourself or others, please call 911, return the nearest emergency room, or call your local crisis hotline:  [facility]: [phone]  [facility]: [phone]  [facility]: [phone]

## 2024-06-25 NOTE — PROGRESS NOTES
[facility]  BEHAVIORAL HEALTH FOLLOW-UP NOTE       6/21/2024     Patient was seen and examined in person, Chart reviewed   Patient's case discussed with staff/team    Chief Complaint: Paranoid    Interim History:   I saw patient this morning in his room he is disheveled flat blunted affect.  He is isolate guarded not attending groups and socializing with peers and does not interact with others he is evasive.  He tells me that he is ready to be discharged and wants to leave today.  Poor insight and judgment denies suicidal ideations intent or plan denies auditory or visual hallucinations tells me that the medications are helping him      Appetite:   [] Normal/Unchanged  [] Increased  [x] Decreased      Sleep:       [] Normal/Unchanged  [x] Fair       [] Poor              Energy:    [] Normal/Unchanged  [] Increased  [x] Decreased        SI [] Present  [] Absent    HI  []Present  [] Absent     Aggression:  [] yes  [] no    Patient is [] able  [x] unable to CONTRACT FOR SAFETY     PAST MEDICAL/PSYCHIATRIC HISTORY:   Past Medical History:   Diagnosis Date    Anxiety 1/4/2012    Anxiety     Asthma     Bipolar affective disorder ([facility]) 11/25/2015    Chondromalacia of left knee     Chondromalacia of left knee     Chondromalacia of right knee     Chondromalacia of right knee     Chronic back pain 4/20/2017    Depression     Drug abuse and dependence ([facility]) 1/4/2012    Fracture of left side of mandibular body ([facility]) 06/17/2018    ACUTE LEFT CONDYLAR NECK FRACTURE    Fracture of navicular bone of foot, closed 09/03/2012    lt    Fracture of temporal bone ([facility]) 2015    Hallucinations     Heroin abuse ([facility])     History of pulmonary embolism     Polysubstance abuse ([facility])     Pulmonary embolism ([facility])     Schizoaffective disorder ([facility])     Schizophrenia ([facility])     Seizures ([facility])     Talus fracture 9/2012    lt    TBI (traumatic brain injury) ([facility]) 2015    TBI (traumatic brain injury) ([facility])     2015    Wound 
               [facility]  BEHAVIORAL HEALTH FOLLOW-UP NOTE       6/20/2024     Patient was seen and examined in person, Chart reviewed   Patient's case discussed with staff/team    Chief Complaint: Paranoid    Interim History:   No change in presentation since yesterday  Seclusive in his room  Paranoid ++  AH ++  Minimal interaction  No agitation  Pt want to go to sober living on discharge  Appetite:   [] Normal/Unchanged  [] Increased  [x] Decreased      Sleep:       [] Normal/Unchanged  [x] Fair       [] Poor              Energy:    [] Normal/Unchanged  [] Increased  [x] Decreased        SI [] Present  [] Absent    HI  []Present  [] Absent     Aggression:  [] yes  [] no    Patient is [] able  [x] unable to CONTRACT FOR SAFETY     PAST MEDICAL/PSYCHIATRIC HISTORY:   Past Medical History:   Diagnosis Date    Anxiety 1/4/2012    Anxiety     Asthma     Bipolar affective disorder ([facility]) 11/25/2015    Chondromalacia of left knee     Chondromalacia of left knee     Chondromalacia of right knee     Chondromalacia of right knee     Chronic back pain 4/20/2017    Depression     Drug abuse and dependence ([facility]) 1/4/2012    Fracture of left side of mandibular body ([facility]) 06/17/2018    ACUTE LEFT CONDYLAR NECK FRACTURE    Fracture of navicular bone of foot, closed 09/03/2012    lt    Fracture of temporal bone ([facility]) 2015    Hallucinations     Heroin abuse ([facility])     History of pulmonary embolism     Polysubstance abuse ([facility])     Pulmonary embolism ([facility])     Schizoaffective disorder ([facility])     Schizophrenia ([facility])     Seizures ([facility])     Talus fracture 9/2012    lt    TBI (traumatic brain injury) ([facility]) 2015    TBI (traumatic brain injury) ([facility])     2015    Wound of left leg 9/14/2012       FAMILY/SOCIAL HISTORY:  Family History   Family history unknown: Yes     Social History     Socioeconomic History    Marital status: Single     Spouse name: none    Number of children: 0    Years of education: unsure    Highest education 
               [facility] BEHAVIORAL HEALTH FOLLOW-UP NOTE       6/24/2024     Patient was seen and examined in person, Chart reviewed   Patient's case discussed with staff/team    Chief Complaint: Paranoid    Interim History:     Patient is getting better with the psychotic symptoms.  He denies any audiovisual hallucinations or paranoid thoughts.  His affect is much more brighter and reactive.  Patient is out of his room.  Patient denies any suicidal or homicidal thoughts.  He is waiting for them to get here to find a place for him to go for his cocaine addiction  Appetite:   [] Normal/Unchanged  [] Increased  [x] Decreased      Sleep:       [] Normal/Unchanged  [x] Fair       [] Poor              Energy:    [] Normal/Unchanged  [] Increased  [x] Decreased        SI [] Present  [x] Absent    HI  []Present  [x] Absent     Aggression:  [] yes  [] no    Patient is [] able  [x] unable to CONTRACT FOR SAFETY     PAST MEDICAL/PSYCHIATRIC HISTORY:   Past Medical History:   Diagnosis Date    Anxiety 1/4/2012    Anxiety     Asthma     Bipolar affective disorder ([facility]) 11/25/2015    Chondromalacia of left knee     Chondromalacia of left knee     Chondromalacia of right knee     Chondromalacia of right knee     Chronic back pain 4/20/2017    Depression     Drug abuse and dependence ([facility]) 1/4/2012    Fracture of left side of mandibular body ([facility]) 06/17/2018    ACUTE LEFT CONDYLAR NECK FRACTURE    Fracture of navicular bone of foot, closed 09/03/2012    lt    Fracture of temporal bone ([facility]) 2015    Hallucinations     Heroin abuse ([facility])     History of pulmonary embolism     Polysubstance abuse ([facility])     Pulmonary embolism ([facility])     Schizoaffective disorder ([facility])     Schizophrenia ([facility])     Seizures ([facility])     Talus fracture 9/2012    lt    TBI (traumatic brain injury) ([facility]) 2015    TBI (traumatic brain injury) ([facility])     2015    Wound of left leg 9/14/2012       FAMILY/SOCIAL HISTORY:  Family History   Family history 
               [facility]  BEHAVIORAL HEALTH FOLLOW-UP NOTE       6/23/2024     Patient was seen and examined in person, Chart reviewed   Patient's case discussed with staff/team    Chief Complaint: Paranoid    Interim History:   I saw patient this morning in his room .  He is sleeping with the blankets over his head and in his room despite it being group time.  He denies feeling paranoid however his behaviors on the unit remain isolative and guarded does not attend groups does not socialize with peers however he endorses feeling better he denies suicidal or homicidal ideations intent or plan denies auditory or visual hallucinations        Appetite:   [] Normal/Unchanged  [] Increased  [x] Decreased      Sleep:       [] Normal/Unchanged  [x] Fair       [] Poor              Energy:    [] Normal/Unchanged  [] Increased  [x] Decreased        SI [] Present  [x] Absent    HI  []Present  [x] Absent     Aggression:  [] yes  [x] no    Patient is [] able  [x] unable to CONTRACT FOR SAFETY     PAST MEDICAL/PSYCHIATRIC HISTORY:   Past Medical History:   Diagnosis Date    Anxiety 1/4/2012    Anxiety     Asthma     Bipolar affective disorder ([facility]) 11/25/2015    Chondromalacia of left knee     Chondromalacia of left knee     Chondromalacia of right knee     Chondromalacia of right knee     Chronic back pain 4/20/2017    Depression     Drug abuse and dependence ([facility]) 1/4/2012    Fracture of left side of mandibular body ([facility]) 06/17/2018    ACUTE LEFT CONDYLAR NECK FRACTURE    Fracture of navicular bone of foot, closed 09/03/2012    lt    Fracture of temporal bone ([facility]) 2015    Hallucinations     Heroin abuse ([facility])     History of pulmonary embolism     Polysubstance abuse ([facility])     Pulmonary embolism ([facility])     Schizoaffective disorder ([facility])     Schizophrenia ([facility])     Seizures ([facility])     Talus fracture 9/2012    lt    TBI (traumatic brain injury) ([facility]) 2015    TBI (traumatic brain injury) ([facility])     2015    Wound of left 
               [facility]  BEHAVIORAL HEALTH FOLLOW-UP NOTE       6/19/2024     Patient was seen and examined in person, Chart reviewed   Patient's case discussed with staff/team    Chief Complaint: Paranoid    Interim History:     Seclusive in his room  Paranoid ++  AH ++  Minimal interaction  No agitation  Pt want to go to sober living on discharge  Appetite:   [] Normal/Unchanged  [] Increased  [x] Decreased      Sleep:       [] Normal/Unchanged  [x] Fair       [] Poor              Energy:    [] Normal/Unchanged  [] Increased  [x] Decreased        SI [] Present  [] Absent    HI  []Present  [] Absent     Aggression:  [] yes  [] no    Patient is [] able  [x] unable to CONTRACT FOR SAFETY     PAST MEDICAL/PSYCHIATRIC HISTORY:   Past Medical History:   Diagnosis Date    Anxiety 1/4/2012    Anxiety     Asthma     Bipolar affective disorder ([facility]) 11/25/2015    Chondromalacia of left knee     Chondromalacia of left knee     Chondromalacia of right knee     Chondromalacia of right knee     Chronic back pain 4/20/2017    Depression     Drug abuse and dependence ([facility]) 1/4/2012    Fracture of left side of mandibular body ([facility]) 06/17/2018    ACUTE LEFT CONDYLAR NECK FRACTURE    Fracture of navicular bone of foot, closed 09/03/2012    lt    Fracture of temporal bone ([facility]) 2015    Hallucinations     Heroin abuse ([facility])     History of pulmonary embolism     Polysubstance abuse ([facility])     Pulmonary embolism ([facility])     Schizoaffective disorder ([facility])     Schizophrenia ([facility])     Seizures ([facility])     Talus fracture 9/2012    lt    TBI (traumatic brain injury) ([facility]) 2015    TBI (traumatic brain injury) ([facility])     2015    Wound of left leg 9/14/2012       FAMILY/SOCIAL HISTORY:  Family History   Family history unknown: Yes     Social History     Socioeconomic History    Marital status: Single     Spouse name: none    Number of children: 0    Years of education: unsure    Highest education level: Not on file   Occupational History 
               [facility]  BEHAVIORAL HEALTH FOLLOW-UP NOTE       6/21/2024     Patient was seen and examined in person, Chart reviewed   Patient's case discussed with staff/team    Chief Complaint: Paranoid    Interim History:     Paranoid less intense  AH denies any  Minimal interaction  No agitation  Pt want to go to sober living on discharge  Appetite:   [] Normal/Unchanged  [] Increased  [x] Decreased      Sleep:       [] Normal/Unchanged  [x] Fair       [] Poor              Energy:    [] Normal/Unchanged  [] Increased  [x] Decreased        SI [] Present  [] Absent    HI  []Present  [] Absent     Aggression:  [] yes  [] no    Patient is [] able  [x] unable to CONTRACT FOR SAFETY     PAST MEDICAL/PSYCHIATRIC HISTORY:   Past Medical History:   Diagnosis Date    Anxiety 1/4/2012    Anxiety     Asthma     Bipolar affective disorder ([facility]) 11/25/2015    Chondromalacia of left knee     Chondromalacia of left knee     Chondromalacia of right knee     Chondromalacia of right knee     Chronic back pain 4/20/2017    Depression     Drug abuse and dependence ([facility]) 1/4/2012    Fracture of left side of mandibular body ([facility]) 06/17/2018    ACUTE LEFT CONDYLAR NECK FRACTURE    Fracture of navicular bone of foot, closed 09/03/2012    lt    Fracture of temporal bone ([facility]) 2015    Hallucinations     Heroin abuse ([facility])     History of pulmonary embolism     Polysubstance abuse ([facility])     Pulmonary embolism ([facility])     Schizoaffective disorder ([facility])     Schizophrenia ([facility])     Seizures ([facility])     Talus fracture 9/2012    lt    TBI (traumatic brain injury) ([facility]) 2015    TBI (traumatic brain injury) ([facility])     2015    Wound of left leg 9/14/2012       FAMILY/SOCIAL HISTORY:  Family History   Family history unknown: Yes     Social History     Socioeconomic History    Marital status: Single     Spouse name: none    Number of children: 0    Years of education: unsure    Highest education level: Not on file   Occupational History    
      Behavioral Services  Medicare Certification Upon Admission    I certify that this patient's inpatient psychiatric hospital admission is medically necessary for:    [x] (1) Treatment which could reasonably be expected to improve this patient's condition,       [x] (2) Or for diagnostic study;     AND     [x](2) The inpatient psychiatric services are provided while the individual is under the care of a physician and are included in the individualized plan of care.    Estimated length of stay/service 3-5    Plan for post-hospital care Op care    Electronically signed by TOMASA GARCIA MD on 6/18/2024 at 11:14 AM      
Arrived to the unit via wheelchair. Walks with strong and steady gait. Skin and contraband check done by this RN and Madeleine CRYSTAL RN. Pt is malodorous, blisters to bilateral feet, poor foot care. States he walks a lot r/t being homeless. Declined tour of unit. Shower supplies provided. Encouraged to shower but pt refused at this time.   
Continues to rest in bed with eyes closed  
Discharge instructions reviewed verbally and in writing including f/u appointments. Patient verbalizes understanding and signed as such. All belongings returned for discharge. Patient provided with food/drug interaction booklet. Patient denies SI, HI, A/V hallucinations, mood is stable.   
Found pt resting in bed, reports he is eating well, has some cravings to use crack,that he thinks rehab will help him stay away from it longer, Bp done, tylenol given for c/o raoul toe pain, reports depression and anxiety #5, denied voices or suicidal thoughts. Pt reports he will shower in a little bit, is aware osmar oint goes thin layer on open areas and other cream goes on redden closed areas. Gave commit.   
Patient did not attend [id number] [facility] despite staff encouragement.        Electronically signed by Lena Interiano on 6/24/2024 at 9:37 PM   
Patient did not attend group despite staff encouragement.      
Patient did not participate in [id number] Activity Group despite staff encouragement.        Electronically signed by Lena Interiano on 6/22/2024 at 9:55 PM   
Patient isolative to room and comes out for meals and snacks. Patient denies anxiety, depression, SI, HI, AVH. Patient endorses \"sleeping too much\" and eating \"okay\" and does not attend groups as \"I like to be alone.\" Patient does appear guarded and does not speak much to this nurse during assessment. Did request trazadone and vistaril for sleep during assessment. Medicated per request and per order.   
Progress Note    Date:6/19/2024       Room:W374/W374-01  Patient Name:Demetris Madison     YOB: 1990     Age:33 y.o.    Assessment        Hospital Problems             Last Modified POA    * (Principal) Schizoaffective disorder (HCC) 6/17/2024 Yes       Plan:      Schizoaffective disorder  -Psychiatry  -Provide emotional support  -Encourage group therapy participation     Asthma   -Denies shortness of breath     Sleep disorder; patient denies CPAP     Seizures   -Patient denies recent seizure activity reports over 5 years ago  -No home medication use  -Seizure precautions     PE  -Denies PE; appears to be on no blood thinners  -Denies chest pain shortness of breath  -Continue to monitor VS & respiratory status     Tobacco use  -NicoDerm patch daily  -Educated on adverse effects of smoking on health     Check A1c 4.7  EKG pending  CK level 279 down from 1172     monitor LFTs     Additional work up or/and treatment plan may be added today or then after based on clinical progression by other providers or specialists.  Patient will need to follow-up with PCP for chronic disease management.     I have spent greater than 70% of time  spent focused exclusively on this patient ,reviewing  chart, labs/diagnostics, reconciling medications, &  answering questions with patient and discussing plan.  Subjective   Interval History Status: .  Patient reevaluated bedside for hypotension.  BP.  Patient denies chest pain, palpitations, headache, dizziness, shortness of breath, cough, fever, chills, N/V/D, and changes in appetite.  Encouraged to increase fluids.        Review of Systems   12 point review of systems reviewed with patient; negative other than as mentioned    Medications   Scheduled Meds:    fluPHENAZine HCl  5 mg Oral 2 times per day    nicotine  1 patch TransDERmal Daily     Continuous Infusions:   PRN Meds: acetaminophen, magnesium hydroxide, aluminum & magnesium hydroxide-simethicone, haloperidol **OR** 
Progress Note    Date:6/20/2024       Room:W374/W374-01  Patient Name:Demetris Madison     YOB: 1990     Age:33 y.o.    Assessment        Hospital Problems             Last Modified POA    * (Principal) Schizoaffective disorder ([facility]) 6/17/2024 Yes       Plan:      Schizoaffective disorder  -Psychiatry  -Provide emotional support  -Encourage group therapy participation     Asthma, sleep disorder   -Denies shortness of breath  -patient denies CPAP     Seizures   -Patient denies recent seizure activity reports over 5 years ago  -No home medication use  -Seizure precautions     PE  -Denies PE; appears to be on no blood thinners  -Denies chest pain shortness of breath  -Continue to monitor VS & respiratory status     Tobacco use  -NicoDerm patch daily  -Educated on adverse effects of smoking on health     Leukocytosis  -White blood cells 10.9, neutrophils 7.8   -Afebrile; patient fever, chills, cough, SOB, N/V/D, and changes in appetite.  -Continue to monitor vital signs and for signs and symptoms of infection    Rash to groin   -Bilateral thighs groin area red excoriated  -Nystatin cream twice daily with bacitracin twice daily until resolved  -Patient encouraged to keep area clean and dry     Additional work up or/and treatment plan may be added today or then after based on clinical progression by other providers or specialists.  Patient will need to follow-up with PCP for chronic disease management.      I have spent greater than 70% of time  spent focused exclusively on this patient ,reviewing  chart, labs/diagnostics, reconciling medications, &  answering questions with patient and discussing plan.    Subjective   Interval History Status: Patient seen at bedside for reports of rash to groin area both thighs noted to be red excoriated mildly broken skin.  Patient attributes to thighs rubbing together due to excessive walking however appears to be more hygienic or fungal in nature will order nystatin cream 
Progress Note    Date:6/21/2024       Room:W374/W374-01  Patient Name:Demetris Madison     YOB: 1990     Age:33 y.o.    Assessment        Hospital Problems             Last Modified POA    * (Principal) Schizoaffective disorder ([facility]) 6/17/2024 Yes       Plan:      Schizoaffective disorder  -Psychiatry  -Provide emotional support  -Encourage group therapy participation     Asthma, sleep disorder   -Denies shortness of breath  -patient denies CPAP     Seizures   -Patient denies recent seizure activity reports over 5 years ago  -No home medication use     PE  -Denies PE; appears to be on no blood thinners  -Denies chest pain shortness of breath  -Continue to monitor VS & respiratory status     Tobacco use  -NicoDerm patch daily  -Educated on adverse effects of smoking on health     Leukocytosis  -WBC 8.4,Neut 64.4  -Afebrile; patient fever, chills, cough, SOB, N/V/D, and changes in appetite.  -Continue to monitor vital signs and for signs and symptoms of infection     Rash to groin   -Nystatin cream twice daily with bacitracin twice daily until resolved  -Patient encouraged to keep area clean and dry     Additional work up or/and treatment plan may be added today or then after based on clinical progression by other providers or specialists.  Patient will need to follow-up with PCP for chronic disease management.      I have spent greater than 70% of time  spent focused exclusively on this patient ,reviewing  chart, labs/diagnostics, reconciling medications, &  answering questions with patient and discussing plan.    Subjective   Interval History Status: .  Patient seen today follow-up to to review lab results and follow-up with rash.  Reports improvement in rash.  Encouraged to keep area clean and dry apply cream twice daily.  White blood cell within normal limits.  Patient denies chest pain, palpitations, headache, dizziness, shortness of breath, cough, fever, chills, N/V/D, and changes in 
Pt declined admission process. Remains in bed.  
Pt denied any depression anxiety suicidal thoughts voices, reports sleeping well.  
Pt did not attend group  
Pt did not attend group despite enc to attend.  
Pt goal is to go to treatment  
Pt has cloths washing now  
Pt is noted laying in bed appeared to be responding  to internal stimuli,   Pt asked to attend sober rehab,  informed ,  
Pt is noted up on the unit denies all depression anxiety and suicidal thoughts ,voices, reports good sleep that he plans on attending rehab on discharge.  
Pt is resting in bed, explained and gave am Prolixin 5mg gave tylenol for feet pain and pt reports thigh pain states they are red, denied voices depression anxiety or suicidal thoughts, pt asked about an up date on sober living. Pt reports good sleep, VS obtained.  
Pt is sleeping now in his bed, resp even, unlabored , eyes closed.  
Pt is sleeping resp even unlabored.  
Pt just awoken explained and gave am meds, pt was agreeable to prolixin 5 mg, denied questions, pt went right back to sleep with covers over him.  
Pt refused EKG at this time.  
Pt refused to complete OQ analyst   
Pt reports to this RN that he would like to go to 30 day treatment program upon D/C  
Pt's aunt called and gave HIPAA code. Aunt reports that the court wanted pt to seek mental health help and gave the example of [facility]. Aunt is not aware if sober living or rehab is acceptable by the court.  
Systems   12 point review of systems reviewed with patient; negative other than as mentioned    Medications   Scheduled Meds:    miconazole   Topical BID    neomycin-bacitracin-polymyxin   Topical BID    fluPHENAZine HCl  5 mg Oral 2 times per day     Continuous Infusions:   PRN Meds: nicotine polacrilex, acetaminophen, magnesium hydroxide, aluminum & magnesium hydroxide-simethicone, haloperidol **OR** haloperidol lactate, benztropine mesylate, traZODone, hydrOXYzine pamoate **OR** hydrOXYzine    Past History    Past Medical History:   has a past medical history of Anxiety, Anxiety, Asthma, Bipolar affective disorder ([facility]), Chondromalacia of left knee, Chondromalacia of left knee, Chondromalacia of right knee, Chondromalacia of right knee, Chronic back pain, Depression, Drug abuse and dependence (Formerly [facility]), Fracture of left side of mandibular body (Formerly [facility]), Fracture of navicular bone of foot, closed, Fracture of temporal bone ([facility]ly [facility]), Hallucinations, Heroin abuse ([facility]ly [facility]), History of pulmonary embolism, Polysubstance abuse ([facility]ly [facility]), Pulmonary embolism ([facility]), Schizoaffective disorder ([facility]ly [facility]), Schizophrenia ([facility]ly [facility]), Seizures ([facility]ly [facility]), Talus fracture, TBI (traumatic brain injury) ([facility]ly [facility]), TBI (traumatic brain injury) (Formerly [facility]), and Wound of left leg.    Social History:   reports that he has been smoking cigarettes. He has never used smokeless tobacco. He reports that he does not currently use alcohol. He reports current drug use. Frequency: 7.00 times per week. Drugs: Marijuana (Weed) and Cocaine.     Family History:   Family History   Family history unknown: Yes       Physical Examination      Vitals:  /84   Pulse 71   Temp 98.4 °F (36.9 °C) (Oral)   Resp 18   Ht 1.829 m (6')   Wt 113.4 kg (250 lb)   SpO2 97%   BMI 33.91 kg/m²   Temp (24hrs), Av.4 °F (36.9 °C), Min:98.4 °F (36.9 °C), Max:98.4 °F (36.9 °C)      I/O (24Hr):  No intake or output data in the 24 hours ending 24 1312      CONSTITUTIONAL:  Awake, alert, no 
\"WBC\", \"HGB\", \"HCT\", \"PLT\" in the last 72 hours.  No results for input(s): \"NA\", \"K\", \"CL\", \"CO2\", \"BUN\", \"CREATININE\", \"CALCIUM\", \"PHOS\" in the last 72 hours.    Invalid input(s): \"MAGNES\"  No results for input(s): \"AST\", \"ALT\", \"BILIDIR\", \"BILITOT\", \"ALKPHOS\" in the last 72 hours.  No results for input(s): \"INR\" in the last 72 hours.  No results for input(s): \"CKTOTAL\", \"TROPONINI\" in the last 72 hours.    Urinalysis:      Lab Results   Component Value Date/Time    NITRU Negative 06/17/2024 02:45 PM    WBCUA 3-5 08/26/2022 03:01 PM    BACTERIA Moderate (6-50) 08/26/2022 03:01 PM    RBCUA Negative 08/26/2022 03:01 PM    BLOODU Negative 06/17/2024 02:45 PM    GLUCOSEU Negative 06/17/2024 02:45 PM       Radiology:  No orders to display           Assessment/Plan:    Active Hospital Problems    Diagnosis Date Noted    Schizoaffective disorder (HCC) [F25.9] 11/02/2019            Additional work up or/and treatment plan may be added today or then after based on clinical progression. I am managing a portion of pt care. Some medical issues are handled by other specialists. Additional work up and treatment should be done in out pt setting by pt PCP and other out pt providers.     In addition to examining and evaluating pt, I spent additional time explaining care, normal and abnormal findings, and treatment plan. All of pt questions were answered. Counseling, diet and education were  provided. Case will be discussed with nursing staff when appropriate. Family will be updated if and when appropriate.      Diet: ADULT DIET; Regular    Code Status: Full Code    PT/OT Eval           Electronically signed by ALICE Kothari CNP on 6/23/2024 at 10:54 PM

## 2024-06-25 NOTE — GROUP NOTE
Patient did not attend group / Anticipated discharge today.     Date: 6/25/2024    Group Start Time: 0930  Group End Time: 1020  Group Topic: Music Therapy    ML 3W Laine Teresa    Playlist for Recovery  Song Discussion, Playlist Building, Receptive Music Listening, Coping Skills    Patients will reflect on what recovery means to them and brainstorm related songs. Patients will brainstorm songs that correspond with prompts about recovery and share with the group. Patients will select one song they would benefit from hearing today. Patients will listen to their song and explain why/how it is beneficial and/or their connection to the song.     Goals: Improve Mood, Improve Insight/Self-Awareness, Increase Socialization/Community Building, Improve Self-Expression, Improve Attention to Task, Improve Coping Skills/Develop Coping Skills      Documentation completed by Laine Boo MT-BC, PsychoEd Spec

## 2024-06-25 NOTE — PLAN OF CARE
Problem: Pain  Goal: Verbalizes/displays adequate comfort level or baseline comfort level  6/20/2024 1057 by Maryam Ruggiero RN  Outcome: Progressing  6/19/2024 2118 by Sallie Coles RN  Outcome: Progressing     Problem: Risk for Elopement  Goal: Patient will not exit the unit/facility without proper excort  6/20/2024 1057 by Maryam Ruggiero RN  Outcome: Progressing  Flowsheets (Taken 6/20/2024 1055)  Nursing Interventions for Elopement Risk: Reduce environmental triggers  6/19/2024 2118 by Sallie Coles RN  Outcome: Progressing     Problem: Anxiety  Goal: Will report anxiety at manageable levels  Description: INTERVENTIONS:  1. Administer medication as ordered  2. Teach and rehearse alternative coping skills  3. Provide emotional support with 1:1 interaction with staff  6/20/2024 1057 by Maryam Ruggiero RN  Outcome: Progressing  Flowsheets (Taken 6/20/2024 1055)  Will report anxiety at manageable levels: Provide emotional support with 1:1 interaction with staff  6/19/2024 2118 by Sallie Coles RN  Outcome: Progressing     Problem: Coping  Goal: Pt/Family able to verbalize concerns and demonstrate effective coping strategies  Description: INTERVENTIONS:  1. Assist patient/family to identify coping skills, available support systems and cultural and spiritual values  2. Provide emotional support, including active listening and acknowledgement of concerns of patient and caregivers  3. Reduce environmental stimuli, as able  4. Instruct patient/family in relaxation techniques, as appropriate  5. Assess for spiritual pain/suffering and initiate Spiritual Care, Psychosocial Clinical Specialist consults as needed  6/20/2024 1057 by Maryam Ruggiero RN  Outcome: Progressing  Flowsheets (Taken 6/20/2024 1055)  Patient/family able to verbalize anxieties, fears, and concerns, and demonstrate effective coping: Reduce environmental stimuli, as able  6/19/2024 2118 by Sallie Coles RN  Outcome: Progressing   
  Problem: Pain  Goal: Verbalizes/displays adequate comfort level or baseline comfort level  Outcome: Progressing     Problem: Risk for Elopement  Goal: Patient will not exit the unit/facility without proper excort  Outcome: Progressing  Flowsheets (Taken 6/19/2024 1401)  Nursing Interventions for Elopement Risk:   Reduce environmental triggers   Make sure patient has all necessary personal care items     Problem: Anxiety  Goal: Will report anxiety at manageable levels  Description: INTERVENTIONS:  1. Administer medication as ordered  2. Teach and rehearse alternative coping skills  3. Provide emotional support with 1:1 interaction with staff  Outcome: Progressing  Flowsheets (Taken 6/19/2024 1401)  Will report anxiety at manageable levels: Provide emotional support with 1:1 interaction with staff     Problem: Coping  Goal: Pt/Family able to verbalize concerns and demonstrate effective coping strategies  Description: INTERVENTIONS:  1. Assist patient/family to identify coping skills, available support systems and cultural and spiritual values  2. Provide emotional support, including active listening and acknowledgement of concerns of patient and caregivers  3. Reduce environmental stimuli, as able  4. Instruct patient/family in relaxation techniques, as appropriate  5. Assess for spiritual pain/suffering and initiate Spiritual Care, Psychosocial Clinical Specialist consults as needed  Outcome: Progressing  Flowsheets (Taken 6/19/2024 1401)  Patient/family able to verbalize anxieties, fears, and concerns, and demonstrate effective coping: Provide emotional support, including active listening and acknowledgement of concerns of patient and caregivers     Problem: Confusion  Goal: Confusion, delirium, dementia, or psychosis is improved or at baseline  Description: INTERVENTIONS:  1. Assess for possible contributors to thought disturbance, including medications, impaired vision or hearing, underlying metabolic 
  Problem: Pain  Goal: Verbalizes/displays adequate comfort level or baseline comfort level  Outcome: Progressing     Problem: Risk for Elopement  Goal: Patient will not exit the unit/facility without proper excort  Outcome: Progressing  Flowsheets (Taken 6/24/2024 1543 by Selene Bell, RN)  Nursing Interventions for Elopement Risk:   Assist with personal care needs such as toileting, eating, dressing, as needed to reduce the risk of wandering   Reduce environmental triggers     Problem: Anxiety  Goal: Will report anxiety at manageable levels  Description: INTERVENTIONS:  1. Administer medication as ordered  2. Teach and rehearse alternative coping skills  3. Provide emotional support with 1:1 interaction with staff  6/24/2024 2321 by Mare Delgadillo RN  Outcome: Progressing  6/24/2024 1550 by Selene Bell RN  Outcome: Progressing  Flowsheets (Taken 6/24/2024 1543)  Will report anxiety at manageable levels:   Administer medication as ordered   Teach and rehearse alternative coping skills   Provide emotional support with 1:1 interaction with staff     Problem: Coping  Goal: Pt/Family able to verbalize concerns and demonstrate effective coping strategies  Description: INTERVENTIONS:  1. Assist patient/family to identify coping skills, available support systems and cultural and spiritual values  2. Provide emotional support, including active listening and acknowledgement of concerns of patient and caregivers  3. Reduce environmental stimuli, as able  4. Instruct patient/family in relaxation techniques, as appropriate  5. Assess for spiritual pain/suffering and initiate Spiritual Care, Psychosocial Clinical Specialist consults as needed  Outcome: Progressing  Flowsheets (Taken 6/24/2024 1543 by Selene Bell, RN)  Patient/family able to verbalize anxieties, fears, and concerns, and demonstrate effective coping:   Assist patient/family to identify coping skills, available support systems and cultural and spiritual 
Patient assessment completed in pt assigned room. Pt med cert. Assault and seizure precautions. Flat affect. Poor eye contact. Patient rates anxiety  and depression 5/10 with 10 being the highest. Pt verbalized, \" Being here, same out there\", in response to factors causing anxiety and depression. Pt denies SI, HI, and AVH. Patient verbalized, \" Getting too much sleep due to being in treatment places.\" Pt endorses adequate appetite.  Pt denies physical pain.  Pt mood, \" Pretty good, I guess.\" Patient requesting Wellbutrin. Encourage pt to speak to provider. ADL's fair.     Problem: Pain  Goal: Verbalizes/displays adequate comfort level or baseline comfort level  Outcome: Progressing     Problem: Risk for Elopement  Goal: Patient will not exit the unit/facility without proper excort  Outcome: Progressing     Problem: Anxiety  Goal: Will report anxiety at manageable levels  Description: INTERVENTIONS:  1. Administer medication as ordered  2. Teach and rehearse alternative coping skills  3. Provide emotional support with 1:1 interaction with staff  Outcome: Progressing     Problem: Coping  Goal: Pt/Family able to verbalize concerns and demonstrate effective coping strategies  Description: INTERVENTIONS:  1. Assist patient/family to identify coping skills, available support systems and cultural and spiritual values  2. Provide emotional support, including active listening and acknowledgement of concerns of patient and caregivers  3. Reduce environmental stimuli, as able  4. Instruct patient/family in relaxation techniques, as appropriate  5. Assess for spiritual pain/suffering and initiate Spiritual Care, Psychosocial Clinical Specialist consults as needed  Outcome: Progressing     Problem: Confusion  Goal: Confusion, delirium, dementia, or psychosis is improved or at baseline  Description: INTERVENTIONS:  1. Assess for possible contributors to thought disturbance, including medications, impaired vision or hearing, 
Patient isolative and withdrawn to self. Pt encouraged to attend group but did not want to. Pt denies SI, HI and AVH. He endorses high anxiety 8/10 (with 10 being the highest). Pt denies depression. Pt verbalizes he does not feel motivated to stay sober because he doesn't like how he feels without drugs. Pt states he is ordered to attend 30 day treatment and if he does he is able to avoid legal issues. Pt denies any other complaints to staff.   Problem: Pain  Goal: Verbalizes/displays adequate comfort level or baseline comfort level  6/20/2024 2002 by Varsha Barney RN  Outcome: Progressing  6/20/2024 1057 by Maryam Ruggiero RN  Outcome: Progressing     Problem: Risk for Elopement  Goal: Patient will not exit the unit/facility without proper excort  6/20/2024 2002 by Varsha Barney RN  Outcome: Progressing  6/20/2024 1057 by Maryam Ruggiero RN  Outcome: Progressing     Problem: Anxiety  Goal: Will report anxiety at manageable levels  6/20/2024 2002 by Varsha Barney RN  Outcome: Progressing  6/20/2024 1057 by Maryam Ruggiero RN  Outcome: Progressing     Problem: Coping  Goal: Pt/Family able to verbalize concerns and demonstrate effective coping strategies  6/20/2024 2002 by Varsha Barney RN  Outcome: Progressing  6/20/2024 1057 by Maryam Ruggiero RN  Outcome: Progressing     Problem: Confusion  Goal: Confusion, delirium, dementia, or psychosis is improved or at baseline  6/20/2024 2002 by Varsha Barney RN  Outcome: Progressing  6/20/2024 1057 by Maryam Ruggiero RN  Outcome: Progressing     Problem: Depression/Self Harm  Goal: Effect of psychiatric condition will be minimized and patient will be protected from self harm  6/20/2024 2002 by aVrsha Barney RN  Outcome: Progressing  6/20/2024 1057 by Maryam Ruggiero RN  Outcome: Progressing     Problem: Drug Abuse/Detox  Goal: Will have no detox symptoms and will verbalize plan for changing drug-related behavior  6/20/2024 2002 by Varsha Barney RN  Outcome: 
Pt in bed on initial rounds at 0745.  Pt seclusive, isolates, withdrawn.  Pt denies wanting to hurt himself or others, denies AVH, denies depression and anxiety.  Will con't to monitor.  
Pt reports to this nurse that he is feeling much better and wants to be discharged.  Pt asking if he'll be discharged this weekend and updated pt that there were no orders for that at this time.  Pt accepting of this.  Pt isolates to self, will come out to retrieve tray and go back to room.  Pt feels meds are working but continues to have flat, sad affect.  Pt denies SI/HI/AVH.   Problem: Pain  Goal: Verbalizes/displays adequate comfort level or baseline comfort level  6/22/2024 1027 by Jennifer Zuleta, RN  Outcome: Progressing  6/22/2024 0139 by Mare Delgadillo RN  Outcome: Progressing     Problem: Risk for Elopement  Goal: Patient will not exit the unit/facility without proper excort  6/22/2024 1027 by Jennifer Zuleta, RN  Outcome: Progressing  Flowsheets (Taken 6/22/2024 1018)  Nursing Interventions for Elopement Risk: Reduce environmental triggers  6/22/2024 0139 by Mare Delgadillo RN  Outcome: Progressing     Problem: Anxiety  Goal: Will report anxiety at manageable levels  Description: INTERVENTIONS:  1. Administer medication as ordered  2. Teach and rehearse alternative coping skills  3. Provide emotional support with 1:1 interaction with staff  6/22/2024 1027 by Jennifer Zuleta RN  Outcome: Progressing  Flowsheets (Taken 6/22/2024 1018)  Will report anxiety at manageable levels:   Administer medication as ordered   Teach and rehearse alternative coping skills   Provide emotional support with 1:1 interaction with staff  6/22/2024 0139 by Mare Delgadillo RN  Outcome: Progressing     Problem: Coping  Goal: Pt/Family able to verbalize concerns and demonstrate effective coping strategies  Description: INTERVENTIONS:  1. Assist patient/family to identify coping skills, available support systems and cultural and spiritual values  2. Provide emotional support, including active listening and acknowledgement of concerns of patient and caregivers  3. Reduce environmental stimuli, as able  4. Instruct patient/family in 
Pt up ad jose, reports  that he is feeling much better and wants to be discharged.  Pt isolates to self and room. Pt feels meds are working but continues to have flat, sad affect. Pt denies SI/HI/AVH. No overt attempt to escape. Patient remains free from harm.  Problem: Pain  Goal: Verbalizes/displays adequate comfort level or baseline comfort level  Outcome: Progressing     Problem: Risk for Elopement  Goal: Patient will not exit the unit/facility without proper excort  Outcome: Progressing     Problem: Anxiety  Goal: Will report anxiety at manageable levels  Description: INTERVENTIONS:  1. Administer medication as ordered  2. Teach and rehearse alternative coping skills  3. Provide emotional support with 1:1 interaction with staff  Outcome: Progressing     Problem: Coping  Goal: Pt/Family able to verbalize concerns and demonstrate effective coping strategies  Description: INTERVENTIONS:  1. Assist patient/family to identify coping skills, available support systems and cultural and spiritual values  2. Provide emotional support, including active listening and acknowledgement of concerns of patient and caregivers  3. Reduce environmental stimuli, as able  4. Instruct patient/family in relaxation techniques, as appropriate  5. Assess for spiritual pain/suffering and initiate Spiritual Care, Psychosocial Clinical Specialist consults as needed  Outcome: Progressing     Problem: Confusion  Goal: Confusion, delirium, dementia, or psychosis is improved or at baseline  Description: INTERVENTIONS:  1. Assess for possible contributors to thought disturbance, including medications, impaired vision or hearing, underlying metabolic abnormalities, dehydration, psychiatric diagnoses, and notify attending LIP  2. [place] high risk fall precautions, as indicated  3. Provide frequent short contacts to provide reality reorientation, refocusing and direction  4. Decrease environmental stimuli, including noise as appropriate  5. 
Provide frequent short contacts to provide reality reorientation, refocusing and direction  4. Decrease environmental stimuli, including noise as appropriate  5. Monitor and intervene to maintain adequate nutrition, hydration, elimination, sleep and activity  6. If unable to ensure safety without constant attention obtain sitter and review sitter guidelines with assigned personnel  7. Initiate Psychosocial CNS and Spiritual Care consult, as indicated  6/23/2024 1943 by Moe Jacobson RN  Outcome: Progressing     Problem: Depression/Self Harm  Goal: Effect of psychiatric condition will be minimized and patient will be protected from self harm  Description: INTERVENTIONS:  1. Assess impact of patient's symptoms on level of functioning, self care needs and offer support as indicated  2. Assess patient/family knowledge of depression, impact on illness and need for teaching  3. Provide emotional support, presence and reassurance  4. Assess for possible suicidal thoughts or ideation. If patient expresses suicidal thoughts or statements do not leave alone, initiate Suicide Precautions, move to a room close to the nursing station and obtain sitter  5. Initiate consults as appropriate with Mental Health Professional, Spiritual Care, Psychosocial CNS, and consider a recommendation to the LIP for a Psychiatric Consultation  6/23/2024 1943 by Moe Jacobson, RN  Outcome: Progressing     Problem: Drug Abuse/Detox  Goal: Will have no detox symptoms and will verbalize plan for changing drug-related behavior  Description: INTERVENTIONS:  1. Administer medication as ordered  2. Monitor physical status  3. Provide emotional support with 1:1 interaction with staff  4. Encourage  recovery focused treatment   6/23/2024 1943 by Moe Jacobson, RN  Outcome: Progressing     Problem: Discharge Planning  Goal: Discharge to home or other facility with appropriate resources  6/23/2024 1943 by Moe Jacobson, RN  Outcome: Progressing     
baseline  Description: INTERVENTIONS:  1. Assess for possible contributors to thought disturbance, including medications, impaired vision or hearing, underlying metabolic abnormalities, dehydration, psychiatric diagnoses, and notify attending LIP  2. [place] high risk fall precautions, as indicated  3. Provide frequent short contacts to provide reality reorientation, refocusing and direction  4. Decrease environmental stimuli, including noise as appropriate  5. Monitor and intervene to maintain adequate nutrition, hydration, elimination, sleep and activity  6. If unable to ensure safety without constant attention obtain sitter and review sitter guidelines with assigned personnel  7. Initiate Psychosocial CNS and Spiritual Care consult, as indicated  6/21/2024 0913 by Carolyn Jaramillo RN  Outcome: Progressing  6/20/2024 2002 by Varsha Barney RN  Outcome: Progressing     Problem: Depression/Self Harm  Goal: Effect of psychiatric condition will be minimized and patient will be protected from self harm  Description: INTERVENTIONS:  1. Assess impact of patient's symptoms on level of functioning, self care needs and offer support as indicated  2. Assess patient/family knowledge of depression, impact on illness and need for teaching  3. Provide emotional support, presence and reassurance  4. Assess for possible suicidal thoughts or ideation. If patient expresses suicidal thoughts or statements do not leave alone, initiate Suicide Precautions, move to a room close to the nursing station and obtain sitter  5. Initiate consults as appropriate with Mental Health Professional, Spiritual Care, Psychosocial CNS, and consider a recommendation to the LIP for a Psychiatric Consultation  6/21/2024 0913 by Carolyn Jaramillo RN  Outcome: Progressing  6/20/2024 2002 by Varsha Barney RN  Outcome: Progressing  Flowsheets (Taken 6/20/2024 2002)  Effect of psychiatric condition will be minimized and patient will be protected from self 
effective coping: Provide emotional support, including active listening and acknowledgement of concerns of patient and caregivers     Problem: Confusion  Goal: Confusion, delirium, dementia, or psychosis is improved or at baseline  Description: INTERVENTIONS:  1. Assess for possible contributors to thought disturbance, including medications, impaired vision or hearing, underlying metabolic abnormalities, dehydration, psychiatric diagnoses, and notify attending LIP  2. [place] high risk fall precautions, as indicated  3. Provide frequent short contacts to provide reality reorientation, refocusing and direction  4. Decrease environmental stimuli, including noise as appropriate  5. Monitor and intervene to maintain adequate nutrition, hydration, elimination, sleep and activity  6. If unable to ensure safety without constant attention obtain sitter and review sitter guidelines with assigned personnel  7. Initiate Psychosocial CNS and Spiritual Care consult, as indicated  6/19/2024 2118 by Sallie Coles RN  Outcome: Progressing  6/19/2024 1404 by Roland Cheung RN  Outcome: Progressing  Flowsheets (Taken 6/19/2024 1401)  Effect of thought disturbance (confusion, delirium, dementia, or psychosis) are managed with adequate functional status: Decrease environmental stimuli, including noise as appropriate     Problem: Depression/Self Harm  Goal: Effect of psychiatric condition will be minimized and patient will be protected from self harm  Description: INTERVENTIONS:  1. Assess impact of patient's symptoms on level of functioning, self care needs and offer support as indicated  2. Assess patient/family knowledge of depression, impact on illness and need for teaching  3. Provide emotional support, presence and reassurance  4. Assess for possible suicidal thoughts or ideation. If patient expresses suicidal thoughts or statements do not leave alone, initiate Suicide Precautions, move to a room close to the nursing 
appropriate resources: Identify barriers to discharge with patient and caregiver  6/18/2024 1109 by , Loreta, RN  Outcome: Not Progressing   PT isolative to room,awake in bed. Pt ate 100% of supper tray. Took pt another pitcher of ice water, and encouraged to drink. Pt denies SI,HI,A/V hallucinations. Contracts for safety on the unit. Pt appears watchful,suspicious,guarded,irritable affect. Denies anxiety and depression,thou affect incongruent. Informed pt of prn medication,if needed. Pt verbalized understanding. Gave pt unit paperwork with HIPAA code and Pt Rights with explanation. Pt declined to sign consents. Refused recheck of VS.  
protected from self harm  Description: INTERVENTIONS:  1. Assess impact of patient's symptoms on level of functioning, self care needs and offer support as indicated  2. Assess patient/family knowledge of depression, impact on illness and need for teaching  3. Provide emotional support, presence and reassurance  4. Assess for possible suicidal thoughts or ideation. If patient expresses suicidal thoughts or statements do not leave alone, initiate Suicide Precautions, move to a room close to the nursing station and obtain sitter  5. Initiate consults as appropriate with Mental Health Professional, Spiritual Care, Psychosocial CNS, and consider a recommendation to the LIP for a Psychiatric Consultation  Outcome: Not Progressing     Problem: Drug Abuse/Detox  Goal: Will have no detox symptoms and will verbalize plan for changing drug-related behavior  Description: INTERVENTIONS:  1. Administer medication as ordered  2. Monitor physical status  3. Provide emotional support with 1:1 interaction with staff  4. Encourage  recovery focused treatment   Outcome: Not Progressing     Problem: Discharge Planning  Goal: Discharge to home or other facility with appropriate resources  Outcome: Not Progressing     
treatment     Problem: Discharge Planning  Goal: Discharge to home or other facility with appropriate resources  Outcome: Progressing  Flowsheets (Taken 6/23/2024 1152)  Discharge to home or other facility with appropriate resources:   Identify barriers to discharge with patient and caregiver   Identify discharge learning needs (meds, wound care, etc)   Refer to discharge planning if patient needs post-hospital services based on physician order or complex needs related to functional status, cognitive ability or social support system   Arrange for interpreters to assist at discharge as needed   Arrange for needed discharge resources and transportation as appropriate

## 2024-06-25 NOTE — TRANSITION OF CARE
Behavioral Health Transition Record    Patient Name: Demetris Madison  YOB: 1990   Medical Record Number: [id number]  Date of Admission: 6/17/2024  2:35 PM   Date of Discharge: 6/25/24    Attending Provider: Andrew Mascorro MD   Discharging Provider: Andrew Mascorro MD  To contact this individual call [facility] at [phone] or call [facility] at [phone] and ask the  to page.  If unavailable, ask to be transferred to Behavioral Health Provider on call.  A Behavioral Health Provider will be available on call 24/7 and during holidays.    Primary Care Provider: [facility], [facility]    Allergies   Allergen Reactions    Shellfish-Derived Products Itching    Shrimp Flavor Itching       Reason for Admission: The patient is a 33 y.o. male with significant past history of Schizophrenia and addiction     ER report:   Presented to ED via [facility] from [facility] on a pink sheet after reporting suicidal ideation with plan to run out in front of an oncoming car while @ an [facility] appointment. History of one suicidal gesture in 2021 of an overdose. Reports increased depression R/T no housing, no support, & limited income. Denies homocidal ideation. Denies auditory hallucinations. Reports \"seeing holograms last night\". Patient does not appear internally preoccupied @ time of assessment. No delusional thoughts voiced. Reports disturbed sleep & appetite varies.      During interview:  Pt refused to come to interview room  Report that he has been homeless and not taking medication  Guarded and suspicious  Pt was suicidal with plan to run in front of the traffic  Manoj pink slipped  Pt denies AH although he seem to be internally stimulated  Admit to feeling paranoid but would not elaborate on his paranoid thoughts  Pt does not have any contact with his family  Grandmother used to be his legal guardian but not anymore    Admission Diagnosis: Suicidal ideation [R45.851]  Marijuana abuse

## 2024-07-13 ENCOUNTER — HOSPITAL ENCOUNTER (EMERGENCY)
Age: 34
Discharge: HOME | End: 2024-07-13
Attending: STUDENT IN AN ORGANIZED HEALTH CARE EDUCATION/TRAINING PROGRAM
Payer: COMMERCIAL

## 2024-07-13 VITALS
HEART RATE: 98 BPM | OXYGEN SATURATION: 97 % | RESPIRATION RATE: 16 BRPM | SYSTOLIC BLOOD PRESSURE: 128 MMHG | DIASTOLIC BLOOD PRESSURE: 88 MMHG | TEMPERATURE: 98 F

## 2024-07-13 DIAGNOSIS — L05.91 PILONIDAL CYST WITHOUT ABSCESS: Primary | ICD-10-CM

## 2024-07-13 PROCEDURE — 96372 THER/PROPH/DIAG INJ SC/IM: CPT

## 2024-07-13 PROCEDURE — 6360000002 HC RX W HCPCS

## 2024-07-13 PROCEDURE — 99284 EMERGENCY DEPT VISIT MOD MDM: CPT

## 2024-07-13 RX ORDER — IBUPROFEN 800 MG/1
800 TABLET ORAL EVERY 8 HOURS PRN
Qty: 21 TABLET | Refills: 0 | Status: SHIPPED | OUTPATIENT
Start: 2024-07-13 | End: 2024-07-20

## 2024-07-13 RX ORDER — KETOROLAC TROMETHAMINE 30 MG/ML
30 INJECTION, SOLUTION INTRAMUSCULAR; INTRAVENOUS ONCE
Status: COMPLETED | OUTPATIENT
Start: 2024-07-13 | End: 2024-07-13

## 2024-07-13 RX ORDER — CEPHALEXIN 500 MG/1
500 CAPSULE ORAL DAILY
Qty: 5 CAPSULE | Refills: 0 | Status: SHIPPED | OUTPATIENT
Start: 2024-07-13 | End: 2024-07-13

## 2024-07-13 RX ORDER — DOXYCYCLINE HYCLATE 100 MG
100 TABLET ORAL 2 TIMES DAILY
Qty: 20 TABLET | Refills: 0 | Status: SHIPPED | OUTPATIENT
Start: 2024-07-13 | End: 2024-07-23

## 2024-07-13 RX ORDER — CEPHALEXIN 500 MG/1
500 CAPSULE ORAL 4 TIMES DAILY
Qty: 40 CAPSULE | Refills: 0 | Status: SHIPPED | OUTPATIENT
Start: 2024-07-13 | End: 2024-07-23

## 2024-07-13 RX ORDER — DOXYCYCLINE HYCLATE 100 MG
100 TABLET ORAL 2 TIMES DAILY
Qty: 10 TABLET | Refills: 0 | Status: SHIPPED | OUTPATIENT
Start: 2024-07-13 | End: 2024-07-13

## 2024-07-13 RX ADMIN — KETOROLAC TROMETHAMINE 30 MG: 30 INJECTION, SOLUTION INTRAMUSCULAR at 15:45

## 2024-07-13 ASSESSMENT — PAIN DESCRIPTION - LOCATION: LOCATION: BUTTOCKS

## 2024-07-13 ASSESSMENT — PAIN SCALES - GENERAL: PAINLEVEL_OUTOF10: 10

## 2024-07-13 NOTE — DISCHARGE INSTRUCTIONS
As discussed please utilize Sitz bath.  Warm water soaks of approximately 8 to 10 inches of water, for 15 minutes 3 times daily for the next week.

## 2024-07-13 NOTE — ED PROVIDER NOTES
[facility] EMERGENCY DEPARTMENT  EMERGENCY DEPARTMENT ENCOUNTER      Pt Name: Demetris Madison  MRN: [id number]  Birthdate 1990  Date of evaluation: 7/13/2024  Provider: Nidhi Joe MD  PCP: [facility]  Note Started: 3:16 PM EDT 7/13/24    CHIEF COMPLAINT       Chief Complaint   Patient presents with    Wound Check     Pain in buttock- per pt scab noted     HISTORY OF PRESENT ILLNESS: 1 or more Elements   History From: Patient    Limitations to history : None    Demetris Madison is a 33 y.o. male who presents with pain in his gluteal cleft for last 2-3 days, noted watery secretions, associated with swelling, described pain 10/10 intensity, got relief with Ibuprofen. Currently he is in [facility] for Opioid use disorder. Otherwise he denies any symptoms like fever, chills, rashes, abdominal pain, nausea, vomiting or any other acute symptoms.     Nursing Notes were all reviewed and agreed with or any disagreements were addressed in the HPI.    REVIEW OF SYSTEMS :      Positives and Pertinent negatives as per HPI.     SURGICAL HISTORY     Past Surgical History:   Procedure Laterality Date    WRIST SURGERY  2009    pin in wrist left     CURRENTMEDICATIONS       Discharge Medication List as of 7/13/2024  4:06 PM        CONTINUE these medications which have NOT CHANGED    Details   fluPHENAZine HCl (PROLIXIN) 5 MG tablet Take 1 tablet by mouth every 12 hours, Disp-30 tablet, R-2Normal      traZODone (DESYREL) 50 MG tablet Take 1 tablet by mouth nightly, Disp-15 tablet, R-3Normal           ALLERGIES     Shellfish-derived products and Shrimp flavor    FAMILYHISTORY       Family History   Family history unknown: Yes     SOCIAL HISTORY       Social History     Tobacco Use    Smoking status: Every Day     Types: Cigarettes    Smokeless tobacco: Never   Vaping Use    Vaping Use: Every day   Substance Use Topics    Alcohol use: Not Currently    Drug use: Yes          DISPOSITION Decision To Discharge 07/13/2024 03:50:33 PM    DISPOSITION  Disposition: Discharge to rehab  Patient condition is stable    7/13/24, 3:16 PM EDT.    Nidhi Joe, PGY-2  Emergency Medicine    PATIENT REFERRED TO:  Dex Alonso MD  [address] [facility] [address]  [phone]    Call in 1 day  hospital follow up for pilonidal cyst    DISCHARGE MEDICATIONS:  Discharge Medication List as of 7/13/2024  4:06 PM        START taking these medications    Details   ibuprofen (ADVIL;MOTRIN) 800 MG tablet Take 1 tablet by mouth every 8 hours as needed for Pain, Disp-21 tablet, R-0Print           DISCONTINUED MEDICATIONS:  Discharge Medication List as of 7/13/2024  4:06 PM        STOP taking these medications       risperiDONE (RISPERDAL PO) Comments:   Reason for Stopping:              (Please note that portions of this note were completed with a voice recognition program.  Efforts were made to edit the dictations but occasionally words are mis-transcribed.)    Nidhi Joe MD (electronically signed)

## 2024-08-06 NOTE — ED NOTES
Chart to ER Zone 2 Medical Staff & Lab notified of need for blood to be drawn.      Mac Clemons RN  03/16/20 1839 no

## 2025-01-07 ENCOUNTER — HOSPITAL ENCOUNTER (EMERGENCY)
Facility: HOSPITAL | Age: 35
Discharge: PSYCHIATRIC OR SUBSTANCE USE FACILITY | End: 2025-01-08
Attending: EMERGENCY MEDICINE
Payer: MEDICARE

## 2025-01-07 ENCOUNTER — APPOINTMENT (OUTPATIENT)
Dept: RADIOLOGY | Facility: HOSPITAL | Age: 35
End: 2025-01-07
Payer: MEDICARE

## 2025-01-07 ENCOUNTER — APPOINTMENT (OUTPATIENT)
Dept: CARDIOLOGY | Facility: HOSPITAL | Age: 35
End: 2025-01-07
Payer: MEDICARE

## 2025-01-07 DIAGNOSIS — F19.10 POLYSUBSTANCE ABUSE (MULTI): ICD-10-CM

## 2025-01-07 DIAGNOSIS — F22 PARANOIA (MULTI): ICD-10-CM

## 2025-01-07 DIAGNOSIS — F99 PSYCHIATRIC PROBLEM: Primary | ICD-10-CM

## 2025-01-07 DIAGNOSIS — R93.89 ABNORMAL CXR: ICD-10-CM

## 2025-01-07 LAB
ALBUMIN SERPL BCP-MCNC: 4.9 G/DL (ref 3.4–5)
ALP SERPL-CCNC: 86 U/L (ref 33–120)
ALT SERPL W P-5'-P-CCNC: 25 U/L (ref 10–52)
AMPHETAMINES UR QL SCN: ABNORMAL
ANION GAP SERPL CALC-SCNC: 12 MMOL/L (ref 10–20)
APAP SERPL-MCNC: <10 UG/ML
APPEARANCE UR: CLEAR
AST SERPL W P-5'-P-CCNC: 24 U/L (ref 9–39)
ATRIAL RATE: 99 BPM
BARBITURATES UR QL SCN: ABNORMAL
BASOPHILS # BLD AUTO: 0.07 X10*3/UL (ref 0–0.1)
BASOPHILS NFR BLD AUTO: 0.5 %
BENZODIAZ UR QL SCN: ABNORMAL
BILIRUB SERPL-MCNC: 0.7 MG/DL (ref 0–1.2)
BILIRUB UR STRIP.AUTO-MCNC: NEGATIVE MG/DL
BUN SERPL-MCNC: 26 MG/DL (ref 6–23)
BZE UR QL SCN: ABNORMAL
CALCIUM SERPL-MCNC: 9.7 MG/DL (ref 8.6–10.3)
CANNABINOIDS UR QL SCN: ABNORMAL
CHLORIDE SERPL-SCNC: 103 MMOL/L (ref 98–107)
CK SERPL-CCNC: 641 U/L (ref 0–325)
CO2 SERPL-SCNC: 28 MMOL/L (ref 21–32)
COLOR UR: YELLOW
CREAT SERPL-MCNC: 1.03 MG/DL (ref 0.5–1.3)
EGFRCR SERPLBLD CKD-EPI 2021: >90 ML/MIN/1.73M*2
EOSINOPHIL # BLD AUTO: 0.03 X10*3/UL (ref 0–0.7)
EOSINOPHIL NFR BLD AUTO: 0.2 %
ERYTHROCYTE [DISTWIDTH] IN BLOOD BY AUTOMATED COUNT: 12.2 % (ref 11.5–14.5)
ETHANOL SERPL-MCNC: <10 MG/DL
FENTANYL+NORFENTANYL UR QL SCN: ABNORMAL
FLUAV RNA RESP QL NAA+PROBE: NOT DETECTED
FLUBV RNA RESP QL NAA+PROBE: NOT DETECTED
GLUCOSE SERPL-MCNC: 81 MG/DL (ref 74–99)
GLUCOSE UR STRIP.AUTO-MCNC: NORMAL MG/DL
HCT VFR BLD AUTO: 41.7 % (ref 41–52)
HGB BLD-MCNC: 15.6 G/DL (ref 13.5–17.5)
IMM GRANULOCYTES # BLD AUTO: 0.03 X10*3/UL (ref 0–0.7)
IMM GRANULOCYTES NFR BLD AUTO: 0.2 % (ref 0–0.9)
KETONES UR STRIP.AUTO-MCNC: NEGATIVE MG/DL
LEUKOCYTE ESTERASE UR QL STRIP.AUTO: NEGATIVE
LYMPHOCYTES # BLD AUTO: 1.85 X10*3/UL (ref 1.2–4.8)
LYMPHOCYTES NFR BLD AUTO: 12.6 %
MCH RBC QN AUTO: 31.7 PG (ref 26–34)
MCHC RBC AUTO-ENTMCNC: 37.4 G/DL (ref 32–36)
MCV RBC AUTO: 85 FL (ref 80–100)
METHADONE UR QL SCN: ABNORMAL
MONOCYTES # BLD AUTO: 1.65 X10*3/UL (ref 0.1–1)
MONOCYTES NFR BLD AUTO: 11.2 %
NEUTROPHILS # BLD AUTO: 11.07 X10*3/UL (ref 1.2–7.7)
NEUTROPHILS NFR BLD AUTO: 75.3 %
NITRITE UR QL STRIP.AUTO: NEGATIVE
NRBC BLD-RTO: 0 /100 WBCS (ref 0–0)
OPIATES UR QL SCN: ABNORMAL
OXYCODONE+OXYMORPHONE UR QL SCN: ABNORMAL
P AXIS: 71 DEGREES
P OFFSET: 198 MS
P ONSET: 148 MS
PCP UR QL SCN: ABNORMAL
PH UR STRIP.AUTO: 5.5 [PH]
PLATELET # BLD AUTO: 202 X10*3/UL (ref 150–450)
POTASSIUM SERPL-SCNC: 4 MMOL/L (ref 3.5–5.3)
PR INTERVAL: 138 MS
PROT SERPL-MCNC: 7.7 G/DL (ref 6.4–8.2)
PROT UR STRIP.AUTO-MCNC: NEGATIVE MG/DL
Q ONSET: 217 MS
QRS COUNT: 16 BEATS
QRS DURATION: 92 MS
QT INTERVAL: 362 MS
QTC CALCULATION(BAZETT): 464 MS
QTC FREDERICIA: 427 MS
R AXIS: 82 DEGREES
RBC # BLD AUTO: 4.92 X10*6/UL (ref 4.5–5.9)
RBC # UR STRIP.AUTO: NEGATIVE /UL
SALICYLATES SERPL-MCNC: <3 MG/DL
SARS-COV-2 RNA RESP QL NAA+PROBE: NOT DETECTED
SODIUM SERPL-SCNC: 139 MMOL/L (ref 136–145)
SP GR UR STRIP.AUTO: 1.02
T AXIS: 57 DEGREES
T OFFSET: 398 MS
UROBILINOGEN UR STRIP.AUTO-MCNC: NORMAL MG/DL
VENTRICULAR RATE: 99 BPM
WBC # BLD AUTO: 14.7 X10*3/UL (ref 4.4–11.3)

## 2025-01-07 PROCEDURE — 71045 X-RAY EXAM CHEST 1 VIEW: CPT | Performed by: RADIOLOGY

## 2025-01-07 PROCEDURE — 36415 COLL VENOUS BLD VENIPUNCTURE: CPT | Performed by: EMERGENCY MEDICINE

## 2025-01-07 PROCEDURE — 85025 COMPLETE CBC W/AUTO DIFF WBC: CPT | Performed by: EMERGENCY MEDICINE

## 2025-01-07 PROCEDURE — 80307 DRUG TEST PRSMV CHEM ANLYZR: CPT | Performed by: EMERGENCY MEDICINE

## 2025-01-07 PROCEDURE — 99285 EMERGENCY DEPT VISIT HI MDM: CPT | Mod: 25 | Performed by: EMERGENCY MEDICINE

## 2025-01-07 PROCEDURE — 71250 CT THORAX DX C-: CPT | Performed by: SURGERY

## 2025-01-07 PROCEDURE — 96372 THER/PROPH/DIAG INJ SC/IM: CPT | Performed by: STUDENT IN AN ORGANIZED HEALTH CARE EDUCATION/TRAINING PROGRAM

## 2025-01-07 PROCEDURE — 80053 COMPREHEN METABOLIC PANEL: CPT | Performed by: EMERGENCY MEDICINE

## 2025-01-07 PROCEDURE — 87636 SARSCOV2 & INF A&B AMP PRB: CPT | Performed by: EMERGENCY MEDICINE

## 2025-01-07 PROCEDURE — 80143 DRUG ASSAY ACETAMINOPHEN: CPT | Performed by: EMERGENCY MEDICINE

## 2025-01-07 PROCEDURE — 2500000001 HC RX 250 WO HCPCS SELF ADMINISTERED DRUGS (ALT 637 FOR MEDICARE OP): Performed by: STUDENT IN AN ORGANIZED HEALTH CARE EDUCATION/TRAINING PROGRAM

## 2025-01-07 PROCEDURE — 2500000004 HC RX 250 GENERAL PHARMACY W/ HCPCS (ALT 636 FOR OP/ED): Performed by: EMERGENCY MEDICINE

## 2025-01-07 PROCEDURE — 71250 CT THORAX DX C-: CPT

## 2025-01-07 PROCEDURE — 71045 X-RAY EXAM CHEST 1 VIEW: CPT

## 2025-01-07 PROCEDURE — 2500000004 HC RX 250 GENERAL PHARMACY W/ HCPCS (ALT 636 FOR OP/ED): Performed by: STUDENT IN AN ORGANIZED HEALTH CARE EDUCATION/TRAINING PROGRAM

## 2025-01-07 PROCEDURE — 81003 URINALYSIS AUTO W/O SCOPE: CPT | Performed by: EMERGENCY MEDICINE

## 2025-01-07 PROCEDURE — 96360 HYDRATION IV INFUSION INIT: CPT

## 2025-01-07 PROCEDURE — 82550 ASSAY OF CK (CPK): CPT | Performed by: EMERGENCY MEDICINE

## 2025-01-07 PROCEDURE — 93005 ELECTROCARDIOGRAM TRACING: CPT

## 2025-01-07 RX ORDER — MULTIVIT-MIN/IRON FUM/FOLIC AC 7.5 MG-4
1 TABLET ORAL DAILY
Status: DISCONTINUED | OUTPATIENT
Start: 2025-01-07 | End: 2025-01-08 | Stop reason: HOSPADM

## 2025-01-07 RX ORDER — FOLIC ACID 1 MG/1
1 TABLET ORAL DAILY
Status: DISCONTINUED | OUTPATIENT
Start: 2025-01-07 | End: 2025-01-08 | Stop reason: HOSPADM

## 2025-01-07 RX ORDER — LANOLIN ALCOHOL/MO/W.PET/CERES
100 CREAM (GRAM) TOPICAL DAILY
Status: DISCONTINUED | OUTPATIENT
Start: 2025-01-07 | End: 2025-01-08 | Stop reason: HOSPADM

## 2025-01-07 RX ORDER — DIAZEPAM 5 MG/ML
10 INJECTION, SOLUTION INTRAMUSCULAR; INTRAVENOUS EVERY 2 HOUR PRN
Status: DISCONTINUED | OUTPATIENT
Start: 2025-01-07 | End: 2025-01-08 | Stop reason: HOSPADM

## 2025-01-07 RX ORDER — OLANZAPINE 10 MG/2ML
5 INJECTION, POWDER, FOR SOLUTION INTRAMUSCULAR ONCE
Status: COMPLETED | OUTPATIENT
Start: 2025-01-07 | End: 2025-01-07

## 2025-01-07 RX ADMIN — FOLIC ACID 1 MG: 1 TABLET ORAL at 17:56

## 2025-01-07 RX ADMIN — OLANZAPINE 5 MG: 10 INJECTION, POWDER, FOR SOLUTION INTRAMUSCULAR at 17:56

## 2025-01-07 RX ADMIN — Medication 100 MG: at 17:56

## 2025-01-07 RX ADMIN — SODIUM CHLORIDE 1000 ML: 9 INJECTION, SOLUTION INTRAVENOUS at 14:44

## 2025-01-07 RX ADMIN — Medication 1 TABLET: at 17:56

## 2025-01-07 SDOH — HEALTH STABILITY: MENTAL HEALTH: HAVE YOU EVER TRIED TO KILL YOURSELF?: YES

## 2025-01-07 SDOH — HEALTH STABILITY: MENTAL HEALTH: HAVE YOU EVER DONE ANYTHING, STARTED TO DO ANYTHING, OR PREPARED TO DO ANYTHING TO END YOUR LIFE?: YES

## 2025-01-07 SDOH — HEALTH STABILITY: MENTAL HEALTH: BEHAVIORAL HEALTH(WDL): EXCEPTIONS TO WDL

## 2025-01-07 SDOH — SOCIAL STABILITY: SOCIAL NETWORK: VISITOR BEHAVIORS: UNABLE TO ASSESS

## 2025-01-07 SDOH — HEALTH STABILITY: MENTAL HEALTH: SLEEP PATTERN: DISTURBED/INTERRUPTED SLEEP

## 2025-01-07 SDOH — HEALTH STABILITY: MENTAL HEALTH: IN THE PAST FEW WEEKS, HAVE YOU FELT THAT YOU OR YOUR FAMILY WOULD BE BETTER OFF IF YOU WERE DEAD?: NO

## 2025-01-07 SDOH — HEALTH STABILITY: MENTAL HEALTH: CONTENT: BLAMING OTHERS

## 2025-01-07 SDOH — HEALTH STABILITY: MENTAL HEALTH

## 2025-01-07 SDOH — HEALTH STABILITY: MENTAL HEALTH: NEEDS EXPRESSED: DENIES

## 2025-01-07 SDOH — HEALTH STABILITY: MENTAL HEALTH: SUICIDAL BEHAVIOR (LIFETIME): YES

## 2025-01-07 SDOH — HEALTH STABILITY: MENTAL HEALTH: HALLUCINATION: VISUAL

## 2025-01-07 SDOH — HEALTH STABILITY: MENTAL HEALTH: HAVE YOU ACTUALLY HAD ANY THOUGHTS OF KILLING YOURSELF?: NO

## 2025-01-07 SDOH — HEALTH STABILITY: MENTAL HEALTH: IN THE PAST FEW WEEKS, HAVE YOU WISHED YOU WERE DEAD?: NO

## 2025-01-07 SDOH — HEALTH STABILITY: MENTAL HEALTH: DELUSIONS: PARANOID

## 2025-01-07 SDOH — SOCIAL STABILITY: SOCIAL INSECURITY: FAMILY BEHAVIORS: UNABLE TO ASSESS

## 2025-01-07 SDOH — HEALTH STABILITY: MENTAL HEALTH: IN THE PAST WEEK, HAVE YOU BEEN HAVING THOUGHTS ABOUT KILLING YOURSELF?: NO

## 2025-01-07 SDOH — HEALTH STABILITY: MENTAL HEALTH: BEHAVIORS/MOOD: ANXIOUS;DELUSIONS;RESTLESS

## 2025-01-07 SDOH — HEALTH STABILITY: MENTAL HEALTH: SUICIDAL BEHAVIOR (3 MONTHS): NO

## 2025-01-07 SDOH — SOCIAL STABILITY: SOCIAL NETWORK: EMOTIONAL SUPPORT GIVEN: REASSURE

## 2025-01-07 SDOH — HEALTH STABILITY: MENTAL HEALTH: ARE YOU HAVING THOUGHTS OF KILLING YOURSELF RIGHT NOW?: NO

## 2025-01-07 SDOH — HEALTH STABILITY: MENTAL HEALTH: BEHAVIORS/MOOD: ANXIOUS;DELUSIONS;FLIGHT OF IDEAS;PARANOID

## 2025-01-07 SDOH — HEALTH STABILITY: MENTAL HEALTH: HOW DID YOU TRY TO KILL YOURSELF?: WOULD NOT SAY

## 2025-01-07 SDOH — HEALTH STABILITY: MENTAL HEALTH: NON-SPECIFIC ACTIVE SUICIDAL THOUGHTS (PAST 1 MONTH): NO

## 2025-01-07 SDOH — HEALTH STABILITY: MENTAL HEALTH: BEHAVIORS/MOOD: ANXIOUS;DELUSIONS

## 2025-01-07 SDOH — HEALTH STABILITY: MENTAL HEALTH: ANXIETY SYMPTOMS: GENERALIZED

## 2025-01-07 SDOH — HEALTH STABILITY: MENTAL HEALTH: FOR HIGH RISK PATIENTS: ALL INTERVENTIONS ABOVE, PLUS:;1:1 PATIENT OBSERVER AT ALL TIMES

## 2025-01-07 SDOH — HEALTH STABILITY: MENTAL HEALTH: HALLUCINATION: UNABLE TO ASSESS

## 2025-01-07 SDOH — HEALTH STABILITY: MENTAL HEALTH: WAS THIS WITHIN THE PAST THREE MONTHS?: NO

## 2025-01-07 SDOH — HEALTH STABILITY: MENTAL HEALTH
OTHER SUICIDE PRECAUTIONS INCLUDE: PATIENT PLACED IN AN EASILY OBSERVABLE ROOM WITH DOOR/CURTAIN REMAINING OPEN;PATIENT PLACED IN GOWN (SNAPS OR PAPER GOWNS PREFERRED) AND WANDED;REMAINING RISKS IDENTIFIED AND MITIGATED;PATIENT PLACED IN PSYCH SAFE ROOM (IF AVAILABLE);PROVIDER NOTIFIED;EL

## 2025-01-07 SDOH — HEALTH STABILITY: MENTAL HEALTH: CONTENT: BLAMING OTHERS;DELUSIONS

## 2025-01-07 SDOH — HEALTH STABILITY: MENTAL HEALTH: HOW DID YOU TRY TO KILL YOURSELF?: OVERDOSE

## 2025-01-07 SDOH — HEALTH STABILITY: MENTAL HEALTH: BEHAVIORS/MOOD: SLEEPING

## 2025-01-07 SDOH — HEALTH STABILITY: MENTAL HEALTH: WHEN DID YOU TRY TO KILL YOURSELF?: UNREPORTED

## 2025-01-07 SDOH — HEALTH STABILITY: MENTAL HEALTH: MOOD: DEPRESSED

## 2025-01-07 SDOH — HEALTH STABILITY: MENTAL HEALTH: WISH TO BE DEAD (PAST 1 MONTH): NO

## 2025-01-07 SDOH — HEALTH STABILITY: MENTAL HEALTH: DELUSIONS: GRANDEUR

## 2025-01-07 SDOH — HEALTH STABILITY: MENTAL HEALTH: DEPRESSION SYMPTOMS: IMPAIRED CONCENTRATION;INCREASED IRRITABILITY

## 2025-01-07 SDOH — HEALTH STABILITY: MENTAL HEALTH: SUICIDE ASSESSMENT:: ADULT (C-SSRS)

## 2025-01-07 SDOH — HEALTH STABILITY: MENTAL HEALTH: HAVE YOU WISHED YOU WERE DEAD OR WISHED YOU COULD GO TO SLEEP AND NOT WAKE UP?: NO

## 2025-01-07 SDOH — HEALTH STABILITY: MENTAL HEALTH: SUICIDE ASSESSMENT: ADULT (C-SSRS)

## 2025-01-07 SDOH — HEALTH STABILITY: MENTAL HEALTH
OTHER SUICIDE PRECAUTIONS INCLUDE: PATIENT PLACED IN AN EASILY OBSERVABLE ROOM WITH DOOR/CURTAIN REMAINING OPEN;PATIENT PLACED IN GOWN (SNAPS OR PAPER GOWNS PREFERRED) AND WANDED;REMAINING RISKS IDENTIFIED AND MITIGATED;PATIENT PLACED IN PSYCH SAFE ROOM (IF AVAILABLE);ELOPEMENT RISK IDENT

## 2025-01-07 SDOH — ECONOMIC STABILITY: GENERAL: FINANCIAL CONCERNS: UNABLE TO MEET BASIC NEEDS

## 2025-01-07 SDOH — ECONOMIC STABILITY: HOUSING INSECURITY: FEELS SAFE LIVING IN HOME: OTHER (COMMENT)

## 2025-01-07 SDOH — HEALTH STABILITY: MENTAL HEALTH: SUICIDAL BEHAVIOR (DESCRIPTION): OVERDOSE

## 2025-01-07 SDOH — HEALTH STABILITY: MENTAL HEALTH: COGNITION: APPROPRIATE JUDGEMENT

## 2025-01-07 SDOH — HEALTH STABILITY: MENTAL HEALTH: FOR HIGH RISK PATIENTS: ALL INTERVENTIONS ABOVE, PLUS:

## 2025-01-07 ASSESSMENT — LIFESTYLE VARIABLES
BLOOD PRESSURE: 136/98
VISUAL DISTURBANCES: NOT PRESENT
HEADACHE, FULLNESS IN HEAD: NOT PRESENT
PULSE: 106
ANXIETY: MILDLY ANXIOUS
ORIENTATION AND CLOUDING OF SENSORIUM: ORIENTED AND CAN DO SERIAL ADDITIONS
TOTAL SCORE: 6
AUDITORY DISTURBANCES: NOT PRESENT
TREMOR: 2
TOTAL SCORE: 0
ANXIETY: MILDLY ANXIOUS
HAVE PEOPLE ANNOYED YOU BY CRITICIZING YOUR DRINKING: NO
PAROXYSMAL SWEATS: BARELY PERCEPTIBLE SWEATING, PALMS MOIST
ORIENTATION AND CLOUDING OF SENSORIUM: CANNOT DO SERIAL ADDITIONS OR IS UNCERTAIN ABOUT DATE
AGITATION: SOMEWHAT MORE THAN NORMAL ACTIVITY
HAVE YOU EVER FELT YOU SHOULD CUT DOWN ON YOUR DRINKING: NO
AGITATION: NORMAL ACTIVITY
TREMOR: NOT VISIBLE, BUT CAN BE FELT FINGERTIP TO FINGERTIP
EVER HAD A DRINK FIRST THING IN THE MORNING TO STEADY YOUR NERVES TO GET RID OF A HANGOVER: NO
TOTAL SCORE: 2
HEADACHE, FULLNESS IN HEAD: NOT PRESENT
NAUSEA AND VOMITING: NO NAUSEA AND NO VOMITING
PAROXYSMAL SWEATS: NO SWEAT VISIBLE
VISUAL DISTURBANCES: NOT PRESENT
PRESCIPTION_ABUSE_PAST_12_MONTHS: NO
EVER FELT BAD OR GUILTY ABOUT YOUR DRINKING: NO
AUDITORY DISTURBANCES: NOT PRESENT
NAUSEA AND VOMITING: NO NAUSEA AND NO VOMITING
SUBSTANCE_ABUSE_PAST_12_MONTHS: YES

## 2025-01-07 ASSESSMENT — COLUMBIA-SUICIDE SEVERITY RATING SCALE - C-SSRS
2. HAVE YOU ACTUALLY HAD ANY THOUGHTS OF KILLING YOURSELF?: NO
1. IN THE PAST MONTH, HAVE YOU WISHED YOU WERE DEAD OR WISHED YOU COULD GO TO SLEEP AND NOT WAKE UP?: NO
6. HAVE YOU EVER DONE ANYTHING, STARTED TO DO ANYTHING, OR PREPARED TO DO ANYTHING TO END YOUR LIFE?: NO

## 2025-01-07 ASSESSMENT — PAIN - FUNCTIONAL ASSESSMENT: PAIN_FUNCTIONAL_ASSESSMENT: 0-10

## 2025-01-07 ASSESSMENT — PAIN SCALES - GENERAL
PAINLEVEL_OUTOF10: 0 - NO PAIN
PAINLEVEL_OUTOF10: 0 - NO PAIN

## 2025-01-07 NOTE — PROGRESS NOTES
"EPAT - Social Work Psychiatric Assessment    Arrival Details  Mode of Arrival: Ambulatory  Admission Source: Home  Admission Type: Voluntary  EPAT Assessment Start Date: 01/07/25  EPAT Assessment Start Time: 1400  Name of : [facility]    History of Present Illness  Admission Reason: Psychiatric Evaluation  HPI: Patient, Jovani Crane, is a 34 year old male with history of schizoaffective disorder, bipolar disorder, depression, anxiety, schizophrenia, and polysubstance use disorder (cocaine and marijuana). Patient presented to ED with complaint of psychiatric evaluation. Patient reportedly expressing increased paranoia of people following patient. Patient discussed having paranoia for several years but feeling worse today and discussed not feeling safe. Patient denying suicidal ideation and homicidal ideation with ED provider. EPAT consulted due to concern for psychiatric decompensation. Patient's chart, community record, provider note, triage note, labs, and C-SSRS score reviewed. Patient's chart shows history of mental health diagnoses, inpatient psychiatric hospitalizations, and EPAT assessments. Patient's most recent EPAT assessment noted in February of 2023 with recommendation for hospitalization and eventual placement at . Patient's C-SSRS scored at \"no risk\" in triage. Patient declined collateral contact during assessment and no numbers in chart.    SW Readmission Information   Readmission within 30 Days: No    Psychiatric Symptoms  Anxiety Symptoms: Generalized  Depression Symptoms: Impaired concentration, Increased irritability  Florina Symptoms: Flight of ideas, Pressured speech    Psychosis Symptoms  Hallucination Type: Visual, Voices commenting, Auditory  Delusion Type: Paranoid    Additional Symptoms - Adult  Generalized Anxiety Disorder: Difficult to control worry, Excessive anxiety/worry, Restlessness, Irritability  Obsessive Compulsive Disorder: No problems reported or " observed.  Panic Attack: No problems reported or observed.  Post Traumatic Stress Disorder: No problems reported or observed.  Delirium: No problems reported or observed.  Review of Symptoms Comments: Patient reported increased paranoia with fear that EPAT  has been following patient around and people have been following patient around due to schizoaffective disorder diagnosis. Patient discussed visual hallucinations of stars that put thoughts and images into patient's mind. Patient reported belief that the stars were a part of a secret show that everyone participates in. Patient denied active suicidal ideation and history of overdose attempt. Patient denied homicidal ideation.    Past Psychiatric History/Meds/Treatments  Past Psychiatric History: Patient has history of schizoaffective disorder versus schizophrenia, bipolar disorder, depression, anxiety, and polysubstance use disorder (cocaine and marijuana).  Past Psychiatric Meds/Treatments: Patient did not discuss current medications. Patient has history of inpatient psychiatric admission at  in 2023.  Past Violence/Victimization History: Unreported    Current Mental Health Contacts   Name/Phone Number: Unreported   Last Appointment Date: Unreported  Provider Name/Phone Number: Unreported  Provider Last Appointment Date: Unreported    Support System: Extended family    Living Arrangement: Homeless    Home Safety  Feels Safe Living in Home: Other (Comment) (Patient reportedly homeless)  Potentially Unsafe Housing Conditions: Unable to Assess  Home Safety : Patient reportedly homeless and did not discuss feelings of safety in the community.    Income Information  Employment Status for: Patient  Employment Status: Unemployed  Income Source: Unemployed  Current/Previous Occupation: Unable to Assess  Income/Expense Information: Expenses exceed income  Financial Concerns: Unable to Meet Basic Needs  Who Manages Finances if Patient  Unable: Unreported  Employment/ Finance Comments: Patient did not discuss employment or financial issues.    [place] Service/Education History  Current or Previous  Service: None   Experience: Other (Comment) (Unreported)  Education Level: Other (Comment) (Did not assess)  History of Learning Problems: No  History of School Behavior Problems: No  School History: Patient did not discuss school history or learning issues.    Social/Cultural History  Social History: Patient is a 34 year old  male with pale skin, brown hair, glassess, wearing hospital gear. Patient appeared poorly groomed and close to stated age.  Cultural Requests During Hospitalization: Unreported  Spiritual Requests During Hospitalization: Unreported  Important Activities: Social    Legal  Legal Considerations: Patient/ Family Ability to Make Healthcare Decisions  Assistance with Managing/Advocating Healthcare Needs: Other (Comment) (Unreported)  Criminal Activity/ Legal Involvement Pertinent to Current Situation/ Hospitalization: Unreported  Legal Concerns: Patient reported recently getting out of [employer] after several months. Patient did not discuss ongoing check-ins with probation/.  Legal Comments: Unreported    Drug Screening  Have you used any substances (canabis, cocaine, heroin, hallucinogens, inhalants, etc.) in the past 12 months?: Yes  Have you used any prescription drugs other than prescribed in the past 12 months?: No  Is a toxicology screen needed?: Yes    Stage of Change  Stage of Change: Precontemplation  History of Treatment: Sober living, AA/NA meetings, Inpatient  Type of Treatment Offered: Inpatient  Treatment Offered: Accepted  Duration of Substance Use: Unreported  Frequency of Substance Use: Daily use of Marijuana of about 3 grams per day.  Age of First Substance Use: Unreported    Behavioral Health  Behavioral Health(WDL): Exceptions to WDL  Behaviors/Mood: Anxious, Guarded, Hallucinations,  Paranoid  Affect: Inconsistent with thought content  Parent/Guardian/Significant Other Involvement: No involvement  Family Behaviors: Unable to assess  Visitor Behaviors: Unable to assess  Needs Expressed: Emotional  Emotional Support Given: Reassure    Orientation  Orientation Level: Oriented X4    General Appearance  Motor Activity: Restlessness  Speech Pattern: Pressured  General Attitude: Guarded, Cooperative  Appearance/Hygiene: Poor hygiene, Disheveled    Thought Process  Coherency: Circumstantial, Flight of ideas  Content: Blaming others, Preoccupation  Delusions: Paranoid  Perception: Hallucinations  Hallucination: Visual  Judgment/Insight: Poor  Confusion: None  Cognition: Poor judgement, Poor safety awareness    Sleep Pattern  Sleep Pattern: Disturbed/interrupted sleep, Insomnia    Risk Factors  Self Harm/Suicidal Ideation Plan: Patient denying active suicidal ideation.  Previous Self Harm/Suicidal Plans: Patient reported history of suicide attempt via overdose. No timeline for overdose discussed.  Risk Factors: Male, Major mental illness, Lower socioeconomic status, Substance abuse  Description of Thoughts/Ideas Leaving Unit Now: Patient requesting help with sober living.    Violence Risk Assessment  Assessment of Violence: None noted  Thoughts of Harm to Others: No    Ability to Assess Risk Screen  Risk Screen - Ability to Assess: Able to be screened  Ask Suicide-Screening Questions  1. In the past few weeks, have you wished you were dead?: No  2. In the past few weeks, have you felt that you or your family would be better off if you were dead?: No  3. In the past week, have you been having thoughts about killing yourself?: No  4. Have you ever tried to kill yourself?: Yes  How did you try to kill yourself?: Overdose  When did you try to kill yourself?: Unreported  5. Are you having thoughts of killing yourself right now?: No  Calculated Risk Score: Potential Risk  [facility] Suicide Severity Rating Scale  (Screener/Recent Self-Report)  1. Wish to be Dead (Past 1 Month): No  2. Non-Specific Active Suicidal Thoughts (Past 1 Month): No  6. Suicidal Behavior (Lifetime): Yes  6. Suicidal Behavior (3 Months): No  6. Suicidal Behavior (Description): Overdose  Calculated C-SSRS Risk Score (Lifetime/Recent): Moderate Risk  Step 1: Risk Factors  Current & Past Psychiatric Dx: Psychotic disorder, Alcohol/substance abuse disorders  Presenting Symptoms: Anxiety and/or panic, Psychosis  Family History: Other (Comment) (Unreported)  Precipitants/Stressors: Substance intoxication or withdrawal, Pending incarceration or homelessness, Inadequate social supports, Social isolation  Change in Treatment: Non-compliant or not receiving treatment  Access to Lethal Methods : No  Step 2: Protective Factors   Protective Factors Internal: Frustration tolerance  Protective Factors External: Supportive social network or family or friends  Step 3: Suicidal Ideation Intensity  Most Severe Suicidal Ideation Identified: Patient denying active suicidal ideation.  How Many Times Have You Had These Thoughts: Less than once a week  When You Have the Thoughts How Long do They Last : Fleeting - few seconds or minutes  Could/Can You Stop Thinking About Killing Yourself or Wanting to Die if You Want to: Easily able to control thoughts  Are There Things - Anyone or Anything - That Stopped You From Wanting to Die or Acting on: Deterrents definitely stopped you from attempting suicide  What Sort of Reasons Did You Have For Thinking About Wanting to Die or Killing Yourself: Completely to get attention, revenge, or a reaction from others  Total Score: 5  Step 5: Documentation  Risk Level: Low suicide risk    Psychiatric Impression and Plan of Care    Assessment and Plan: Patient, Jovani Crane, is a 34 year old male with history of schizoaffective disorder verus schizophrenia, bipolar disorder, anxiety, depression, and polysubstance use disorder (cocaine and  "marijuana). Patient presented to ED with complaint of psychiatric evaluation. Patient discussed reson for ED visit stating \"This is out of control. You're all driving me nuts. My mental health and physical health is weird. I see stars. They put thoughts and images in my head. They are a part of a secret show that everyone is in.\" Patient appeared to have disorganized and tangential thought process during EPAT assessment. Patient initially unable to describe symptoms needing addressed in ED. After being asked if patient was experiencing suicidal ideation, patient answered with information about seeing the stars. Patient appeard to be frustrated at times with questions being asked by EPAT. Patient expressed feeling as if the ED was embarrasing patient and making patient feel idiotic. Patient discussed some paranoid thoughts of EPAT  following patient around due to prior schizoaffective disorder diagnosis. Of note, this is EPAT assessors first meeting with patient. Patient denied active suicidal ideation. Patient reported history of suicide attempt via overdose with no timeline of attempt discussed. Patient's C-SSRS scored at low risk due to no active ideation reported. Patient's overall lifetime risk remains elevated at moderate risk due to history of suicide attempt. Patient denied homicidal ideation. Patient did not appear internally stimulated but voiced distress related to visual hallucinations due to hallucinations putting thoughts into patient's head. Patient discussed no recent change to appetite. Patient reported recent change to sleeping habits. Patient reported feeling scared to go to sleep recently. Patient did not discuss hours awake. Patient discussed daily use of marijuana. Patient discussed use of around 3  grams daily and use prior to ED arrival. Patient asked if patient felt intoxicated during assessment for which patient replied \"I don't know\". Patient voiced desire to connect with sober " living. Due to patient's increasing irritablity and low distress tolerance, EPAT  unable to gague interest in talking with [facility] for sober support. Patient did not discuss any recent use of medications for symptom management. Patient denied having current outpatient follow up for psychiatry or mental health therapy. Patient able to identify supportive person in patient's brother. Patient unable to identify reason for living. Patient considered low risk of harm to self and others but currently acutely disabled related to mental health diagnosis. Patient currently meets criteria for inpatient psychiatric hospitalization and recommended for admission. Plan for care discussed with and approved by Dr. Sánchez.     Specific Resources Provided to Patient: Patient recommended for inpatient hospitalization.  CM Notified: -  PHP/IOP Recommended: Not at this time  Specific Information Provided for PHP/IOP: None at this time.  Plan Comments: Diagnosis: Unspecified mood disorder with possible substance induced psychosis symptomology.    Outcome/Disposition  Patient's Perception of Outcome Achieved: Patient voicing desire for sober support connection.  Assessment, Recommendations and Risk Level Reviewed with: Dr. Sánchez  Contact Name: None provided  Contact Number(s): -  Contact Relationship: -  EPAT Assessment Completed Date: 01/07/25  EPAT Assessment Completed Time: 2229  Patient Disposition:  Adult Inpatient Psych

## 2025-01-07 NOTE — ED PROVIDER NOTES
" 34-year-old male presents emergency department with chief complaint of wanting help with his mental health.  Patient was here about 30 minutes ago and roomed, but then left without being seen.  At that time he stated he wanted help with substance abuse.  Patient tells me that he has been having thoughts that people are following him.  He states that these thoughts have increased recently and he does not know what to do.  Patient states that he has had these thoughts for \"years \", but they are much worse today.  He denies any thoughts of when to harm himself or others.  Patient states that he does not feel safe with these thoughts and is not sure what to do or where to go.  He reports marijuana use and tobacco use.  He denies any regular alcohol use or illicit drug use on my exam.  Patient denies fevers, coughing, congestion.  No chest pain or difficult breathing.  No abdominal pain, nausea, vomiting, dysuria, diarrhea, constipation, black or bloody stools.  Chart review shows history of polysubstance abuse, depression, pulmonary embolus, schizoaffective bipolar disorder, subdural hematoma, schizophrenia, and anxiety.  Patient is not on any anticoagulants.      History provided by:  Patient  History limited by:  Psychiatric disorder   used: No           ------------------------------------------------------------------------------------------------------------------------------------------    VS: As documented in the triage note and EMR flowsheet from this visit were reviewed.    Review of Systems  Review of systems is limited due to psychiatric disorder please see \[facility] [place] [facility]\""  Nursing notes reviewed and confirmed by me.  Chart review performed including medications, allergies, and medical, surgical, and family history  Visit Vitals  BP (!) 146/99 (BP Location: Left arm, Patient Position: Sitting)   Pulse 87   Temp 37.2 °C (99 °F) (Temporal)   Resp 16     Physical Exam  Vitals and nursing note " reviewed.   Constitutional:       General: He is not in acute distress.     Appearance: He is not ill-appearing.      Comments: Patient is disheveled in appearance   HENT:      Head: Normocephalic and atraumatic.      Right Ear: External ear normal.      Left Ear: External ear normal.      Nose: Nose normal. No congestion or rhinorrhea.      Mouth/Throat:      Mouth: Mucous membranes are dry.      Pharynx: No oropharyngeal exudate or posterior oropharyngeal erythema.   Eyes:      Extraocular Movements: Extraocular movements intact.      Conjunctiva/sclera: Conjunctivae normal.      Pupils: Pupils are equal, round, and reactive to light.   Cardiovascular:      Rate and Rhythm: Regular rhythm. Tachycardia present.      Pulses: Normal pulses.      Heart sounds: Normal heart sounds.   Pulmonary:      Effort: Pulmonary effort is normal. No respiratory distress.      Breath sounds: No stridor. No wheezing, rhonchi or rales.      Comments: Coarse breath sounds bilaterally  Abdominal:      General: There is no distension.      Palpations: Abdomen is soft.      Tenderness: There is no abdominal tenderness. There is no guarding or rebound.   Musculoskeletal:         General: No swelling or deformity. Normal range of motion.      Cervical back: Normal range of motion and neck supple. No rigidity.      Right lower leg: No edema.      Left lower leg: No edema.      Comments: No calf tenderness    Skin:     General: Skin is warm and dry.      Capillary Refill: Capillary refill takes less than 2 seconds.      Coloration: Skin is not jaundiced.      Findings: No rash.   Neurological:      General: No focal deficit present.      Mental Status: He is alert and oriented to person, place, and time.      Sensory: No sensory deficit.      Motor: No weakness.      Comments: Patient ambulates with steady gait.   Psychiatric:         Attention and Perception: He is inattentive.         Mood and Affect: Mood is anxious. Affect is flat.          Speech: Speech normal.         Behavior: Behavior is withdrawn.         Thought Content: Thought content is paranoid and delusional. Thought content does not include homicidal or suicidal ideation. Thought content does not include homicidal or suicidal plan.        No past medical history on file.   No past surgical history on file.   Social History     Socioeconomic History    Marital status: Single      ------------------------------------------------------------------------------------------------------------------------------------------  XR chest 1 view   Final Result   Limited study. Increased density now seen above the medial aspect of   the left hemidiaphragm, possibly focal infiltrates/atelectasis or   hernia, however mass can not be completely excluded. This could be   further evaluated with CT scan of the chest.        Signed by: Ac Zambrano 1/7/2025 3:10 PM   Dictation workstation:   [id number]      CT chest wo IV contrast    (Results Pending)      Labs Reviewed   CBC WITH AUTO DIFFERENTIAL - Abnormal       Result Value    WBC 14.7 (*)     nRBC 0.0      RBC 4.92      Hemoglobin 15.6      Hematocrit 41.7      MCV 85      MCH 31.7      MCHC 37.4 (*)     RDW 12.2      Platelets 202      Neutrophils % 75.3      Immature Granulocytes %, Automated 0.2      Lymphocytes % 12.6      Monocytes % 11.2      Eosinophils % 0.2      Basophils % 0.5      Neutrophils Absolute 11.07 (*)     Immature Granulocytes Absolute, Automated 0.03      Lymphocytes Absolute 1.85      Monocytes Absolute 1.65 (*)     Eosinophils Absolute 0.03      Basophils Absolute 0.07     COMPREHENSIVE METABOLIC PANEL - Abnormal    Glucose 81      Sodium 139      Potassium 4.0      Chloride 103      Bicarbonate 28      Anion Gap 12      Urea Nitrogen 26 (*)     Creatinine 1.03      eGFR >90      Calcium 9.7      Albumin 4.9      Alkaline Phosphatase 86      Total Protein 7.7      AST 24      Bilirubin, Total 0.7      ALT 25     DRUG  SCREEN,URINE - Abnormal    Amphetamine Screen, Urine Presumptive Positive (*)     Barbiturate Screen, Urine Presumptive Negative      Benzodiazepines Screen, Urine Presumptive Negative      Cannabinoid Screen, Urine Presumptive Positive (*)     Cocaine Metabolite Screen, Urine Presumptive Positive (*)     Fentanyl Screen, Urine Presumptive Negative      Opiate Screen, Urine Presumptive Negative      Oxycodone Screen, Urine Presumptive Negative      PCP Screen, Urine Presumptive Negative      Methadone Screen, Urine Presumptive Negative      Narrative:     Drug screen results are presumptive and should not be used to assess   compliance with prescribed medication. Contact the performing [facility] laboratory   to add-on definitive confirmatory testing if clinically indicated.    Toxicology screening results are reported qualitatively. The concentration must   be greater than or equal to the cutoff to be reported as positive. The concentration   at which the screening test can detect an individual drug or metabolite varies.   The absence of expected drug(s) and/or drug metabolite(s) may indicate non-compliance,   inappropriate timing of specimen collection relative to drug administration, poor drug   absorption, diluted/adulterated urine, or limitations of testing. For medical purposes   only; not valid for forensic use.    Interpretive questions should be directed to the laboratory medical directors.   CREATINE KINASE - Abnormal    Creatine Kinase 641 (*)    ACUTE TOXICOLOGY PANEL, BLOOD - Normal    Acetaminophen <10.0      Salicylate  <3      Alcohol <10     URINALYSIS WITH REFLEX CULTURE AND MICROSCOPIC - Normal    Color, Urine Yellow      Appearance, Urine Clear      Specific Gravity, Urine 1.024      pH, Urine 5.5      Protein, Urine NEGATIVE      Glucose, Urine Normal      Blood, Urine NEGATIVE      Ketones, Urine NEGATIVE      Bilirubin, Urine NEGATIVE      Urobilinogen, Urine Normal      Nitrite, Urine NEGATIVE       Leukocyte Esterase, Urine NEGATIVE     SARS-COV-2 AND INFLUENZA A/B PCR - Normal    Flu A Result Not Detected      Flu B Result Not Detected      Coronavirus 2019, PCR Not Detected      Narrative:     This assay has received FDA Emergency Use Authorization (EUA) and  is only authorized for the duration of time that circumstances exist to justify the authorization of the emergency use of in vitro diagnostic tests for the detection of SARS-CoV-2 virus and/or diagnosis of COVID-19 infection under section 564(b)(1) of the Act, 21 U.S.C. 360bbb-3(b)(1). Testing for SARS-CoV-2 is only recommended for patients who meet current clinical and/or epidemiological criteria as defined by federal, state, or local public health directives. This assay is an in vitro diagnostic nucleic acid amplification test for the qualitative detection of SARS-CoV-2, Influenza A, and Influenza B from nasopharyngeal specimens and has been validated for use at [facility]. Negative results do not preclude COVID-19 infections or Influenza A/B infections, and should not be used as the sole basis for diagnosis, treatment, or other management decisions. If Influenza A/B and RSV PCR results are negative, testing for Parainfluenza virus, Adenovirus and Metapneumovirus is routinely performed for [facility] pediatric oncology and intensive care inpatients, and is available on other patients by placing an add-on request.    URINALYSIS WITH REFLEX CULTURE AND MICROSCOPIC    Narrative:     The following orders were created for panel order Urinalysis with Reflex Culture and Microscopic.  Procedure                               Abnormality         Status                     ---------                               -----------         ------                     Urinalysis with Reflex C...[[id number]]  Normal              Final result               Extra Urine Gray Tube[[id number]]                            In process                   Please view  results for these tests on the individual orders.   EXTRA URINE GRAY TUBE        Medical Decision Making  EKG interpreted by ED physician: Normal sinus rhythm rate of 99.  IN, QRS, QTc intervals all within normal limits.  No significant ST elevations or depressions.  No significant Q waves.  Good R wave progression.  Normal axis.    34-year-old male presents emergency department with complaints of needing help with his mental health.  He states he feels like he is being followed.  He reports he has had these symptoms in the past, but they are worse today.  He reports he does not feel safe.  Given presenting complaints a thorough workup is obtained.  CMP shows no significant metabolic abnormality.  CBC shows leukocytosis and no significant anemia.  Patient was initially tachycardic meeting SIRS criteria.  UA does not show obvious findings of infection, COVID, and flu testing are negative.  Chest x-ray does not show obvious pneumonia, but does show density in the left hemidiaphragm.  CT is recommended for further evaluation of this.  At this time I do not suspect sepsis as there is no obvious bacterial source of his symptoms.  Urine drug screen is consistent with patient's polysubstance abuse.  CK is mildly elevated though patient does not have findings of rhabdomyolysis.  Toxicology panel is negative.  At the end of my shift patient is pending CT evaluation for further evaluation of abnormal x-ray.  In addition, he has been evaluated by [facility] who are recommending psychiatric placement once patient can be medically cleared.  Patient signed out to Dr. Proctor.  Patient reportedly declined CT on multiple occasions.  I discussed reasoning for getting CT given his abnormal chest x-ray.  He reports that he is agreeable to get the CT scan and is just nervous and would like something for his anxiety.  Patient given Zyprexa and awaiting CT scan.  He is also placed on [facility] protocol.       Diagnoses as of 01/07/25 7353    Psychiatric problem   Paranoia (Multi)   Polysubstance abuse (Multi)   Abnormal CXR      1. Psychiatric problem        2. Paranoia (Multi)        3. Polysubstance abuse (Multi)        4. Abnormal CXR           Procedures     This note was dictated using dragon software and may contain errors related to dictation interpretation errors.      Christian Sánchez,   01/07/25 2564

## 2025-01-07 NOTE — PROGRESS NOTES
Emergency Medicine Transition of Care Note.    I received Jovani Crane in signout from Dr. Sánchez.  Please see the previous ED provider note for all HPI, PE and MDM up to the time of signout at 1710. This is in addition to the primary record.    In brief Jovani Crane is an 34 y.o. male presenting for   Chief Complaint   Patient presents with    Psychiatric Evaluation     At the time of signout we were awaiting: CT scan rslt, med clearance, EPAT placement.    Diagnoses as of 01/07/25 4538   Psychiatric problem   Paranoia (Multi)   Polysubstance abuse (Multi)   Abnormal CXR       Medical Decision Making  Patient CT scan was negative for acute findings.  Patient has remained clinically stable, no acute events or my time with him in the emergency department.  Patient is medically cleared.  Patient was accepted to [facility].    Disclaimer: This note was dictated using speech recognition software. Minor errors in transcription may be present. Please call if questions.     Chris Proctor MD  [facility]  Contact on [employer]            Final diagnoses:   None           Procedure  Procedures    Chris Proctor MD

## 2025-01-08 VITALS
SYSTOLIC BLOOD PRESSURE: 125 MMHG | WEIGHT: 250 LBS | BODY MASS INDEX: 33.86 KG/M2 | OXYGEN SATURATION: 96 % | TEMPERATURE: 97.7 F | HEIGHT: 72 IN | RESPIRATION RATE: 17 BRPM | DIASTOLIC BLOOD PRESSURE: 86 MMHG | HEART RATE: 84 BPM

## 2025-01-08 LAB — HOLD SPECIMEN: NORMAL

## 2025-01-08 PROCEDURE — 2500000001 HC RX 250 WO HCPCS SELF ADMINISTERED DRUGS (ALT 637 FOR MEDICARE OP): Performed by: INTERNAL MEDICINE

## 2025-01-08 RX ORDER — MIDAZOLAM HYDROCHLORIDE 5 MG/ML
5 INJECTION INTRAMUSCULAR; INTRAVENOUS ONCE AS NEEDED
Status: DISCONTINUED | OUTPATIENT
Start: 2025-01-08 | End: 2025-01-08 | Stop reason: HOSPADM

## 2025-01-08 RX ORDER — LORAZEPAM 1 MG/1
2 TABLET ORAL ONCE
Status: COMPLETED | OUTPATIENT
Start: 2025-01-08 | End: 2025-01-08

## 2025-01-08 RX ADMIN — LORAZEPAM 2 MG: 1 TABLET ORAL at 05:05

## 2025-01-08 SDOH — HEALTH STABILITY: MENTAL HEALTH: HAVE YOU WISHED YOU WERE DEAD OR WISHED YOU COULD GO TO SLEEP AND NOT WAKE UP?: YES

## 2025-01-08 SDOH — HEALTH STABILITY: MENTAL HEALTH: MOOD: DEPRESSED

## 2025-01-08 SDOH — HEALTH STABILITY: MENTAL HEALTH: HAVE YOU BEEN THINKING ABOUT HOW YOU MIGHT DO THIS?: NO

## 2025-01-08 SDOH — HEALTH STABILITY: MENTAL HEALTH: HAVE YOU EVER TRIED TO KILL YOURSELF?: YES

## 2025-01-08 SDOH — HEALTH STABILITY: PHYSICAL HEALTH: PATIENT ACTIVITY: AWAKE

## 2025-01-08 SDOH — HEALTH STABILITY: MENTAL HEALTH: BEHAVIORS/MOOD: CALM

## 2025-01-08 SDOH — HEALTH STABILITY: MENTAL HEALTH: DELUSIONS: OTHER (COMMENT)

## 2025-01-08 SDOH — HEALTH STABILITY: MENTAL HEALTH: HAVE YOU HAD THESE THOUGHTS AND HAD SOME INTENTION OF ACTING ON THEM?: YES

## 2025-01-08 SDOH — HEALTH STABILITY: MENTAL HEALTH: ARE YOU HAVING THOUGHTS OF KILLING YOURSELF RIGHT NOW?: NO

## 2025-01-08 SDOH — HEALTH STABILITY: MENTAL HEALTH: HOW DID YOU TRY TO KILL YOURSELF?: DRUGS

## 2025-01-08 SDOH — HEALTH STABILITY: MENTAL HEALTH
HAVE YOU STARTED TO WORK OUT OR WORKED OUT THE DETAILS OF HOW TO KILL YOURSELF? DO YOU INTENT TO CARRY OUT THIS PLAN?: YES

## 2025-01-08 SDOH — SOCIAL STABILITY: SOCIAL NETWORK: VISITOR BEHAVIORS: UNABLE TO ASSESS

## 2025-01-08 SDOH — HEALTH STABILITY: MENTAL HEALTH: IN THE PAST FEW WEEKS, HAVE YOU FELT THAT YOU OR YOUR FAMILY WOULD BE BETTER OFF IF YOU WERE DEAD?: NO

## 2025-01-08 SDOH — HEALTH STABILITY: MENTAL HEALTH

## 2025-01-08 SDOH — HEALTH STABILITY: MENTAL HEALTH
OTHER SUICIDE PRECAUTIONS INCLUDE: PATIENT PLACED IN GOWN (SNAPS OR PAPER GOWNS PREFERRED) AND WANDED;REMAINING RISKS IDENTIFIED AND MITIGATED;PATIENT PLACED IN PSYCH SAFE ROOM (IF AVAILABLE);PATIENT PLACED IN AN EASILY OBSERVABLE ROOM WITH DOOR/CURTAIN REMAINING OPEN;ELOPEMENT RISK IDENT

## 2025-01-08 SDOH — HEALTH STABILITY: MENTAL HEALTH: CONTENT: UNREMARKABLE

## 2025-01-08 SDOH — HEALTH STABILITY: MENTAL HEALTH: BEHAVIORS/MOOD: SLEEPING

## 2025-01-08 SDOH — HEALTH STABILITY: PHYSICAL HEALTH: PATIENT ACTIVITY: SLEEPING

## 2025-01-08 SDOH — HEALTH STABILITY: MENTAL HEALTH: IN THE PAST WEEK, HAVE YOU BEEN HAVING THOUGHTS ABOUT KILLING YOURSELF?: NO

## 2025-01-08 SDOH — HEALTH STABILITY: MENTAL HEALTH: RISK OF SUICIDE: HIGH RISK

## 2025-01-08 SDOH — HEALTH STABILITY: MENTAL HEALTH: IN THE PAST FEW WEEKS, HAVE YOU WISHED YOU WERE DEAD?: NO

## 2025-01-08 SDOH — HEALTH STABILITY: MENTAL HEALTH: SUICIDE ASSESSMENT: ADULT (C-SSRS)

## 2025-01-08 SDOH — HEALTH STABILITY: MENTAL HEALTH: HAVE YOU EVER DONE ANYTHING, STARTED TO DO ANYTHING, OR PREPARED TO DO ANYTHING TO END YOUR LIFE?: YES

## 2025-01-08 SDOH — HEALTH STABILITY: MENTAL HEALTH: HAVE YOU ACTUALLY HAD ANY THOUGHTS OF KILLING YOURSELF?: YES

## 2025-01-08 SDOH — HEALTH STABILITY: MENTAL HEALTH: NEEDS EXPRESSED: DENIES

## 2025-01-08 SDOH — HEALTH STABILITY: MENTAL HEALTH: SLEEP PATTERN: UNABLE TO ASSESS

## 2025-01-08 SDOH — HEALTH STABILITY: MENTAL HEALTH: BEHAVIORAL HEALTH(WDL): EXCEPTIONS TO WDL

## 2025-01-08 SDOH — SOCIAL STABILITY: SOCIAL INSECURITY: FAMILY BEHAVIORS: UNABLE TO ASSESS

## 2025-01-08 SDOH — SOCIAL STABILITY: SOCIAL NETWORK: EMOTIONAL SUPPORT GIVEN: REASSURE

## 2025-01-08 SDOH — HEALTH STABILITY: MENTAL HEALTH
HAVE YOU STARTED TO WORK OUT OR WORKED OUT THE DETAILS OF HOW TO KILL YOURSELF? DO YOU INTENT TO CARRY OUT THIS PLAN?: NO

## 2025-01-08 SDOH — HEALTH STABILITY: MENTAL HEALTH: HAVE YOU BEEN THINKING ABOUT HOW YOU MIGHT DO THIS?: YES

## 2025-01-08 SDOH — SOCIAL STABILITY: SOCIAL NETWORK: PARENT/GUARDIAN/SIGNIFICANT OTHER INVOLVEMENT: NO INVOLVEMENT

## 2025-01-08 SDOH — HEALTH STABILITY: MENTAL HEALTH: WAS THIS WITHIN THE PAST THREE MONTHS?: NO

## 2025-01-08 NOTE — SIGNIFICANT EVENT
Application for Emergency Admission      Ready for Transfer?  Is the patient medically cleared for transfer to inpatient psychiatry: Yes  Has the patient been accepted to an inpatient psychiatric hospital: Yes    Application for Emergency Admission  IN ACCORDANCE WITH [place]122[place]  The Chief Clinical Officer of: [facility],  1/8/2025 .12:10 AM    Reason for Hospitalization  The undersigned has reason to believe that: Jovani Crane Is a mentally ill person subject to hospitalization by court order under division B Section 5122.01 of the Revised Code, i.e., this person:    1.Yes  Represents a substantial risk of physical harm to self as manifested by evidence of threats of, or attempts at, suicide or serious self-inflicted bodily harm    2.No Represents a substantial risk of physical harm to others as manifested by evidence of recent homicidal or other violent behavior, evidence of recent threats that place another in reasonable fear of violent behavior and serious physical harm, or other evidence of present dangerousness    3.No Represents a substantial and immediate risk of serious physical impairment or injury to self as manifested by  evidence that the person is unable to provide for and is not providing for the person's basic physical needs because of the person's mental illness and that appropriate provision for those needs cannot be made  immediately available in the community    4.No Would benefit from treatment in a hospital for his mental illness and is in need of such treatment as manifested by evidence of behavior that creates a grave and imminent risk to substantial rights of others or  himself.    5.No Would benefit from treatment as manifested by evidence of behavior that indicates all of the following:       (a) The person is unlikely to survive safely in the community without supervision, based on a clinical determination.       (b) The person has a history of lack of compliance with  treatment for mental illness and one of the following applies:      (i) At least twice within the thirty-six months prior to the filing of an affidavit seeking court-ordered treatment of the person under section 5122.111 of the Revised Code, the lack of compliance has been a significant factor in necessitating hospitalization in a hospital or receipt of services in a forensic or other mental health unit of a correctional facility, provided that the thirty-six-month period shall be extended by the length of any hospitalization or incarceration of the person that occurred within the thirty-six-month period.      (ii) Within the forty-eight months prior to the filing of an affidavit seeking court-ordered treatment of the person under section 5122.111 of the Revised Code, the lack of compliance resulted in one or more acts of serious violent behavior toward self or others or threats of, or attempts at, serious physical harm to self or others, provided that the forty-eight-month period shall be extended by the length of any hospitalization or incarceration of the person that occurred within the forty-eight-month period.      (c) The person, as a result of mental illness, is unlikely to voluntarily participate in necessary treatment.       (d) In view of the person's treatment history and current behavior, the person is in need of treatment in order to prevent a relapse or deterioration that would be likely to result in substantial risk of serious harm to the person or others.    (e) Represents a substantial risk of physical harm to self or others if allowed to remain at liberty pending examination.    Therefore, it is requested that said person be admitted to the above named facility.    STATEMENT OF BELIEF    Must be filled out by one of the following: a psychiatrist, licensed physician, licensed clinical psychologist, health or ,  or .  (Statement shall include the circumstances under  which the individual was taken into custody and the reason for the person's belief that hospitalization is necessary. The statement shall also include a reference to efforts made to secure the individual's property at his residence if he was taken into custody there. Every reasonable and appropriate effort should be made to take this person into custody in the least conspicuous manner possible.)    Suicidal    Chris Proctor MD 1/8/2025     _____________________________________________________________   Place of Employment: [facility]    STATEMENT OF OBSERVATION BY PSYCHIATRIST, LICENSED PHYSICIAN, OR LICENSED CLINICAL PSYCHOLOGIST, IF APPLICABLE    Place of Observation (e.g., [facility] mental health center, general hospital, office, emergency facility)  (If applicable, please complete)    Chris Proctor MD 1/8/2025    _____________________________________________________________

## 2025-01-08 NOTE — PROGRESS NOTES
Emergency Medicine Transition of Care Note.    I received Jovani Crane in signout from Dr. Proctor.  Please see the previous ED provider note for all HPI, PE and MDM up to the time of signout at 00:30. This is in addition to the primary record.    In brief Jovani Crane is an 34 y.o. male presenting for   Chief Complaint   Patient presents with    Psychiatric Evaluation     At the time of signout we were awaiting: Transfer to [facility].     Diagnoses as of 01/08/25 0520   Psychiatric problem   Paranoia (Multi)   Polysubstance abuse (Multi)   Abnormal CXR       Medical Decision Making  Patient accepted for admission at [facility].        Final diagnoses:   [F99] Psychiatric problem   [F22] Paranoia (Multi)   [F19.10] Polysubstance abuse (Multi)   [R93.89] Abnormal CXR           Procedure  Procedures    Rober Shultz DO

## 2025-01-16 LAB
ATRIAL RATE: 99 BPM
P AXIS: 71 DEGREES
P OFFSET: 198 MS
P ONSET: 148 MS
PR INTERVAL: 138 MS
Q ONSET: 217 MS
QRS COUNT: 16 BEATS
QRS DURATION: 92 MS
QT INTERVAL: 362 MS
QTC CALCULATION(BAZETT): 464 MS
QTC FREDERICIA: 427 MS
R AXIS: 82 DEGREES
T AXIS: 57 DEGREES
T OFFSET: 398 MS
VENTRICULAR RATE: 99 BPM